# Patient Record
Sex: FEMALE | Race: WHITE | NOT HISPANIC OR LATINO | Employment: OTHER | URBAN - METROPOLITAN AREA
[De-identification: names, ages, dates, MRNs, and addresses within clinical notes are randomized per-mention and may not be internally consistent; named-entity substitution may affect disease eponyms.]

---

## 2020-09-17 ENCOUNTER — TELEPHONE (OUTPATIENT)
Dept: CARDIOLOGY | Facility: CLINIC | Age: 65
End: 2020-09-17

## 2020-09-17 DIAGNOSIS — I25.10 CORONARY ARTERY DISEASE, ANGINA PRESENCE UNSPECIFIED, UNSPECIFIED VESSEL OR LESION TYPE, UNSPECIFIED WHETHER NATIVE OR TRANSPLANTED HEART: Primary | ICD-10-CM

## 2020-09-17 DIAGNOSIS — E78.00 PURE HYPERCHOLESTEROLEMIA: ICD-10-CM

## 2020-09-17 DIAGNOSIS — I50.9 CONGESTIVE HEART FAILURE, UNSPECIFIED HF CHRONICITY, UNSPECIFIED HEART FAILURE TYPE: ICD-10-CM

## 2020-09-17 NOTE — TELEPHONE ENCOUNTER
----- Message from Yesy Brush sent at 9/17/2020 11:33 AM CDT -----  Regarding: lab order  Lab order     [phone]

## 2020-10-22 RX ORDER — FUROSEMIDE 40 MG/1
TABLET ORAL
Qty: 60 TABLET | Refills: 0 | Status: SHIPPED | OUTPATIENT
Start: 2020-10-22 | End: 2020-10-22 | Stop reason: SDUPTHER

## 2020-10-25 RX ORDER — FUROSEMIDE 40 MG/1
60 TABLET ORAL DAILY
Qty: 60 TABLET | Refills: 2 | Status: SHIPPED | OUTPATIENT
Start: 2020-10-25 | End: 2021-01-01 | Stop reason: SDUPTHER

## 2020-10-26 ENCOUNTER — OFFICE VISIT (OUTPATIENT)
Dept: CARDIOLOGY | Facility: CLINIC | Age: 65
End: 2020-10-26
Payer: COMMERCIAL

## 2020-10-26 VITALS
BODY MASS INDEX: 31.54 KG/M2 | HEIGHT: 63 IN | OXYGEN SATURATION: 97 % | SYSTOLIC BLOOD PRESSURE: 120 MMHG | DIASTOLIC BLOOD PRESSURE: 62 MMHG | HEART RATE: 73 BPM | WEIGHT: 178 LBS | TEMPERATURE: 97 F

## 2020-10-26 DIAGNOSIS — Z79.4 TYPE 2 DIABETES MELLITUS WITH OTHER CIRCULATORY COMPLICATION, WITH LONG-TERM CURRENT USE OF INSULIN: ICD-10-CM

## 2020-10-26 DIAGNOSIS — I50.20 HFREF (HEART FAILURE WITH REDUCED EJECTION FRACTION): ICD-10-CM

## 2020-10-26 DIAGNOSIS — I25.10 ARTERIOSCLEROTIC CARDIOVASCULAR DISEASE (ASCVD): ICD-10-CM

## 2020-10-26 DIAGNOSIS — I73.9 CLAUDICATION IN PERIPHERAL VASCULAR DISEASE: ICD-10-CM

## 2020-10-26 DIAGNOSIS — I25.10 CORONARY ARTERY DISEASE, ANGINA PRESENCE UNSPECIFIED, UNSPECIFIED VESSEL OR LESION TYPE, UNSPECIFIED WHETHER NATIVE OR TRANSPLANTED HEART: Primary | ICD-10-CM

## 2020-10-26 DIAGNOSIS — E11.59 TYPE 2 DIABETES MELLITUS WITH OTHER CIRCULATORY COMPLICATION, WITH LONG-TERM CURRENT USE OF INSULIN: ICD-10-CM

## 2020-10-26 DIAGNOSIS — I73.9 PVD (PERIPHERAL VASCULAR DISEASE): ICD-10-CM

## 2020-10-26 PROCEDURE — 93000 ELECTROCARDIOGRAM COMPLETE: CPT | Mod: S$GLB,,, | Performed by: SPECIALIST

## 2020-10-26 PROCEDURE — 1101F PT FALLS ASSESS-DOCD LE1/YR: CPT | Mod: CPTII,S$GLB,, | Performed by: SPECIALIST

## 2020-10-26 PROCEDURE — 1101F PR PT FALLS ASSESS DOC 0-1 FALLS W/OUT INJ PAST YR: ICD-10-PCS | Mod: CPTII,S$GLB,, | Performed by: SPECIALIST

## 2020-10-26 PROCEDURE — 3008F BODY MASS INDEX DOCD: CPT | Mod: CPTII,S$GLB,, | Performed by: SPECIALIST

## 2020-10-26 PROCEDURE — 93000 EKG 12-LEAD: ICD-10-PCS | Mod: S$GLB,,, | Performed by: SPECIALIST

## 2020-10-26 PROCEDURE — 3008F PR BODY MASS INDEX (BMI) DOCUMENTED: ICD-10-PCS | Mod: CPTII,S$GLB,, | Performed by: SPECIALIST

## 2020-10-26 PROCEDURE — 99204 PR OFFICE/OUTPT VISIT, NEW, LEVL IV, 45-59 MIN: ICD-10-PCS | Mod: S$GLB,,, | Performed by: SPECIALIST

## 2020-10-26 PROCEDURE — 99204 OFFICE O/P NEW MOD 45 MIN: CPT | Mod: S$GLB,,, | Performed by: SPECIALIST

## 2020-10-26 RX ORDER — GABAPENTIN 400 MG/1
400 CAPSULE ORAL 3 TIMES DAILY
Status: ON HOLD | COMMUNITY
Start: 2020-08-28 | End: 2021-01-01 | Stop reason: HOSPADM

## 2020-10-26 RX ORDER — ATORVASTATIN CALCIUM 40 MG/1
40 TABLET, FILM COATED ORAL NIGHTLY
COMMUNITY
Start: 2020-08-27 | End: 2021-01-01 | Stop reason: SDUPTHER

## 2020-10-26 RX ORDER — CLOPIDOGREL BISULFATE 75 MG/1
75 TABLET ORAL DAILY
COMMUNITY
Start: 2020-10-14 | End: 2020-12-18

## 2020-10-26 RX ORDER — CYCLOBENZAPRINE HCL 10 MG
10 TABLET ORAL 2 TIMES DAILY
COMMUNITY
Start: 2020-09-23 | End: 2021-01-01 | Stop reason: CLARIF

## 2020-10-26 RX ORDER — CITALOPRAM 40 MG/1
40 TABLET, FILM COATED ORAL DAILY
COMMUNITY
Start: 2020-10-20

## 2020-10-26 RX ORDER — INSULIN GLARGINE 100 [IU]/ML
INJECTION, SOLUTION SUBCUTANEOUS
Status: ON HOLD | COMMUNITY
Start: 2020-10-05 | End: 2021-01-01 | Stop reason: SDUPTHER

## 2020-10-26 RX ORDER — ISOSORBIDE DINITRATE 10 MG/1
10 TABLET ORAL 3 TIMES DAILY
COMMUNITY
End: 2020-11-02

## 2020-10-26 RX ORDER — CARVEDILOL 3.12 MG/1
TABLET ORAL
Status: ON HOLD | COMMUNITY
End: 2021-01-01 | Stop reason: HOSPADM

## 2020-10-26 NOTE — PROGRESS NOTES
Subjective:    Patient ID:  Claudia Powell is a 65 y.o. female who presents for   Coronary Artery Disease, Congestive Heart Failure, and Hyperlipidemia    HPI  cabg - 2016  Cath    rca ok, cx 50% block,  Jennifer to  Lad ok   svg to d1 ok  EF  40   aicd medtronic 2 years ago - bi v lead   Feels o k     No cp  + sob  No  etof  No  cigarrettes   Comes in today in feels well a she has not had a check up in over a year  She is on 20 U of insulin today  Problem 2.  She has claudication in the legs or right worse than left previous angiogram demonstrated occluded right SFA  She has lost weight is not smoking or drinking  Review of patient's allergies indicates:   Allergen Reactions    Cephalosporins     Penicillins      hives         Past Medical History:   Diagnosis Date    CHF (congestive heart failure)     Coronary artery disease     Diabetes mellitus     Hyperlipidemia     Hypertension     LBBB (left bundle branch block)     NSTEMI (non-ST elevated myocardial infarction)     PVD (peripheral vascular disease)      Past Surgical History:   Procedure Laterality Date    CARDIAC CATHETERIZATION       SECTION      CORONARY ARTERY BYPASS GRAFT  06/22/2016    x4    HYSTERECTOMY      INSERTION OF BIVENTRICULAR IMPLANTABLE CARDIOVERTER-DEFIBRILLATOR (ICD)       Social History     Tobacco Use    Smoking status: Former Smoker    Smokeless tobacco: Never Used   Substance Use Topics    Alcohol use: Not Currently    Drug use: Never        Review of Systems     Review of Systems   Constitution: Positive for malaise/fatigue and weight gain.   HENT: Negative.    Eyes: Negative.    Cardiovascular: Positive for dyspnea on exertion. Negative for chest pain, leg swelling, near-syncope, orthopnea, palpitations and syncope.   Respiratory: Positive for shortness of breath.    Endocrine:        Hbaic 6.5  Insulin 200 units    Skin: Negative.    Musculoskeletal: Positive for arthritis and joint pain.   Gastrointestinal:  Negative.  Negative for constipation, diarrhea and nausea.   Genitourinary: Negative.            Objective:        Vitals:    10/26/20 1631   BP: 120/62   Pulse: 73   Temp: 96.8 °F (36 °C)       No results found for: WBC, HGB, HCT, PLT, CHOL, TRIG, HDL, LDLDIRECT, ALT, AST, NA, K, CL, CREATININE, BUN, CO2, TSH, PSA, INR, GLUF, HGBA1C, MICROALBUR     ECHOCARDIOGRAM RESULTS  No results found for this or any previous visit.    CURRENT/PREVIOUS VISIT EKG  No results found for this or any previous visit.  No results found for this or any previous visit.  No results found for this or any previous visit.    PHYSICAL EXAM    Physical Exam blood pressure is 120/80  Head neck no carotid bruit  Lungs are clear  Cardiac 1/6 systolic murmur at the apex  Abdomen is ok  Femoral pulses are okay cannot palpate dorsalis pedis posterior tibials or pops field cool lower extremities    Medication List with Changes/Refills   Current Medications    ATORVASTATIN (LIPITOR) 40 MG TABLET    Take 40 mg by mouth every evening.    CARVEDILOL (COREG) 3.125 MG TABLET    Take by mouth.    CITALOPRAM (CELEXA) 40 MG TABLET    Take 40 mg by mouth once daily.    CLOPIDOGREL (PLAVIX) 75 MG TABLET    Take 75 mg by mouth once daily.    CYCLOBENZAPRINE (FLEXERIL) 10 MG TABLET    Take 10 mg by mouth 2 (two) times daily.    DIGOXIN (LANOXIN) 125 MCG TABLET    Take 1 tablet by mouth once daily    ENTRESTO 49-51 MG PER TABLET    Take 1 tablet by mouth twice daily    FUROSEMIDE (LASIX) 40 MG TABLET    Take 1.5 tablets (60 mg total) by mouth once daily.    GABAPENTIN (NEURONTIN) 400 MG CAPSULE    Take 400 mg by mouth 3 (three) times daily.    ISOSORBIDE DINITRATE (ISORDIL) 10 MG TABLET    Take 10 mg by mouth 3 (three) times daily.    LANTUS U-100 INSULIN 100 UNIT/ML INJECTION    INJECT 200 UNITS SUBCUTANEOUSLY ONCE DAILY    METOPROLOL TARTRATE (LOPRESSOR) 50 MG TABLET    Take 1 tablet by mouth twice daily    PENTOXIFYLLINE (TRENTAL) 400 MG TBSR    TAKE 1 TABLET  BY MOUTH THREE TIMES DAILY    RANOLAZINE (RANEXA) 500 MG TB12    Take 1 tablet by mouth twice daily           Assessment:       1. Coronary artery disease, angina presence unspecified, unspecified vessel or lesion type, unspecified whether native or transplanted heart     2.  Peripheral masses  Heart failure reduced ejection fraction with Medtronic Bi V pacing with AICD device     Plan:   Full lab work  Medtronic pacemaker clinic check  Echocardiogram  Return to clinic 6 -8 we week    Problem List Items Addressed This Visit     None      Visit Diagnoses     Coronary artery disease, angina presence unspecified, unspecified vessel or lesion type, unspecified whether native or transplanted heart    -  Primary    Relevant Orders    IN OFFICE EKG 12-LEAD (to [place])           No follow-ups on file.

## 2020-11-11 ENCOUNTER — HOSPITAL ENCOUNTER (OUTPATIENT)
Dept: CARDIOLOGY | Facility: CLINIC | Age: 65
Discharge: HOME | End: 2020-11-11
Attending: SPECIALIST
Payer: MEDICARE

## 2020-11-11 ENCOUNTER — LAB VISIT (OUTPATIENT)
Dept: LAB | Facility: HOSPITAL | Age: 65
End: 2020-11-11
Attending: SPECIALIST
Payer: MEDICARE

## 2020-11-11 VITALS — HEIGHT: 63 IN | WEIGHT: 177 LBS | BODY MASS INDEX: 31.36 KG/M2

## 2020-11-11 VITALS — HEIGHT: 63 IN | BODY MASS INDEX: 31.53 KG/M2 | WEIGHT: 177.94 LBS

## 2020-11-11 DIAGNOSIS — I44.7 LBBB (LEFT BUNDLE BRANCH BLOCK): Primary | ICD-10-CM

## 2020-11-11 DIAGNOSIS — I50.20 HFREF (HEART FAILURE WITH REDUCED EJECTION FRACTION): ICD-10-CM

## 2020-11-11 DIAGNOSIS — E78.00 PURE HYPERCHOLESTEROLEMIA: ICD-10-CM

## 2020-11-11 DIAGNOSIS — I25.10 CORONARY ARTERY DISEASE, ANGINA PRESENCE UNSPECIFIED, UNSPECIFIED VESSEL OR LESION TYPE, UNSPECIFIED WHETHER NATIVE OR TRANSPLANTED HEART: ICD-10-CM

## 2020-11-11 DIAGNOSIS — I25.10 ARTERIOSCLEROTIC CARDIOVASCULAR DISEASE (ASCVD): ICD-10-CM

## 2020-11-11 DIAGNOSIS — I73.9 CLAUDICATION IN PERIPHERAL VASCULAR DISEASE: ICD-10-CM

## 2020-11-11 DIAGNOSIS — Z79.4 TYPE 2 DIABETES MELLITUS WITH OTHER CIRCULATORY COMPLICATION, WITH LONG-TERM CURRENT USE OF INSULIN: ICD-10-CM

## 2020-11-11 DIAGNOSIS — E11.59 TYPE 2 DIABETES MELLITUS WITH OTHER CIRCULATORY COMPLICATION, WITH LONG-TERM CURRENT USE OF INSULIN: ICD-10-CM

## 2020-11-11 DIAGNOSIS — I50.9 CONGESTIVE HEART FAILURE, UNSPECIFIED HF CHRONICITY, UNSPECIFIED HEART FAILURE TYPE: ICD-10-CM

## 2020-11-11 LAB
ALBUMIN SERPL BCP-MCNC: 3.9 G/DL (ref 3.5–5.2)
ALP SERPL-CCNC: 94 U/L (ref 55–135)
ALT SERPL W/O P-5'-P-CCNC: 21 U/L (ref 10–44)
ANION GAP SERPL CALC-SCNC: 8 MMOL/L (ref 8–16)
AST SERPL-CCNC: 21 U/L (ref 10–40)
BASOPHILS # BLD AUTO: 0.07 K/UL (ref 0–0.2)
BASOPHILS NFR BLD: 0.6 % (ref 0–1.9)
BILIRUB SERPL-MCNC: 0.9 MG/DL (ref 0.1–1)
BNP SERPL-MCNC: 194 PG/ML (ref 0–99)
BUN SERPL-MCNC: 33 MG/DL (ref 8–23)
CALCIUM SERPL-MCNC: 9.4 MG/DL (ref 8.7–10.5)
CHLORIDE SERPL-SCNC: 104 MMOL/L (ref 95–110)
CHOLEST SERPL-MCNC: 142 MG/DL (ref 120–199)
CHOLEST/HDLC SERPL: 3.8 {RATIO} (ref 2–5)
CO2 SERPL-SCNC: 28 MMOL/L (ref 23–29)
CREAT SERPL-MCNC: 1.5 MG/DL (ref 0.5–1.4)
DIFFERENTIAL METHOD: ABNORMAL
EOSINOPHIL # BLD AUTO: 0.2 K/UL (ref 0–0.5)
EOSINOPHIL NFR BLD: 1.3 % (ref 0–8)
ERYTHROCYTE [DISTWIDTH] IN BLOOD BY AUTOMATED COUNT: 14.1 % (ref 11.5–14.5)
EST. GFR  (AFRICAN AMERICAN): 41.8 ML/MIN/1.73 M^2
EST. GFR  (NON AFRICAN AMERICAN): 36.3 ML/MIN/1.73 M^2
ESTIMATED AVG GLUCOSE: 131 MG/DL (ref 68–131)
GLUCOSE SERPL-MCNC: 101 MG/DL (ref 70–110)
HBA1C MFR BLD HPLC: 6.2 % (ref 4.5–6.2)
HCT VFR BLD AUTO: 39.5 % (ref 37–48.5)
HDLC SERPL-MCNC: 37 MG/DL (ref 40–75)
HDLC SERPL: 26.1 % (ref 20–50)
HGB BLD-MCNC: 13.2 G/DL (ref 12–16)
IMM GRANULOCYTES # BLD AUTO: 0.16 K/UL (ref 0–0.04)
IMM GRANULOCYTES NFR BLD AUTO: 1.3 % (ref 0–0.5)
LDLC SERPL CALC-MCNC: 75.6 MG/DL (ref 63–159)
LYMPHOCYTES # BLD AUTO: 1.7 K/UL (ref 1–4.8)
LYMPHOCYTES NFR BLD: 13.7 % (ref 18–48)
MCH RBC QN AUTO: 31.2 PG (ref 27–31)
MCHC RBC AUTO-ENTMCNC: 33.4 G/DL (ref 32–36)
MCV RBC AUTO: 93 FL (ref 82–98)
MONOCYTES # BLD AUTO: 0.8 K/UL (ref 0.3–1)
MONOCYTES NFR BLD: 6.3 % (ref 4–15)
NEUTROPHILS # BLD AUTO: 9.6 K/UL (ref 1.8–7.7)
NEUTROPHILS NFR BLD: 76.8 % (ref 38–73)
NONHDLC SERPL-MCNC: 105 MG/DL
NRBC BLD-RTO: 0 /100 WBC
PLATELET # BLD AUTO: 331 K/UL (ref 150–350)
PMV BLD AUTO: 9.6 FL (ref 9.2–12.9)
POTASSIUM SERPL-SCNC: 4.3 MMOL/L (ref 3.5–5.1)
PROT SERPL-MCNC: 7.7 G/DL (ref 6–8.4)
RBC # BLD AUTO: 4.23 M/UL (ref 4–5.4)
SODIUM SERPL-SCNC: 140 MMOL/L (ref 136–145)
TRIGL SERPL-MCNC: 147 MG/DL (ref 30–150)
WBC # BLD AUTO: 12.45 K/UL (ref 3.9–12.7)

## 2020-11-11 PROCEDURE — 93306 ECHO (CUPID ONLY): ICD-10-PCS | Mod: S$GLB,,, | Performed by: INTERNAL MEDICINE

## 2020-11-11 PROCEDURE — 85025 COMPLETE CBC W/AUTO DIFF WBC: CPT

## 2020-11-11 PROCEDURE — 83880 ASSAY OF NATRIURETIC PEPTIDE: CPT

## 2020-11-11 PROCEDURE — 80061 LIPID PANEL: CPT

## 2020-11-11 PROCEDURE — 93922 ANKLE BRACHIAL INDICES (ABI): ICD-10-PCS | Mod: S$GLB,,, | Performed by: SPECIALIST

## 2020-11-11 PROCEDURE — 83036 HEMOGLOBIN GLYCOSYLATED A1C: CPT

## 2020-11-11 PROCEDURE — 36415 COLL VENOUS BLD VENIPUNCTURE: CPT

## 2020-11-11 PROCEDURE — 80053 COMPREHEN METABOLIC PANEL: CPT

## 2020-11-11 PROCEDURE — 93306 TTE W/DOPPLER COMPLETE: CPT | Mod: S$GLB,,, | Performed by: INTERNAL MEDICINE

## 2020-11-11 PROCEDURE — 93922 UPR/L XTREMITY ART 2 LEVELS: CPT | Mod: S$GLB,,, | Performed by: SPECIALIST

## 2020-11-15 LAB
AORTIC ROOT ANNULUS: 2.2 CM
AORTIC VALVE CUSP SEPERATION: 1.6 CM
AV INDEX (PROSTH): 0.72
AV MEAN GRADIENT: 8 MMHG
AV PEAK GRADIENT: 13 MMHG
AV REGURGITATION PRESSURE HALF TIME: 692 MS
AV VALVE AREA: 2.48 CM2
AV VELOCITY RATIO: 0.74
BSA FOR ECHO PROCEDURE: 1.89 M2
CV ECHO LV RWT: 0.38 CM
DOP CALC AO PEAK VEL: 1.83 M/S
DOP CALC AO VTI: 40.4 CM
DOP CALC LVOT AREA: 3.5 CM2
DOP CALC LVOT DIAMETER: 2.1 CM
DOP CALC LVOT PEAK VEL: 1.36 M/S
DOP CALC LVOT STROKE VOLUME: 100.05 CM3
DOP CALCLVOT PEAK VEL VTI: 28.9 CM
E WAVE DECELERATION TIME: 185 MS
E/A RATIO: 1.41
E/E' RATIO: 13.23 M/S
ECHO LV POSTERIOR WALL: 0.96 CM (ref 0.6–1.1)
FRACTIONAL SHORTENING: 23 % (ref 28–44)
INTERVENTRICULAR SEPTUM: 1.14 CM (ref 0.6–1.1)
IVRT: 137 MS
LEFT ATRIUM SIZE: 4.7 CM
LEFT INTERNAL DIMENSION IN SYSTOLE: 3.9 CM (ref 2.1–4)
LEFT VENTRICLE MASS INDEX: 107 G/M2
LEFT VENTRICULAR INTERNAL DIMENSION IN DIASTOLE: 5.04 CM (ref 3.5–6)
LEFT VENTRICULAR MASS: 196.93 G
LV LATERAL E/E' RATIO: 10.75 M/S
LV SEPTAL E/E' RATIO: 17.2 M/S
MV PEAK A VEL: 0.61 M/S
MV PEAK E VEL: 0.86 M/S
MV STENOSIS PRESSURE HALF TIME: 88 MS
MV VALVE AREA P 1/2 METHOD: 2.5 CM2
PISA TR MAX VEL: 2.5 M/S
RA PRESSURE: 3 MMHG
RIGHT VENTRICULAR END-DIASTOLIC DIMENSION: 2.76 CM
TDI LATERAL: 0.08 M/S
TDI SEPTAL: 0.05 M/S
TDI: 0.07 M/S
TR MAX PG: 25 MMHG
TV REST PULMONARY ARTERY PRESSURE: 28 MMHG

## 2020-11-20 LAB
LEFT ABI: 0.47
LEFT ARM BP: 132 MMHG
LEFT CALF BP: 62 MMHG
LEFT DORSALIS PEDIS: 55 MMHG
LEFT POSTERIOR TIBIAL: 62 MMHG
LEFT UPPER LEG BP: 84 MMHG
RIGHT ABI: 0.48
RIGHT ARM BP: 128 MMHG
RIGHT CALF BP: 59 MMHG
RIGHT DORSALIS PEDIS: 58 MMHG
RIGHT POSTERIOR TIBIAL: 63 MMHG
RIGHT UPPER LEG BP: 61 MMHG

## 2021-01-01 ENCOUNTER — PATIENT MESSAGE (OUTPATIENT)
Dept: CARDIOLOGY | Facility: CLINIC | Age: 66
End: 2021-01-01

## 2021-01-01 ENCOUNTER — IMMUNIZATION (OUTPATIENT)
Dept: PRIMARY CARE CLINIC | Facility: CLINIC | Age: 66
End: 2021-01-01
Payer: MEDICARE

## 2021-01-01 ENCOUNTER — HOSPITAL ENCOUNTER (EMERGENCY)
Facility: HOSPITAL | Age: 66
Discharge: HOME | End: 2021-10-20
Attending: EMERGENCY MEDICINE
Payer: MEDICARE

## 2021-01-01 ENCOUNTER — HOSPITAL ENCOUNTER (OUTPATIENT)
Facility: HOSPITAL | Age: 66
Discharge: HOME | End: 2021-11-27
Attending: EMERGENCY MEDICINE | Admitting: INTERNAL MEDICINE
Payer: MEDICARE

## 2021-01-01 ENCOUNTER — HOSPITAL ENCOUNTER (INPATIENT)
Facility: HOSPITAL | Age: 66
LOS: 9 days | Discharge: HOME HEALTH | DRG: 308 | End: 2021-11-15
Attending: EMERGENCY MEDICINE | Admitting: INTERNAL MEDICINE
Payer: MEDICARE

## 2021-01-01 ENCOUNTER — TELEPHONE (OUTPATIENT)
Dept: CARDIOLOGY | Facility: CLINIC | Age: 66
End: 2021-01-01

## 2021-01-01 ENCOUNTER — LAB VISIT (OUTPATIENT)
Dept: LAB | Facility: HOSPITAL | Age: 66
End: 2021-01-01
Attending: INTERNAL MEDICINE
Payer: MEDICARE

## 2021-01-01 ENCOUNTER — OFFICE VISIT (OUTPATIENT)
Dept: CARDIOLOGY | Facility: CLINIC | Age: 66
End: 2021-01-01
Payer: MEDICARE

## 2021-01-01 ENCOUNTER — PATIENT MESSAGE (OUTPATIENT)
Dept: CARDIOLOGY | Facility: CLINIC | Age: 66
End: 2021-01-01
Payer: MEDICARE

## 2021-01-01 ENCOUNTER — TELEPHONE (OUTPATIENT)
Dept: CARDIOLOGY | Facility: CLINIC | Age: 66
End: 2021-01-01
Payer: MEDICARE

## 2021-01-01 ENCOUNTER — CLINICAL SUPPORT (OUTPATIENT)
Dept: CARDIOLOGY | Facility: HOSPITAL | Age: 66
DRG: 291 | End: 2021-01-01
Payer: MEDICARE

## 2021-01-01 ENCOUNTER — NURSE TRIAGE (OUTPATIENT)
Dept: ADMINISTRATIVE | Facility: CLINIC | Age: 66
End: 2021-01-01
Payer: MEDICARE

## 2021-01-01 ENCOUNTER — HOSPITAL ENCOUNTER (INPATIENT)
Facility: HOSPITAL | Age: 66
LOS: 2 days | Discharge: HOME | DRG: 291 | End: 2021-09-15
Attending: EMERGENCY MEDICINE | Admitting: FAMILY MEDICINE
Payer: MEDICARE

## 2021-01-01 ENCOUNTER — HOSPITAL ENCOUNTER (OUTPATIENT)
Facility: HOSPITAL | Age: 66
Discharge: HOME | End: 2021-11-23
Attending: EMERGENCY MEDICINE | Admitting: INTERNAL MEDICINE
Payer: MEDICARE

## 2021-01-01 ENCOUNTER — TELEPHONE (OUTPATIENT)
Dept: CARDIAC REHAB | Facility: HOSPITAL | Age: 66
End: 2021-01-01
Payer: MEDICARE

## 2021-01-01 ENCOUNTER — CLINICAL SUPPORT (OUTPATIENT)
Dept: CARDIOLOGY | Facility: HOSPITAL | Age: 66
DRG: 308 | End: 2021-01-01
Attending: HOSPITALIST
Payer: MEDICARE

## 2021-01-01 VITALS
BODY MASS INDEX: 33.3 KG/M2 | BODY MASS INDEX: 39.21 KG/M2 | HEART RATE: 60 BPM | HEIGHT: 63 IN | OXYGEN SATURATION: 95 % | DIASTOLIC BLOOD PRESSURE: 84 MMHG | TEMPERATURE: 97 F | WEIGHT: 188 LBS | HEART RATE: 68 BPM | TEMPERATURE: 99 F | RESPIRATION RATE: 18 BRPM | SYSTOLIC BLOOD PRESSURE: 161 MMHG | RESPIRATION RATE: 18 BRPM | OXYGEN SATURATION: 100 % | WEIGHT: 221.31 LBS | SYSTOLIC BLOOD PRESSURE: 177 MMHG | DIASTOLIC BLOOD PRESSURE: 70 MMHG

## 2021-01-01 VITALS
HEIGHT: 63 IN | TEMPERATURE: 99 F | DIASTOLIC BLOOD PRESSURE: 72 MMHG | HEART RATE: 88 BPM | BODY MASS INDEX: 33.4 KG/M2 | RESPIRATION RATE: 18 BRPM | WEIGHT: 188.5 LBS | OXYGEN SATURATION: 95 % | SYSTOLIC BLOOD PRESSURE: 154 MMHG

## 2021-01-01 VITALS
BODY MASS INDEX: 34.91 KG/M2 | SYSTOLIC BLOOD PRESSURE: 130 MMHG | HEART RATE: 71 BPM | OXYGEN SATURATION: 95 % | DIASTOLIC BLOOD PRESSURE: 60 MMHG | WEIGHT: 197 LBS | HEIGHT: 63 IN

## 2021-01-01 VITALS
RESPIRATION RATE: 18 BRPM | SYSTOLIC BLOOD PRESSURE: 134 MMHG | HEIGHT: 63 IN | WEIGHT: 192.25 LBS | TEMPERATURE: 98 F | OXYGEN SATURATION: 96 % | BODY MASS INDEX: 34.06 KG/M2 | DIASTOLIC BLOOD PRESSURE: 63 MMHG | HEART RATE: 74 BPM

## 2021-01-01 VITALS
TEMPERATURE: 98 F | WEIGHT: 191 LBS | OXYGEN SATURATION: 98 % | HEIGHT: 63 IN | HEART RATE: 69 BPM | BODY MASS INDEX: 33.84 KG/M2 | RESPIRATION RATE: 20 BRPM | DIASTOLIC BLOOD PRESSURE: 72 MMHG | SYSTOLIC BLOOD PRESSURE: 138 MMHG

## 2021-01-01 VITALS
OXYGEN SATURATION: 96 % | HEART RATE: 65 BPM | SYSTOLIC BLOOD PRESSURE: 178 MMHG | BODY MASS INDEX: 31.54 KG/M2 | HEIGHT: 63 IN | DIASTOLIC BLOOD PRESSURE: 70 MMHG | WEIGHT: 178 LBS

## 2021-01-01 DIAGNOSIS — I25.110 ATHEROSCLEROSIS OF NATIVE CORONARY ARTERY OF NATIVE HEART WITH UNSTABLE ANGINA PECTORIS: Chronic | ICD-10-CM

## 2021-01-01 DIAGNOSIS — I50.42 CHRONIC COMBINED SYSTOLIC AND DIASTOLIC HEART FAILURE: Chronic | ICD-10-CM

## 2021-01-01 DIAGNOSIS — R07.9 CHEST PAIN: ICD-10-CM

## 2021-01-01 DIAGNOSIS — I48.0 PAROXYSMAL ATRIAL FIBRILLATION: ICD-10-CM

## 2021-01-01 DIAGNOSIS — R94.31 ABNORMAL EKG: ICD-10-CM

## 2021-01-01 DIAGNOSIS — R06.02 SHORTNESS OF BREATH: ICD-10-CM

## 2021-01-01 DIAGNOSIS — R06.02 SOB (SHORTNESS OF BREATH): ICD-10-CM

## 2021-01-01 DIAGNOSIS — I50.9 ACUTE ON CHRONIC HEART FAILURE: ICD-10-CM

## 2021-01-01 DIAGNOSIS — J96.01 ACUTE HYPOXEMIC RESPIRATORY FAILURE: ICD-10-CM

## 2021-01-01 DIAGNOSIS — I50.23 ACUTE ON CHRONIC SYSTOLIC (CONGESTIVE) HEART FAILURE: ICD-10-CM

## 2021-01-01 DIAGNOSIS — I49.9 ARRHYTHMIA: ICD-10-CM

## 2021-01-01 DIAGNOSIS — E11.9 DIABETES MELLITUS WITHOUT COMPLICATION: Chronic | ICD-10-CM

## 2021-01-01 DIAGNOSIS — N17.9 AKI (ACUTE KIDNEY INJURY): ICD-10-CM

## 2021-01-01 DIAGNOSIS — I50.22 CHRONIC SYSTOLIC HEART FAILURE: Chronic | ICD-10-CM

## 2021-01-01 DIAGNOSIS — I73.9 PERIPHERAL VASCULAR DISEASE: Chronic | ICD-10-CM

## 2021-01-01 DIAGNOSIS — R57.9 SHOCK: ICD-10-CM

## 2021-01-01 DIAGNOSIS — I21.4 NSTEMI (NON-ST ELEVATED MYOCARDIAL INFARCTION): ICD-10-CM

## 2021-01-01 DIAGNOSIS — I50.43 ACUTE ON CHRONIC COMBINED SYSTOLIC AND DIASTOLIC HEART FAILURE: Primary | ICD-10-CM

## 2021-01-01 DIAGNOSIS — R52 PAIN: ICD-10-CM

## 2021-01-01 DIAGNOSIS — Z23 NEED FOR VACCINATION: Primary | ICD-10-CM

## 2021-01-01 DIAGNOSIS — I50.9 CHF (CONGESTIVE HEART FAILURE): ICD-10-CM

## 2021-01-01 DIAGNOSIS — Z95.810 AICD (AUTOMATIC CARDIOVERTER/DEFIBRILLATOR) PRESENT: Chronic | ICD-10-CM

## 2021-01-01 DIAGNOSIS — E87.70 HYPERVOLEMIA, UNSPECIFIED HYPERVOLEMIA TYPE: Primary | ICD-10-CM

## 2021-01-01 DIAGNOSIS — I50.20 HFREF (HEART FAILURE WITH REDUCED EJECTION FRACTION): Primary | ICD-10-CM

## 2021-01-01 DIAGNOSIS — M77.8 ENTHESOPATHY OF ELBOW: Primary | ICD-10-CM

## 2021-01-01 DIAGNOSIS — J96.01 ACUTE RESPIRATORY FAILURE WITH HYPOXIA: ICD-10-CM

## 2021-01-01 DIAGNOSIS — I25.10 CORONARY ARTERY DISEASE INVOLVING NATIVE CORONARY ARTERY OF NATIVE HEART WITHOUT ANGINA PECTORIS: ICD-10-CM

## 2021-01-01 DIAGNOSIS — I50.9 ACUTE ON CHRONIC CONGESTIVE HEART FAILURE: ICD-10-CM

## 2021-01-01 DIAGNOSIS — E87.5 HYPERKALEMIA: ICD-10-CM

## 2021-01-01 DIAGNOSIS — I10 PRIMARY HYPERTENSION: ICD-10-CM

## 2021-01-01 DIAGNOSIS — I48.91 ATRIAL FIBRILLATION WITH RVR: Primary | ICD-10-CM

## 2021-01-01 LAB
ALBUMIN SERPL BCP-MCNC: 2.9 G/DL (ref 3.5–5.2)
ALBUMIN SERPL BCP-MCNC: 3 G/DL (ref 3.5–5.2)
ALBUMIN SERPL BCP-MCNC: 3.1 G/DL (ref 3.5–5.2)
ALBUMIN SERPL BCP-MCNC: 3.2 G/DL (ref 3.5–5.2)
ALBUMIN SERPL BCP-MCNC: 3.2 G/DL (ref 3.5–5.2)
ALBUMIN SERPL BCP-MCNC: 3.3 G/DL (ref 3.5–5.2)
ALBUMIN SERPL BCP-MCNC: 3.4 G/DL (ref 3.5–5.2)
ALBUMIN SERPL BCP-MCNC: 3.5 G/DL (ref 3.5–5.2)
ALBUMIN SERPL BCP-MCNC: 3.6 G/DL (ref 3.5–5.2)
ALBUMIN SERPL BCP-MCNC: 3.7 G/DL (ref 3.5–5.2)
ALBUMIN SERPL BCP-MCNC: 3.8 G/DL (ref 3.5–5.2)
ALBUMIN SERPL BCP-MCNC: 3.8 G/DL (ref 3.5–5.2)
ALLENS TEST: ABNORMAL
ALP SERPL-CCNC: 102 U/L (ref 55–135)
ALP SERPL-CCNC: 105 U/L (ref 55–135)
ALP SERPL-CCNC: 106 U/L (ref 55–135)
ALP SERPL-CCNC: 111 U/L (ref 55–135)
ALP SERPL-CCNC: 112 U/L (ref 55–135)
ALP SERPL-CCNC: 113 U/L (ref 55–135)
ALP SERPL-CCNC: 119 U/L (ref 55–135)
ALP SERPL-CCNC: 123 U/L (ref 55–135)
ALP SERPL-CCNC: 153 U/L (ref 55–135)
ALP SERPL-CCNC: 84 U/L (ref 55–135)
ALP SERPL-CCNC: 91 U/L (ref 55–135)
ALP SERPL-CCNC: 92 U/L (ref 55–135)
ALP SERPL-CCNC: 94 U/L (ref 55–135)
ALP SERPL-CCNC: 99 U/L (ref 55–135)
ALT SERPL W/O P-5'-P-CCNC: 106 U/L (ref 10–44)
ALT SERPL W/O P-5'-P-CCNC: 1392 U/L (ref 10–44)
ALT SERPL W/O P-5'-P-CCNC: 1807 U/L (ref 10–44)
ALT SERPL W/O P-5'-P-CCNC: 2737 U/L (ref 10–44)
ALT SERPL W/O P-5'-P-CCNC: 3180 U/L (ref 10–44)
ALT SERPL W/O P-5'-P-CCNC: 364 U/L (ref 10–44)
ALT SERPL W/O P-5'-P-CCNC: 37 U/L (ref 10–44)
ALT SERPL W/O P-5'-P-CCNC: 466 U/L (ref 10–44)
ALT SERPL W/O P-5'-P-CCNC: 53 U/L (ref 10–44)
ALT SERPL W/O P-5'-P-CCNC: 57 U/L (ref 10–44)
ALT SERPL W/O P-5'-P-CCNC: 59 U/L (ref 10–44)
ALT SERPL W/O P-5'-P-CCNC: 657 U/L (ref 10–44)
ALT SERPL W/O P-5'-P-CCNC: 67 U/L (ref 10–44)
ALT SERPL W/O P-5'-P-CCNC: 81 U/L (ref 10–44)
ALT SERPL W/O P-5'-P-CCNC: 856 U/L (ref 10–44)
ALT SERPL W/O P-5'-P-CCNC: 920 U/L (ref 10–44)
AMYLASE SERPL-CCNC: 82 U/L (ref 20–110)
ANION GAP SERPL CALC-SCNC: 10 MMOL/L (ref 8–16)
ANION GAP SERPL CALC-SCNC: 11 MMOL/L (ref 8–16)
ANION GAP SERPL CALC-SCNC: 13 MMOL/L (ref 8–16)
ANION GAP SERPL CALC-SCNC: 14 MMOL/L (ref 8–16)
ANION GAP SERPL CALC-SCNC: 15 MMOL/L (ref 8–16)
ANION GAP SERPL CALC-SCNC: 16 MMOL/L (ref 8–16)
ANION GAP SERPL CALC-SCNC: 21 MMOL/L (ref 8–16)
ANION GAP SERPL CALC-SCNC: 7 MMOL/L (ref 8–16)
ANION GAP SERPL CALC-SCNC: 9 MMOL/L (ref 8–16)
AORTIC ROOT ANNULUS: 3.29 CM
AORTIC VALVE CUSP SEPERATION: 1.56 CM
APTT PPP: 24.2 SEC (ref 23.3–35.1)
AST SERPL-CCNC: 1339 U/L (ref 10–40)
AST SERPL-CCNC: 187 U/L (ref 10–40)
AST SERPL-CCNC: 23 U/L (ref 10–40)
AST SERPL-CCNC: 28 U/L (ref 10–40)
AST SERPL-CCNC: 29 U/L (ref 10–40)
AST SERPL-CCNC: 30 U/L (ref 10–40)
AST SERPL-CCNC: 33 U/L (ref 10–40)
AST SERPL-CCNC: 35 U/L (ref 10–40)
AST SERPL-CCNC: 364 U/L (ref 10–40)
AST SERPL-CCNC: 3742 U/L (ref 10–40)
AST SERPL-CCNC: 40 U/L (ref 10–40)
AST SERPL-CCNC: 53 U/L (ref 10–40)
AST SERPL-CCNC: 61 U/L (ref 10–40)
AST SERPL-CCNC: 645 U/L (ref 10–40)
AST SERPL-CCNC: 69 U/L (ref 10–40)
AST SERPL-CCNC: 82 U/L (ref 10–40)
AV INDEX (PROSTH): 0.67
AV MEAN GRADIENT: 3 MMHG
AV PEAK GRADIENT: 8 MMHG
AV VALVE AREA: 2.14 CM2
AV VELOCITY RATIO: 62.42
B-OH-BUTYR BLD STRIP-SCNC: 0.2 MMOL/L (ref 0–0.5)
B-OH-BUTYR BLD STRIP-SCNC: 0.2 MMOL/L (ref 0–0.5)
BACTERIA #/AREA URNS HPF: ABNORMAL /HPF
BACTERIA #/AREA URNS HPF: NEGATIVE /HPF
BACTERIA BLD CULT: NORMAL
BACTERIA BLD CULT: NORMAL
BACTERIA UR CULT: ABNORMAL
BASOPHILS # BLD AUTO: 0.02 K/UL (ref 0–0.2)
BASOPHILS # BLD AUTO: 0.03 K/UL (ref 0–0.2)
BASOPHILS # BLD AUTO: 0.04 K/UL (ref 0–0.2)
BASOPHILS # BLD AUTO: 0.05 K/UL (ref 0–0.2)
BASOPHILS # BLD AUTO: 0.06 K/UL (ref 0–0.2)
BASOPHILS # BLD AUTO: 0.08 K/UL (ref 0–0.2)
BASOPHILS # BLD AUTO: 0.13 K/UL (ref 0–0.2)
BASOPHILS # BLD AUTO: 0.15 K/UL (ref 0–0.2)
BASOPHILS NFR BLD: 0.1 % (ref 0–1.9)
BASOPHILS NFR BLD: 0.1 % (ref 0–1.9)
BASOPHILS NFR BLD: 0.2 % (ref 0–1.9)
BASOPHILS NFR BLD: 0.3 % (ref 0–1.9)
BASOPHILS NFR BLD: 0.4 % (ref 0–1.9)
BASOPHILS NFR BLD: 0.5 % (ref 0–1.9)
BASOPHILS NFR BLD: 0.6 % (ref 0–1.9)
BASOPHILS NFR BLD: 0.7 % (ref 0–1.9)
BASOPHILS NFR BLD: 0.7 % (ref 0–1.9)
BASOPHILS NFR BLD: 0.8 % (ref 0–1.9)
BILIRUB SERPL-MCNC: 1 MG/DL (ref 0.1–1)
BILIRUB SERPL-MCNC: 1.2 MG/DL (ref 0.1–1)
BILIRUB SERPL-MCNC: 1.3 MG/DL (ref 0.1–1)
BILIRUB SERPL-MCNC: 1.4 MG/DL (ref 0.1–1)
BILIRUB SERPL-MCNC: 1.5 MG/DL (ref 0.1–1)
BILIRUB SERPL-MCNC: 1.6 MG/DL (ref 0.1–1)
BILIRUB SERPL-MCNC: 1.7 MG/DL (ref 0.1–1)
BILIRUB SERPL-MCNC: 1.8 MG/DL (ref 0.1–1)
BILIRUB SERPL-MCNC: 1.9 MG/DL (ref 0.1–1)
BILIRUB SERPL-MCNC: 2 MG/DL (ref 0.1–1)
BILIRUB UR QL STRIP: NEGATIVE
BNP SERPL-MCNC: 1156 PG/ML (ref 0–99)
BNP SERPL-MCNC: 1471 PG/ML (ref 0–99)
BNP SERPL-MCNC: 2197 PG/ML (ref 0–99)
BNP SERPL-MCNC: 3639 PG/ML (ref 0–99)
BNP SERPL-MCNC: 4167 PG/ML (ref 0–99)
BNP SERPL-MCNC: 710 PG/ML (ref 0–99)
BNP SERPL-MCNC: 840 PG/ML (ref 0–99)
BNP SERPL-MCNC: 966 PG/ML (ref 0–99)
BNP SERPL-MCNC: >4500 PG/ML (ref 0–99)
BSA FOR ECHO PROCEDURE: 2.01 M2
BUN SERPL-MCNC: 104 MG/DL (ref 8–23)
BUN SERPL-MCNC: 105 MG/DL (ref 8–23)
BUN SERPL-MCNC: 110 MG/DL (ref 8–23)
BUN SERPL-MCNC: 24 MG/DL (ref 8–23)
BUN SERPL-MCNC: 29 MG/DL (ref 8–23)
BUN SERPL-MCNC: 33 MG/DL (ref 8–23)
BUN SERPL-MCNC: 37 MG/DL (ref 8–23)
BUN SERPL-MCNC: 37 MG/DL (ref 8–23)
BUN SERPL-MCNC: 45 MG/DL (ref 8–23)
BUN SERPL-MCNC: 47 MG/DL (ref 8–23)
BUN SERPL-MCNC: 48 MG/DL (ref 8–23)
BUN SERPL-MCNC: 56 MG/DL (ref 8–23)
BUN SERPL-MCNC: 59 MG/DL (ref 8–23)
BUN SERPL-MCNC: 59 MG/DL (ref 8–23)
BUN SERPL-MCNC: 64 MG/DL (ref 8–23)
BUN SERPL-MCNC: 68 MG/DL (ref 8–23)
BUN SERPL-MCNC: 70 MG/DL (ref 8–23)
BUN SERPL-MCNC: 72 MG/DL (ref 8–23)
BUN SERPL-MCNC: 79 MG/DL (ref 8–23)
BUN SERPL-MCNC: 80 MG/DL (ref 8–23)
BUN SERPL-MCNC: 81 MG/DL (ref 8–23)
CA-I BLDV-SCNC: 0.85 MMOL/L (ref 1.06–1.42)
CALCIUM SERPL-MCNC: 10.1 MG/DL (ref 8.7–10.5)
CALCIUM SERPL-MCNC: 6.6 MG/DL (ref 8.7–10.5)
CALCIUM SERPL-MCNC: 6.7 MG/DL (ref 8.7–10.5)
CALCIUM SERPL-MCNC: 7.1 MG/DL (ref 8.7–10.5)
CALCIUM SERPL-MCNC: 7.3 MG/DL (ref 8.7–10.5)
CALCIUM SERPL-MCNC: 7.9 MG/DL (ref 8.7–10.5)
CALCIUM SERPL-MCNC: 8.1 MG/DL (ref 8.7–10.5)
CALCIUM SERPL-MCNC: 8.2 MG/DL (ref 8.7–10.5)
CALCIUM SERPL-MCNC: 8.2 MG/DL (ref 8.7–10.5)
CALCIUM SERPL-MCNC: 8.3 MG/DL (ref 8.7–10.5)
CALCIUM SERPL-MCNC: 8.5 MG/DL (ref 8.7–10.5)
CALCIUM SERPL-MCNC: 8.6 MG/DL (ref 8.7–10.5)
CALCIUM SERPL-MCNC: 8.8 MG/DL (ref 8.7–10.5)
CALCIUM SERPL-MCNC: 8.8 MG/DL (ref 8.7–10.5)
CALCIUM SERPL-MCNC: 8.9 MG/DL (ref 8.7–10.5)
CALCIUM SERPL-MCNC: 8.9 MG/DL (ref 8.7–10.5)
CALCIUM SERPL-MCNC: 9 MG/DL (ref 8.7–10.5)
CALCIUM SERPL-MCNC: 9.2 MG/DL (ref 8.7–10.5)
CALCIUM SERPL-MCNC: 9.4 MG/DL (ref 8.7–10.5)
CALCIUM SERPL-MCNC: 9.5 MG/DL (ref 8.7–10.5)
CALCIUM SERPL-MCNC: 9.6 MG/DL (ref 8.7–10.5)
CHLORIDE SERPL-SCNC: 100 MMOL/L (ref 95–110)
CHLORIDE SERPL-SCNC: 101 MMOL/L (ref 95–110)
CHLORIDE SERPL-SCNC: 102 MMOL/L (ref 95–110)
CHLORIDE SERPL-SCNC: 103 MMOL/L (ref 95–110)
CHLORIDE SERPL-SCNC: 103 MMOL/L (ref 95–110)
CHLORIDE SERPL-SCNC: 93 MMOL/L (ref 95–110)
CHLORIDE SERPL-SCNC: 94 MMOL/L (ref 95–110)
CHLORIDE SERPL-SCNC: 96 MMOL/L (ref 95–110)
CHLORIDE SERPL-SCNC: 96 MMOL/L (ref 95–110)
CHLORIDE SERPL-SCNC: 97 MMOL/L (ref 95–110)
CHLORIDE SERPL-SCNC: 98 MMOL/L (ref 95–110)
CHLORIDE SERPL-SCNC: 99 MMOL/L (ref 95–110)
CLARITY UR: ABNORMAL
CLARITY UR: CLEAR
CO2 SERPL-SCNC: 14 MMOL/L (ref 23–29)
CO2 SERPL-SCNC: 17 MMOL/L (ref 23–29)
CO2 SERPL-SCNC: 17 MMOL/L (ref 23–29)
CO2 SERPL-SCNC: 19 MMOL/L (ref 23–29)
CO2 SERPL-SCNC: 19 MMOL/L (ref 23–29)
CO2 SERPL-SCNC: 21 MMOL/L (ref 23–29)
CO2 SERPL-SCNC: 23 MMOL/L (ref 23–29)
CO2 SERPL-SCNC: 24 MMOL/L (ref 23–29)
CO2 SERPL-SCNC: 25 MMOL/L (ref 23–29)
CO2 SERPL-SCNC: 26 MMOL/L (ref 23–29)
CO2 SERPL-SCNC: 27 MMOL/L (ref 23–29)
CO2 SERPL-SCNC: 29 MMOL/L (ref 23–29)
COLOR UR: COLORLESS
COLOR UR: COLORLESS
COLOR UR: YELLOW
COLOR UR: YELLOW
CORTIS SERPL-MCNC: 28.2 UG/DL
CREAT SERPL-MCNC: 1.4 MG/DL (ref 0.5–1.4)
CREAT SERPL-MCNC: 1.5 MG/DL (ref 0.5–1.4)
CREAT SERPL-MCNC: 1.6 MG/DL (ref 0.5–1.4)
CREAT SERPL-MCNC: 1.6 MG/DL (ref 0.5–1.4)
CREAT SERPL-MCNC: 1.7 MG/DL (ref 0.5–1.4)
CREAT SERPL-MCNC: 1.7 MG/DL (ref 0.5–1.4)
CREAT SERPL-MCNC: 1.9 MG/DL (ref 0.5–1.4)
CREAT SERPL-MCNC: 1.9 MG/DL (ref 0.5–1.4)
CREAT SERPL-MCNC: 2 MG/DL (ref 0.5–1.4)
CREAT SERPL-MCNC: 2.1 MG/DL (ref 0.5–1.4)
CREAT SERPL-MCNC: 2.1 MG/DL (ref 0.5–1.4)
CREAT SERPL-MCNC: 2.3 MG/DL (ref 0.5–1.4)
CREAT SERPL-MCNC: 2.9 MG/DL (ref 0.5–1.4)
CREAT SERPL-MCNC: 3.9 MG/DL (ref 0.5–1.4)
CREAT SERPL-MCNC: 4.3 MG/DL (ref 0.5–1.4)
CREAT SERPL-MCNC: 4.5 MG/DL (ref 0.5–1.4)
CREAT SERPL-MCNC: 4.9 MG/DL (ref 0.5–1.4)
CV ECHO LV RWT: 0.41 CM
CV PHARM DOSE: 0.4 MG
CV STRESS BASE HR: 70 BPM
DELSYS: ABNORMAL
DIASTOLIC BLOOD PRESSURE: 62 MMHG
DIFFERENTIAL METHOD: ABNORMAL
DIGOXIN SERPL-MCNC: 0.4 NG/ML (ref 0.8–2)
DIGOXIN SERPL-MCNC: 0.5 NG/ML (ref 0.8–2)
DIGOXIN SERPL-MCNC: 0.5 NG/ML (ref 0.8–2)
DIGOXIN SERPL-MCNC: 0.6 NG/ML (ref 0.8–2)
DIGOXIN SERPL-MCNC: 0.7 NG/ML (ref 0.8–2)
DIGOXIN SERPL-MCNC: 0.7 NG/ML (ref 0.8–2)
DIGOXIN SERPL-MCNC: 1.9 NG/ML (ref 0.8–2)
DIGOXIN SERPL-MCNC: 2.2 NG/ML (ref 0.8–2)
DOP CALC AO PEAK VEL: 1.4 M/S
DOP CALC AO VTI: 18.49 CM
DOP CALC LVOT AREA: 3.2 CM2
DOP CALC LVOT DIAMETER: 2.01 CM
DOP CALC LVOT PEAK VEL: 87.39 M/S
DOP CALC LVOT STROKE VOLUME: 39.55 CM3
DOP CALCLVOT PEAK VEL VTI: 12.47 CM
E WAVE DECELERATION TIME: 174.28 MSEC
E/A RATIO: 6.8
E/E' RATIO: 22.67 M/S
ECHO LV POSTERIOR WALL: 1.01 CM (ref 0.6–1.1)
EJECTION FRACTION: 33 %
EOSINOPHIL # BLD AUTO: 0 K/UL (ref 0–0.5)
EOSINOPHIL # BLD AUTO: 0.1 K/UL (ref 0–0.5)
EOSINOPHIL # BLD AUTO: 0.2 K/UL (ref 0–0.5)
EOSINOPHIL # BLD AUTO: 0.4 K/UL (ref 0–0.5)
EOSINOPHIL NFR BLD: 0 % (ref 0–8)
EOSINOPHIL NFR BLD: 0.1 % (ref 0–8)
EOSINOPHIL NFR BLD: 0.2 % (ref 0–8)
EOSINOPHIL NFR BLD: 0.4 % (ref 0–8)
EOSINOPHIL NFR BLD: 0.5 % (ref 0–8)
EOSINOPHIL NFR BLD: 0.8 % (ref 0–8)
EOSINOPHIL NFR BLD: 1 % (ref 0–8)
EOSINOPHIL NFR BLD: 1.4 % (ref 0–8)
EOSINOPHIL NFR BLD: 1.5 % (ref 0–8)
EOSINOPHIL NFR BLD: 1.6 % (ref 0–8)
EOSINOPHIL NFR BLD: 1.8 % (ref 0–8)
EOSINOPHIL NFR BLD: 1.8 % (ref 0–8)
EOSINOPHIL NFR BLD: 1.9 % (ref 0–8)
EOSINOPHIL NFR BLD: 1.9 % (ref 0–8)
EP: 7
ERYTHROCYTE [DISTWIDTH] IN BLOOD BY AUTOMATED COUNT: 14.3 % (ref 11.5–14.5)
ERYTHROCYTE [DISTWIDTH] IN BLOOD BY AUTOMATED COUNT: 14.4 % (ref 11.5–14.5)
ERYTHROCYTE [DISTWIDTH] IN BLOOD BY AUTOMATED COUNT: 14.5 % (ref 11.5–14.5)
ERYTHROCYTE [DISTWIDTH] IN BLOOD BY AUTOMATED COUNT: 14.5 % (ref 11.5–14.5)
ERYTHROCYTE [DISTWIDTH] IN BLOOD BY AUTOMATED COUNT: 14.6 % (ref 11.5–14.5)
ERYTHROCYTE [DISTWIDTH] IN BLOOD BY AUTOMATED COUNT: 15.9 % (ref 11.5–14.5)
ERYTHROCYTE [DISTWIDTH] IN BLOOD BY AUTOMATED COUNT: 16.1 % (ref 11.5–14.5)
ERYTHROCYTE [DISTWIDTH] IN BLOOD BY AUTOMATED COUNT: 16.2 % (ref 11.5–14.5)
ERYTHROCYTE [DISTWIDTH] IN BLOOD BY AUTOMATED COUNT: 16.4 % (ref 11.5–14.5)
ERYTHROCYTE [DISTWIDTH] IN BLOOD BY AUTOMATED COUNT: 16.4 % (ref 11.5–14.5)
ERYTHROCYTE [DISTWIDTH] IN BLOOD BY AUTOMATED COUNT: 16.9 % (ref 11.5–14.5)
ERYTHROCYTE [DISTWIDTH] IN BLOOD BY AUTOMATED COUNT: 16.9 % (ref 11.5–14.5)
ERYTHROCYTE [DISTWIDTH] IN BLOOD BY AUTOMATED COUNT: 17 % (ref 11.5–14.5)
ERYTHROCYTE [DISTWIDTH] IN BLOOD BY AUTOMATED COUNT: 17.3 % (ref 11.5–14.5)
ERYTHROCYTE [DISTWIDTH] IN BLOOD BY AUTOMATED COUNT: 17.3 % (ref 11.5–14.5)
ERYTHROCYTE [DISTWIDTH] IN BLOOD BY AUTOMATED COUNT: 17.7 % (ref 11.5–14.5)
ERYTHROCYTE [DISTWIDTH] IN BLOOD BY AUTOMATED COUNT: 17.8 % (ref 11.5–14.5)
ERYTHROCYTE [DISTWIDTH] IN BLOOD BY AUTOMATED COUNT: 18 % (ref 11.5–14.5)
ERYTHROCYTE [SEDIMENTATION RATE] IN BLOOD BY WESTERGREN METHOD: 18 MM/H
EST. GFR  (AFRICAN AMERICAN): 11 ML/MIN/1.73 M^2
EST. GFR  (AFRICAN AMERICAN): 11.6 ML/MIN/1.73 M^2
EST. GFR  (AFRICAN AMERICAN): 13.1 ML/MIN/1.73 M^2
EST. GFR  (AFRICAN AMERICAN): 18.7 ML/MIN/1.73 M^2
EST. GFR  (AFRICAN AMERICAN): 24.8 ML/MIN/1.73 M^2
EST. GFR  (AFRICAN AMERICAN): 27.7 ML/MIN/1.73 M^2
EST. GFR  (AFRICAN AMERICAN): 27.7 ML/MIN/1.73 M^2
EST. GFR  (AFRICAN AMERICAN): 29.3 ML/MIN/1.73 M^2
EST. GFR  (AFRICAN AMERICAN): 31.2 ML/MIN/1.73 M^2
EST. GFR  (AFRICAN AMERICAN): 31.2 ML/MIN/1.73 M^2
EST. GFR  (AFRICAN AMERICAN): 35.7 ML/MIN/1.73 M^2
EST. GFR  (AFRICAN AMERICAN): 35.7 ML/MIN/1.73 M^2
EST. GFR  (AFRICAN AMERICAN): 38.4 ML/MIN/1.73 M^2
EST. GFR  (AFRICAN AMERICAN): 38.4 ML/MIN/1.73 M^2
EST. GFR  (AFRICAN AMERICAN): 41.6 ML/MIN/1.73 M^2
EST. GFR  (AFRICAN AMERICAN): 45.2 ML/MIN/1.73 M^2
EST. GFR  (AFRICAN AMERICAN): 9.9 ML/MIN/1.73 M^2
EST. GFR  (NON AFRICAN AMERICAN): 10.1 ML/MIN/1.73 M^2
EST. GFR  (NON AFRICAN AMERICAN): 11.4 ML/MIN/1.73 M^2
EST. GFR  (NON AFRICAN AMERICAN): 16.2 ML/MIN/1.73 M^2
EST. GFR  (NON AFRICAN AMERICAN): 21.5 ML/MIN/1.73 M^2
EST. GFR  (NON AFRICAN AMERICAN): 24 ML/MIN/1.73 M^2
EST. GFR  (NON AFRICAN AMERICAN): 24 ML/MIN/1.73 M^2
EST. GFR  (NON AFRICAN AMERICAN): 25.5 ML/MIN/1.73 M^2
EST. GFR  (NON AFRICAN AMERICAN): 27.1 ML/MIN/1.73 M^2
EST. GFR  (NON AFRICAN AMERICAN): 27.1 ML/MIN/1.73 M^2
EST. GFR  (NON AFRICAN AMERICAN): 31 ML/MIN/1.73 M^2
EST. GFR  (NON AFRICAN AMERICAN): 31 ML/MIN/1.73 M^2
EST. GFR  (NON AFRICAN AMERICAN): 33.3 ML/MIN/1.73 M^2
EST. GFR  (NON AFRICAN AMERICAN): 33.3 ML/MIN/1.73 M^2
EST. GFR  (NON AFRICAN AMERICAN): 36 ML/MIN/1.73 M^2
EST. GFR  (NON AFRICAN AMERICAN): 39.2 ML/MIN/1.73 M^2
EST. GFR  (NON AFRICAN AMERICAN): 8.6 ML/MIN/1.73 M^2
EST. GFR  (NON AFRICAN AMERICAN): 9.5 ML/MIN/1.73 M^2
ESTIMATED AVG GLUCOSE: 137 MG/DL (ref 68–131)
FIO2: 45
FRACTIONAL SHORTENING: 17 % (ref 28–44)
GLUCOSE SERPL-MCNC: 113 MG/DL (ref 70–110)
GLUCOSE SERPL-MCNC: 125 MG/DL (ref 70–110)
GLUCOSE SERPL-MCNC: 135 MG/DL (ref 70–110)
GLUCOSE SERPL-MCNC: 142 MG/DL (ref 70–110)
GLUCOSE SERPL-MCNC: 147 MG/DL (ref 70–110)
GLUCOSE SERPL-MCNC: 149 MG/DL (ref 70–110)
GLUCOSE SERPL-MCNC: 153 MG/DL (ref 70–110)
GLUCOSE SERPL-MCNC: 155 MG/DL (ref 70–110)
GLUCOSE SERPL-MCNC: 160 MG/DL (ref 70–110)
GLUCOSE SERPL-MCNC: 162 MG/DL (ref 70–110)
GLUCOSE SERPL-MCNC: 165 MG/DL (ref 70–110)
GLUCOSE SERPL-MCNC: 167 MG/DL (ref 70–110)
GLUCOSE SERPL-MCNC: 169 MG/DL (ref 70–110)
GLUCOSE SERPL-MCNC: 179 MG/DL (ref 70–110)
GLUCOSE SERPL-MCNC: 179 MG/DL (ref 70–110)
GLUCOSE SERPL-MCNC: 181 MG/DL (ref 70–110)
GLUCOSE SERPL-MCNC: 182 MG/DL (ref 70–110)
GLUCOSE SERPL-MCNC: 191 MG/DL (ref 70–110)
GLUCOSE SERPL-MCNC: 192 MG/DL (ref 70–110)
GLUCOSE SERPL-MCNC: 193 MG/DL (ref 70–110)
GLUCOSE SERPL-MCNC: 195 MG/DL (ref 70–110)
GLUCOSE SERPL-MCNC: 197 MG/DL (ref 70–110)
GLUCOSE SERPL-MCNC: 198 MG/DL (ref 70–110)
GLUCOSE SERPL-MCNC: 199 MG/DL (ref 70–110)
GLUCOSE SERPL-MCNC: 201 MG/DL (ref 70–110)
GLUCOSE SERPL-MCNC: 203 MG/DL (ref 70–110)
GLUCOSE SERPL-MCNC: 204 MG/DL (ref 70–110)
GLUCOSE SERPL-MCNC: 209 MG/DL (ref 70–110)
GLUCOSE SERPL-MCNC: 210 MG/DL (ref 70–110)
GLUCOSE SERPL-MCNC: 212 MG/DL (ref 70–110)
GLUCOSE SERPL-MCNC: 213 MG/DL (ref 70–110)
GLUCOSE SERPL-MCNC: 213 MG/DL (ref 70–110)
GLUCOSE SERPL-MCNC: 224 MG/DL (ref 70–110)
GLUCOSE SERPL-MCNC: 225 MG/DL (ref 70–110)
GLUCOSE SERPL-MCNC: 227 MG/DL (ref 70–110)
GLUCOSE SERPL-MCNC: 228 MG/DL (ref 70–110)
GLUCOSE SERPL-MCNC: 230 MG/DL (ref 70–110)
GLUCOSE SERPL-MCNC: 231 MG/DL (ref 70–110)
GLUCOSE SERPL-MCNC: 234 MG/DL (ref 70–110)
GLUCOSE SERPL-MCNC: 247 MG/DL (ref 70–110)
GLUCOSE SERPL-MCNC: 249 MG/DL (ref 70–110)
GLUCOSE SERPL-MCNC: 252 MG/DL (ref 70–110)
GLUCOSE SERPL-MCNC: 253 MG/DL (ref 70–110)
GLUCOSE SERPL-MCNC: 254 MG/DL (ref 70–110)
GLUCOSE SERPL-MCNC: 255 MG/DL (ref 70–110)
GLUCOSE SERPL-MCNC: 257 MG/DL (ref 70–110)
GLUCOSE SERPL-MCNC: 257 MG/DL (ref 70–110)
GLUCOSE SERPL-MCNC: 258 MG/DL (ref 70–110)
GLUCOSE SERPL-MCNC: 261 MG/DL (ref 70–110)
GLUCOSE SERPL-MCNC: 264 MG/DL (ref 70–110)
GLUCOSE SERPL-MCNC: 265 MG/DL (ref 70–110)
GLUCOSE SERPL-MCNC: 266 MG/DL (ref 70–110)
GLUCOSE SERPL-MCNC: 267 MG/DL (ref 70–110)
GLUCOSE SERPL-MCNC: 267 MG/DL (ref 70–110)
GLUCOSE SERPL-MCNC: 268 MG/DL (ref 70–110)
GLUCOSE SERPL-MCNC: 272 MG/DL (ref 70–110)
GLUCOSE SERPL-MCNC: 277 MG/DL (ref 70–110)
GLUCOSE SERPL-MCNC: 281 MG/DL (ref 70–110)
GLUCOSE SERPL-MCNC: 286 MG/DL (ref 70–110)
GLUCOSE SERPL-MCNC: 293 MG/DL (ref 70–110)
GLUCOSE SERPL-MCNC: 309 MG/DL (ref 70–110)
GLUCOSE SERPL-MCNC: 309 MG/DL (ref 70–110)
GLUCOSE SERPL-MCNC: 312 MG/DL (ref 70–110)
GLUCOSE SERPL-MCNC: 314 MG/DL (ref 70–110)
GLUCOSE SERPL-MCNC: 315 MG/DL (ref 70–110)
GLUCOSE SERPL-MCNC: 329 MG/DL (ref 70–110)
GLUCOSE SERPL-MCNC: 331 MG/DL (ref 70–110)
GLUCOSE SERPL-MCNC: 332 MG/DL (ref 70–110)
GLUCOSE SERPL-MCNC: 340 MG/DL (ref 70–110)
GLUCOSE SERPL-MCNC: 347 MG/DL (ref 70–110)
GLUCOSE SERPL-MCNC: 363 MG/DL (ref 70–110)
GLUCOSE SERPL-MCNC: 384 MG/DL (ref 70–110)
GLUCOSE SERPL-MCNC: 427 MG/DL (ref 70–110)
GLUCOSE SERPL-MCNC: 449 MG/DL (ref 70–110)
GLUCOSE SERPL-MCNC: 96 MG/DL (ref 70–110)
GLUCOSE UR QL STRIP: ABNORMAL
GLUCOSE UR QL STRIP: NEGATIVE
HBA1C MFR BLD: 6.4 % (ref 4.5–6.2)
HCO3 UR-SCNC: 23.9 MMOL/L (ref 24–28)
HCT VFR BLD AUTO: 30.3 % (ref 37–48.5)
HCT VFR BLD AUTO: 31.1 % (ref 37–48.5)
HCT VFR BLD AUTO: 33.5 % (ref 37–48.5)
HCT VFR BLD AUTO: 34.3 % (ref 37–48.5)
HCT VFR BLD AUTO: 34.3 % (ref 37–48.5)
HCT VFR BLD AUTO: 34.4 % (ref 37–48.5)
HCT VFR BLD AUTO: 35.4 % (ref 37–48.5)
HCT VFR BLD AUTO: 35.6 % (ref 37–48.5)
HCT VFR BLD AUTO: 35.9 % (ref 37–48.5)
HCT VFR BLD AUTO: 36.1 % (ref 37–48.5)
HCT VFR BLD AUTO: 36.3 % (ref 37–48.5)
HCT VFR BLD AUTO: 36.6 % (ref 37–48.5)
HCT VFR BLD AUTO: 36.9 % (ref 37–48.5)
HCT VFR BLD AUTO: 36.9 % (ref 37–48.5)
HCT VFR BLD AUTO: 37.3 % (ref 37–48.5)
HCT VFR BLD AUTO: 37.8 % (ref 37–48.5)
HCT VFR BLD AUTO: 38.1 % (ref 37–48.5)
HCT VFR BLD AUTO: 38.4 % (ref 37–48.5)
HCT VFR BLD AUTO: 40 % (ref 37–48.5)
HCT VFR BLD AUTO: 43 % (ref 37–48.5)
HCT VFR BLD CALC: 37 %PCV (ref 36–54)
HGB BLD-MCNC: 10 G/DL (ref 12–16)
HGB BLD-MCNC: 10.2 G/DL (ref 12–16)
HGB BLD-MCNC: 10.6 G/DL (ref 12–16)
HGB BLD-MCNC: 10.8 G/DL (ref 12–16)
HGB BLD-MCNC: 10.8 G/DL (ref 12–16)
HGB BLD-MCNC: 10.9 G/DL (ref 12–16)
HGB BLD-MCNC: 11.1 G/DL (ref 12–16)
HGB BLD-MCNC: 11.2 G/DL (ref 12–16)
HGB BLD-MCNC: 11.4 G/DL (ref 12–16)
HGB BLD-MCNC: 11.7 G/DL (ref 12–16)
HGB BLD-MCNC: 11.8 G/DL (ref 12–16)
HGB BLD-MCNC: 11.9 G/DL (ref 12–16)
HGB BLD-MCNC: 12 G/DL (ref 12–16)
HGB BLD-MCNC: 12 G/DL (ref 12–16)
HGB BLD-MCNC: 12.4 G/DL (ref 12–16)
HGB BLD-MCNC: 12.6 G/DL (ref 12–16)
HGB BLD-MCNC: 12.8 G/DL (ref 12–16)
HGB BLD-MCNC: 12.9 G/DL (ref 12–16)
HGB BLD-MCNC: 13 G/DL (ref 12–16)
HGB BLD-MCNC: 13.9 G/DL (ref 12–16)
HGB UR QL STRIP: ABNORMAL
HGB UR QL STRIP: NEGATIVE
HYALINE CASTS #/AREA URNS LPF: 0 /LPF
HYALINE CASTS #/AREA URNS LPF: 1 /LPF
HYALINE CASTS #/AREA URNS LPF: 10 /LPF
HYALINE CASTS #/AREA URNS LPF: 2 /LPF
IMM GRANULOCYTES # BLD AUTO: 0.07 K/UL (ref 0–0.04)
IMM GRANULOCYTES # BLD AUTO: 0.07 K/UL (ref 0–0.04)
IMM GRANULOCYTES # BLD AUTO: 0.08 K/UL (ref 0–0.04)
IMM GRANULOCYTES # BLD AUTO: 0.08 K/UL (ref 0–0.04)
IMM GRANULOCYTES # BLD AUTO: 0.09 K/UL (ref 0–0.04)
IMM GRANULOCYTES # BLD AUTO: 0.1 K/UL (ref 0–0.04)
IMM GRANULOCYTES # BLD AUTO: 0.11 K/UL (ref 0–0.04)
IMM GRANULOCYTES # BLD AUTO: 0.17 K/UL (ref 0–0.04)
IMM GRANULOCYTES # BLD AUTO: 0.18 K/UL (ref 0–0.04)
IMM GRANULOCYTES # BLD AUTO: 0.22 K/UL (ref 0–0.04)
IMM GRANULOCYTES # BLD AUTO: 0.24 K/UL (ref 0–0.04)
IMM GRANULOCYTES # BLD AUTO: 0.26 K/UL (ref 0–0.04)
IMM GRANULOCYTES # BLD AUTO: 0.27 K/UL (ref 0–0.04)
IMM GRANULOCYTES # BLD AUTO: 0.4 K/UL (ref 0–0.04)
IMM GRANULOCYTES # BLD AUTO: 0.65 K/UL (ref 0–0.04)
IMM GRANULOCYTES # BLD AUTO: 0.69 K/UL (ref 0–0.04)
IMM GRANULOCYTES NFR BLD AUTO: 0.6 % (ref 0–0.5)
IMM GRANULOCYTES NFR BLD AUTO: 0.9 % (ref 0–0.5)
IMM GRANULOCYTES NFR BLD AUTO: 0.9 % (ref 0–0.5)
IMM GRANULOCYTES NFR BLD AUTO: 1 % (ref 0–0.5)
IMM GRANULOCYTES NFR BLD AUTO: 1.1 % (ref 0–0.5)
IMM GRANULOCYTES NFR BLD AUTO: 1.2 % (ref 0–0.5)
IMM GRANULOCYTES NFR BLD AUTO: 1.5 % (ref 0–0.5)
IMM GRANULOCYTES NFR BLD AUTO: 1.8 % (ref 0–0.5)
IMM GRANULOCYTES NFR BLD AUTO: 1.9 % (ref 0–0.5)
IMM GRANULOCYTES NFR BLD AUTO: 2.2 % (ref 0–0.5)
IMM GRANULOCYTES NFR BLD AUTO: 2.4 % (ref 0–0.5)
IMM GRANULOCYTES NFR BLD AUTO: 2.4 % (ref 0–0.5)
IMM GRANULOCYTES NFR BLD AUTO: 3.1 % (ref 0–0.5)
IMM GRANULOCYTES NFR BLD AUTO: 3.4 % (ref 0–0.5)
INR PPP: 1.1
INR PPP: 1.4
INR PPP: 1.4
INR PPP: 1.6
INTERVENTRICULAR SEPTUM: 1.01 CM (ref 0.6–1.1)
IP: 15
IVC DIAMETER: 2.3 CM
KETONES UR QL STRIP: NEGATIVE
LACTATE SERPL-SCNC: 3.2 MMOL/L (ref 0.5–1.9)
LACTATE SERPL-SCNC: 4.5 MMOL/L (ref 0.5–1.9)
LACTATE SERPL-SCNC: 5.4 MMOL/L (ref 0.5–1.9)
LACTATE SERPL-SCNC: 5.5 MMOL/L (ref 0.5–1.9)
LACTATE SERPL-SCNC: 9.6 MMOL/L (ref 0.5–1.9)
LEFT ATRIUM SIZE: 3.78 CM
LEFT INTERNAL DIMENSION IN SYSTOLE: 4.06 CM (ref 2.1–4)
LEFT VENTRICLE DIASTOLIC VOLUME INDEX: 91.25 ML/M2
LEFT VENTRICLE DIASTOLIC VOLUME: 179.76 ML
LEFT VENTRICLE MASS INDEX: 91 G/M2
LEFT VENTRICLE SYSTOLIC VOLUME INDEX: 60.8 ML/M2
LEFT VENTRICLE SYSTOLIC VOLUME: 119.68 ML
LEFT VENTRICULAR INTERNAL DIMENSION IN DIASTOLE: 4.9 CM (ref 3.5–6)
LEFT VENTRICULAR MASS: 178.42 G
LEUKOCYTE ESTERASE UR QL STRIP: ABNORMAL
LEUKOCYTE ESTERASE UR QL STRIP: NEGATIVE
LIPASE SERPL-CCNC: 70 U/L (ref 4–60)
LV LATERAL E/E' RATIO: 27.2 M/S
LV SEPTAL E/E' RATIO: 19.43 M/S
LYMPHOCYTES # BLD AUTO: 0.5 K/UL (ref 1–4.8)
LYMPHOCYTES # BLD AUTO: 0.7 K/UL (ref 1–4.8)
LYMPHOCYTES # BLD AUTO: 0.9 K/UL (ref 1–4.8)
LYMPHOCYTES # BLD AUTO: 0.9 K/UL (ref 1–4.8)
LYMPHOCYTES # BLD AUTO: 1 K/UL (ref 1–4.8)
LYMPHOCYTES # BLD AUTO: 1.1 K/UL (ref 1–4.8)
LYMPHOCYTES # BLD AUTO: 1.2 K/UL (ref 1–4.8)
LYMPHOCYTES # BLD AUTO: 1.2 K/UL (ref 1–4.8)
LYMPHOCYTES # BLD AUTO: 1.3 K/UL (ref 1–4.8)
LYMPHOCYTES # BLD AUTO: 1.4 K/UL (ref 1–4.8)
LYMPHOCYTES # BLD AUTO: 1.5 K/UL (ref 1–4.8)
LYMPHOCYTES # BLD AUTO: 1.8 K/UL (ref 1–4.8)
LYMPHOCYTES # BLD AUTO: 1.9 K/UL (ref 1–4.8)
LYMPHOCYTES # BLD AUTO: 6.1 K/UL (ref 1–4.8)
LYMPHOCYTES NFR BLD: 10.7 % (ref 18–48)
LYMPHOCYTES NFR BLD: 11.3 % (ref 18–48)
LYMPHOCYTES NFR BLD: 12.8 % (ref 18–48)
LYMPHOCYTES NFR BLD: 13 % (ref 18–48)
LYMPHOCYTES NFR BLD: 13.9 % (ref 18–48)
LYMPHOCYTES NFR BLD: 15.5 % (ref 18–48)
LYMPHOCYTES NFR BLD: 15.5 % (ref 18–48)
LYMPHOCYTES NFR BLD: 16.7 % (ref 18–48)
LYMPHOCYTES NFR BLD: 29.4 % (ref 18–48)
LYMPHOCYTES NFR BLD: 3.4 % (ref 18–48)
LYMPHOCYTES NFR BLD: 6.5 % (ref 18–48)
LYMPHOCYTES NFR BLD: 6.9 % (ref 18–48)
LYMPHOCYTES NFR BLD: 7 % (ref 18–48)
LYMPHOCYTES NFR BLD: 7.7 % (ref 18–48)
LYMPHOCYTES NFR BLD: 7.9 % (ref 18–48)
LYMPHOCYTES NFR BLD: 8.9 % (ref 18–48)
MAGNESIUM SERPL-MCNC: 1.8 MG/DL (ref 1.6–2.6)
MAGNESIUM SERPL-MCNC: 2 MG/DL (ref 1.6–2.6)
MAGNESIUM SERPL-MCNC: 2 MG/DL (ref 1.6–2.6)
MAGNESIUM SERPL-MCNC: 2.3 MG/DL (ref 1.6–2.6)
MAGNESIUM SERPL-MCNC: 2.3 MG/DL (ref 1.6–2.6)
MAGNESIUM SERPL-MCNC: 2.4 MG/DL (ref 1.6–2.6)
MAGNESIUM SERPL-MCNC: 2.5 MG/DL (ref 1.6–2.6)
MAGNESIUM SERPL-MCNC: 2.6 MG/DL (ref 1.6–2.6)
MCH RBC QN AUTO: 28.6 PG (ref 27–31)
MCH RBC QN AUTO: 28.9 PG (ref 27–31)
MCH RBC QN AUTO: 29.3 PG (ref 27–31)
MCH RBC QN AUTO: 29.9 PG (ref 27–31)
MCH RBC QN AUTO: 30.2 PG (ref 27–31)
MCH RBC QN AUTO: 30.6 PG (ref 27–31)
MCH RBC QN AUTO: 30.9 PG (ref 27–31)
MCH RBC QN AUTO: 31.2 PG (ref 27–31)
MCH RBC QN AUTO: 31.3 PG (ref 27–31)
MCH RBC QN AUTO: 31.4 PG (ref 27–31)
MCH RBC QN AUTO: 31.4 PG (ref 27–31)
MCH RBC QN AUTO: 31.5 PG (ref 27–31)
MCH RBC QN AUTO: 31.7 PG (ref 27–31)
MCH RBC QN AUTO: 31.8 PG (ref 27–31)
MCH RBC QN AUTO: 31.8 PG (ref 27–31)
MCH RBC QN AUTO: 31.9 PG (ref 27–31)
MCH RBC QN AUTO: 32.1 PG (ref 27–31)
MCH RBC QN AUTO: 32.1 PG (ref 27–31)
MCHC RBC AUTO-ENTMCNC: 30.9 G/DL (ref 32–36)
MCHC RBC AUTO-ENTMCNC: 31.2 G/DL (ref 32–36)
MCHC RBC AUTO-ENTMCNC: 31.4 G/DL (ref 32–36)
MCHC RBC AUTO-ENTMCNC: 31.5 G/DL (ref 32–36)
MCHC RBC AUTO-ENTMCNC: 31.5 G/DL (ref 32–36)
MCHC RBC AUTO-ENTMCNC: 31.6 G/DL (ref 32–36)
MCHC RBC AUTO-ENTMCNC: 31.7 G/DL (ref 32–36)
MCHC RBC AUTO-ENTMCNC: 31.7 G/DL (ref 32–36)
MCHC RBC AUTO-ENTMCNC: 31.8 G/DL (ref 32–36)
MCHC RBC AUTO-ENTMCNC: 32.2 G/DL (ref 32–36)
MCHC RBC AUTO-ENTMCNC: 32.3 G/DL (ref 32–36)
MCHC RBC AUTO-ENTMCNC: 32.3 G/DL (ref 32–36)
MCHC RBC AUTO-ENTMCNC: 32.5 G/DL (ref 32–36)
MCHC RBC AUTO-ENTMCNC: 32.5 G/DL (ref 32–36)
MCHC RBC AUTO-ENTMCNC: 32.8 G/DL (ref 32–36)
MCHC RBC AUTO-ENTMCNC: 33 G/DL (ref 32–36)
MCHC RBC AUTO-ENTMCNC: 33.9 G/DL (ref 32–36)
MCHC RBC AUTO-ENTMCNC: 34.3 G/DL (ref 32–36)
MCHC RBC AUTO-ENTMCNC: 34.3 G/DL (ref 32–36)
MCHC RBC AUTO-ENTMCNC: 34.4 G/DL (ref 32–36)
MCV RBC AUTO: 100 FL (ref 82–98)
MCV RBC AUTO: 91 FL (ref 82–98)
MCV RBC AUTO: 92 FL (ref 82–98)
MCV RBC AUTO: 95 FL (ref 82–98)
MCV RBC AUTO: 96 FL (ref 82–98)
MCV RBC AUTO: 97 FL (ref 82–98)
MCV RBC AUTO: 97 FL (ref 82–98)
MCV RBC AUTO: 98 FL (ref 82–98)
MCV RBC AUTO: 98 FL (ref 82–98)
MCV RBC AUTO: 99 FL (ref 82–98)
MICROSCOPIC COMMENT: ABNORMAL
MICROSCOPIC COMMENT: NORMAL
MODE: ABNORMAL
MONOCYTES # BLD AUTO: 0.3 K/UL (ref 0.3–1)
MONOCYTES # BLD AUTO: 0.6 K/UL (ref 0.3–1)
MONOCYTES # BLD AUTO: 0.7 K/UL (ref 0.3–1)
MONOCYTES # BLD AUTO: 0.8 K/UL (ref 0.3–1)
MONOCYTES # BLD AUTO: 0.9 K/UL (ref 0.3–1)
MONOCYTES # BLD AUTO: 0.9 K/UL (ref 0.3–1)
MONOCYTES # BLD AUTO: 1 K/UL (ref 0.3–1)
MONOCYTES # BLD AUTO: 1.1 K/UL (ref 0.3–1)
MONOCYTES # BLD AUTO: 1.1 K/UL (ref 0.3–1)
MONOCYTES # BLD AUTO: 1.2 K/UL (ref 0.3–1)
MONOCYTES # BLD AUTO: 1.2 K/UL (ref 0.3–1)
MONOCYTES # BLD AUTO: 1.3 K/UL (ref 0.3–1)
MONOCYTES # BLD AUTO: 1.4 K/UL (ref 0.3–1)
MONOCYTES # BLD AUTO: 1.6 K/UL (ref 0.3–1)
MONOCYTES NFR BLD: 1.8 % (ref 4–15)
MONOCYTES NFR BLD: 10.8 % (ref 4–15)
MONOCYTES NFR BLD: 11.6 % (ref 4–15)
MONOCYTES NFR BLD: 12.2 % (ref 4–15)
MONOCYTES NFR BLD: 13.6 % (ref 4–15)
MONOCYTES NFR BLD: 5.5 % (ref 4–15)
MONOCYTES NFR BLD: 5.8 % (ref 4–15)
MONOCYTES NFR BLD: 6.4 % (ref 4–15)
MONOCYTES NFR BLD: 7.2 % (ref 4–15)
MONOCYTES NFR BLD: 7.6 % (ref 4–15)
MONOCYTES NFR BLD: 8 % (ref 4–15)
MONOCYTES NFR BLD: 8.1 % (ref 4–15)
MONOCYTES NFR BLD: 8.3 % (ref 4–15)
MONOCYTES NFR BLD: 8.5 % (ref 4–15)
MONOCYTES NFR BLD: 8.6 % (ref 4–15)
MONOCYTES NFR BLD: 9.3 % (ref 4–15)
MV PEAK A VEL: 0.2 M/S
MV PEAK E VEL: 1.36 M/S
NEUTROPHILS # BLD AUTO: 10.7 K/UL (ref 1.8–7.7)
NEUTROPHILS # BLD AUTO: 12.1 K/UL (ref 1.8–7.7)
NEUTROPHILS # BLD AUTO: 12.1 K/UL (ref 1.8–7.7)
NEUTROPHILS # BLD AUTO: 12.6 K/UL (ref 1.8–7.7)
NEUTROPHILS # BLD AUTO: 13.1 K/UL (ref 1.8–7.7)
NEUTROPHILS # BLD AUTO: 13.4 K/UL (ref 1.8–7.7)
NEUTROPHILS # BLD AUTO: 16.5 K/UL (ref 1.8–7.7)
NEUTROPHILS # BLD AUTO: 6 K/UL (ref 1.8–7.7)
NEUTROPHILS # BLD AUTO: 6.2 K/UL (ref 1.8–7.7)
NEUTROPHILS # BLD AUTO: 6.6 K/UL (ref 1.8–7.7)
NEUTROPHILS # BLD AUTO: 7.2 K/UL (ref 1.8–7.7)
NEUTROPHILS # BLD AUTO: 7.9 K/UL (ref 1.8–7.7)
NEUTROPHILS # BLD AUTO: 8.9 K/UL (ref 1.8–7.7)
NEUTROPHILS # BLD AUTO: 9.2 K/UL (ref 1.8–7.7)
NEUTROPHILS # BLD AUTO: 9.4 K/UL (ref 1.8–7.7)
NEUTROPHILS # BLD AUTO: 9.6 K/UL (ref 1.8–7.7)
NEUTROPHILS NFR BLD: 58.6 % (ref 38–73)
NEUTROPHILS NFR BLD: 66.5 % (ref 38–73)
NEUTROPHILS NFR BLD: 69.4 % (ref 38–73)
NEUTROPHILS NFR BLD: 72.2 % (ref 38–73)
NEUTROPHILS NFR BLD: 72.2 % (ref 38–73)
NEUTROPHILS NFR BLD: 76.3 % (ref 38–73)
NEUTROPHILS NFR BLD: 76.9 % (ref 38–73)
NEUTROPHILS NFR BLD: 77.5 % (ref 38–73)
NEUTROPHILS NFR BLD: 79 % (ref 38–73)
NEUTROPHILS NFR BLD: 79.7 % (ref 38–73)
NEUTROPHILS NFR BLD: 80.9 % (ref 38–73)
NEUTROPHILS NFR BLD: 81.3 % (ref 38–73)
NEUTROPHILS NFR BLD: 82.4 % (ref 38–73)
NEUTROPHILS NFR BLD: 83.4 % (ref 38–73)
NEUTROPHILS NFR BLD: 83.4 % (ref 38–73)
NEUTROPHILS NFR BLD: 92.6 % (ref 38–73)
NITRITE UR QL STRIP: NEGATIVE
NITRITE UR QL STRIP: POSITIVE
NRBC BLD-RTO: 0 /100 WBC
NRBC BLD-RTO: 1 /100 WBC
NRBC BLD-RTO: 2 /100 WBC
OHS CV CPX 1 MINUTE RECOVERY HEART RATE: 87 BPM
OHS CV CPX 85 PERCENT MAX PREDICTED HEART RATE MALE: 126
OHS CV CPX MAX PREDICTED HEART RATE: 148
OHS CV CPX PATIENT IS FEMALE: 1
OHS CV CPX PATIENT IS MALE: 0
OHS CV CPX PEAK DIASTOLIC BLOOD PRESSURE: 62 MMHG
OHS CV CPX PEAK HEAR RATE: 87 BPM
OHS CV CPX PEAK RATE PRESSURE PRODUCT: NORMAL
OHS CV CPX PEAK SYSTOLIC BLOOD PRESSURE: 152 MMHG
OHS CV CPX PERCENT MAX PREDICTED HEART RATE ACHIEVED: 59
OHS CV CPX RATE PRESSURE PRODUCT PRESENTING: 9660
PCO2 BLDA: 38.6 MMHG (ref 35–45)
PH SMN: 7.4 [PH] (ref 7.35–7.45)
PH UR STRIP: 5 [PH] (ref 5–8)
PH UR STRIP: 5 [PH] (ref 5–8)
PH UR STRIP: 6 [PH] (ref 5–8)
PH UR STRIP: 6 [PH] (ref 5–8)
PHOSPHATE SERPL-MCNC: 2.9 MG/DL (ref 2.7–4.5)
PHOSPHATE SERPL-MCNC: 3.5 MG/DL (ref 2.7–4.5)
PHOSPHATE SERPL-MCNC: 3.7 MG/DL (ref 2.7–4.5)
PHOSPHATE SERPL-MCNC: 5.7 MG/DL (ref 2.7–4.5)
PHOSPHATE SERPL-MCNC: 6.6 MG/DL (ref 2.7–4.5)
PHOSPHATE SERPL-MCNC: 9.1 MG/DL (ref 2.7–4.5)
PISA TR MAX VEL: 2.68 M/S
PLATELET # BLD AUTO: 156 K/UL (ref 150–450)
PLATELET # BLD AUTO: 160 K/UL (ref 150–450)
PLATELET # BLD AUTO: 192 K/UL (ref 150–450)
PLATELET # BLD AUTO: 195 K/UL (ref 150–450)
PLATELET # BLD AUTO: 201 K/UL (ref 150–450)
PLATELET # BLD AUTO: 216 K/UL (ref 150–450)
PLATELET # BLD AUTO: 226 K/UL (ref 150–450)
PLATELET # BLD AUTO: 241 K/UL (ref 150–450)
PLATELET # BLD AUTO: 250 K/UL (ref 150–450)
PLATELET # BLD AUTO: 273 K/UL (ref 150–450)
PLATELET # BLD AUTO: 275 K/UL (ref 150–450)
PLATELET # BLD AUTO: 285 K/UL (ref 150–450)
PLATELET # BLD AUTO: 288 K/UL (ref 150–450)
PLATELET # BLD AUTO: 293 K/UL (ref 150–450)
PLATELET # BLD AUTO: 297 K/UL (ref 150–450)
PLATELET # BLD AUTO: 300 K/UL (ref 150–450)
PLATELET # BLD AUTO: 305 K/UL (ref 150–450)
PLATELET # BLD AUTO: 306 K/UL (ref 150–450)
PLATELET # BLD AUTO: 369 K/UL (ref 150–450)
PLATELET # BLD AUTO: 381 K/UL (ref 150–450)
PMV BLD AUTO: 10 FL (ref 9.2–12.9)
PMV BLD AUTO: 10.1 FL (ref 9.2–12.9)
PMV BLD AUTO: 10.2 FL (ref 9.2–12.9)
PMV BLD AUTO: 10.2 FL (ref 9.2–12.9)
PMV BLD AUTO: 10.3 FL (ref 9.2–12.9)
PMV BLD AUTO: 10.4 FL (ref 9.2–12.9)
PMV BLD AUTO: 10.4 FL (ref 9.2–12.9)
PMV BLD AUTO: 10.5 FL (ref 9.2–12.9)
PMV BLD AUTO: 9.2 FL (ref 9.2–12.9)
PMV BLD AUTO: 9.7 FL (ref 9.2–12.9)
PMV BLD AUTO: 9.7 FL (ref 9.2–12.9)
PMV BLD AUTO: 9.8 FL (ref 9.2–12.9)
PMV BLD AUTO: 9.9 FL (ref 9.2–12.9)
PO2 BLDA: 195 MMHG (ref 80–100)
POC BE: -1 MMOL/L
POC IONIZED CALCIUM: 1.19 MMOL/L (ref 1.06–1.42)
POC SATURATED O2: 100 % (ref 95–100)
POC TCO2: 25 MMOL/L (ref 23–27)
POTASSIUM BLD-SCNC: 4.4 MMOL/L (ref 3.5–5.1)
POTASSIUM SERPL-SCNC: 3.1 MMOL/L (ref 3.5–5.1)
POTASSIUM SERPL-SCNC: 3.6 MMOL/L (ref 3.5–5.1)
POTASSIUM SERPL-SCNC: 3.8 MMOL/L (ref 3.5–5.1)
POTASSIUM SERPL-SCNC: 3.9 MMOL/L (ref 3.5–5.1)
POTASSIUM SERPL-SCNC: 4 MMOL/L (ref 3.5–5.1)
POTASSIUM SERPL-SCNC: 4.1 MMOL/L (ref 3.5–5.1)
POTASSIUM SERPL-SCNC: 4.2 MMOL/L (ref 3.5–5.1)
POTASSIUM SERPL-SCNC: 4.5 MMOL/L (ref 3.5–5.1)
POTASSIUM SERPL-SCNC: 4.6 MMOL/L (ref 3.5–5.1)
POTASSIUM SERPL-SCNC: 4.8 MMOL/L (ref 3.5–5.1)
POTASSIUM SERPL-SCNC: 4.9 MMOL/L (ref 3.5–5.1)
POTASSIUM SERPL-SCNC: 5 MMOL/L (ref 3.5–5.1)
POTASSIUM SERPL-SCNC: 5.2 MMOL/L (ref 3.5–5.1)
POTASSIUM SERPL-SCNC: 5.6 MMOL/L (ref 3.5–5.1)
POTASSIUM SERPL-SCNC: 5.8 MMOL/L (ref 3.5–5.1)
PROCALCITONIN SERPL IA-MCNC: 0.42 NG/ML (ref 0–0.5)
PROCALCITONIN SERPL IA-MCNC: <0.05 NG/ML (ref 0–0.5)
PROCALCITONIN SERPL IA-MCNC: <0.05 NG/ML (ref 0–0.5)
PROT SERPL-MCNC: 5.9 G/DL (ref 6–8.4)
PROT SERPL-MCNC: 5.9 G/DL (ref 6–8.4)
PROT SERPL-MCNC: 6.1 G/DL (ref 6–8.4)
PROT SERPL-MCNC: 6.1 G/DL (ref 6–8.4)
PROT SERPL-MCNC: 6.3 G/DL (ref 6–8.4)
PROT SERPL-MCNC: 6.3 G/DL (ref 6–8.4)
PROT SERPL-MCNC: 6.4 G/DL (ref 6–8.4)
PROT SERPL-MCNC: 6.6 G/DL (ref 6–8.4)
PROT SERPL-MCNC: 6.7 G/DL (ref 6–8.4)
PROT SERPL-MCNC: 6.7 G/DL (ref 6–8.4)
PROT SERPL-MCNC: 6.9 G/DL (ref 6–8.4)
PROT SERPL-MCNC: 7.1 G/DL (ref 6–8.4)
PROT SERPL-MCNC: 7.3 G/DL (ref 6–8.4)
PROT SERPL-MCNC: 7.3 G/DL (ref 6–8.4)
PROT UR QL STRIP: ABNORMAL
PROT UR QL STRIP: NEGATIVE
PROTHROMBIN TIME: 13.2 SEC (ref 11.8–14.3)
PROTHROMBIN TIME: 15.8 SEC (ref 11.4–13.7)
PROTHROMBIN TIME: 15.9 SEC (ref 11.4–13.7)
PROTHROMBIN TIME: 17.7 SEC (ref 11.4–13.7)
PV PEAK VELOCITY: 74.56 CM/S
RA PRESSURE: 15 MMHG
RBC # BLD AUTO: 3.19 M/UL (ref 4–5.4)
RBC # BLD AUTO: 3.21 M/UL (ref 4–5.4)
RBC # BLD AUTO: 3.37 M/UL (ref 4–5.4)
RBC # BLD AUTO: 3.53 M/UL (ref 4–5.4)
RBC # BLD AUTO: 3.53 M/UL (ref 4–5.4)
RBC # BLD AUTO: 3.58 M/UL (ref 4–5.4)
RBC # BLD AUTO: 3.6 M/UL (ref 4–5.4)
RBC # BLD AUTO: 3.74 M/UL (ref 4–5.4)
RBC # BLD AUTO: 3.77 M/UL (ref 4–5.4)
RBC # BLD AUTO: 3.79 M/UL (ref 4–5.4)
RBC # BLD AUTO: 3.85 M/UL (ref 4–5.4)
RBC # BLD AUTO: 3.91 M/UL (ref 4–5.4)
RBC # BLD AUTO: 3.95 M/UL (ref 4–5.4)
RBC # BLD AUTO: 3.96 M/UL (ref 4–5.4)
RBC # BLD AUTO: 3.98 M/UL (ref 4–5.4)
RBC # BLD AUTO: 4.02 M/UL (ref 4–5.4)
RBC # BLD AUTO: 4.05 M/UL (ref 4–5.4)
RBC # BLD AUTO: 4.08 M/UL (ref 4–5.4)
RBC # BLD AUTO: 4.15 M/UL (ref 4–5.4)
RBC # BLD AUTO: 4.36 M/UL (ref 4–5.4)
RBC #/AREA URNS HPF: 1 /HPF (ref 0–4)
RBC #/AREA URNS HPF: 1 /HPF (ref 0–4)
RBC #/AREA URNS HPF: 3 /HPF (ref 0–4)
RBC #/AREA URNS HPF: 6 /HPF (ref 0–4)
RIGHT VENTRICULAR END-DIASTOLIC DIMENSION: 331 CM
SAMPLE: ABNORMAL
SARS-COV-2 RDRP RESP QL NAA+PROBE: NEGATIVE
SITE: ABNORMAL
SODIUM BLD-SCNC: 136 MMOL/L (ref 136–145)
SODIUM SERPL-SCNC: 128 MMOL/L (ref 136–145)
SODIUM SERPL-SCNC: 131 MMOL/L (ref 136–145)
SODIUM SERPL-SCNC: 131 MMOL/L (ref 136–145)
SODIUM SERPL-SCNC: 132 MMOL/L (ref 136–145)
SODIUM SERPL-SCNC: 132 MMOL/L (ref 136–145)
SODIUM SERPL-SCNC: 133 MMOL/L (ref 136–145)
SODIUM SERPL-SCNC: 134 MMOL/L (ref 136–145)
SODIUM SERPL-SCNC: 135 MMOL/L (ref 136–145)
SODIUM SERPL-SCNC: 136 MMOL/L (ref 136–145)
SODIUM SERPL-SCNC: 137 MMOL/L (ref 136–145)
SODIUM SERPL-SCNC: 137 MMOL/L (ref 136–145)
SODIUM SERPL-SCNC: 138 MMOL/L (ref 136–145)
SODIUM SERPL-SCNC: 139 MMOL/L (ref 136–145)
SP GR UR STRIP: 1 (ref 1–1.03)
SP GR UR STRIP: 1.01 (ref 1–1.03)
SP GR UR STRIP: 1.01 (ref 1–1.03)
SP GR UR STRIP: 1.03 (ref 1–1.03)
SPONT RATE: 26
SQUAMOUS #/AREA URNS HPF: 0 /HPF
SQUAMOUS #/AREA URNS HPF: 1 /HPF
SQUAMOUS #/AREA URNS HPF: 1 /HPF
SQUAMOUS #/AREA URNS HPF: 4 /HPF
SYSTOLIC BLOOD PRESSURE: 138 MMHG
T4 FREE SERPL-MCNC: 1.33 NG/DL (ref 0.71–1.51)
TDI LATERAL: 0.05 M/S
TDI SEPTAL: 0.07 M/S
TDI: 0.06 M/S
TR MAX PG: 29 MMHG
TROPONIN I SERPL DL<=0.01 NG/ML-MCNC: 0.08 NG/ML
TROPONIN I SERPL DL<=0.01 NG/ML-MCNC: 0.09 NG/ML
TROPONIN I SERPL DL<=0.01 NG/ML-MCNC: 0.09 NG/ML
TROPONIN I SERPL DL<=0.01 NG/ML-MCNC: 0.13 NG/ML
TROPONIN I SERPL DL<=0.01 NG/ML-MCNC: 0.14 NG/ML
TROPONIN I SERPL DL<=0.01 NG/ML-MCNC: 0.14 NG/ML
TROPONIN I SERPL DL<=0.01 NG/ML-MCNC: 0.28 NG/ML
TROPONIN I SERPL DL<=0.01 NG/ML-MCNC: 0.91 NG/ML
TROPONIN I SERPL DL<=0.01 NG/ML-MCNC: 1.07 NG/ML
TROPONIN I SERPL DL<=0.01 NG/ML-MCNC: 1.08 NG/ML
TROPONIN I SERPL DL<=0.01 NG/ML-MCNC: 1.1 NG/ML
TROPONIN I SERPL DL<=0.01 NG/ML-MCNC: 1.4 NG/ML
TROPONIN I SERPL DL<=0.01 NG/ML-MCNC: 1.56 NG/ML
TROPONIN I SERPL DL<=0.01 NG/ML-MCNC: 1.62 NG/ML
TSH SERPL DL<=0.005 MIU/L-ACNC: 1.3 UIU/ML (ref 0.34–5.6)
TV REST PULMONARY ARTERY PRESSURE: 44 MMHG
URATE SERPL-MCNC: 13.4 MG/DL (ref 2.4–5.7)
URN SPEC COLLECT METH UR: ABNORMAL
UROBILINOGEN UR STRIP-ACNC: NEGATIVE EU/DL
VALPROATE SERPL-MCNC: <10 UG/ML (ref 50–100)
WBC # BLD AUTO: 11.11 K/UL (ref 3.9–12.7)
WBC # BLD AUTO: 11.41 K/UL (ref 3.9–12.7)
WBC # BLD AUTO: 11.57 K/UL (ref 3.9–12.7)
WBC # BLD AUTO: 11.66 K/UL (ref 3.9–12.7)
WBC # BLD AUTO: 12.06 K/UL (ref 3.9–12.7)
WBC # BLD AUTO: 13.03 K/UL (ref 3.9–12.7)
WBC # BLD AUTO: 13.04 K/UL (ref 3.9–12.7)
WBC # BLD AUTO: 13.93 K/UL (ref 3.9–12.7)
WBC # BLD AUTO: 14.36 K/UL (ref 3.9–12.7)
WBC # BLD AUTO: 14.5 K/UL (ref 3.9–12.7)
WBC # BLD AUTO: 14.52 K/UL (ref 3.9–12.7)
WBC # BLD AUTO: 15.09 K/UL (ref 3.9–12.7)
WBC # BLD AUTO: 16.88 K/UL (ref 3.9–12.7)
WBC # BLD AUTO: 17.17 K/UL (ref 3.9–12.7)
WBC # BLD AUTO: 20.31 K/UL (ref 3.9–12.7)
WBC # BLD AUTO: 20.7 K/UL (ref 3.9–12.7)
WBC # BLD AUTO: 8.64 K/UL (ref 3.9–12.7)
WBC # BLD AUTO: 9.06 K/UL (ref 3.9–12.7)
WBC # BLD AUTO: 9.1 K/UL (ref 3.9–12.7)
WBC # BLD AUTO: 9.1 K/UL (ref 3.9–12.7)
WBC #/AREA URNS HPF: 0 /HPF (ref 0–5)
WBC #/AREA URNS HPF: 1 /HPF (ref 0–5)
WBC #/AREA URNS HPF: 11 /HPF (ref 0–5)
WBC #/AREA URNS HPF: 38 /HPF (ref 0–5)
YEAST URNS QL MICRO: ABNORMAL
YEAST URNS QL MICRO: NORMAL

## 2021-01-01 PROCEDURE — 85025 COMPLETE CBC W/AUTO DIFF WBC: CPT | Performed by: NURSE PRACTITIONER

## 2021-01-01 PROCEDURE — 94618 PULMONARY STRESS TESTING: CPT

## 2021-01-01 PROCEDURE — 25000003 PHARM REV CODE 250: Performed by: NURSE PRACTITIONER

## 2021-01-01 PROCEDURE — 63600175 PHARM REV CODE 636 W HCPCS: Performed by: FAMILY MEDICINE

## 2021-01-01 PROCEDURE — 20000000 HC ICU ROOM

## 2021-01-01 PROCEDURE — 94760 N-INVAS EAR/PLS OXIMETRY 1: CPT

## 2021-01-01 PROCEDURE — 91300 COVID-19, MRNA, LNP-S, PF, 30 MCG/0.3 ML DOSE VACCINE: ICD-10-PCS | Mod: S$GLB,,, | Performed by: FAMILY MEDICINE

## 2021-01-01 PROCEDURE — 3044F PR MOST RECENT HEMOGLOBIN A1C LEVEL <7.0%: ICD-10-PCS | Mod: CPTII,S$GLB,, | Performed by: INTERNAL MEDICINE

## 2021-01-01 PROCEDURE — 4010F ACE/ARB THERAPY RXD/TAKEN: CPT | Mod: CPTII,S$GLB,, | Performed by: NURSE PRACTITIONER

## 2021-01-01 PROCEDURE — 84484 ASSAY OF TROPONIN QUANT: CPT | Performed by: EMERGENCY MEDICINE

## 2021-01-01 PROCEDURE — 94660 CPAP INITIATION&MGMT: CPT

## 2021-01-01 PROCEDURE — 80053 COMPREHEN METABOLIC PANEL: CPT | Performed by: HOSPITALIST

## 2021-01-01 PROCEDURE — 82010 KETONE BODYS QUAN: CPT | Mod: 91 | Performed by: INTERNAL MEDICINE

## 2021-01-01 PROCEDURE — 99285 EMERGENCY DEPT VISIT HI MDM: CPT | Mod: 25

## 2021-01-01 PROCEDURE — 63600175 PHARM REV CODE 636 W HCPCS: Performed by: EMERGENCY MEDICINE

## 2021-01-01 PROCEDURE — 21400001 HC TELEMETRY ROOM

## 2021-01-01 PROCEDURE — 82803 BLOOD GASES ANY COMBINATION: CPT

## 2021-01-01 PROCEDURE — 99900035 HC TECH TIME PER 15 MIN (STAT)

## 2021-01-01 PROCEDURE — 84484 ASSAY OF TROPONIN QUANT: CPT | Mod: 91 | Performed by: NURSE PRACTITIONER

## 2021-01-01 PROCEDURE — 63600175 PHARM REV CODE 636 W HCPCS: Performed by: NURSE PRACTITIONER

## 2021-01-01 PROCEDURE — 27000221 HC OXYGEN, UP TO 24 HOURS

## 2021-01-01 PROCEDURE — 85025 COMPLETE CBC W/AUTO DIFF WBC: CPT | Performed by: EMERGENCY MEDICINE

## 2021-01-01 PROCEDURE — 25000003 PHARM REV CODE 250: Performed by: INTERNAL MEDICINE

## 2021-01-01 PROCEDURE — 83735 ASSAY OF MAGNESIUM: CPT | Performed by: HOSPITALIST

## 2021-01-01 PROCEDURE — 82330 ASSAY OF CALCIUM: CPT

## 2021-01-01 PROCEDURE — 36415 COLL VENOUS BLD VENIPUNCTURE: CPT | Performed by: NURSE PRACTITIONER

## 2021-01-01 PROCEDURE — 80162 ASSAY OF DIGOXIN TOTAL: CPT | Performed by: INTERNAL MEDICINE

## 2021-01-01 PROCEDURE — 36415 COLL VENOUS BLD VENIPUNCTURE: CPT | Performed by: EMERGENCY MEDICINE

## 2021-01-01 PROCEDURE — 93306 ECHO (CUPID ONLY): ICD-10-PCS | Mod: 26,,, | Performed by: INTERNAL MEDICINE

## 2021-01-01 PROCEDURE — 3078F DIAST BP <80 MM HG: CPT | Mod: CPTII,S$GLB,, | Performed by: INTERNAL MEDICINE

## 2021-01-01 PROCEDURE — 93010 EKG 12-LEAD: ICD-10-PCS | Mod: ,,, | Performed by: INTERNAL MEDICINE

## 2021-01-01 PROCEDURE — 96375 TX/PRO/DX INJ NEW DRUG ADDON: CPT

## 2021-01-01 PROCEDURE — 85610 PROTHROMBIN TIME: CPT | Performed by: EMERGENCY MEDICINE

## 2021-01-01 PROCEDURE — 96372 THER/PROPH/DIAG INJ SC/IM: CPT

## 2021-01-01 PROCEDURE — 99900031 HC PATIENT EDUCATION (STAT)

## 2021-01-01 PROCEDURE — 1159F MED LIST DOCD IN RCRD: CPT | Mod: CPTII,S$GLB,, | Performed by: INTERNAL MEDICINE

## 2021-01-01 PROCEDURE — 80048 BASIC METABOLIC PNL TOTAL CA: CPT | Performed by: NURSE PRACTITIONER

## 2021-01-01 PROCEDURE — 36415 COLL VENOUS BLD VENIPUNCTURE: CPT | Performed by: HOSPITALIST

## 2021-01-01 PROCEDURE — 97116 GAIT TRAINING THERAPY: CPT

## 2021-01-01 PROCEDURE — 91300 COVID-19, MRNA, LNP-S, PF, 30 MCG/0.3 ML DOSE VACCINE: CPT | Mod: S$GLB,,, | Performed by: FAMILY MEDICINE

## 2021-01-01 PROCEDURE — 94761 N-INVAS EAR/PLS OXIMETRY MLT: CPT

## 2021-01-01 PROCEDURE — 84145 PROCALCITONIN (PCT): CPT | Performed by: FAMILY MEDICINE

## 2021-01-01 PROCEDURE — 1160F RVW MEDS BY RX/DR IN RCRD: CPT | Mod: CPTII,S$GLB,, | Performed by: INTERNAL MEDICINE

## 2021-01-01 PROCEDURE — 63600175 PHARM REV CODE 636 W HCPCS: Performed by: INTERNAL MEDICINE

## 2021-01-01 PROCEDURE — 80162 ASSAY OF DIGOXIN TOTAL: CPT | Performed by: HOSPITALIST

## 2021-01-01 PROCEDURE — 99233 PR SUBSEQUENT HOSPITAL CARE,LEVL III: ICD-10-PCS | Mod: ,,, | Performed by: INTERNAL MEDICINE

## 2021-01-01 PROCEDURE — 87040 BLOOD CULTURE FOR BACTERIA: CPT | Mod: 59 | Performed by: EMERGENCY MEDICINE

## 2021-01-01 PROCEDURE — 3075F PR MOST RECENT SYSTOLIC BLOOD PRESS GE 130-139MM HG: ICD-10-PCS | Mod: CPTII,S$GLB,, | Performed by: INTERNAL MEDICINE

## 2021-01-01 PROCEDURE — G0378 HOSPITAL OBSERVATION PER HR: HCPCS

## 2021-01-01 PROCEDURE — 94799 UNLISTED PULMONARY SVC/PX: CPT

## 2021-01-01 PROCEDURE — 25000003 PHARM REV CODE 250: Performed by: HOSPITALIST

## 2021-01-01 PROCEDURE — 82962 GLUCOSE BLOOD TEST: CPT

## 2021-01-01 PROCEDURE — 96376 TX/PRO/DX INJ SAME DRUG ADON: CPT

## 2021-01-01 PROCEDURE — 87086 URINE CULTURE/COLONY COUNT: CPT

## 2021-01-01 PROCEDURE — 97161 PT EVAL LOW COMPLEX 20 MIN: CPT

## 2021-01-01 PROCEDURE — 1159F PR MEDICATION LIST DOCUMENTED IN MEDICAL RECORD: ICD-10-PCS | Mod: CPTII,S$GLB,, | Performed by: INTERNAL MEDICINE

## 2021-01-01 PROCEDURE — 83605 ASSAY OF LACTIC ACID: CPT | Performed by: INTERNAL MEDICINE

## 2021-01-01 PROCEDURE — 83880 ASSAY OF NATRIURETIC PEPTIDE: CPT | Performed by: INTERNAL MEDICINE

## 2021-01-01 PROCEDURE — 21000000 HC CCU ICU ROOM CHARGE

## 2021-01-01 PROCEDURE — 83605 ASSAY OF LACTIC ACID: CPT | Performed by: EMERGENCY MEDICINE

## 2021-01-01 PROCEDURE — 94640 AIRWAY INHALATION TREATMENT: CPT

## 2021-01-01 PROCEDURE — 93018 CV STRESS TEST I&R ONLY: CPT | Mod: ,,, | Performed by: INTERNAL MEDICINE

## 2021-01-01 PROCEDURE — 80053 COMPREHEN METABOLIC PANEL: CPT | Performed by: NURSE PRACTITIONER

## 2021-01-01 PROCEDURE — 99214 OFFICE O/P EST MOD 30 MIN: CPT | Mod: S$GLB,,, | Performed by: INTERNAL MEDICINE

## 2021-01-01 PROCEDURE — 81001 URINALYSIS AUTO W/SCOPE: CPT | Performed by: EMERGENCY MEDICINE

## 2021-01-01 PROCEDURE — 96365 THER/PROPH/DIAG IV INF INIT: CPT

## 2021-01-01 PROCEDURE — 84100 ASSAY OF PHOSPHORUS: CPT | Performed by: HOSPITALIST

## 2021-01-01 PROCEDURE — 3075F SYST BP GE 130 - 139MM HG: CPT | Mod: CPTII,S$GLB,, | Performed by: INTERNAL MEDICINE

## 2021-01-01 PROCEDURE — 96372 THER/PROPH/DIAG INJ SC/IM: CPT | Mod: 59

## 2021-01-01 PROCEDURE — 99283 EMERGENCY DEPT VISIT LOW MDM: CPT

## 2021-01-01 PROCEDURE — 80053 COMPREHEN METABOLIC PANEL: CPT | Performed by: EMERGENCY MEDICINE

## 2021-01-01 PROCEDURE — 83880 ASSAY OF NATRIURETIC PEPTIDE: CPT | Performed by: EMERGENCY MEDICINE

## 2021-01-01 PROCEDURE — 82010 KETONE BODYS QUAN: CPT | Performed by: EMERGENCY MEDICINE

## 2021-01-01 PROCEDURE — 84484 ASSAY OF TROPONIN QUANT: CPT | Performed by: NURSE PRACTITIONER

## 2021-01-01 PROCEDURE — 99233 SBSQ HOSP IP/OBS HIGH 50: CPT | Mod: ,,, | Performed by: INTERNAL MEDICINE

## 2021-01-01 PROCEDURE — C9399 UNCLASSIFIED DRUGS OR BIOLOG: HCPCS | Performed by: NURSE PRACTITIONER

## 2021-01-01 PROCEDURE — 4010F ACE/ARB THERAPY RXD/TAKEN: CPT | Mod: CPTII,S$GLB,, | Performed by: INTERNAL MEDICINE

## 2021-01-01 PROCEDURE — 93010 ELECTROCARDIOGRAM REPORT: CPT | Mod: ,,, | Performed by: INTERNAL MEDICINE

## 2021-01-01 PROCEDURE — 93016 NUCLEAR STRESS TEST (CUPID ONLY): ICD-10-PCS | Mod: ,,, | Performed by: INTERNAL MEDICINE

## 2021-01-01 PROCEDURE — 80048 BASIC METABOLIC PNL TOTAL CA: CPT | Performed by: INTERNAL MEDICINE

## 2021-01-01 PROCEDURE — 84295 ASSAY OF SERUM SODIUM: CPT

## 2021-01-01 PROCEDURE — 93018 NUCLEAR STRESS TEST (CUPID ONLY): ICD-10-PCS | Mod: ,,, | Performed by: INTERNAL MEDICINE

## 2021-01-01 PROCEDURE — 81001 URINALYSIS AUTO W/SCOPE: CPT | Performed by: INTERNAL MEDICINE

## 2021-01-01 PROCEDURE — 93010 ELECTROCARDIOGRAM REPORT: CPT | Mod: ,,, | Performed by: SPECIALIST

## 2021-01-01 PROCEDURE — 3288F PR FALLS RISK ASSESSMENT DOCUMENTED: ICD-10-PCS | Mod: CPTII,S$GLB,, | Performed by: INTERNAL MEDICINE

## 2021-01-01 PROCEDURE — 93017 CV STRESS TEST TRACING ONLY: CPT

## 2021-01-01 PROCEDURE — 3078F PR MOST RECENT DIASTOLIC BLOOD PRESSURE < 80 MM HG: ICD-10-PCS | Mod: CPTII,S$GLB,, | Performed by: INTERNAL MEDICINE

## 2021-01-01 PROCEDURE — 83036 HEMOGLOBIN GLYCOSYLATED A1C: CPT | Performed by: NURSE PRACTITIONER

## 2021-01-01 PROCEDURE — 83735 ASSAY OF MAGNESIUM: CPT | Performed by: NURSE PRACTITIONER

## 2021-01-01 PROCEDURE — 1101F PR PT FALLS ASSESS DOC 0-1 FALLS W/OUT INJ PAST YR: ICD-10-PCS | Mod: CPTII,S$GLB,, | Performed by: INTERNAL MEDICINE

## 2021-01-01 PROCEDURE — 93016 CV STRESS TEST SUPVJ ONLY: CPT | Mod: ,,, | Performed by: INTERNAL MEDICINE

## 2021-01-01 PROCEDURE — 99223 PR INITIAL HOSPITAL CARE,LEVL III: ICD-10-PCS | Mod: ,,, | Performed by: INTERNAL MEDICINE

## 2021-01-01 PROCEDURE — 83880 ASSAY OF NATRIURETIC PEPTIDE: CPT | Performed by: HOSPITALIST

## 2021-01-01 PROCEDURE — 83735 ASSAY OF MAGNESIUM: CPT | Performed by: INTERNAL MEDICINE

## 2021-01-01 PROCEDURE — 80164 ASSAY DIPROPYLACETIC ACD TOT: CPT | Performed by: NURSE PRACTITIONER

## 2021-01-01 PROCEDURE — 4010F PR ACE/ARB THEARPY RXD/TAKEN: ICD-10-PCS | Mod: CPTII,S$GLB,, | Performed by: NURSE PRACTITIONER

## 2021-01-01 PROCEDURE — 82150 ASSAY OF AMYLASE: CPT | Performed by: INTERNAL MEDICINE

## 2021-01-01 PROCEDURE — 93010 EKG 12-LEAD: ICD-10-PCS | Mod: ,,, | Performed by: SPECIALIST

## 2021-01-01 PROCEDURE — 99223 1ST HOSP IP/OBS HIGH 75: CPT | Mod: ,,, | Performed by: INTERNAL MEDICINE

## 2021-01-01 PROCEDURE — 99232 PR SUBSEQUENT HOSPITAL CARE,LEVL II: ICD-10-PCS | Mod: ,,, | Performed by: INTERNAL MEDICINE

## 2021-01-01 PROCEDURE — 84484 ASSAY OF TROPONIN QUANT: CPT | Performed by: HOSPITALIST

## 2021-01-01 PROCEDURE — 99214 OFFICE O/P EST MOD 30 MIN: CPT | Mod: S$GLB,,, | Performed by: NURSE PRACTITIONER

## 2021-01-01 PROCEDURE — 87077 CULTURE AEROBIC IDENTIFY: CPT

## 2021-01-01 PROCEDURE — 25000003 PHARM REV CODE 250

## 2021-01-01 PROCEDURE — 84100 ASSAY OF PHOSPHORUS: CPT | Performed by: NURSE PRACTITIONER

## 2021-01-01 PROCEDURE — 99233 SBSQ HOSP IP/OBS HIGH 50: CPT | Mod: 25,,, | Performed by: INTERNAL MEDICINE

## 2021-01-01 PROCEDURE — U0002 COVID-19 LAB TEST NON-CDC: HCPCS | Performed by: EMERGENCY MEDICINE

## 2021-01-01 PROCEDURE — 99291 CRITICAL CARE FIRST HOUR: CPT

## 2021-01-01 PROCEDURE — 93005 ELECTROCARDIOGRAM TRACING: CPT | Performed by: INTERNAL MEDICINE

## 2021-01-01 PROCEDURE — 85610 PROTHROMBIN TIME: CPT | Performed by: HOSPITALIST

## 2021-01-01 PROCEDURE — 85014 HEMATOCRIT: CPT

## 2021-01-01 PROCEDURE — 36415 COLL VENOUS BLD VENIPUNCTURE: CPT | Performed by: FAMILY MEDICINE

## 2021-01-01 PROCEDURE — 87186 SC STD MICRODIL/AGAR DIL: CPT

## 2021-01-01 PROCEDURE — 82962 GLUCOSE BLOOD TEST: CPT | Mod: 91

## 2021-01-01 PROCEDURE — 0001A COVID-19, MRNA, LNP-S, PF, 30 MCG/0.3 ML DOSE VACCINE: ICD-10-PCS | Mod: S$GLB,,, | Performed by: FAMILY MEDICINE

## 2021-01-01 PROCEDURE — 25000242 PHARM REV CODE 250 ALT 637 W/ HCPCS: Performed by: INTERNAL MEDICINE

## 2021-01-01 PROCEDURE — 84145 PROCALCITONIN (PCT): CPT | Performed by: EMERGENCY MEDICINE

## 2021-01-01 PROCEDURE — 25000242 PHARM REV CODE 250 ALT 637 W/ HCPCS: Performed by: EMERGENCY MEDICINE

## 2021-01-01 PROCEDURE — 3008F BODY MASS INDEX DOCD: CPT | Mod: CPTII,S$GLB,, | Performed by: INTERNAL MEDICINE

## 2021-01-01 PROCEDURE — 82330 ASSAY OF CALCIUM: CPT | Performed by: INTERNAL MEDICINE

## 2021-01-01 PROCEDURE — 97530 THERAPEUTIC ACTIVITIES: CPT

## 2021-01-01 PROCEDURE — 84145 PROCALCITONIN (PCT): CPT | Performed by: HOSPITALIST

## 2021-01-01 PROCEDURE — 99284 EMERGENCY DEPT VISIT MOD MDM: CPT | Mod: 25

## 2021-01-01 PROCEDURE — 99233 PR SUBSEQUENT HOSPITAL CARE,LEVL III: ICD-10-PCS | Mod: 25,,, | Performed by: INTERNAL MEDICINE

## 2021-01-01 PROCEDURE — 36600 WITHDRAWAL OF ARTERIAL BLOOD: CPT

## 2021-01-01 PROCEDURE — 1160F PR REVIEW ALL MEDS BY PRESCRIBER/CLIN PHARMACIST DOCUMENTED: ICD-10-PCS | Mod: CPTII,S$GLB,, | Performed by: INTERNAL MEDICINE

## 2021-01-01 PROCEDURE — 96366 THER/PROPH/DIAG IV INF ADDON: CPT

## 2021-01-01 PROCEDURE — 84439 ASSAY OF FREE THYROXINE: CPT | Performed by: INTERNAL MEDICINE

## 2021-01-01 PROCEDURE — 25000003 PHARM REV CODE 250: Performed by: FAMILY MEDICINE

## 2021-01-01 PROCEDURE — 85027 COMPLETE CBC AUTOMATED: CPT | Performed by: INTERNAL MEDICINE

## 2021-01-01 PROCEDURE — 83880 ASSAY OF NATRIURETIC PEPTIDE: CPT | Performed by: NURSE PRACTITIONER

## 2021-01-01 PROCEDURE — 63600175 PHARM REV CODE 636 W HCPCS

## 2021-01-01 PROCEDURE — 1126F PR PAIN SEVERITY QUANTIFIED, NO PAIN PRESENT: ICD-10-PCS | Mod: CPTII,S$GLB,, | Performed by: INTERNAL MEDICINE

## 2021-01-01 PROCEDURE — 80162 ASSAY OF DIGOXIN TOTAL: CPT | Performed by: NURSE PRACTITIONER

## 2021-01-01 PROCEDURE — 82533 TOTAL CORTISOL: CPT | Performed by: INTERNAL MEDICINE

## 2021-01-01 PROCEDURE — 83605 ASSAY OF LACTIC ACID: CPT | Mod: 91 | Performed by: FAMILY MEDICINE

## 2021-01-01 PROCEDURE — 25000003 PHARM REV CODE 250: Performed by: EMERGENCY MEDICINE

## 2021-01-01 PROCEDURE — 1126F AMNT PAIN NOTED NONE PRSNT: CPT | Mod: CPTII,S$GLB,, | Performed by: INTERNAL MEDICINE

## 2021-01-01 PROCEDURE — 83735 ASSAY OF MAGNESIUM: CPT | Performed by: EMERGENCY MEDICINE

## 2021-01-01 PROCEDURE — 25000242 PHARM REV CODE 250 ALT 637 W/ HCPCS: Performed by: NURSE PRACTITIONER

## 2021-01-01 PROCEDURE — 3008F PR BODY MASS INDEX (BMI) DOCUMENTED: ICD-10-PCS | Mod: CPTII,S$GLB,, | Performed by: INTERNAL MEDICINE

## 2021-01-01 PROCEDURE — 84132 ASSAY OF SERUM POTASSIUM: CPT

## 2021-01-01 PROCEDURE — 93306 TTE W/DOPPLER COMPLETE: CPT

## 2021-01-01 PROCEDURE — 80162 ASSAY OF DIGOXIN TOTAL: CPT | Performed by: EMERGENCY MEDICINE

## 2021-01-01 PROCEDURE — 1101F PT FALLS ASSESS-DOCD LE1/YR: CPT | Mod: CPTII,S$GLB,, | Performed by: INTERNAL MEDICINE

## 2021-01-01 PROCEDURE — 85025 COMPLETE CBC W/AUTO DIFF WBC: CPT | Mod: 91 | Performed by: NURSE PRACTITIONER

## 2021-01-01 PROCEDURE — 96374 THER/PROPH/DIAG INJ IV PUSH: CPT

## 2021-01-01 PROCEDURE — 36415 COLL VENOUS BLD VENIPUNCTURE: CPT | Performed by: INTERNAL MEDICINE

## 2021-01-01 PROCEDURE — 99214 PR OFFICE/OUTPT VISIT, EST, LEVL IV, 30-39 MIN: ICD-10-PCS | Mod: S$GLB,,, | Performed by: NURSE PRACTITIONER

## 2021-01-01 PROCEDURE — 25000242 PHARM REV CODE 250 ALT 637 W/ HCPCS: Performed by: HOSPITALIST

## 2021-01-01 PROCEDURE — 3044F HG A1C LEVEL LT 7.0%: CPT | Mod: CPTII,S$GLB,, | Performed by: INTERNAL MEDICINE

## 2021-01-01 PROCEDURE — 85730 THROMBOPLASTIN TIME PARTIAL: CPT | Performed by: EMERGENCY MEDICINE

## 2021-01-01 PROCEDURE — 63600175 PHARM REV CODE 636 W HCPCS: Performed by: HOSPITALIST

## 2021-01-01 PROCEDURE — 81001 URINALYSIS AUTO W/SCOPE: CPT

## 2021-01-01 PROCEDURE — 84550 ASSAY OF BLOOD/URIC ACID: CPT | Performed by: NURSE PRACTITIONER

## 2021-01-01 PROCEDURE — 84132 ASSAY OF SERUM POTASSIUM: CPT | Mod: 59 | Performed by: FAMILY MEDICINE

## 2021-01-01 PROCEDURE — 99214 PR OFFICE/OUTPT VISIT, EST, LEVL IV, 30-39 MIN: ICD-10-PCS | Mod: S$GLB,,, | Performed by: INTERNAL MEDICINE

## 2021-01-01 PROCEDURE — 93306 TTE W/DOPPLER COMPLETE: CPT | Mod: 26,,, | Performed by: INTERNAL MEDICINE

## 2021-01-01 PROCEDURE — 0002A COVID-19, MRNA, LNP-S, PF, 30 MCG/0.3 ML DOSE VACCINE: CPT | Mod: CV19,S$GLB,, | Performed by: FAMILY MEDICINE

## 2021-01-01 PROCEDURE — 0002A COVID-19, MRNA, LNP-S, PF, 30 MCG/0.3 ML DOSE VACCINE: ICD-10-PCS | Mod: CV19,S$GLB,, | Performed by: FAMILY MEDICINE

## 2021-01-01 PROCEDURE — 84443 ASSAY THYROID STIM HORMONE: CPT | Performed by: INTERNAL MEDICINE

## 2021-01-01 PROCEDURE — 4010F PR ACE/ARB THEARPY RXD/TAKEN: ICD-10-PCS | Mod: CPTII,S$GLB,, | Performed by: INTERNAL MEDICINE

## 2021-01-01 PROCEDURE — 94640 AIRWAY INHALATION TREATMENT: CPT | Mod: 76

## 2021-01-01 PROCEDURE — 99232 SBSQ HOSP IP/OBS MODERATE 35: CPT | Mod: ,,, | Performed by: INTERNAL MEDICINE

## 2021-01-01 PROCEDURE — 83605 ASSAY OF LACTIC ACID: CPT | Mod: 91 | Performed by: INTERNAL MEDICINE

## 2021-01-01 PROCEDURE — 3288F FALL RISK ASSESSMENT DOCD: CPT | Mod: CPTII,S$GLB,, | Performed by: INTERNAL MEDICINE

## 2021-01-01 PROCEDURE — 93005 ELECTROCARDIOGRAM TRACING: CPT | Performed by: SPECIALIST

## 2021-01-01 PROCEDURE — 0001A COVID-19, MRNA, LNP-S, PF, 30 MCG/0.3 ML DOSE VACCINE: CPT | Mod: S$GLB,,, | Performed by: FAMILY MEDICINE

## 2021-01-01 PROCEDURE — 83690 ASSAY OF LIPASE: CPT | Performed by: INTERNAL MEDICINE

## 2021-01-01 PROCEDURE — 80053 COMPREHEN METABOLIC PANEL: CPT | Mod: 91 | Performed by: INTERNAL MEDICINE

## 2021-01-01 PROCEDURE — 81001 URINALYSIS AUTO W/SCOPE: CPT | Performed by: NURSE PRACTITIONER

## 2021-01-01 RX ORDER — INSULIN ASPART 100 [IU]/ML
0-5 INJECTION, SOLUTION INTRAVENOUS; SUBCUTANEOUS
Status: DISCONTINUED | OUTPATIENT
Start: 2021-01-01 | End: 2021-01-01 | Stop reason: HOSPADM

## 2021-01-01 RX ORDER — FAMOTIDINE 20 MG/1
20 TABLET, FILM COATED ORAL DAILY
Status: DISCONTINUED | OUTPATIENT
Start: 2021-01-01 | End: 2021-01-01 | Stop reason: HOSPADM

## 2021-01-01 RX ORDER — ENOXAPARIN SODIUM 100 MG/ML
1 INJECTION SUBCUTANEOUS
Status: DISCONTINUED | OUTPATIENT
Start: 2021-01-01 | End: 2021-01-01

## 2021-01-01 RX ORDER — POTASSIUM CHLORIDE 20 MEQ/1
40 TABLET, EXTENDED RELEASE ORAL
Status: DISCONTINUED | OUTPATIENT
Start: 2021-01-01 | End: 2021-01-01 | Stop reason: HOSPADM

## 2021-01-01 RX ORDER — ACETAMINOPHEN 325 MG/1
650 TABLET ORAL EVERY 4 HOURS PRN
Status: DISCONTINUED | OUTPATIENT
Start: 2021-01-01 | End: 2021-01-01 | Stop reason: HOSPADM

## 2021-01-01 RX ORDER — ISOSORBIDE DINITRATE 10 MG/1
10 TABLET ORAL 3 TIMES DAILY
Status: DISCONTINUED | OUTPATIENT
Start: 2021-01-01 | End: 2021-01-01 | Stop reason: HOSPADM

## 2021-01-01 RX ORDER — MAGNESIUM SULFATE 1 G/100ML
1 INJECTION INTRAVENOUS
Status: DISCONTINUED | OUTPATIENT
Start: 2021-01-01 | End: 2021-01-01 | Stop reason: HOSPADM

## 2021-01-01 RX ORDER — IBUPROFEN 200 MG
16 TABLET ORAL
Status: DISCONTINUED | OUTPATIENT
Start: 2021-01-01 | End: 2021-01-01 | Stop reason: HOSPADM

## 2021-01-01 RX ORDER — IBUPROFEN 200 MG
24 TABLET ORAL
Status: DISCONTINUED | OUTPATIENT
Start: 2021-01-01 | End: 2021-01-01 | Stop reason: HOSPADM

## 2021-01-01 RX ORDER — REGADENOSON 0.08 MG/ML
0.4 INJECTION, SOLUTION INTRAVENOUS ONCE
Status: COMPLETED | OUTPATIENT
Start: 2021-01-01 | End: 2021-01-01

## 2021-01-01 RX ORDER — FUROSEMIDE 10 MG/ML
40 INJECTION INTRAMUSCULAR; INTRAVENOUS 2 TIMES DAILY
Status: DISCONTINUED | OUTPATIENT
Start: 2021-01-01 | End: 2021-01-01

## 2021-01-01 RX ORDER — PREDNISONE 20 MG/1
40 TABLET ORAL DAILY
Status: DISCONTINUED | OUTPATIENT
Start: 2021-01-01 | End: 2021-01-01

## 2021-01-01 RX ORDER — ATORVASTATIN CALCIUM 20 MG/1
40 TABLET, FILM COATED ORAL NIGHTLY
Status: DISCONTINUED | OUTPATIENT
Start: 2021-01-01 | End: 2021-01-01 | Stop reason: HOSPADM

## 2021-01-01 RX ORDER — DIGOXIN 125 MCG
0.12 TABLET ORAL DAILY
Status: DISCONTINUED | OUTPATIENT
Start: 2021-01-01 | End: 2021-01-01

## 2021-01-01 RX ORDER — METOPROLOL TARTRATE 25 MG/1
25 TABLET, FILM COATED ORAL 2 TIMES DAILY
Status: DISCONTINUED | OUTPATIENT
Start: 2021-01-01 | End: 2021-01-01

## 2021-01-01 RX ORDER — SPIRONOLACTONE 25 MG/1
25 TABLET ORAL DAILY
Start: 2021-01-01

## 2021-01-01 RX ORDER — DIGOXIN 125 MCG
0.12 TABLET ORAL DAILY
Qty: 30 TABLET | Refills: 0 | Status: SHIPPED | OUTPATIENT
Start: 2021-01-01 | End: 2021-01-01 | Stop reason: SDUPTHER

## 2021-01-01 RX ORDER — ONDANSETRON 2 MG/ML
4 INJECTION INTRAMUSCULAR; INTRAVENOUS EVERY 8 HOURS PRN
Status: DISCONTINUED | OUTPATIENT
Start: 2021-01-01 | End: 2021-01-01 | Stop reason: HOSPADM

## 2021-01-01 RX ORDER — IPRATROPIUM BROMIDE AND ALBUTEROL SULFATE 2.5; .5 MG/3ML; MG/3ML
3 SOLUTION RESPIRATORY (INHALATION)
Status: DISCONTINUED | OUTPATIENT
Start: 2021-01-01 | End: 2021-01-01

## 2021-01-01 RX ORDER — SODIUM CHLORIDE 9 MG/ML
INJECTION, SOLUTION INTRAVENOUS CONTINUOUS
Status: DISCONTINUED | OUTPATIENT
Start: 2021-01-01 | End: 2021-01-01

## 2021-01-01 RX ORDER — POTASSIUM CHLORIDE 20 MEQ/1
20 TABLET, EXTENDED RELEASE ORAL
Status: DISCONTINUED | OUTPATIENT
Start: 2021-01-01 | End: 2021-01-01 | Stop reason: HOSPADM

## 2021-01-01 RX ORDER — TALC
6 POWDER (GRAM) TOPICAL NIGHTLY PRN
Status: DISCONTINUED | OUTPATIENT
Start: 2021-01-01 | End: 2021-01-01 | Stop reason: HOSPADM

## 2021-01-01 RX ORDER — POTASSIUM CHLORIDE 7.45 MG/ML
20 INJECTION INTRAVENOUS
Status: DISCONTINUED | OUTPATIENT
Start: 2021-01-01 | End: 2021-01-01 | Stop reason: HOSPADM

## 2021-01-01 RX ORDER — BISACODYL 10 MG
10 SUPPOSITORY, RECTAL RECTAL DAILY PRN
Status: DISCONTINUED | OUTPATIENT
Start: 2021-01-01 | End: 2021-01-01 | Stop reason: HOSPADM

## 2021-01-01 RX ORDER — AMLODIPINE BESYLATE 5 MG/1
5 TABLET ORAL 2 TIMES DAILY
Qty: 180 TABLET | Refills: 3 | Status: SHIPPED | OUTPATIENT
Start: 2021-01-01 | End: 2022-12-06

## 2021-01-01 RX ORDER — CHLORHEXIDINE GLUCONATE ORAL RINSE 1.2 MG/ML
15 SOLUTION DENTAL 2 TIMES DAILY
Status: DISCONTINUED | OUTPATIENT
Start: 2021-01-01 | End: 2021-01-01

## 2021-01-01 RX ORDER — FUROSEMIDE 10 MG/ML
60 INJECTION INTRAMUSCULAR; INTRAVENOUS ONCE
Status: COMPLETED | OUTPATIENT
Start: 2021-01-01 | End: 2021-01-01

## 2021-01-01 RX ORDER — RANOLAZINE 500 MG/1
500 TABLET, EXTENDED RELEASE ORAL 2 TIMES DAILY
Qty: 60 TABLET | Refills: 0 | Status: SHIPPED | OUTPATIENT
Start: 2021-01-01 | End: 2021-01-01 | Stop reason: SDUPTHER

## 2021-01-01 RX ORDER — NITROFURANTOIN 25; 75 MG/1; MG/1
100 CAPSULE ORAL EVERY 12 HOURS
Qty: 10 CAPSULE | Refills: 0 | Status: SHIPPED | OUTPATIENT
Start: 2021-01-01 | End: 2021-01-01

## 2021-01-01 RX ORDER — FUROSEMIDE 10 MG/ML
20 INJECTION INTRAMUSCULAR; INTRAVENOUS ONCE
Status: COMPLETED | OUTPATIENT
Start: 2021-01-01 | End: 2021-01-01

## 2021-01-01 RX ORDER — POTASSIUM CHLORIDE 7.45 MG/ML
40 INJECTION INTRAVENOUS
Status: DISCONTINUED | OUTPATIENT
Start: 2021-01-01 | End: 2021-01-01 | Stop reason: HOSPADM

## 2021-01-01 RX ORDER — ATORVASTATIN CALCIUM 40 MG/1
40 TABLET, FILM COATED ORAL NIGHTLY
Status: DISCONTINUED | OUTPATIENT
Start: 2021-01-01 | End: 2021-01-01 | Stop reason: HOSPADM

## 2021-01-01 RX ORDER — ISOSORBIDE DINITRATE 10 MG/1
10 TABLET ORAL 3 TIMES DAILY
Qty: 270 TABLET | Refills: 0 | Status: SHIPPED | OUTPATIENT
Start: 2021-01-01

## 2021-01-01 RX ORDER — CLOPIDOGREL BISULFATE 75 MG/1
75 TABLET ORAL DAILY
Status: DISCONTINUED | OUTPATIENT
Start: 2021-01-01 | End: 2021-01-01

## 2021-01-01 RX ORDER — METOPROLOL TARTRATE 50 MG/1
50 TABLET ORAL 2 TIMES DAILY
Status: DISCONTINUED | OUTPATIENT
Start: 2021-01-01 | End: 2021-01-01 | Stop reason: HOSPADM

## 2021-01-01 RX ORDER — GLUCAGON 1 MG
1 KIT INJECTION
Status: DISCONTINUED | OUTPATIENT
Start: 2021-01-01 | End: 2021-01-01 | Stop reason: HOSPADM

## 2021-01-01 RX ORDER — SACUBITRIL AND VALSARTAN 49; 51 MG/1; MG/1
1 TABLET, FILM COATED ORAL 2 TIMES DAILY
Qty: 60 TABLET | Refills: 2 | Status: ON HOLD | OUTPATIENT
Start: 2021-01-01 | End: 2021-01-01 | Stop reason: HOSPADM

## 2021-01-01 RX ORDER — PENTOXIFYLLINE 400 MG/1
400 TABLET, EXTENDED RELEASE ORAL 3 TIMES DAILY
Status: DISCONTINUED | OUTPATIENT
Start: 2021-01-01 | End: 2021-01-01 | Stop reason: HOSPADM

## 2021-01-01 RX ORDER — FUROSEMIDE 10 MG/ML
INJECTION INTRAMUSCULAR; INTRAVENOUS
Status: COMPLETED
Start: 2021-01-01 | End: 2021-01-01

## 2021-01-01 RX ORDER — SACUBITRIL AND VALSARTAN 49; 51 MG/1; MG/1
1 TABLET, FILM COATED ORAL 2 TIMES DAILY
Qty: 60 TABLET | Refills: 2 | Status: ON HOLD | OUTPATIENT
Start: 2021-01-01 | End: 2021-01-01 | Stop reason: SDUPTHER

## 2021-01-01 RX ORDER — METOLAZONE 5 MG/1
5 TABLET ORAL EVERY OTHER DAY
COMMUNITY
Start: 2021-01-01

## 2021-01-01 RX ORDER — METOPROLOL TARTRATE 1 MG/ML
2.5 INJECTION, SOLUTION INTRAVENOUS ONCE
Status: COMPLETED | OUTPATIENT
Start: 2021-01-01 | End: 2021-01-01

## 2021-01-01 RX ORDER — NAPROXEN 500 MG/1
500 TABLET ORAL 2 TIMES DAILY WITH MEALS
Qty: 10 TABLET | Refills: 0 | Status: SHIPPED | OUTPATIENT
Start: 2021-01-01 | End: 2021-01-01

## 2021-01-01 RX ORDER — NITROFURANTOIN 25; 75 MG/1; MG/1
100 CAPSULE ORAL 2 TIMES DAILY
Status: ON HOLD | COMMUNITY
End: 2021-01-01 | Stop reason: HOSPADM

## 2021-01-01 RX ORDER — MAGNESIUM SULFATE HEPTAHYDRATE 40 MG/ML
4 INJECTION, SOLUTION INTRAVENOUS
Status: DISCONTINUED | OUTPATIENT
Start: 2021-01-01 | End: 2021-01-01 | Stop reason: HOSPADM

## 2021-01-01 RX ORDER — PENTOXIFYLLINE 400 MG/1
400 TABLET, EXTENDED RELEASE ORAL 2 TIMES DAILY
Status: DISCONTINUED | OUTPATIENT
Start: 2021-01-01 | End: 2021-01-01

## 2021-01-01 RX ORDER — AMIODARONE HYDROCHLORIDE 200 MG/1
200 TABLET ORAL DAILY
Qty: 90 TABLET | Refills: 3 | Status: SHIPPED | OUTPATIENT
Start: 2021-01-01 | End: 2022-12-06

## 2021-01-01 RX ORDER — RANOLAZINE 500 MG/1
500 TABLET, EXTENDED RELEASE ORAL 2 TIMES DAILY
Status: DISCONTINUED | OUTPATIENT
Start: 2021-01-01 | End: 2021-01-01 | Stop reason: HOSPADM

## 2021-01-01 RX ORDER — LISINOPRIL 5 MG/1
10 TABLET ORAL DAILY
Status: DISCONTINUED | OUTPATIENT
Start: 2021-01-01 | End: 2021-01-01 | Stop reason: HOSPADM

## 2021-01-01 RX ORDER — ISOSORBIDE DINITRATE 10 MG/1
10 TABLET ORAL ONCE
Status: COMPLETED | OUTPATIENT
Start: 2021-01-01 | End: 2021-01-01

## 2021-01-01 RX ORDER — ASPIRIN 325 MG
325 TABLET ORAL
Status: COMPLETED | OUTPATIENT
Start: 2021-01-01 | End: 2021-01-01

## 2021-01-01 RX ORDER — ENOXAPARIN SODIUM 100 MG/ML
1 INJECTION SUBCUTANEOUS
Status: DISCONTINUED | OUTPATIENT
Start: 2021-01-01 | End: 2021-01-01 | Stop reason: DRUGHIGH

## 2021-01-01 RX ORDER — LEVOFLOXACIN 500 MG/1
500 TABLET, FILM COATED ORAL ONCE
Status: COMPLETED | OUTPATIENT
Start: 2021-01-01 | End: 2021-01-01

## 2021-01-01 RX ORDER — CITALOPRAM 20 MG/1
40 TABLET, FILM COATED ORAL DAILY
Status: DISCONTINUED | OUTPATIENT
Start: 2021-01-01 | End: 2021-01-01

## 2021-01-01 RX ORDER — ALBUTEROL SULFATE 90 UG/1
1 AEROSOL, METERED RESPIRATORY (INHALATION) EVERY 4 HOURS PRN
Status: DISCONTINUED | OUTPATIENT
Start: 2021-01-01 | End: 2021-01-01

## 2021-01-01 RX ORDER — MUPIROCIN 20 MG/G
OINTMENT TOPICAL 2 TIMES DAILY
Status: DISCONTINUED | OUTPATIENT
Start: 2021-01-01 | End: 2021-01-01

## 2021-01-01 RX ORDER — MORPHINE SULFATE 2 MG/ML
2 INJECTION, SOLUTION INTRAMUSCULAR; INTRAVENOUS EVERY 4 HOURS PRN
Status: DISCONTINUED | OUTPATIENT
Start: 2021-01-01 | End: 2021-01-01 | Stop reason: HOSPADM

## 2021-01-01 RX ORDER — ENOXAPARIN SODIUM 100 MG/ML
1 INJECTION SUBCUTANEOUS
Status: COMPLETED | OUTPATIENT
Start: 2021-01-01 | End: 2021-01-01

## 2021-01-01 RX ORDER — PENTOXIFYLLINE 400 MG/1
400 TABLET, EXTENDED RELEASE ORAL 3 TIMES DAILY
Qty: 270 TABLET | Refills: 3 | Status: ON HOLD | OUTPATIENT
Start: 2021-01-01 | End: 2022-01-01 | Stop reason: CLARIF

## 2021-01-01 RX ORDER — IPRATROPIUM BROMIDE 0.5 MG/2.5ML
0.5 SOLUTION RESPIRATORY (INHALATION)
Status: DISCONTINUED | OUTPATIENT
Start: 2021-01-01 | End: 2021-01-01 | Stop reason: HOSPADM

## 2021-01-01 RX ORDER — ALPRAZOLAM 0.5 MG/1
0.5 TABLET ORAL 2 TIMES DAILY PRN
Qty: 14 TABLET | Refills: 0 | Status: ON HOLD | OUTPATIENT
Start: 2021-01-01 | End: 2022-01-01 | Stop reason: CLARIF

## 2021-01-01 RX ORDER — NOREPINEPHRINE BITARTRATE/D5W 4MG/250ML
0-3 PLASTIC BAG, INJECTION (ML) INTRAVENOUS CONTINUOUS
Status: DISCONTINUED | OUTPATIENT
Start: 2021-01-01 | End: 2021-01-01

## 2021-01-01 RX ORDER — SODIUM CHLORIDE 0.9 % (FLUSH) 0.9 %
10 SYRINGE (ML) INJECTION
Status: DISCONTINUED | OUTPATIENT
Start: 2021-01-01 | End: 2021-01-01 | Stop reason: HOSPADM

## 2021-01-01 RX ORDER — SACUBITRIL AND VALSARTAN 49; 51 MG/1; MG/1
1 TABLET, FILM COATED ORAL 2 TIMES DAILY
Qty: 60 TABLET | Refills: 2 | OUTPATIENT
Start: 2021-01-01 | End: 2021-01-01

## 2021-01-01 RX ORDER — FUROSEMIDE 10 MG/ML
40 INJECTION INTRAMUSCULAR; INTRAVENOUS
Status: COMPLETED | OUTPATIENT
Start: 2021-01-01 | End: 2021-01-01

## 2021-01-01 RX ORDER — DIGOXIN 125 MCG
0.12 TABLET ORAL DAILY
Status: DISCONTINUED | OUTPATIENT
Start: 2021-01-01 | End: 2021-01-01 | Stop reason: HOSPADM

## 2021-01-01 RX ORDER — FUROSEMIDE 10 MG/ML
80 INJECTION INTRAMUSCULAR; INTRAVENOUS 2 TIMES DAILY
Status: DISCONTINUED | OUTPATIENT
Start: 2021-01-01 | End: 2021-01-01

## 2021-01-01 RX ORDER — IPRATROPIUM BROMIDE AND ALBUTEROL SULFATE 2.5; .5 MG/3ML; MG/3ML
3 SOLUTION RESPIRATORY (INHALATION) EVERY 6 HOURS
Status: DISCONTINUED | OUTPATIENT
Start: 2021-01-01 | End: 2021-01-01

## 2021-01-01 RX ORDER — POTASSIUM CHLORIDE 750 MG/1
10 TABLET, EXTENDED RELEASE ORAL DAILY
Qty: 30 TABLET | Refills: 3 | Status: ON HOLD | OUTPATIENT
Start: 2021-01-01 | End: 2021-01-01 | Stop reason: HOSPADM

## 2021-01-01 RX ORDER — CITALOPRAM 20 MG/1
20 TABLET, FILM COATED ORAL DAILY
Status: DISCONTINUED | OUTPATIENT
Start: 2021-01-01 | End: 2021-01-01 | Stop reason: HOSPADM

## 2021-01-01 RX ORDER — ENOXAPARIN SODIUM 100 MG/ML
40 INJECTION SUBCUTANEOUS EVERY 24 HOURS
Status: DISCONTINUED | OUTPATIENT
Start: 2021-01-01 | End: 2021-01-01 | Stop reason: HOSPADM

## 2021-01-01 RX ORDER — ALBUTEROL SULFATE 0.83 MG/ML
2.5 SOLUTION RESPIRATORY (INHALATION) EVERY 4 HOURS PRN
Status: DISCONTINUED | OUTPATIENT
Start: 2021-01-01 | End: 2021-01-01 | Stop reason: HOSPADM

## 2021-01-01 RX ORDER — METOPROLOL TARTRATE 25 MG/1
25 TABLET, FILM COATED ORAL 2 TIMES DAILY
Qty: 60 TABLET | Refills: 0 | Status: SHIPPED | OUTPATIENT
Start: 2021-01-01 | End: 2021-01-01

## 2021-01-01 RX ORDER — ENOXAPARIN SODIUM 100 MG/ML
0.75 INJECTION SUBCUTANEOUS
Status: DISCONTINUED | OUTPATIENT
Start: 2021-01-01 | End: 2021-01-01

## 2021-01-01 RX ORDER — ATORVASTATIN CALCIUM 40 MG/1
40 TABLET, FILM COATED ORAL NIGHTLY
Status: DISCONTINUED | OUTPATIENT
Start: 2021-01-01 | End: 2021-01-01

## 2021-01-01 RX ORDER — MULTIVITAMIN
1 TABLET ORAL DAILY
COMMUNITY

## 2021-01-01 RX ORDER — MAGNESIUM SULFATE HEPTAHYDRATE 40 MG/ML
2 INJECTION, SOLUTION INTRAVENOUS
Status: DISCONTINUED | OUTPATIENT
Start: 2021-01-01 | End: 2021-01-01 | Stop reason: HOSPADM

## 2021-01-01 RX ORDER — ISOSORBIDE DINITRATE 10 MG/1
10 TABLET ORAL 3 TIMES DAILY
Status: DISCONTINUED | OUTPATIENT
Start: 2021-01-01 | End: 2021-01-01

## 2021-01-01 RX ORDER — NITROGLYCERIN 20 MG/100ML
5 INJECTION INTRAVENOUS CONTINUOUS
Status: DISCONTINUED | OUTPATIENT
Start: 2021-01-01 | End: 2021-01-01

## 2021-01-01 RX ORDER — DIGOXIN 0.25 MG/ML
250 INJECTION INTRAMUSCULAR; INTRAVENOUS
Status: COMPLETED | OUTPATIENT
Start: 2021-01-01 | End: 2021-01-01

## 2021-01-01 RX ORDER — PHENTERMINE HYDROCHLORIDE 37.5 MG/1
CAPSULE ORAL
COMMUNITY
Start: 2021-01-01 | End: 2022-01-01

## 2021-01-01 RX ORDER — CLOPIDOGREL BISULFATE 75 MG/1
75 TABLET ORAL DAILY
Status: DISCONTINUED | OUTPATIENT
Start: 2021-01-01 | End: 2021-01-01 | Stop reason: HOSPADM

## 2021-01-01 RX ORDER — INSULIN GLARGINE 100 [IU]/ML
100 INJECTION, SOLUTION SUBCUTANEOUS DAILY
Status: ON HOLD
Start: 2021-01-01 | End: 2021-01-01 | Stop reason: SDUPTHER

## 2021-01-01 RX ORDER — PREDNISONE 20 MG/1
40 TABLET ORAL DAILY
Status: DISCONTINUED | OUTPATIENT
Start: 2021-01-01 | End: 2021-01-01 | Stop reason: HOSPADM

## 2021-01-01 RX ORDER — ALBUTEROL SULFATE 90 UG/1
1 AEROSOL, METERED RESPIRATORY (INHALATION) EVERY 4 HOURS PRN
COMMUNITY
Start: 2021-01-01

## 2021-01-01 RX ORDER — METHYLPREDNISOLONE SOD SUCC 125 MG
VIAL (EA) INJECTION
Status: COMPLETED
Start: 2021-01-01 | End: 2021-01-01

## 2021-01-01 RX ORDER — POLYETHYLENE GLYCOL 3350 17 G/17G
17 POWDER, FOR SOLUTION ORAL DAILY
Status: DISCONTINUED | OUTPATIENT
Start: 2021-01-01 | End: 2021-01-01 | Stop reason: HOSPADM

## 2021-01-01 RX ORDER — AMOXICILLIN 250 MG
1 CAPSULE ORAL 2 TIMES DAILY PRN
Status: DISCONTINUED | OUTPATIENT
Start: 2021-01-01 | End: 2021-01-01 | Stop reason: HOSPADM

## 2021-01-01 RX ORDER — POTASSIUM CHLORIDE 750 MG/1
10 CAPSULE, EXTENDED RELEASE ORAL DAILY
Status: DISCONTINUED | OUTPATIENT
Start: 2021-01-01 | End: 2021-01-01

## 2021-01-01 RX ORDER — CITALOPRAM 20 MG/1
40 TABLET, FILM COATED ORAL DAILY
Status: DISCONTINUED | OUTPATIENT
Start: 2021-01-01 | End: 2021-01-01 | Stop reason: HOSPADM

## 2021-01-01 RX ORDER — PROCHLORPERAZINE EDISYLATE 5 MG/ML
5 INJECTION INTRAMUSCULAR; INTRAVENOUS EVERY 6 HOURS PRN
Status: DISCONTINUED | OUTPATIENT
Start: 2021-01-01 | End: 2021-01-01 | Stop reason: HOSPADM

## 2021-01-01 RX ORDER — FUROSEMIDE 40 MG/1
60 TABLET ORAL DAILY
Qty: 45 TABLET | Refills: 0 | Status: SHIPPED | OUTPATIENT
Start: 2021-01-01 | End: 2021-01-01

## 2021-01-01 RX ORDER — LANOLIN ALCOHOL/MO/W.PET/CERES
800 CREAM (GRAM) TOPICAL
Status: DISCONTINUED | OUTPATIENT
Start: 2021-01-01 | End: 2021-01-01 | Stop reason: HOSPADM

## 2021-01-01 RX ORDER — LEVALBUTEROL 1.25 MG/.5ML
1.25 SOLUTION, CONCENTRATE RESPIRATORY (INHALATION) EVERY 6 HOURS PRN
Status: DISCONTINUED | OUTPATIENT
Start: 2021-01-01 | End: 2021-01-01 | Stop reason: HOSPADM

## 2021-01-01 RX ORDER — FUROSEMIDE 10 MG/ML
80 INJECTION INTRAMUSCULAR; INTRAVENOUS
Status: COMPLETED | OUTPATIENT
Start: 2021-01-01 | End: 2021-01-01

## 2021-01-01 RX ORDER — RANOLAZINE 500 MG/1
500 TABLET, EXTENDED RELEASE ORAL 2 TIMES DAILY
Qty: 60 TABLET | Refills: 2 | Status: SHIPPED | OUTPATIENT
Start: 2021-01-01 | End: 2021-01-01 | Stop reason: SDUPTHER

## 2021-01-01 RX ORDER — PROMETHAZINE HYDROCHLORIDE 25 MG/1
25 TABLET ORAL EVERY 6 HOURS PRN
Status: DISCONTINUED | OUTPATIENT
Start: 2021-01-01 | End: 2021-01-01 | Stop reason: HOSPADM

## 2021-01-01 RX ORDER — ATORVASTATIN CALCIUM 40 MG/1
40 TABLET, FILM COATED ORAL NIGHTLY
Qty: 90 TABLET | Refills: 2 | Status: SHIPPED | OUTPATIENT
Start: 2021-01-01

## 2021-01-01 RX ORDER — PREDNISONE 20 MG/1
40 TABLET ORAL DAILY
Qty: 8 TABLET | Refills: 0 | OUTPATIENT
Start: 2021-01-01 | End: 2021-01-01

## 2021-01-01 RX ORDER — CLOPIDOGREL BISULFATE 75 MG/1
75 TABLET ORAL DAILY
Qty: 90 TABLET | Refills: 3 | Status: ON HOLD | OUTPATIENT
Start: 2021-01-01 | End: 2022-01-01 | Stop reason: SDUPTHER

## 2021-01-01 RX ORDER — IPRATROPIUM BROMIDE AND ALBUTEROL SULFATE 2.5; .5 MG/3ML; MG/3ML
3 SOLUTION RESPIRATORY (INHALATION) 2 TIMES DAILY
Status: DISCONTINUED | OUTPATIENT
Start: 2021-01-01 | End: 2021-01-01 | Stop reason: HOSPADM

## 2021-01-01 RX ORDER — LEVALBUTEROL 1.25 MG/.5ML
1.25 SOLUTION, CONCENTRATE RESPIRATORY (INHALATION) 4 TIMES DAILY
Status: DISCONTINUED | OUTPATIENT
Start: 2021-01-01 | End: 2021-01-01

## 2021-01-01 RX ORDER — METOPROLOL TARTRATE 50 MG/1
50 TABLET ORAL 2 TIMES DAILY
Status: DISCONTINUED | OUTPATIENT
Start: 2021-01-01 | End: 2021-01-01

## 2021-01-01 RX ORDER — METHYLPREDNISOLONE SOD SUCC 125 MG
125 VIAL (EA) INJECTION
Status: COMPLETED | OUTPATIENT
Start: 2021-01-01 | End: 2021-01-01

## 2021-01-01 RX ORDER — AMIODARONE HYDROCHLORIDE 200 MG/1
200 TABLET ORAL DAILY
Status: DISCONTINUED | OUTPATIENT
Start: 2021-01-01 | End: 2021-01-01 | Stop reason: HOSPADM

## 2021-01-01 RX ORDER — FUROSEMIDE 40 MG/1
40 TABLET ORAL DAILY
Status: DISCONTINUED | OUTPATIENT
Start: 2021-01-01 | End: 2021-01-01 | Stop reason: HOSPADM

## 2021-01-01 RX ORDER — IPRATROPIUM BROMIDE 0.5 MG/2.5ML
0.5 SOLUTION RESPIRATORY (INHALATION) EVERY 8 HOURS
Status: DISCONTINUED | OUTPATIENT
Start: 2021-01-01 | End: 2021-01-01

## 2021-01-01 RX ORDER — AMLODIPINE BESYLATE 5 MG/1
5 TABLET ORAL DAILY
Qty: 30 TABLET | Refills: 1 | Status: SHIPPED | OUTPATIENT
Start: 2021-01-01 | End: 2021-01-01 | Stop reason: SDUPTHER

## 2021-01-01 RX ORDER — FUROSEMIDE 40 MG/1
TABLET ORAL
Qty: 60 TABLET | Refills: 2 | Status: ON HOLD | OUTPATIENT
Start: 2021-01-01 | End: 2022-01-01

## 2021-01-01 RX ORDER — LEVALBUTEROL 1.25 MG/.5ML
1.25 SOLUTION, CONCENTRATE RESPIRATORY (INHALATION)
Status: DISCONTINUED | OUTPATIENT
Start: 2021-01-01 | End: 2021-01-01 | Stop reason: HOSPADM

## 2021-01-01 RX ORDER — GABAPENTIN 400 MG/1
400 CAPSULE ORAL 3 TIMES DAILY
COMMUNITY

## 2021-01-01 RX ORDER — SACUBITRIL AND VALSARTAN 49; 51 MG/1; MG/1
1 TABLET, FILM COATED ORAL 2 TIMES DAILY
Qty: 60 TABLET | Refills: 2 | Status: SHIPPED | OUTPATIENT
Start: 2021-01-01 | End: 2021-01-01 | Stop reason: SDUPTHER

## 2021-01-01 RX ORDER — NITROGLYCERIN 20 MG/100ML
INJECTION INTRAVENOUS
Status: COMPLETED
Start: 2021-01-01 | End: 2021-01-01

## 2021-01-01 RX ORDER — OXYCODONE AND ACETAMINOPHEN 10; 325 MG/1; MG/1
1 TABLET ORAL EVERY 8 HOURS PRN
COMMUNITY
Start: 2021-01-01 | End: 2022-01-01

## 2021-01-01 RX ORDER — INSULIN ASPART 100 [IU]/ML
0-5 INJECTION, SOLUTION INTRAVENOUS; SUBCUTANEOUS
Status: DISCONTINUED | OUTPATIENT
Start: 2021-01-01 | End: 2021-01-01

## 2021-01-01 RX ORDER — FUROSEMIDE 20 MG/1
60 TABLET ORAL DAILY
Status: DISCONTINUED | OUTPATIENT
Start: 2021-01-01 | End: 2021-01-01

## 2021-01-01 RX ORDER — METOPROLOL TARTRATE 50 MG/1
50 TABLET ORAL 2 TIMES DAILY
Qty: 180 TABLET | Refills: 2 | Status: ON HOLD | OUTPATIENT
Start: 2021-01-01 | End: 2022-01-01 | Stop reason: HOSPADM

## 2021-01-01 RX ORDER — IPRATROPIUM BROMIDE AND ALBUTEROL SULFATE 2.5; .5 MG/3ML; MG/3ML
3 SOLUTION RESPIRATORY (INHALATION)
Status: COMPLETED | OUTPATIENT
Start: 2021-01-01 | End: 2021-01-01

## 2021-01-01 RX ORDER — SPIRONOLACTONE 25 MG/1
25 TABLET ORAL DAILY
Status: ON HOLD | COMMUNITY
End: 2021-01-01 | Stop reason: SDUPTHER

## 2021-01-01 RX ORDER — MORPHINE SULFATE 4 MG/ML
4 INJECTION, SOLUTION INTRAMUSCULAR; INTRAVENOUS EVERY 4 HOURS PRN
Status: DISCONTINUED | OUTPATIENT
Start: 2021-01-01 | End: 2021-01-01 | Stop reason: HOSPADM

## 2021-01-01 RX ORDER — ALPRAZOLAM 0.5 MG/1
0.5 TABLET ORAL 3 TIMES DAILY PRN
Status: DISCONTINUED | OUTPATIENT
Start: 2021-01-01 | End: 2021-01-01 | Stop reason: HOSPADM

## 2021-01-01 RX ORDER — FUROSEMIDE 10 MG/ML
40 INJECTION INTRAMUSCULAR; INTRAVENOUS
Status: DISCONTINUED | OUTPATIENT
Start: 2021-01-01 | End: 2021-01-01

## 2021-01-01 RX ORDER — RANOLAZINE 500 MG/1
500 TABLET, EXTENDED RELEASE ORAL 2 TIMES DAILY
Qty: 60 TABLET | Refills: 2 | Status: ON HOLD | OUTPATIENT
Start: 2021-01-01 | End: 2021-01-01 | Stop reason: SDUPTHER

## 2021-01-01 RX ORDER — RANOLAZINE 500 MG/1
500 TABLET, EXTENDED RELEASE ORAL 2 TIMES DAILY
Qty: 60 TABLET | Refills: 2 | OUTPATIENT
Start: 2021-01-01 | End: 2021-01-01

## 2021-01-01 RX ORDER — AMIODARONE HYDROCHLORIDE 200 MG/1
200 TABLET ORAL DAILY
Qty: 21 TABLET | Refills: 0 | Status: SHIPPED | OUTPATIENT
Start: 2021-01-01 | End: 2021-01-01 | Stop reason: SDUPTHER

## 2021-01-01 RX ORDER — SPIRONOLACTONE 25 MG/1
25 TABLET ORAL DAILY
Qty: 30 TABLET | Refills: 11 | Status: SHIPPED | OUTPATIENT
Start: 2021-01-01 | End: 2021-01-01 | Stop reason: SINTOL

## 2021-01-01 RX ORDER — DIGOXIN 125 MCG
0.12 TABLET ORAL DAILY
Qty: 30 TABLET | Refills: 2 | Status: SHIPPED | OUTPATIENT
Start: 2021-01-01 | End: 2021-01-01 | Stop reason: SDUPTHER

## 2021-01-01 RX ORDER — RANOLAZINE 500 MG/1
500 TABLET, EXTENDED RELEASE ORAL 2 TIMES DAILY
Status: DISCONTINUED | OUTPATIENT
Start: 2021-01-01 | End: 2021-01-01

## 2021-01-01 RX ORDER — NITROGLYCERIN 0.4 MG/1
0.4 TABLET SUBLINGUAL EVERY 5 MIN PRN
Status: DISCONTINUED | OUTPATIENT
Start: 2021-01-01 | End: 2021-01-01 | Stop reason: HOSPADM

## 2021-01-01 RX ORDER — NITROFURANTOIN 25; 75 MG/1; MG/1
100 CAPSULE ORAL EVERY 12 HOURS
Status: DISCONTINUED | OUTPATIENT
Start: 2021-01-01 | End: 2021-01-01 | Stop reason: HOSPADM

## 2021-01-01 RX ORDER — NITROGLYCERIN 20 MG/100ML
0-400 INJECTION INTRAVENOUS CONTINUOUS
Status: DISCONTINUED | OUTPATIENT
Start: 2021-01-01 | End: 2021-01-01

## 2021-01-01 RX ORDER — ONDANSETRON HYDROCHLORIDE 8 MG/1
8 TABLET, FILM COATED ORAL EVERY 6 HOURS PRN
Status: DISCONTINUED | OUTPATIENT
Start: 2021-01-01 | End: 2021-01-01 | Stop reason: HOSPADM

## 2021-01-01 RX ORDER — MEROPENEM AND SODIUM CHLORIDE 1 G/50ML
1 INJECTION, SOLUTION INTRAVENOUS
Status: DISCONTINUED | OUTPATIENT
Start: 2021-01-01 | End: 2021-01-01

## 2021-01-01 RX ORDER — FUROSEMIDE 40 MG/1
60 TABLET ORAL DAILY
Qty: 60 TABLET | Refills: 2 | Status: SHIPPED | OUTPATIENT
Start: 2021-01-01 | End: 2021-01-01 | Stop reason: SDUPTHER

## 2021-01-01 RX ORDER — BENZONATATE 100 MG/1
100 CAPSULE ORAL 3 TIMES DAILY PRN
Status: DISCONTINUED | OUTPATIENT
Start: 2021-01-01 | End: 2021-01-01 | Stop reason: HOSPADM

## 2021-01-01 RX ORDER — INSULIN GLARGINE 100 [IU]/ML
50 INJECTION, SOLUTION SUBCUTANEOUS DAILY
Status: ON HOLD
Start: 2021-01-01 | End: 2022-01-01 | Stop reason: SDUPTHER

## 2021-01-01 RX ORDER — METOPROLOL TARTRATE 25 MG/1
25 TABLET, FILM COATED ORAL 2 TIMES DAILY
Status: DISCONTINUED | OUTPATIENT
Start: 2021-01-01 | End: 2021-01-01 | Stop reason: HOSPADM

## 2021-01-01 RX ORDER — GABAPENTIN 300 MG/1
300 CAPSULE ORAL 2 TIMES DAILY
Status: DISCONTINUED | OUTPATIENT
Start: 2021-01-01 | End: 2021-01-01

## 2021-01-01 RX ORDER — NAPROXEN SODIUM 220 MG/1
81 TABLET, FILM COATED ORAL DAILY
Status: DISCONTINUED | OUTPATIENT
Start: 2021-01-01 | End: 2021-01-01 | Stop reason: HOSPADM

## 2021-01-01 RX ORDER — FUROSEMIDE 40 MG/1
60 TABLET ORAL DAILY
Qty: 60 TABLET | Refills: 2 | Status: ON HOLD | OUTPATIENT
Start: 2021-01-01 | End: 2021-01-01 | Stop reason: HOSPADM

## 2021-01-01 RX ORDER — CARVEDILOL 3.12 MG/1
3.12 TABLET ORAL 2 TIMES DAILY
Status: DISCONTINUED | OUTPATIENT
Start: 2021-01-01 | End: 2021-01-01

## 2021-01-01 RX ORDER — ESZOPICLONE 3 MG/1
3 TABLET, FILM COATED ORAL NIGHTLY
COMMUNITY
End: 2022-01-01

## 2021-01-01 RX ORDER — INSULIN ASPART 100 [IU]/ML
0-15 INJECTION, SOLUTION INTRAVENOUS; SUBCUTANEOUS
Status: DISCONTINUED | OUTPATIENT
Start: 2021-01-01 | End: 2021-01-01 | Stop reason: HOSPADM

## 2021-01-01 RX ORDER — NAPROXEN SODIUM 220 MG/1
81 TABLET, FILM COATED ORAL DAILY
Status: DISCONTINUED | OUTPATIENT
Start: 2021-01-01 | End: 2021-01-01

## 2021-01-01 RX ORDER — ONDANSETRON 2 MG/ML
4 INJECTION INTRAMUSCULAR; INTRAVENOUS EVERY 6 HOURS PRN
Status: DISCONTINUED | OUTPATIENT
Start: 2021-01-01 | End: 2021-01-01 | Stop reason: HOSPADM

## 2021-01-01 RX ORDER — LEVOFLOXACIN 5 MG/ML
750 INJECTION, SOLUTION INTRAVENOUS
Status: DISCONTINUED | OUTPATIENT
Start: 2021-01-01 | End: 2021-01-01

## 2021-01-01 RX ORDER — LEVOFLOXACIN 250 MG/1
250 TABLET ORAL DAILY
Status: DISCONTINUED | OUTPATIENT
Start: 2021-01-01 | End: 2021-01-01

## 2021-01-01 RX ORDER — AMIODARONE HYDROCHLORIDE 150 MG/3ML
150 INJECTION, SOLUTION INTRAVENOUS
Status: COMPLETED | OUTPATIENT
Start: 2021-01-01 | End: 2021-01-01

## 2021-01-01 RX ORDER — FUROSEMIDE 10 MG/ML
80 INJECTION INTRAMUSCULAR; INTRAVENOUS 2 TIMES DAILY
Status: DISCONTINUED | OUTPATIENT
Start: 2021-01-01 | End: 2021-01-01 | Stop reason: HOSPADM

## 2021-01-01 RX ORDER — FUROSEMIDE 10 MG/ML
40 INJECTION INTRAMUSCULAR; INTRAVENOUS
Status: DISCONTINUED | OUTPATIENT
Start: 2021-01-01 | End: 2021-01-01 | Stop reason: HOSPADM

## 2021-01-01 RX ORDER — ISOSORBIDE DINITRATE 10 MG/1
10 TABLET ORAL 3 TIMES DAILY
Qty: 270 TABLET | Refills: 0 | Status: SHIPPED | OUTPATIENT
Start: 2021-01-01 | End: 2021-01-01 | Stop reason: SDUPTHER

## 2021-01-01 RX ORDER — FUROSEMIDE 10 MG/ML
60 INJECTION INTRAMUSCULAR; INTRAVENOUS
Status: DISCONTINUED | OUTPATIENT
Start: 2021-01-01 | End: 2021-01-01

## 2021-01-01 RX ADMIN — ISOSORBIDE DINITRATE 10 MG: 10 TABLET ORAL at 02:11

## 2021-01-01 RX ADMIN — LEVALBUTEROL HYDROCHLORIDE 1.25 MG: 1.25 SOLUTION, CONCENTRATE RESPIRATORY (INHALATION) at 01:09

## 2021-01-01 RX ADMIN — DIGOXIN 0.12 MG: 125 TABLET ORAL at 08:11

## 2021-01-01 RX ADMIN — FUROSEMIDE 40 MG: 10 INJECTION, SOLUTION INTRAVENOUS at 10:11

## 2021-01-01 RX ADMIN — IBUPROFEN 600 MG: 400 TABLET, FILM COATED ORAL at 02:10

## 2021-01-01 RX ADMIN — SODIUM CHLORIDE 250 ML: 0.9 INJECTION, SOLUTION INTRAVENOUS at 01:11

## 2021-01-01 RX ADMIN — ISOSORBIDE DINITRATE 10 MG: 10 TABLET ORAL at 09:11

## 2021-01-01 RX ADMIN — IPRATROPIUM BROMIDE 0.5 MG: 0.5 SOLUTION RESPIRATORY (INHALATION) at 01:09

## 2021-01-01 RX ADMIN — FUROSEMIDE 40 MG: 10 INJECTION, SOLUTION INTRAMUSCULAR; INTRAVENOUS at 05:11

## 2021-01-01 RX ADMIN — INSULIN ASPART 2 UNITS: 100 INJECTION, SOLUTION INTRAVENOUS; SUBCUTANEOUS at 05:11

## 2021-01-01 RX ADMIN — SODIUM CHLORIDE: 0.9 INJECTION, SOLUTION INTRAVENOUS at 02:11

## 2021-01-01 RX ADMIN — METOPROLOL TARTRATE 25 MG: 25 TABLET, FILM COATED ORAL at 11:09

## 2021-01-01 RX ADMIN — FUROSEMIDE 80 MG: 10 INJECTION, SOLUTION INTRAVENOUS at 02:09

## 2021-01-01 RX ADMIN — ASPIRIN 81 MG 81 MG: 81 TABLET ORAL at 09:11

## 2021-01-01 RX ADMIN — INSULIN ASPART 4 UNITS: 100 INJECTION, SOLUTION INTRAVENOUS; SUBCUTANEOUS at 08:09

## 2021-01-01 RX ADMIN — ONDANSETRON 4 MG: 2 INJECTION INTRAMUSCULAR; INTRAVENOUS at 12:11

## 2021-01-01 RX ADMIN — INSULIN ASPART 1 UNITS: 100 INJECTION, SOLUTION INTRAVENOUS; SUBCUTANEOUS at 09:11

## 2021-01-01 RX ADMIN — ASPIRIN 81 MG 81 MG: 81 TABLET ORAL at 08:11

## 2021-01-01 RX ADMIN — ALPRAZOLAM 0.5 MG: 0.5 TABLET ORAL at 09:11

## 2021-01-01 RX ADMIN — PENTOXIFYLLINE 400 MG: 400 TABLET, EXTENDED RELEASE ORAL at 09:11

## 2021-01-01 RX ADMIN — CLOPIDOGREL BISULFATE 75 MG: 75 TABLET, FILM COATED ORAL at 08:11

## 2021-01-01 RX ADMIN — SODIUM ZIRCONIUM CYCLOSILICATE 10 G: 10 POWDER, FOR SUSPENSION ORAL at 04:11

## 2021-01-01 RX ADMIN — ENOXAPARIN SODIUM 100 MG: 100 INJECTION SUBCUTANEOUS at 05:11

## 2021-01-01 RX ADMIN — PENTOXIFYLLINE 400 MG: 400 TABLET, EXTENDED RELEASE ORAL at 08:09

## 2021-01-01 RX ADMIN — NITROGLYCERIN 20 MCG/MIN: 20 INJECTION INTRAVENOUS at 02:09

## 2021-01-01 RX ADMIN — ATORVASTATIN CALCIUM 40 MG: 40 TABLET, FILM COATED ORAL at 09:11

## 2021-01-01 RX ADMIN — CALCIUM GLUCONATE 1 G: 98 INJECTION, SOLUTION INTRAVENOUS at 04:11

## 2021-01-01 RX ADMIN — INSULIN ASPART 3 UNITS: 100 INJECTION, SOLUTION INTRAVENOUS; SUBCUTANEOUS at 08:11

## 2021-01-01 RX ADMIN — ALPRAZOLAM 0.5 MG: 0.5 TABLET ORAL at 05:11

## 2021-01-01 RX ADMIN — PENTOXIFYLLINE 400 MG: 400 TABLET, EXTENDED RELEASE ORAL at 04:09

## 2021-01-01 RX ADMIN — ISOSORBIDE DINITRATE 10 MG: 10 TABLET ORAL at 08:09

## 2021-01-01 RX ADMIN — PENTOXIFYLLINE 400 MG: 400 TABLET, EXTENDED RELEASE ORAL at 08:11

## 2021-01-01 RX ADMIN — AMIODARONE HYDROCHLORIDE 150 MG: 50 INJECTION, SOLUTION INTRAVENOUS at 11:11

## 2021-01-01 RX ADMIN — ALPRAZOLAM 0.5 MG: 0.5 TABLET ORAL at 10:11

## 2021-01-01 RX ADMIN — CITALOPRAM HYDROBROMIDE 40 MG: 20 TABLET, FILM COATED ORAL at 09:11

## 2021-01-01 RX ADMIN — METOPROLOL TARTRATE 50 MG: 50 TABLET, FILM COATED ORAL at 09:11

## 2021-01-01 RX ADMIN — INSULIN DETEMIR 100 UNITS: 100 INJECTION, SOLUTION SUBCUTANEOUS at 08:09

## 2021-01-01 RX ADMIN — FUROSEMIDE 40 MG: 40 TABLET ORAL at 09:11

## 2021-01-01 RX ADMIN — FUROSEMIDE 80 MG: 10 INJECTION INTRAMUSCULAR; INTRAVENOUS at 02:09

## 2021-01-01 RX ADMIN — ISOSORBIDE DINITRATE 10 MG: 10 TABLET ORAL at 03:11

## 2021-01-01 RX ADMIN — AMIODARONE HYDROCHLORIDE 1 MG/MIN: 1.8 INJECTION, SOLUTION INTRAVENOUS at 03:11

## 2021-01-01 RX ADMIN — ISOSORBIDE DINITRATE 10 MG: 10 TABLET ORAL at 10:11

## 2021-01-01 RX ADMIN — DIGOXIN 0.12 MG: 125 TABLET ORAL at 09:11

## 2021-01-01 RX ADMIN — THERA TABS 1 TABLET: TAB at 08:11

## 2021-01-01 RX ADMIN — ALPRAZOLAM 0.5 MG: 0.5 TABLET ORAL at 04:11

## 2021-01-01 RX ADMIN — SACUBITRIL AND VALSARTAN 2 TABLET: 49; 51 TABLET, FILM COATED ORAL at 08:09

## 2021-01-01 RX ADMIN — MUPIROCIN: 20 OINTMENT TOPICAL at 08:11

## 2021-01-01 RX ADMIN — GABAPENTIN 400 MG: 300 CAPSULE ORAL at 08:09

## 2021-01-01 RX ADMIN — INSULIN DETEMIR 25 UNITS: 100 INJECTION, SOLUTION SUBCUTANEOUS at 09:11

## 2021-01-01 RX ADMIN — SACUBITRIL AND VALSARTAN 1 TABLET: 49; 51 TABLET, FILM COATED ORAL at 08:09

## 2021-01-01 RX ADMIN — LEVALBUTEROL HYDROCHLORIDE 1.25 MG: 1.25 SOLUTION, CONCENTRATE RESPIRATORY (INHALATION) at 01:11

## 2021-01-01 RX ADMIN — ONDANSETRON HYDROCHLORIDE 8 MG: 8 TABLET, FILM COATED ORAL at 09:11

## 2021-01-01 RX ADMIN — INSULIN ASPART 2 UNITS: 100 INJECTION, SOLUTION INTRAVENOUS; SUBCUTANEOUS at 11:11

## 2021-01-01 RX ADMIN — METOPROLOL TARTRATE 50 MG: 50 TABLET, FILM COATED ORAL at 08:11

## 2021-01-01 RX ADMIN — INSULIN ASPART 2 UNITS: 100 INJECTION, SOLUTION INTRAVENOUS; SUBCUTANEOUS at 12:11

## 2021-01-01 RX ADMIN — FUROSEMIDE 20 MG: 10 INJECTION INTRAMUSCULAR; INTRAVENOUS at 09:11

## 2021-01-01 RX ADMIN — MUPIROCIN: 20 OINTMENT TOPICAL at 09:11

## 2021-01-01 RX ADMIN — HUMAN INSULIN 9.52 UNITS: 100 INJECTION, SOLUTION SUBCUTANEOUS at 05:11

## 2021-01-01 RX ADMIN — INSULIN ASPART 12 UNITS: 100 INJECTION, SOLUTION INTRAVENOUS; SUBCUTANEOUS at 12:09

## 2021-01-01 RX ADMIN — INSULIN ASPART 2 UNITS: 100 INJECTION, SOLUTION INTRAVENOUS; SUBCUTANEOUS at 06:11

## 2021-01-01 RX ADMIN — METOPROLOL TARTRATE 25 MG: 25 TABLET, FILM COATED ORAL at 08:11

## 2021-01-01 RX ADMIN — ACETAMINOPHEN 650 MG: 325 TABLET, FILM COATED ORAL at 03:11

## 2021-01-01 RX ADMIN — ISOSORBIDE DINITRATE 10 MG: 10 TABLET ORAL at 08:11

## 2021-01-01 RX ADMIN — ASPIRIN 325 MG ORAL TABLET 325 MG: 325 PILL ORAL at 03:09

## 2021-01-01 RX ADMIN — METOPROLOL TARTRATE 25 MG: 25 TABLET, FILM COATED ORAL at 08:09

## 2021-01-01 RX ADMIN — Medication 125 MG: at 02:09

## 2021-01-01 RX ADMIN — ENOXAPARIN SODIUM 100 MG: 100 INJECTION SUBCUTANEOUS at 04:11

## 2021-01-01 RX ADMIN — RANOLAZINE 500 MG: 500 TABLET, EXTENDED RELEASE ORAL at 08:09

## 2021-01-01 RX ADMIN — REGADENOSON 0.4 MG: 0.08 INJECTION, SOLUTION INTRAVENOUS at 09:09

## 2021-01-01 RX ADMIN — CITALOPRAM HYDROBROMIDE 40 MG: 20 TABLET ORAL at 11:09

## 2021-01-01 RX ADMIN — DIGOXIN 0.12 MG: 125 TABLET ORAL at 08:09

## 2021-01-01 RX ADMIN — FAMOTIDINE 20 MG: 20 TABLET ORAL at 09:11

## 2021-01-01 RX ADMIN — NITROFURANTOIN MONOHYDRATE/MACROCRYSTALLINE 100 MG: 25; 75 CAPSULE ORAL at 02:11

## 2021-01-01 RX ADMIN — GABAPENTIN 400 MG: 300 CAPSULE ORAL at 08:11

## 2021-01-01 RX ADMIN — RANOLAZINE 500 MG: 500 TABLET, EXTENDED RELEASE ORAL at 09:11

## 2021-01-01 RX ADMIN — FAMOTIDINE 20 MG: 20 TABLET ORAL at 08:11

## 2021-01-01 RX ADMIN — NOREPINEPHRINE BITARTRATE 0.05 MCG/KG/MIN: 4 INJECTION, SOLUTION INTRAVENOUS at 02:11

## 2021-01-01 RX ADMIN — IPRATROPIUM BROMIDE 0.5 MG: 0.5 SOLUTION RESPIRATORY (INHALATION) at 08:09

## 2021-01-01 RX ADMIN — AMIODARONE HYDROCHLORIDE 200 MG: 200 TABLET ORAL at 08:11

## 2021-01-01 RX ADMIN — GABAPENTIN 300 MG: 300 CAPSULE ORAL at 09:11

## 2021-01-01 RX ADMIN — AMIODARONE HYDROCHLORIDE 0.5 MG/MIN: 1.8 INJECTION, SOLUTION INTRAVENOUS at 12:11

## 2021-01-01 RX ADMIN — LEVOFLOXACIN 500 MG: 500 TABLET, FILM COATED ORAL at 05:11

## 2021-01-01 RX ADMIN — PENTOXIFYLLINE 400 MG: 400 TABLET, EXTENDED RELEASE ORAL at 02:11

## 2021-01-01 RX ADMIN — HUMAN INSULIN 10 UNITS: 100 INJECTION, SOLUTION SUBCUTANEOUS at 04:09

## 2021-01-01 RX ADMIN — METHYLPREDNISOLONE SODIUM SUCCINATE 125 MG: 125 INJECTION, POWDER, FOR SOLUTION INTRAMUSCULAR; INTRAVENOUS at 02:09

## 2021-01-01 RX ADMIN — LISINOPRIL 10 MG: 5 TABLET ORAL at 08:11

## 2021-01-01 RX ADMIN — APIXABAN 2.5 MG: 2.5 TABLET, FILM COATED ORAL at 09:11

## 2021-01-01 RX ADMIN — ENOXAPARIN SODIUM 90 MG: 100 INJECTION SUBCUTANEOUS at 11:11

## 2021-01-01 RX ADMIN — FUROSEMIDE 60 MG: 10 INJECTION, SOLUTION INTRAVENOUS at 12:11

## 2021-01-01 RX ADMIN — INSULIN ASPART 9 UNITS: 100 INJECTION, SOLUTION INTRAVENOUS; SUBCUTANEOUS at 08:09

## 2021-01-01 RX ADMIN — CITALOPRAM HYDROBROMIDE 20 MG: 20 TABLET, FILM COATED ORAL at 08:11

## 2021-01-01 RX ADMIN — SODIUM ZIRCONIUM CYCLOSILICATE 10 G: 5 POWDER, FOR SUSPENSION ORAL at 10:11

## 2021-01-01 RX ADMIN — RANOLAZINE 500 MG: 500 TABLET, EXTENDED RELEASE ORAL at 08:11

## 2021-01-01 RX ADMIN — LEVALBUTEROL HYDROCHLORIDE 1.25 MG: 1.25 SOLUTION, CONCENTRATE RESPIRATORY (INHALATION) at 08:09

## 2021-01-01 RX ADMIN — SODIUM CHLORIDE: 0.9 INJECTION, SOLUTION INTRAVENOUS at 03:11

## 2021-01-01 RX ADMIN — ALPRAZOLAM 0.5 MG: 0.5 TABLET ORAL at 03:11

## 2021-01-01 RX ADMIN — ISOSORBIDE DINITRATE 10 MG: 10 TABLET ORAL at 04:09

## 2021-01-01 RX ADMIN — INSULIN ASPART 3 UNITS: 100 INJECTION, SOLUTION INTRAVENOUS; SUBCUTANEOUS at 04:11

## 2021-01-01 RX ADMIN — LEVALBUTEROL HYDROCHLORIDE 1.25 MG: 1.25 SOLUTION, CONCENTRATE RESPIRATORY (INHALATION) at 07:11

## 2021-01-01 RX ADMIN — CITALOPRAM HYDROBROMIDE 40 MG: 20 TABLET, FILM COATED ORAL at 08:11

## 2021-01-01 RX ADMIN — CITALOPRAM HYDROBROMIDE 40 MG: 20 TABLET ORAL at 08:09

## 2021-01-01 RX ADMIN — INSULIN ASPART 4 UNITS: 100 INJECTION, SOLUTION INTRAVENOUS; SUBCUTANEOUS at 11:11

## 2021-01-01 RX ADMIN — FUROSEMIDE 80 MG: 10 INJECTION, SOLUTION INTRAMUSCULAR; INTRAVENOUS at 12:09

## 2021-01-01 RX ADMIN — CHLORHEXIDINE GLUCONATE 15 ML: 1.2 RINSE ORAL at 08:11

## 2021-01-01 RX ADMIN — SODIUM ZIRCONIUM CYCLOSILICATE 10 G: 5 POWDER, FOR SUSPENSION ORAL at 09:11

## 2021-01-01 RX ADMIN — FUROSEMIDE 80 MG: 10 INJECTION, SOLUTION INTRAVENOUS at 03:11

## 2021-01-01 RX ADMIN — METOPROLOL TARTRATE 50 MG: 50 TABLET, FILM COATED ORAL at 10:11

## 2021-01-01 RX ADMIN — INSULIN DETEMIR 50 UNITS: 100 INJECTION, SOLUTION SUBCUTANEOUS at 08:11

## 2021-01-01 RX ADMIN — INSULIN ASPART 1 UNITS: 100 INJECTION, SOLUTION INTRAVENOUS; SUBCUTANEOUS at 08:11

## 2021-01-01 RX ADMIN — INSULIN ASPART 6 UNITS: 100 INJECTION, SOLUTION INTRAVENOUS; SUBCUTANEOUS at 05:09

## 2021-01-01 RX ADMIN — CHLORHEXIDINE GLUCONATE 15 ML: 1.2 RINSE ORAL at 09:11

## 2021-01-01 RX ADMIN — INSULIN ASPART 3 UNITS: 100 INJECTION, SOLUTION INTRAVENOUS; SUBCUTANEOUS at 05:11

## 2021-01-01 RX ADMIN — CHLORHEXIDINE GLUCONATE 15 ML: 1.2 RINSE ORAL at 08:09

## 2021-01-01 RX ADMIN — FUROSEMIDE 80 MG: 10 INJECTION, SOLUTION INTRAMUSCULAR; INTRAVENOUS at 04:09

## 2021-01-01 RX ADMIN — CLOPIDOGREL BISULFATE 75 MG: 75 TABLET, FILM COATED ORAL at 09:11

## 2021-01-01 RX ADMIN — SODIUM ZIRCONIUM CYCLOSILICATE 10 G: 10 POWDER, FOR SUSPENSION ORAL at 08:11

## 2021-01-01 RX ADMIN — ENOXAPARIN SODIUM 90 MG: 100 INJECTION SUBCUTANEOUS at 05:11

## 2021-01-01 RX ADMIN — INSULIN DETEMIR 100 UNITS: 100 INJECTION, SOLUTION SUBCUTANEOUS at 12:09

## 2021-01-01 RX ADMIN — FUROSEMIDE 80 MG: 10 INJECTION, SOLUTION INTRAMUSCULAR; INTRAVENOUS at 08:09

## 2021-01-01 RX ADMIN — SACUBITRIL AND VALSARTAN 2 TABLET: 49; 51 TABLET, FILM COATED ORAL at 11:09

## 2021-01-01 RX ADMIN — MORPHINE SULFATE 2 MG: 4 INJECTION, SOLUTION INTRAMUSCULAR; INTRAVENOUS at 03:09

## 2021-01-01 RX ADMIN — DIGOXIN 0.12 MG: 125 TABLET ORAL at 11:09

## 2021-01-01 RX ADMIN — SODIUM BICARBONATE: 84 INJECTION, SOLUTION INTRAVENOUS at 04:11

## 2021-01-01 RX ADMIN — POLYETHYLENE GLYCOL 3350 17 G: 17 POWDER, FOR SOLUTION ORAL at 08:11

## 2021-01-01 RX ADMIN — CLOPIDOGREL BISULFATE 75 MG: 75 TABLET, FILM COATED ORAL at 08:09

## 2021-01-01 RX ADMIN — FUROSEMIDE 80 MG: 10 INJECTION, SOLUTION INTRAMUSCULAR; INTRAVENOUS at 11:09

## 2021-01-01 RX ADMIN — METOPROLOL TARTRATE 25 MG: 25 TABLET, FILM COATED ORAL at 12:09

## 2021-01-01 RX ADMIN — IPRATROPIUM BROMIDE AND ALBUTEROL SULFATE 3 ML: .5; 3 SOLUTION RESPIRATORY (INHALATION) at 07:11

## 2021-01-01 RX ADMIN — INSULIN ASPART 2 UNITS: 100 INJECTION, SOLUTION INTRAVENOUS; SUBCUTANEOUS at 09:11

## 2021-01-01 RX ADMIN — ALPRAZOLAM 0.5 MG: 0.5 TABLET ORAL at 01:11

## 2021-01-01 RX ADMIN — ISOSORBIDE DINITRATE 10 MG: 10 TABLET ORAL at 11:09

## 2021-01-01 RX ADMIN — INSULIN ASPART 3 UNITS: 100 INJECTION, SOLUTION INTRAVENOUS; SUBCUTANEOUS at 11:11

## 2021-01-01 RX ADMIN — FUROSEMIDE 40 MG: 10 INJECTION, SOLUTION INTRAMUSCULAR; INTRAVENOUS at 09:11

## 2021-01-01 RX ADMIN — METOPROLOL TARTRATE 25 MG: 25 TABLET, FILM COATED ORAL at 11:11

## 2021-01-01 RX ADMIN — ENOXAPARIN SODIUM 90 MG: 100 INJECTION SUBCUTANEOUS at 12:11

## 2021-01-01 RX ADMIN — AMIODARONE HYDROCHLORIDE 0.5 MG/MIN: 1.8 INJECTION, SOLUTION INTRAVENOUS at 11:11

## 2021-01-01 RX ADMIN — SODIUM CHLORIDE 500 ML: 0.9 INJECTION, SOLUTION INTRAVENOUS at 02:11

## 2021-01-01 RX ADMIN — CLOPIDOGREL BISULFATE 75 MG: 75 TABLET, FILM COATED ORAL at 11:09

## 2021-01-01 RX ADMIN — INSULIN ASPART 12 UNITS: 100 INJECTION, SOLUTION INTRAVENOUS; SUBCUTANEOUS at 05:09

## 2021-01-01 RX ADMIN — ENOXAPARIN SODIUM 40 MG: 40 INJECTION SUBCUTANEOUS at 04:09

## 2021-01-01 RX ADMIN — PROCHLORPERAZINE EDISYLATE 5 MG: 5 INJECTION INTRAMUSCULAR; INTRAVENOUS at 04:11

## 2021-01-01 RX ADMIN — RANOLAZINE 500 MG: 500 TABLET, EXTENDED RELEASE ORAL at 11:09

## 2021-01-01 RX ADMIN — CALCIUM GLUCONATE 2 G: 98 INJECTION, SOLUTION INTRAVENOUS at 08:11

## 2021-01-01 RX ADMIN — INSULIN ASPART 12 UNITS: 100 INJECTION, SOLUTION INTRAVENOUS; SUBCUTANEOUS at 08:09

## 2021-01-01 RX ADMIN — INSULIN ASPART 2 UNITS: 100 INJECTION, SOLUTION INTRAVENOUS; SUBCUTANEOUS at 02:11

## 2021-01-01 RX ADMIN — PREDNISONE 40 MG: 20 TABLET ORAL at 08:11

## 2021-01-01 RX ADMIN — PENTOXIFYLLINE 400 MG: 400 TABLET, EXTENDED RELEASE ORAL at 11:09

## 2021-01-01 RX ADMIN — AMIODARONE HYDROCHLORIDE 1 MG/MIN: 1.8 INJECTION, SOLUTION INTRAVENOUS at 11:11

## 2021-01-01 RX ADMIN — MUPIROCIN: 20 OINTMENT TOPICAL at 08:09

## 2021-01-01 RX ADMIN — INSULIN ASPART 3 UNITS: 100 INJECTION, SOLUTION INTRAVENOUS; SUBCUTANEOUS at 09:11

## 2021-01-01 RX ADMIN — APIXABAN 2.5 MG: 2.5 TABLET, FILM COATED ORAL at 08:11

## 2021-01-01 RX ADMIN — PREDNISONE 40 MG: 20 TABLET ORAL at 09:11

## 2021-01-01 RX ADMIN — INSULIN DETEMIR 50 UNITS: 100 INJECTION, SOLUTION SUBCUTANEOUS at 10:11

## 2021-01-01 RX ADMIN — IPRATROPIUM BROMIDE AND ALBUTEROL SULFATE 3 ML: .5; 3 SOLUTION RESPIRATORY (INHALATION) at 12:11

## 2021-01-01 RX ADMIN — ONDANSETRON 4 MG: 2 INJECTION INTRAMUSCULAR; INTRAVENOUS at 04:11

## 2021-01-01 RX ADMIN — ASPIRIN 325 MG ORAL TABLET 325 MG: 325 PILL ORAL at 05:11

## 2021-01-01 RX ADMIN — INSULIN ASPART 4 UNITS: 100 INJECTION, SOLUTION INTRAVENOUS; SUBCUTANEOUS at 05:11

## 2021-01-01 RX ADMIN — ONDANSETRON HYDROCHLORIDE 8 MG: 8 TABLET, FILM COATED ORAL at 04:11

## 2021-01-01 RX ADMIN — LEVOFLOXACIN 750 MG: 750 INJECTION, SOLUTION INTRAVENOUS at 03:09

## 2021-01-01 RX ADMIN — CITALOPRAM HYDROBROMIDE 40 MG: 20 TABLET ORAL at 09:11

## 2021-01-01 RX ADMIN — AMIODARONE HYDROCHLORIDE 200 MG: 200 TABLET ORAL at 09:11

## 2021-01-01 RX ADMIN — ATORVASTATIN CALCIUM 40 MG: 40 TABLET, FILM COATED ORAL at 08:09

## 2021-01-01 RX ADMIN — ENOXAPARIN SODIUM 90 MG: 100 INJECTION SUBCUTANEOUS at 04:11

## 2021-01-01 RX ADMIN — ISOSORBIDE DINITRATE 10 MG: 10 TABLET ORAL at 04:11

## 2021-01-01 RX ADMIN — LEVOFLOXACIN 250 MG: 250 TABLET, FILM COATED ORAL at 05:11

## 2021-01-01 RX ADMIN — DIGOXIN 250 MCG: 0.25 INJECTION INTRAMUSCULAR; INTRAVENOUS at 12:11

## 2021-01-01 RX ADMIN — PENTOXIFYLLINE 400 MG: 400 TABLET, EXTENDED RELEASE ORAL at 03:11

## 2021-01-01 RX ADMIN — MEROPENEM AND SODIUM CHLORIDE 1 G: 1 INJECTION, SOLUTION INTRAVENOUS at 04:09

## 2021-01-01 RX ADMIN — CARVEDILOL 3.12 MG: 3.12 TABLET, FILM COATED ORAL at 08:09

## 2021-01-01 RX ADMIN — METOPROLOL TARTRATE 2.5 MG: 5 INJECTION, SOLUTION INTRAVENOUS at 06:11

## 2021-01-01 RX ADMIN — IPRATROPIUM BROMIDE AND ALBUTEROL SULFATE 3 ML: .5; 3 SOLUTION RESPIRATORY (INHALATION) at 02:09

## 2021-01-01 RX ADMIN — DEXTROSE MONOHYDRATE 25 G: 500 INJECTION PARENTERAL at 04:11

## 2021-01-01 RX ADMIN — SODIUM CHLORIDE: 0.9 INJECTION, SOLUTION INTRAVENOUS at 09:11

## 2021-01-01 RX ADMIN — INSULIN ASPART 3 UNITS: 100 INJECTION, SOLUTION INTRAVENOUS; SUBCUTANEOUS at 01:11

## 2021-01-01 RX ADMIN — POTASSIUM CHLORIDE 40 MEQ: 1500 TABLET, EXTENDED RELEASE ORAL at 08:11

## 2021-01-01 RX ADMIN — ENOXAPARIN SODIUM 80 MG: 80 INJECTION SUBCUTANEOUS at 02:11

## 2021-01-14 ENCOUNTER — OFFICE VISIT (OUTPATIENT)
Dept: CARDIOLOGY | Facility: CLINIC | Age: 66
End: 2021-01-14
Payer: MEDICARE

## 2021-01-14 VITALS
WEIGHT: 182 LBS | DIASTOLIC BLOOD PRESSURE: 70 MMHG | HEART RATE: 60 BPM | OXYGEN SATURATION: 97 % | HEIGHT: 63 IN | SYSTOLIC BLOOD PRESSURE: 140 MMHG | BODY MASS INDEX: 32.25 KG/M2

## 2021-01-14 DIAGNOSIS — I25.110 ATHEROSCLEROSIS OF NATIVE CORONARY ARTERY OF NATIVE HEART WITH UNSTABLE ANGINA PECTORIS: Chronic | ICD-10-CM

## 2021-01-14 DIAGNOSIS — I73.9 PERIPHERAL VASCULAR DISEASE: Chronic | ICD-10-CM

## 2021-01-14 DIAGNOSIS — E11.9 DIABETES MELLITUS WITHOUT COMPLICATION: Chronic | ICD-10-CM

## 2021-01-14 DIAGNOSIS — I50.22 CHRONIC SYSTOLIC HEART FAILURE: Chronic | ICD-10-CM

## 2021-01-14 PROCEDURE — 1101F PR PT FALLS ASSESS DOC 0-1 FALLS W/OUT INJ PAST YR: ICD-10-PCS | Mod: CPTII,S$GLB,, | Performed by: INTERNAL MEDICINE

## 2021-01-14 PROCEDURE — 1101F PT FALLS ASSESS-DOCD LE1/YR: CPT | Mod: CPTII,S$GLB,, | Performed by: INTERNAL MEDICINE

## 2021-01-14 PROCEDURE — 3044F PR MOST RECENT HEMOGLOBIN A1C LEVEL <7.0%: ICD-10-PCS | Mod: CPTII,S$GLB,, | Performed by: INTERNAL MEDICINE

## 2021-01-14 PROCEDURE — 3008F BODY MASS INDEX DOCD: CPT | Mod: CPTII,S$GLB,, | Performed by: INTERNAL MEDICINE

## 2021-01-14 PROCEDURE — 3288F FALL RISK ASSESSMENT DOCD: CPT | Mod: CPTII,S$GLB,, | Performed by: INTERNAL MEDICINE

## 2021-01-14 PROCEDURE — 99214 PR OFFICE/OUTPT VISIT, EST, LEVL IV, 30-39 MIN: ICD-10-PCS | Mod: S$GLB,,, | Performed by: INTERNAL MEDICINE

## 2021-01-14 PROCEDURE — 99214 OFFICE O/P EST MOD 30 MIN: CPT | Mod: S$GLB,,, | Performed by: INTERNAL MEDICINE

## 2021-01-14 PROCEDURE — 3288F PR FALLS RISK ASSESSMENT DOCUMENTED: ICD-10-PCS | Mod: CPTII,S$GLB,, | Performed by: INTERNAL MEDICINE

## 2021-01-14 PROCEDURE — 3044F HG A1C LEVEL LT 7.0%: CPT | Mod: CPTII,S$GLB,, | Performed by: INTERNAL MEDICINE

## 2021-01-14 PROCEDURE — 3008F PR BODY MASS INDEX (BMI) DOCUMENTED: ICD-10-PCS | Mod: CPTII,S$GLB,, | Performed by: INTERNAL MEDICINE

## 2021-01-28 PROBLEM — I50.22 CHRONIC SYSTOLIC HEART FAILURE: Chronic | Status: ACTIVE | Noted: 2021-01-01

## 2021-01-28 PROBLEM — I25.110 ATHEROSCLEROSIS OF NATIVE CORONARY ARTERY OF NATIVE HEART WITH UNSTABLE ANGINA PECTORIS: Chronic | Status: ACTIVE | Noted: 2021-01-01

## 2021-01-28 PROBLEM — I73.9 PERIPHERAL VASCULAR DISEASE: Chronic | Status: ACTIVE | Noted: 2021-01-01

## 2021-01-28 PROBLEM — E11.9 DIABETES MELLITUS WITHOUT COMPLICATION: Chronic | Status: ACTIVE | Noted: 2021-01-01

## 2021-09-13 PROBLEM — E66.9 OBESITY: Status: ACTIVE | Noted: 2021-01-01

## 2021-09-13 PROBLEM — R06.02 SOB (SHORTNESS OF BREATH): Status: ACTIVE | Noted: 2021-01-01

## 2021-09-13 PROBLEM — J96.01 ACUTE RESPIRATORY FAILURE WITH HYPOXIA: Status: ACTIVE | Noted: 2021-01-01

## 2021-09-13 PROBLEM — I50.43 ACUTE ON CHRONIC COMBINED SYSTOLIC AND DIASTOLIC HEART FAILURE: Status: ACTIVE | Noted: 2021-01-01

## 2021-09-13 PROBLEM — E87.20 LACTIC ACID ACIDOSIS: Status: ACTIVE | Noted: 2021-01-01

## 2021-09-14 PROBLEM — R07.9 CHEST PAIN: Status: ACTIVE | Noted: 2021-01-01

## 2021-09-15 PROBLEM — E87.20 LACTIC ACID ACIDOSIS: Status: RESOLVED | Noted: 2021-01-01 | Resolved: 2021-01-01

## 2021-09-15 PROBLEM — R07.9 CHEST PAIN: Status: RESOLVED | Noted: 2021-01-01 | Resolved: 2021-01-01

## 2021-09-15 PROBLEM — I50.43 ACUTE ON CHRONIC COMBINED SYSTOLIC AND DIASTOLIC HEART FAILURE: Status: RESOLVED | Noted: 2021-01-01 | Resolved: 2021-01-01

## 2021-09-15 PROBLEM — J96.01 ACUTE RESPIRATORY FAILURE WITH HYPOXIA: Status: RESOLVED | Noted: 2021-01-01 | Resolved: 2021-01-01

## 2021-11-06 PROBLEM — I48.91 ATRIAL FIBRILLATION WITH RVR: Status: ACTIVE | Noted: 2021-01-01

## 2021-11-06 PROBLEM — N17.9 AKI (ACUTE KIDNEY INJURY): Status: ACTIVE | Noted: 2021-01-01

## 2021-11-07 PROBLEM — E87.5 HYPERKALEMIA: Status: ACTIVE | Noted: 2021-01-01

## 2021-11-07 PROBLEM — R57.9 SHOCK: Status: ACTIVE | Noted: 2021-01-01

## 2021-11-09 PROBLEM — K76.0 FATTY LIVER: Status: ACTIVE | Noted: 2021-01-01

## 2021-11-11 PROBLEM — J81.0 ACUTE PULMONARY EDEMA: Status: ACTIVE | Noted: 2021-01-01

## 2021-11-11 PROBLEM — M10.9 GOUT: Status: ACTIVE | Noted: 2021-01-01

## 2021-11-14 PROBLEM — J96.01 ACUTE HYPOXEMIC RESPIRATORY FAILURE: Status: ACTIVE | Noted: 2021-01-01

## 2021-11-14 PROBLEM — N39.0 UTI (URINARY TRACT INFECTION): Status: ACTIVE | Noted: 2021-01-01

## 2021-11-14 PROBLEM — I50.23 ACUTE ON CHRONIC SYSTOLIC (CONGESTIVE) HEART FAILURE: Status: ACTIVE | Noted: 2021-01-01

## 2021-11-15 PROBLEM — I48.91 ATRIAL FIBRILLATION WITH RVR: Status: RESOLVED | Noted: 2021-01-01 | Resolved: 2021-01-01

## 2021-11-15 PROBLEM — J81.0 ACUTE PULMONARY EDEMA: Status: RESOLVED | Noted: 2021-01-01 | Resolved: 2021-01-01

## 2021-11-15 PROBLEM — J96.01 ACUTE HYPOXEMIC RESPIRATORY FAILURE: Status: RESOLVED | Noted: 2021-01-01 | Resolved: 2021-01-01

## 2021-11-15 PROBLEM — E87.5 HYPERKALEMIA: Status: RESOLVED | Noted: 2021-01-01 | Resolved: 2021-01-01

## 2021-11-15 PROBLEM — R57.9 SHOCK: Status: RESOLVED | Noted: 2021-01-01 | Resolved: 2021-01-01

## 2021-11-15 PROBLEM — N17.9 AKI (ACUTE KIDNEY INJURY): Status: RESOLVED | Noted: 2021-01-01 | Resolved: 2021-01-01

## 2021-11-15 PROBLEM — I50.23 ACUTE ON CHRONIC SYSTOLIC (CONGESTIVE) HEART FAILURE: Status: RESOLVED | Noted: 2021-01-01 | Resolved: 2021-01-01

## 2021-11-15 PROBLEM — M10.9 GOUT: Status: RESOLVED | Noted: 2021-01-01 | Resolved: 2021-01-01

## 2021-11-22 PROBLEM — I50.42 CHRONIC COMBINED SYSTOLIC AND DIASTOLIC HEART FAILURE: Status: ACTIVE | Noted: 2021-01-01

## 2021-11-22 PROBLEM — Z95.1 S/P CABG (CORONARY ARTERY BYPASS GRAFT): Chronic | Status: ACTIVE | Noted: 2021-01-01

## 2021-11-22 PROBLEM — Z95.810 AICD (AUTOMATIC CARDIOVERTER/DEFIBRILLATOR) PRESENT: Chronic | Status: ACTIVE | Noted: 2021-01-01

## 2021-11-22 PROBLEM — J44.9 COPD (CHRONIC OBSTRUCTIVE PULMONARY DISEASE): Chronic | Status: ACTIVE | Noted: 2021-01-01

## 2021-11-22 PROBLEM — I50.43 ACUTE ON CHRONIC COMBINED SYSTOLIC AND DIASTOLIC HEART FAILURE: Status: ACTIVE | Noted: 2021-01-01

## 2021-11-22 PROBLEM — I50.42 CHRONIC COMBINED SYSTOLIC AND DIASTOLIC HEART FAILURE: Chronic | Status: ACTIVE | Noted: 2021-01-01

## 2021-11-27 PROBLEM — I50.9 ACUTE ON CHRONIC CONGESTIVE HEART FAILURE: Status: ACTIVE | Noted: 2021-01-01

## 2021-12-06 PROBLEM — I48.0 PAROXYSMAL ATRIAL FIBRILLATION: Status: ACTIVE | Noted: 2021-01-01

## 2021-12-06 PROBLEM — I25.10 CORONARY ARTERY DISEASE INVOLVING NATIVE CORONARY ARTERY OF NATIVE HEART WITHOUT ANGINA PECTORIS: Status: ACTIVE | Noted: 2021-01-01

## 2021-12-06 PROBLEM — I10 PRIMARY HYPERTENSION: Status: ACTIVE | Noted: 2021-01-01

## 2022-01-01 ENCOUNTER — CLINICAL SUPPORT (OUTPATIENT)
Dept: CARDIOLOGY | Facility: HOSPITAL | Age: 67
DRG: 871 | End: 2022-01-01
Attending: INTERNAL MEDICINE
Payer: MEDICARE

## 2022-01-01 ENCOUNTER — TELEPHONE (OUTPATIENT)
Dept: CARDIOLOGY | Facility: CLINIC | Age: 67
End: 2022-01-01
Payer: MEDICARE

## 2022-01-01 ENCOUNTER — HOSPITAL ENCOUNTER (INPATIENT)
Facility: HOSPITAL | Age: 67
LOS: 8 days | Discharge: ANOTHER HOSPITAL | DRG: 871 | End: 2022-01-15
Attending: EMERGENCY MEDICINE | Admitting: INTERNAL MEDICINE
Payer: MEDICARE

## 2022-01-01 ENCOUNTER — HOSPITAL ENCOUNTER (EMERGENCY)
Facility: HOSPITAL | Age: 67
End: 2022-01-23
Attending: EMERGENCY MEDICINE
Payer: MEDICARE

## 2022-01-01 ENCOUNTER — ANESTHESIA EVENT (OUTPATIENT)
Dept: CARDIOLOGY | Facility: HOSPITAL | Age: 67
DRG: 871 | End: 2022-01-01
Payer: MEDICARE

## 2022-01-01 ENCOUNTER — ANESTHESIA EVENT (OUTPATIENT)
Dept: MEDSURG UNIT | Facility: HOSPITAL | Age: 67
DRG: 260 | End: 2022-01-01
Payer: MEDICARE

## 2022-01-01 ENCOUNTER — ANESTHESIA (OUTPATIENT)
Dept: CARDIOLOGY | Facility: HOSPITAL | Age: 67
DRG: 871 | End: 2022-01-01
Payer: MEDICARE

## 2022-01-01 ENCOUNTER — HOSPITAL ENCOUNTER (INPATIENT)
Facility: HOSPITAL | Age: 67
LOS: 6 days | Discharge: HOME HEALTH | DRG: 260 | End: 2022-01-21
Attending: HOSPITALIST | Admitting: HOSPITALIST
Payer: MEDICARE

## 2022-01-01 ENCOUNTER — ANESTHESIA (OUTPATIENT)
Dept: MEDSURG UNIT | Facility: HOSPITAL | Age: 67
DRG: 260 | End: 2022-01-01
Payer: MEDICARE

## 2022-01-01 VITALS
TEMPERATURE: 99 F | SYSTOLIC BLOOD PRESSURE: 111 MMHG | OXYGEN SATURATION: 96 % | HEART RATE: 83 BPM | WEIGHT: 183 LBS | DIASTOLIC BLOOD PRESSURE: 53 MMHG | HEIGHT: 63 IN | RESPIRATION RATE: 18 BRPM | BODY MASS INDEX: 32.43 KG/M2

## 2022-01-01 VITALS
WEIGHT: 181.88 LBS | SYSTOLIC BLOOD PRESSURE: 135 MMHG | OXYGEN SATURATION: 94 % | BODY MASS INDEX: 32.23 KG/M2 | HEART RATE: 61 BPM | RESPIRATION RATE: 18 BRPM | TEMPERATURE: 98 F | HEIGHT: 63 IN | DIASTOLIC BLOOD PRESSURE: 58 MMHG

## 2022-01-01 VITALS — WEIGHT: 181.88 LBS | BODY MASS INDEX: 32.23 KG/M2 | HEIGHT: 63 IN

## 2022-01-01 VITALS — WEIGHT: 183 LBS | BODY MASS INDEX: 32.42 KG/M2

## 2022-01-01 DIAGNOSIS — T82.7XXS INFECTION INVOLVING IMPLANTABLE CARDIOVERTER-DEFIBRILLATOR (ICD), SEQUELA: Primary | ICD-10-CM

## 2022-01-01 DIAGNOSIS — I10 PRIMARY HYPERTENSION: ICD-10-CM

## 2022-01-01 DIAGNOSIS — E87.20 LACTIC ACIDOSIS: ICD-10-CM

## 2022-01-01 DIAGNOSIS — E87.1 HYPONATREMIA: ICD-10-CM

## 2022-01-01 DIAGNOSIS — Z88.0 PENICILLIN ALLERGY: ICD-10-CM

## 2022-01-01 DIAGNOSIS — Z95.810 AICD (AUTOMATIC CARDIOVERTER/DEFIBRILLATOR) PRESENT: ICD-10-CM

## 2022-01-01 DIAGNOSIS — I27.20 PULMONARY HYPERTENSION: ICD-10-CM

## 2022-01-01 DIAGNOSIS — I49.9 CARDIAC RHYTHM DISORDER OR DISTURBANCE OR CHANGE: ICD-10-CM

## 2022-01-01 DIAGNOSIS — E11.65 TYPE 2 DIABETES MELLITUS WITH HYPERGLYCEMIA, WITHOUT LONG-TERM CURRENT USE OF INSULIN: ICD-10-CM

## 2022-01-01 DIAGNOSIS — I46.9 CARDIOPULMONARY ARREST: Primary | ICD-10-CM

## 2022-01-01 DIAGNOSIS — I48.0 PAROXYSMAL ATRIAL FIBRILLATION: ICD-10-CM

## 2022-01-01 DIAGNOSIS — U07.1 COVID-19: ICD-10-CM

## 2022-01-01 DIAGNOSIS — I48.91 ATRIAL FIBRILLATION AND FLUTTER: ICD-10-CM

## 2022-01-01 DIAGNOSIS — A41.81: Primary | ICD-10-CM

## 2022-01-01 DIAGNOSIS — R06.00 DYSPNEA: ICD-10-CM

## 2022-01-01 DIAGNOSIS — R78.81 BACTEREMIA: ICD-10-CM

## 2022-01-01 DIAGNOSIS — E87.6 HYPOKALEMIA: ICD-10-CM

## 2022-01-01 DIAGNOSIS — I21.4 NON-ST ELEVATION MYOCARDIAL INFARCTION (NSTEMI): ICD-10-CM

## 2022-01-01 DIAGNOSIS — I48.92 ATRIAL FIBRILLATION AND FLUTTER: ICD-10-CM

## 2022-01-01 DIAGNOSIS — J18.9 ATYPICAL PNEUMONIA: ICD-10-CM

## 2022-01-01 DIAGNOSIS — R00.1 BRADYCARDIA: ICD-10-CM

## 2022-01-01 DIAGNOSIS — B95.2 BACTEREMIA DUE TO ENTEROCOCCUS: ICD-10-CM

## 2022-01-01 DIAGNOSIS — T82.7XXS PACEMAKER INFECTION, SEQUELA: ICD-10-CM

## 2022-01-01 DIAGNOSIS — R07.9 CHEST PAIN: ICD-10-CM

## 2022-01-01 DIAGNOSIS — N18.31 STAGE 3A CHRONIC KIDNEY DISEASE: ICD-10-CM

## 2022-01-01 DIAGNOSIS — R78.81 BACTEREMIA DUE TO ENTEROCOCCUS: Primary | ICD-10-CM

## 2022-01-01 DIAGNOSIS — I49.9 ARRHYTHMIA: ICD-10-CM

## 2022-01-01 DIAGNOSIS — R09.02 HYPOXIA: ICD-10-CM

## 2022-01-01 DIAGNOSIS — R94.31 PROLONGED QT INTERVAL: ICD-10-CM

## 2022-01-01 DIAGNOSIS — B95.2 BACTEREMIA DUE TO ENTEROCOCCUS: Primary | ICD-10-CM

## 2022-01-01 DIAGNOSIS — I50.42 CHRONIC COMBINED SYSTOLIC AND DIASTOLIC HEART FAILURE: Chronic | ICD-10-CM

## 2022-01-01 DIAGNOSIS — R94.31 QT PROLONGATION: ICD-10-CM

## 2022-01-01 DIAGNOSIS — A41.9 SEVERE SEPSIS: ICD-10-CM

## 2022-01-01 DIAGNOSIS — R78.81 BACTEREMIA DUE TO ENTEROCOCCUS: ICD-10-CM

## 2022-01-01 DIAGNOSIS — T82.7XXA INFECTION INVOLVING IMPLANTABLE CARDIOVERTER-DEFIBRILLATOR (ICD), INITIAL ENCOUNTER: ICD-10-CM

## 2022-01-01 DIAGNOSIS — R65.20 SEVERE SEPSIS: ICD-10-CM

## 2022-01-01 LAB
ABO + RH BLD: NORMAL
ALBUMIN SERPL BCP-MCNC: 2.7 G/DL (ref 3.5–5.2)
ALBUMIN SERPL BCP-MCNC: 2.8 G/DL (ref 3.5–5.2)
ALBUMIN SERPL BCP-MCNC: 3 G/DL (ref 3.5–5.2)
ALBUMIN SERPL BCP-MCNC: 3.1 G/DL (ref 3.5–5.2)
ALBUMIN SERPL BCP-MCNC: 3.2 G/DL (ref 3.5–5.2)
ALBUMIN SERPL BCP-MCNC: 3.2 G/DL (ref 3.5–5.2)
ALBUMIN SERPL BCP-MCNC: 3.3 G/DL (ref 3.5–5.2)
ALBUMIN SERPL BCP-MCNC: 3.4 G/DL (ref 3.5–5.2)
ALLENS TEST: ABNORMAL
ALP SERPL-CCNC: 101 U/L (ref 55–135)
ALP SERPL-CCNC: 113 U/L (ref 55–135)
ALP SERPL-CCNC: 81 U/L (ref 55–135)
ALP SERPL-CCNC: 87 U/L (ref 55–135)
ALP SERPL-CCNC: 88 U/L (ref 55–135)
ALP SERPL-CCNC: 89 U/L (ref 55–135)
ALP SERPL-CCNC: 95 U/L (ref 55–135)
ALP SERPL-CCNC: 96 U/L (ref 55–135)
ALP SERPL-CCNC: 97 U/L (ref 55–135)
ALT SERPL W/O P-5'-P-CCNC: 17 U/L (ref 10–44)
ALT SERPL W/O P-5'-P-CCNC: 18 U/L (ref 10–44)
ALT SERPL W/O P-5'-P-CCNC: 20 U/L (ref 10–44)
ALT SERPL W/O P-5'-P-CCNC: 22 U/L (ref 10–44)
ALT SERPL W/O P-5'-P-CCNC: 22 U/L (ref 10–44)
ALT SERPL W/O P-5'-P-CCNC: 23 U/L (ref 10–44)
ALT SERPL W/O P-5'-P-CCNC: 24 U/L (ref 10–44)
ANION GAP SERPL CALC-SCNC: 10 MMOL/L (ref 8–16)
ANION GAP SERPL CALC-SCNC: 11 MMOL/L (ref 8–16)
ANION GAP SERPL CALC-SCNC: 12 MMOL/L (ref 8–16)
ANION GAP SERPL CALC-SCNC: 12 MMOL/L (ref 8–16)
ANION GAP SERPL CALC-SCNC: 13 MMOL/L (ref 8–16)
ANION GAP SERPL CALC-SCNC: 14 MMOL/L (ref 8–16)
ANION GAP SERPL CALC-SCNC: 14 MMOL/L (ref 8–16)
ANION GAP SERPL CALC-SCNC: 9 MMOL/L (ref 8–16)
ANISOCYTOSIS BLD QL SMEAR: SLIGHT
AST SERPL-CCNC: 17 U/L (ref 10–40)
AST SERPL-CCNC: 19 U/L (ref 10–40)
AST SERPL-CCNC: 21 U/L (ref 10–40)
AST SERPL-CCNC: 21 U/L (ref 10–40)
AST SERPL-CCNC: 22 U/L (ref 10–40)
AST SERPL-CCNC: 22 U/L (ref 10–40)
AST SERPL-CCNC: 24 U/L (ref 10–40)
AST SERPL-CCNC: 24 U/L (ref 10–40)
AST SERPL-CCNC: 29 U/L (ref 10–40)
BACTERIA #/AREA URNS HPF: NEGATIVE /HPF
BACTERIA BLD CULT: ABNORMAL
BACTERIA BLD CULT: NORMAL
BACTERIA BLD CULT: NORMAL
BACTERIA SPEC AEROBE CULT: ABNORMAL
BACTERIA SPEC AEROBE CULT: ABNORMAL
BACTERIA SPEC AEROBE CULT: NO GROWTH
BASOPHILS # BLD AUTO: 0.03 K/UL (ref 0–0.2)
BASOPHILS # BLD AUTO: 0.03 K/UL (ref 0–0.2)
BASOPHILS # BLD AUTO: 0.04 K/UL (ref 0–0.2)
BASOPHILS # BLD AUTO: 0.04 K/UL (ref 0–0.2)
BASOPHILS # BLD AUTO: 0.08 K/UL (ref 0–0.2)
BASOPHILS # BLD AUTO: 0.08 K/UL (ref 0–0.2)
BASOPHILS # BLD AUTO: 0.1 K/UL (ref 0–0.2)
BASOPHILS # BLD AUTO: 0.11 K/UL (ref 0–0.2)
BASOPHILS # BLD AUTO: 0.12 K/UL (ref 0–0.2)
BASOPHILS # BLD AUTO: 0.12 K/UL (ref 0–0.2)
BASOPHILS # BLD AUTO: 0.16 K/UL (ref 0–0.2)
BASOPHILS NFR BLD: 0 % (ref 0–1.9)
BASOPHILS NFR BLD: 0.2 % (ref 0–1.9)
BASOPHILS NFR BLD: 0.3 % (ref 0–1.9)
BASOPHILS NFR BLD: 0.4 % (ref 0–1.9)
BASOPHILS NFR BLD: 0.5 % (ref 0–1.9)
BASOPHILS NFR BLD: 0.7 % (ref 0–1.9)
BASOPHILS NFR BLD: 1 % (ref 0–1.9)
BASOPHILS NFR BLD: 1.1 % (ref 0–1.9)
BASOPHILS NFR BLD: 1.2 % (ref 0–1.9)
BASOPHILS NFR BLD: 1.3 % (ref 0–1.9)
BASOPHILS NFR BLD: 1.3 % (ref 0–1.9)
BASOPHILS NFR BLD: 1.6 % (ref 0–1.9)
BILIRUB SERPL-MCNC: 0.5 MG/DL (ref 0.1–1)
BILIRUB SERPL-MCNC: 1 MG/DL (ref 0.1–1)
BILIRUB SERPL-MCNC: 1.1 MG/DL (ref 0.1–1)
BILIRUB SERPL-MCNC: 1.2 MG/DL (ref 0.1–1)
BILIRUB SERPL-MCNC: 1.4 MG/DL (ref 0.1–1)
BILIRUB UR QL STRIP: NEGATIVE
BLD GP AB SCN CELLS X3 SERPL QL: NORMAL
BLD PROD TYP BPU: NORMAL
BLOOD UNIT EXPIRATION DATE: NORMAL
BLOOD UNIT TYPE CODE: 9500
BLOOD UNIT TYPE: NORMAL
BNP SERPL-MCNC: 804 PG/ML (ref 0–99)
BSA FOR ECHO PROCEDURE: 1.9 M2
BSA FOR ECHO PROCEDURE: 1.91 M2
BSA FOR ECHO PROCEDURE: 1.91 M2
BUN SERPL-MCNC: 24 MG/DL (ref 8–23)
BUN SERPL-MCNC: 24 MG/DL (ref 8–23)
BUN SERPL-MCNC: 28 MG/DL (ref 8–23)
BUN SERPL-MCNC: 29 MG/DL (ref 8–23)
BUN SERPL-MCNC: 29 MG/DL (ref 8–23)
BUN SERPL-MCNC: 30 MG/DL (ref 8–23)
BUN SERPL-MCNC: 31 MG/DL (ref 8–23)
BUN SERPL-MCNC: 32 MG/DL (ref 8–23)
BUN SERPL-MCNC: 34 MG/DL (ref 8–23)
BUN SERPL-MCNC: 35 MG/DL (ref 8–23)
BUN SERPL-MCNC: 36 MG/DL (ref 8–23)
BUN SERPL-MCNC: 37 MG/DL (ref 8–23)
BUN SERPL-MCNC: 41 MG/DL (ref 8–23)
CALCIUM SERPL-MCNC: 10 MG/DL (ref 8.7–10.5)
CALCIUM SERPL-MCNC: 10.3 MG/DL (ref 8.7–10.5)
CALCIUM SERPL-MCNC: 8.4 MG/DL (ref 8.7–10.5)
CALCIUM SERPL-MCNC: 8.5 MG/DL (ref 8.7–10.5)
CALCIUM SERPL-MCNC: 8.6 MG/DL (ref 8.7–10.5)
CALCIUM SERPL-MCNC: 8.6 MG/DL (ref 8.7–10.5)
CALCIUM SERPL-MCNC: 8.9 MG/DL (ref 8.7–10.5)
CALCIUM SERPL-MCNC: 9 MG/DL (ref 8.7–10.5)
CALCIUM SERPL-MCNC: 9.3 MG/DL (ref 8.7–10.5)
CALCIUM SERPL-MCNC: 9.3 MG/DL (ref 8.7–10.5)
CALCIUM SERPL-MCNC: 9.5 MG/DL (ref 8.7–10.5)
CALCIUM SERPL-MCNC: 9.6 MG/DL (ref 8.7–10.5)
CALCIUM SERPL-MCNC: 9.7 MG/DL (ref 8.7–10.5)
CEA SERPL-MCNC: 3.4 NG/ML (ref 0–5)
CHLORIDE SERPL-SCNC: 100 MMOL/L (ref 95–110)
CHLORIDE SERPL-SCNC: 101 MMOL/L (ref 95–110)
CHLORIDE SERPL-SCNC: 102 MMOL/L (ref 95–110)
CHLORIDE SERPL-SCNC: 87 MMOL/L (ref 95–110)
CHLORIDE SERPL-SCNC: 88 MMOL/L (ref 95–110)
CHLORIDE SERPL-SCNC: 90 MMOL/L (ref 95–110)
CHLORIDE SERPL-SCNC: 91 MMOL/L (ref 95–110)
CHLORIDE SERPL-SCNC: 91 MMOL/L (ref 95–110)
CHLORIDE SERPL-SCNC: 92 MMOL/L (ref 95–110)
CHLORIDE SERPL-SCNC: 92 MMOL/L (ref 95–110)
CHLORIDE SERPL-SCNC: 95 MMOL/L (ref 95–110)
CHLORIDE SERPL-SCNC: 97 MMOL/L (ref 95–110)
CHLORIDE SERPL-SCNC: 99 MMOL/L (ref 95–110)
CK SERPL-CCNC: 189 U/L (ref 20–180)
CLARITY UR: CLEAR
CO2 SERPL-SCNC: 20 MMOL/L (ref 23–29)
CO2 SERPL-SCNC: 23 MMOL/L (ref 23–29)
CO2 SERPL-SCNC: 26 MMOL/L (ref 23–29)
CO2 SERPL-SCNC: 27 MMOL/L (ref 23–29)
CO2 SERPL-SCNC: 27 MMOL/L (ref 23–29)
CO2 SERPL-SCNC: 28 MMOL/L (ref 23–29)
CO2 SERPL-SCNC: 29 MMOL/L (ref 23–29)
CO2 SERPL-SCNC: 30 MMOL/L (ref 23–29)
CO2 SERPL-SCNC: 30 MMOL/L (ref 23–29)
CO2 SERPL-SCNC: 31 MMOL/L (ref 23–29)
CO2 SERPL-SCNC: 32 MMOL/L (ref 23–29)
CODING SYSTEM: NORMAL
COLOR UR: YELLOW
CREAT SERPL-MCNC: 1.1 MG/DL (ref 0.5–1.4)
CREAT SERPL-MCNC: 1.2 MG/DL (ref 0.5–1.4)
CREAT SERPL-MCNC: 1.3 MG/DL (ref 0.5–1.4)
CREAT SERPL-MCNC: 1.3 MG/DL (ref 0.5–1.4)
CREAT SERPL-MCNC: 1.4 MG/DL (ref 0.5–1.4)
CREAT SERPL-MCNC: 1.5 MG/DL (ref 0.5–1.4)
CREAT SERPL-MCNC: 1.6 MG/DL (ref 0.5–1.4)
CREAT SERPL-MCNC: 1.7 MG/DL (ref 0.5–1.4)
CRP SERPL-MCNC: 0.84 MG/DL
CRP SERPL-MCNC: 19.53 MG/DL
CRP SERPL-MCNC: 8.8 MG/L (ref 0–8.2)
D DIMER PPP IA.FEU-MCNC: 0.38 UG/ML FEU
DELSYS: ABNORMAL
DIFFERENTIAL METHOD: ABNORMAL
DIGOXIN SERPL-MCNC: 0.8 NG/ML (ref 0.8–2)
DIGOXIN SERPL-MCNC: 0.9 NG/ML (ref 0.8–2)
DISPENSE STATUS: NORMAL
EJECTION FRACTION: 35 %
EJECTION FRACTION: 40 %
EJECTION FRACTION: 42 %
EOSINOPHIL # BLD AUTO: 0 K/UL (ref 0–0.5)
EOSINOPHIL # BLD AUTO: 0.1 K/UL (ref 0–0.5)
EOSINOPHIL # BLD AUTO: 0.2 K/UL (ref 0–0.5)
EOSINOPHIL # BLD AUTO: 0.3 K/UL (ref 0–0.5)
EOSINOPHIL # BLD AUTO: 0.3 K/UL (ref 0–0.5)
EOSINOPHIL NFR BLD: 0.1 % (ref 0–8)
EOSINOPHIL NFR BLD: 0.5 % (ref 0–8)
EOSINOPHIL NFR BLD: 1.7 % (ref 0–8)
EOSINOPHIL NFR BLD: 2 % (ref 0–8)
EOSINOPHIL NFR BLD: 2.1 % (ref 0–8)
EOSINOPHIL NFR BLD: 2.3 % (ref 0–8)
EOSINOPHIL NFR BLD: 2.3 % (ref 0–8)
EOSINOPHIL NFR BLD: 2.4 % (ref 0–8)
EOSINOPHIL NFR BLD: 2.5 % (ref 0–8)
EOSINOPHIL NFR BLD: 2.8 % (ref 0–8)
EOSINOPHIL NFR BLD: 2.9 % (ref 0–8)
EOSINOPHIL NFR BLD: 3.2 % (ref 0–8)
ERYTHROCYTE [DISTWIDTH] IN BLOOD BY AUTOMATED COUNT: 16.2 % (ref 11.5–14.5)
ERYTHROCYTE [DISTWIDTH] IN BLOOD BY AUTOMATED COUNT: 16.3 % (ref 11.5–14.5)
ERYTHROCYTE [DISTWIDTH] IN BLOOD BY AUTOMATED COUNT: 16.3 % (ref 11.5–14.5)
ERYTHROCYTE [DISTWIDTH] IN BLOOD BY AUTOMATED COUNT: 16.4 % (ref 11.5–14.5)
ERYTHROCYTE [DISTWIDTH] IN BLOOD BY AUTOMATED COUNT: 16.5 % (ref 11.5–14.5)
ERYTHROCYTE [DISTWIDTH] IN BLOOD BY AUTOMATED COUNT: 16.6 % (ref 11.5–14.5)
ERYTHROCYTE [DISTWIDTH] IN BLOOD BY AUTOMATED COUNT: 16.7 % (ref 11.5–14.5)
ERYTHROCYTE [DISTWIDTH] IN BLOOD BY AUTOMATED COUNT: 16.7 % (ref 11.5–14.5)
ERYTHROCYTE [DISTWIDTH] IN BLOOD BY AUTOMATED COUNT: 16.8 % (ref 11.5–14.5)
ERYTHROCYTE [DISTWIDTH] IN BLOOD BY AUTOMATED COUNT: 16.8 % (ref 11.5–14.5)
ERYTHROCYTE [DISTWIDTH] IN BLOOD BY AUTOMATED COUNT: 16.9 % (ref 11.5–14.5)
ERYTHROCYTE [DISTWIDTH] IN BLOOD BY AUTOMATED COUNT: 17 % (ref 11.5–14.5)
EST. GFR  (AFRICAN AMERICAN): 35.7 ML/MIN/1.73 M^2
EST. GFR  (AFRICAN AMERICAN): 38.4 ML/MIN/1.73 M^2
EST. GFR  (AFRICAN AMERICAN): 41.6 ML/MIN/1.73 M^2
EST. GFR  (AFRICAN AMERICAN): 45.2 ML/MIN/1.73 M^2
EST. GFR  (AFRICAN AMERICAN): 49.4 ML/MIN/1.73 M^2
EST. GFR  (AFRICAN AMERICAN): 49.4 ML/MIN/1.73 M^2
EST. GFR  (AFRICAN AMERICAN): 54.4 ML/MIN/1.73 M^2
EST. GFR  (AFRICAN AMERICAN): >60 ML/MIN/1.73 M^2
EST. GFR  (NON AFRICAN AMERICAN): 31 ML/MIN/1.73 M^2
EST. GFR  (NON AFRICAN AMERICAN): 33.3 ML/MIN/1.73 M^2
EST. GFR  (NON AFRICAN AMERICAN): 36 ML/MIN/1.73 M^2
EST. GFR  (NON AFRICAN AMERICAN): 39.2 ML/MIN/1.73 M^2
EST. GFR  (NON AFRICAN AMERICAN): 42.9 ML/MIN/1.73 M^2
EST. GFR  (NON AFRICAN AMERICAN): 42.9 ML/MIN/1.73 M^2
EST. GFR  (NON AFRICAN AMERICAN): 47.2 ML/MIN/1.73 M^2
EST. GFR  (NON AFRICAN AMERICAN): 52.4 ML/MIN/1.73 M^2
ESTIMATED AVG GLUCOSE: 263 MG/DL (ref 68–131)
FERRITIN SERPL-MCNC: 76 NG/ML (ref 20–300)
FIO2: 0.3
FLOW: 2.5
FRACTIONAL SHORTENING: 17 % (ref 28–44)
GLUCOSE SERPL-MCNC: 144 MG/DL (ref 70–110)
GLUCOSE SERPL-MCNC: 151 MG/DL (ref 70–110)
GLUCOSE SERPL-MCNC: 158 MG/DL (ref 70–110)
GLUCOSE SERPL-MCNC: 165 MG/DL (ref 70–110)
GLUCOSE SERPL-MCNC: 172 MG/DL (ref 70–110)
GLUCOSE SERPL-MCNC: 181 MG/DL (ref 70–110)
GLUCOSE SERPL-MCNC: 192 MG/DL (ref 70–110)
GLUCOSE SERPL-MCNC: 198 MG/DL (ref 70–110)
GLUCOSE SERPL-MCNC: 202 MG/DL (ref 70–110)
GLUCOSE SERPL-MCNC: 206 MG/DL (ref 70–110)
GLUCOSE SERPL-MCNC: 207 MG/DL (ref 70–110)
GLUCOSE SERPL-MCNC: 215 MG/DL (ref 70–110)
GLUCOSE SERPL-MCNC: 220 MG/DL (ref 70–110)
GLUCOSE SERPL-MCNC: 221 MG/DL (ref 70–110)
GLUCOSE SERPL-MCNC: 234 MG/DL (ref 70–110)
GLUCOSE SERPL-MCNC: 243 MG/DL (ref 70–110)
GLUCOSE SERPL-MCNC: 245 MG/DL (ref 70–110)
GLUCOSE SERPL-MCNC: 254 MG/DL (ref 70–110)
GLUCOSE SERPL-MCNC: 259 MG/DL (ref 70–110)
GLUCOSE SERPL-MCNC: 261 MG/DL (ref 70–110)
GLUCOSE SERPL-MCNC: 273 MG/DL (ref 70–110)
GLUCOSE SERPL-MCNC: 275 MG/DL (ref 70–110)
GLUCOSE SERPL-MCNC: 277 MG/DL (ref 70–110)
GLUCOSE SERPL-MCNC: 286 MG/DL (ref 70–110)
GLUCOSE SERPL-MCNC: 288 MG/DL (ref 70–110)
GLUCOSE SERPL-MCNC: 292 MG/DL (ref 70–110)
GLUCOSE SERPL-MCNC: 299 MG/DL (ref 70–110)
GLUCOSE SERPL-MCNC: 312 MG/DL (ref 70–110)
GLUCOSE SERPL-MCNC: 319 MG/DL (ref 70–110)
GLUCOSE SERPL-MCNC: 326 MG/DL (ref 70–110)
GLUCOSE SERPL-MCNC: 329 MG/DL (ref 70–110)
GLUCOSE SERPL-MCNC: 339 MG/DL (ref 70–110)
GLUCOSE SERPL-MCNC: 355 MG/DL (ref 70–110)
GLUCOSE SERPL-MCNC: 355 MG/DL (ref 70–110)
GLUCOSE SERPL-MCNC: 358 MG/DL (ref 70–110)
GLUCOSE SERPL-MCNC: 368 MG/DL (ref 70–110)
GLUCOSE SERPL-MCNC: 381 MG/DL (ref 70–110)
GLUCOSE SERPL-MCNC: 384 MG/DL (ref 70–110)
GLUCOSE SERPL-MCNC: 391 MG/DL (ref 70–110)
GLUCOSE SERPL-MCNC: 396 MG/DL (ref 70–110)
GLUCOSE SERPL-MCNC: 408 MG/DL (ref 70–110)
GLUCOSE SERPL-MCNC: 415 MG/DL (ref 70–110)
GLUCOSE SERPL-MCNC: 419 MG/DL (ref 70–110)
GLUCOSE SERPL-MCNC: 441 MG/DL (ref 70–110)
GLUCOSE SERPL-MCNC: 482 MG/DL (ref 70–110)
GLUCOSE SERPL-MCNC: 506 MG/DL (ref 70–110)
GLUCOSE SERPL-MCNC: 558 MG/DL (ref 70–110)
GLUCOSE SERPL-MCNC: 560 MG/DL (ref 70–110)
GLUCOSE SERPL-MCNC: 572 MG/DL (ref 70–110)
GLUCOSE UR QL STRIP: ABNORMAL
GRAM STN SPEC: ABNORMAL
HBA1C MFR BLD: 10.8 % (ref 4.5–6.2)
HCO3 UR-SCNC: 35.3 MMOL/L (ref 24–28)
HCT VFR BLD AUTO: 30.6 % (ref 37–48.5)
HCT VFR BLD AUTO: 30.7 % (ref 37–48.5)
HCT VFR BLD AUTO: 30.9 % (ref 37–48.5)
HCT VFR BLD AUTO: 31.7 % (ref 37–48.5)
HCT VFR BLD AUTO: 31.9 % (ref 37–48.5)
HCT VFR BLD AUTO: 32.5 % (ref 37–48.5)
HCT VFR BLD AUTO: 32.6 % (ref 37–48.5)
HCT VFR BLD AUTO: 33.7 % (ref 37–48.5)
HCT VFR BLD AUTO: 33.8 % (ref 37–48.5)
HCT VFR BLD AUTO: 33.9 % (ref 37–48.5)
HCT VFR BLD AUTO: 34.9 % (ref 37–48.5)
HCT VFR BLD AUTO: 36.7 % (ref 37–48.5)
HGB BLD-MCNC: 10.2 G/DL (ref 12–16)
HGB BLD-MCNC: 10.4 G/DL (ref 12–16)
HGB BLD-MCNC: 10.5 G/DL (ref 12–16)
HGB BLD-MCNC: 10.5 G/DL (ref 12–16)
HGB BLD-MCNC: 10.6 G/DL (ref 12–16)
HGB BLD-MCNC: 10.6 G/DL (ref 12–16)
HGB BLD-MCNC: 10.9 G/DL (ref 12–16)
HGB BLD-MCNC: 11 G/DL (ref 12–16)
HGB BLD-MCNC: 11.2 G/DL (ref 12–16)
HGB BLD-MCNC: 11.2 G/DL (ref 12–16)
HGB BLD-MCNC: 12.4 G/DL (ref 12–16)
HGB BLD-MCNC: 9.9 G/DL (ref 12–16)
HGB UR QL STRIP: NEGATIVE
HYALINE CASTS #/AREA URNS LPF: 2 /LPF
IMM GRANULOCYTES # BLD AUTO: 0.06 K/UL (ref 0–0.04)
IMM GRANULOCYTES # BLD AUTO: 0.11 K/UL (ref 0–0.04)
IMM GRANULOCYTES # BLD AUTO: 0.11 K/UL (ref 0–0.04)
IMM GRANULOCYTES # BLD AUTO: 0.13 K/UL (ref 0–0.04)
IMM GRANULOCYTES # BLD AUTO: 0.16 K/UL (ref 0–0.04)
IMM GRANULOCYTES # BLD AUTO: 0.22 K/UL (ref 0–0.04)
IMM GRANULOCYTES # BLD AUTO: 0.26 K/UL (ref 0–0.04)
IMM GRANULOCYTES # BLD AUTO: 0.37 K/UL (ref 0–0.04)
IMM GRANULOCYTES # BLD AUTO: 0.38 K/UL (ref 0–0.04)
IMM GRANULOCYTES # BLD AUTO: 0.41 K/UL (ref 0–0.04)
IMM GRANULOCYTES # BLD AUTO: 0.43 K/UL (ref 0–0.04)
IMM GRANULOCYTES # BLD AUTO: ABNORMAL K/UL (ref 0–0.04)
IMM GRANULOCYTES NFR BLD AUTO: 0.5 % (ref 0–0.5)
IMM GRANULOCYTES NFR BLD AUTO: 0.7 % (ref 0–0.5)
IMM GRANULOCYTES NFR BLD AUTO: 1.1 % (ref 0–0.5)
IMM GRANULOCYTES NFR BLD AUTO: 1.5 % (ref 0–0.5)
IMM GRANULOCYTES NFR BLD AUTO: 1.5 % (ref 0–0.5)
IMM GRANULOCYTES NFR BLD AUTO: 2.7 % (ref 0–0.5)
IMM GRANULOCYTES NFR BLD AUTO: 3 % (ref 0–0.5)
IMM GRANULOCYTES NFR BLD AUTO: 3.4 % (ref 0–0.5)
IMM GRANULOCYTES NFR BLD AUTO: 4.2 % (ref 0–0.5)
IMM GRANULOCYTES NFR BLD AUTO: 4.5 % (ref 0–0.5)
IMM GRANULOCYTES NFR BLD AUTO: 4.9 % (ref 0–0.5)
IMM GRANULOCYTES NFR BLD AUTO: ABNORMAL % (ref 0–0.5)
INR PPP: 1 (ref 0.8–1.2)
INR PPP: 1.4
KETONES UR QL STRIP: NEGATIVE
LACTATE SERPL-SCNC: 1.4 MMOL/L (ref 0.5–1.9)
LACTATE SERPL-SCNC: 2.3 MMOL/L (ref 0.5–1.9)
LDH SERPL L TO P-CCNC: 263 U/L (ref 110–260)
LEFT INTERNAL DIMENSION IN SYSTOLE: 3.81 CM (ref 2.1–4)
LEFT VENTRICLE DIASTOLIC VOLUME INDEX: 59.8 ML/M2
LEFT VENTRICLE DIASTOLIC VOLUME: 111.22 ML
LEFT VENTRICLE SYSTOLIC VOLUME INDEX: 33.5 ML/M2
LEFT VENTRICLE SYSTOLIC VOLUME: 62.4 ML
LEFT VENTRICULAR INTERNAL DIMENSION IN DIASTOLE: 4.57 CM (ref 3.5–6)
LEUKOCYTE ESTERASE UR QL STRIP: NEGATIVE
LYMPHOCYTES # BLD AUTO: 0.5 K/UL (ref 1–4.8)
LYMPHOCYTES # BLD AUTO: 1.2 K/UL (ref 1–4.8)
LYMPHOCYTES # BLD AUTO: 1.2 K/UL (ref 1–4.8)
LYMPHOCYTES # BLD AUTO: 1.7 K/UL (ref 1–4.8)
LYMPHOCYTES # BLD AUTO: 1.8 K/UL (ref 1–4.8)
LYMPHOCYTES # BLD AUTO: 1.8 K/UL (ref 1–4.8)
LYMPHOCYTES # BLD AUTO: 2 K/UL (ref 1–4.8)
LYMPHOCYTES # BLD AUTO: 2.1 K/UL (ref 1–4.8)
LYMPHOCYTES # BLD AUTO: 2.3 K/UL (ref 1–4.8)
LYMPHOCYTES # BLD AUTO: 2.3 K/UL (ref 1–4.8)
LYMPHOCYTES # BLD AUTO: 2.6 K/UL (ref 1–4.8)
LYMPHOCYTES NFR BLD: 10.4 % (ref 18–48)
LYMPHOCYTES NFR BLD: 11 % (ref 18–48)
LYMPHOCYTES NFR BLD: 11.8 % (ref 18–48)
LYMPHOCYTES NFR BLD: 19.7 % (ref 18–48)
LYMPHOCYTES NFR BLD: 20.4 % (ref 18–48)
LYMPHOCYTES NFR BLD: 21.1 % (ref 18–48)
LYMPHOCYTES NFR BLD: 22.4 % (ref 18–48)
LYMPHOCYTES NFR BLD: 23.1 % (ref 18–48)
LYMPHOCYTES NFR BLD: 23.6 % (ref 18–48)
LYMPHOCYTES NFR BLD: 23.7 % (ref 18–48)
LYMPHOCYTES NFR BLD: 23.8 % (ref 18–48)
LYMPHOCYTES NFR BLD: 3.1 % (ref 18–48)
MAGNESIUM SERPL-MCNC: 1.7 MG/DL (ref 1.6–2.6)
MAGNESIUM SERPL-MCNC: 1.8 MG/DL (ref 1.6–2.6)
MAGNESIUM SERPL-MCNC: 1.8 MG/DL (ref 1.6–2.6)
MAGNESIUM SERPL-MCNC: 1.9 MG/DL (ref 1.6–2.6)
MAGNESIUM SERPL-MCNC: 1.9 MG/DL (ref 1.6–2.6)
MAGNESIUM SERPL-MCNC: 2.4 MG/DL (ref 1.6–2.6)
MCH RBC QN AUTO: 28.1 PG (ref 27–31)
MCH RBC QN AUTO: 28.4 PG (ref 27–31)
MCH RBC QN AUTO: 28.4 PG (ref 27–31)
MCH RBC QN AUTO: 28.6 PG (ref 27–31)
MCH RBC QN AUTO: 28.6 PG (ref 27–31)
MCH RBC QN AUTO: 28.7 PG (ref 27–31)
MCH RBC QN AUTO: 28.7 PG (ref 27–31)
MCH RBC QN AUTO: 28.8 PG (ref 27–31)
MCH RBC QN AUTO: 28.9 PG (ref 27–31)
MCH RBC QN AUTO: 28.9 PG (ref 27–31)
MCH RBC QN AUTO: 29 PG (ref 27–31)
MCH RBC QN AUTO: 29.2 PG (ref 27–31)
MCHC RBC AUTO-ENTMCNC: 32.1 G/DL (ref 32–36)
MCHC RBC AUTO-ENTMCNC: 32.3 G/DL (ref 32–36)
MCHC RBC AUTO-ENTMCNC: 32.3 G/DL (ref 32–36)
MCHC RBC AUTO-ENTMCNC: 32.4 G/DL (ref 32–36)
MCHC RBC AUTO-ENTMCNC: 32.4 G/DL (ref 32–36)
MCHC RBC AUTO-ENTMCNC: 32.5 G/DL (ref 32–36)
MCHC RBC AUTO-ENTMCNC: 32.8 G/DL (ref 32–36)
MCHC RBC AUTO-ENTMCNC: 33 G/DL (ref 32–36)
MCHC RBC AUTO-ENTMCNC: 33.1 G/DL (ref 32–36)
MCHC RBC AUTO-ENTMCNC: 33.2 G/DL (ref 32–36)
MCHC RBC AUTO-ENTMCNC: 33.8 G/DL (ref 32–36)
MCHC RBC AUTO-ENTMCNC: 34.2 G/DL (ref 32–36)
MCV RBC AUTO: 85 FL (ref 82–98)
MCV RBC AUTO: 85 FL (ref 82–98)
MCV RBC AUTO: 86 FL (ref 82–98)
MCV RBC AUTO: 86 FL (ref 82–98)
MCV RBC AUTO: 87 FL (ref 82–98)
MCV RBC AUTO: 88 FL (ref 82–98)
MCV RBC AUTO: 90 FL (ref 82–98)
MCV RBC AUTO: 90 FL (ref 82–98)
METAMYELOCYTES NFR BLD MANUAL: 3 %
MICROSCOPIC COMMENT: ABNORMAL
MODE: ABNORMAL
MONOCYTES # BLD AUTO: 0.8 K/UL (ref 0.3–1)
MONOCYTES # BLD AUTO: 0.8 K/UL (ref 0.3–1)
MONOCYTES # BLD AUTO: 0.9 K/UL (ref 0.3–1)
MONOCYTES # BLD AUTO: 1 K/UL (ref 0.3–1)
MONOCYTES # BLD AUTO: 1.1 K/UL (ref 0.3–1)
MONOCYTES # BLD AUTO: 1.2 K/UL (ref 0.3–1)
MONOCYTES NFR BLD: 10.1 % (ref 4–15)
MONOCYTES NFR BLD: 10.1 % (ref 4–15)
MONOCYTES NFR BLD: 10.6 % (ref 4–15)
MONOCYTES NFR BLD: 12.7 % (ref 4–15)
MONOCYTES NFR BLD: 13.3 % (ref 4–15)
MONOCYTES NFR BLD: 5 % (ref 4–15)
MONOCYTES NFR BLD: 5 % (ref 4–15)
MONOCYTES NFR BLD: 7.8 % (ref 4–15)
MONOCYTES NFR BLD: 9 % (ref 4–15)
MONOCYTES NFR BLD: 9.1 % (ref 4–15)
MONOCYTES NFR BLD: 9.6 % (ref 4–15)
MONOCYTES NFR BLD: 9.9 % (ref 4–15)
MYELOCYTES NFR BLD MANUAL: 3 %
NEUTROPHILS # BLD AUTO: 14.1 K/UL (ref 1.8–7.7)
NEUTROPHILS # BLD AUTO: 4.2 K/UL (ref 1.8–7.7)
NEUTROPHILS # BLD AUTO: 4.6 K/UL (ref 1.8–7.7)
NEUTROPHILS # BLD AUTO: 4.9 K/UL (ref 1.8–7.7)
NEUTROPHILS # BLD AUTO: 5.7 K/UL (ref 1.8–7.7)
NEUTROPHILS # BLD AUTO: 5.8 K/UL (ref 1.8–7.7)
NEUTROPHILS # BLD AUTO: 6.4 K/UL (ref 1.8–7.7)
NEUTROPHILS # BLD AUTO: 6.6 K/UL (ref 1.8–7.7)
NEUTROPHILS # BLD AUTO: 7.2 K/UL (ref 1.8–7.7)
NEUTROPHILS # BLD AUTO: 7.3 K/UL (ref 1.8–7.7)
NEUTROPHILS # BLD AUTO: 9.1 K/UL (ref 1.8–7.7)
NEUTROPHILS NFR BLD: 57.2 % (ref 38–73)
NEUTROPHILS NFR BLD: 57.8 % (ref 38–73)
NEUTROPHILS NFR BLD: 59 % (ref 38–73)
NEUTROPHILS NFR BLD: 60.6 % (ref 38–73)
NEUTROPHILS NFR BLD: 60.7 % (ref 38–73)
NEUTROPHILS NFR BLD: 61.6 % (ref 38–73)
NEUTROPHILS NFR BLD: 62.1 % (ref 38–73)
NEUTROPHILS NFR BLD: 66.8 % (ref 38–73)
NEUTROPHILS NFR BLD: 72 % (ref 38–73)
NEUTROPHILS NFR BLD: 74.4 % (ref 38–73)
NEUTROPHILS NFR BLD: 78.2 % (ref 38–73)
NEUTROPHILS NFR BLD: 90.9 % (ref 38–73)
NEUTS BAND NFR BLD MANUAL: 4 %
NITRITE UR QL STRIP: NEGATIVE
NRBC BLD-RTO: 0 /100 WBC
PCO2 BLDA: 45 MMHG (ref 35–45)
PH SMN: 7.5 [PH] (ref 7.35–7.45)
PH UR STRIP: 6 [PH] (ref 5–8)
PHOSPHATE SERPL-MCNC: 3.1 MG/DL (ref 2.7–4.5)
PLATELET # BLD AUTO: 195 K/UL (ref 150–450)
PLATELET # BLD AUTO: 201 K/UL (ref 150–450)
PLATELET # BLD AUTO: 219 K/UL (ref 150–450)
PLATELET # BLD AUTO: 225 K/UL (ref 150–450)
PLATELET # BLD AUTO: 236 K/UL (ref 150–450)
PLATELET # BLD AUTO: 252 K/UL (ref 150–450)
PLATELET # BLD AUTO: 294 K/UL (ref 150–450)
PLATELET # BLD AUTO: 300 K/UL (ref 150–450)
PLATELET # BLD AUTO: 315 K/UL (ref 150–450)
PLATELET # BLD AUTO: 317 K/UL (ref 150–450)
PLATELET # BLD AUTO: 319 K/UL (ref 150–450)
PLATELET # BLD AUTO: 366 K/UL (ref 150–450)
PMV BLD AUTO: 10 FL (ref 9.2–12.9)
PMV BLD AUTO: 10.1 FL (ref 9.2–12.9)
PMV BLD AUTO: 10.1 FL (ref 9.2–12.9)
PMV BLD AUTO: 9.2 FL (ref 9.2–12.9)
PMV BLD AUTO: 9.3 FL (ref 9.2–12.9)
PMV BLD AUTO: 9.4 FL (ref 9.2–12.9)
PMV BLD AUTO: 9.5 FL (ref 9.2–12.9)
PMV BLD AUTO: 9.7 FL (ref 9.2–12.9)
PMV BLD AUTO: 9.8 FL (ref 9.2–12.9)
PMV BLD AUTO: 9.9 FL (ref 9.2–12.9)
PO2 BLDA: 40 MMHG (ref 40–60)
POC BE: 12 MMOL/L
POC SATURATED O2: 79 % (ref 95–100)
POC TCO2: 37 MMOL/L (ref 24–29)
POCT GLUCOSE: 163 MG/DL (ref 70–110)
POCT GLUCOSE: 179 MG/DL (ref 70–110)
POCT GLUCOSE: 187 MG/DL (ref 70–110)
POCT GLUCOSE: 197 MG/DL (ref 70–110)
POCT GLUCOSE: 200 MG/DL (ref 70–110)
POCT GLUCOSE: 201 MG/DL (ref 70–110)
POCT GLUCOSE: 216 MG/DL (ref 70–110)
POCT GLUCOSE: 222 MG/DL (ref 70–110)
POCT GLUCOSE: 225 MG/DL (ref 70–110)
POCT GLUCOSE: 227 MG/DL (ref 70–110)
POCT GLUCOSE: 241 MG/DL (ref 70–110)
POCT GLUCOSE: 241 MG/DL (ref 70–110)
POCT GLUCOSE: 242 MG/DL (ref 70–110)
POCT GLUCOSE: 251 MG/DL (ref 70–110)
POCT GLUCOSE: 256 MG/DL (ref 70–110)
POCT GLUCOSE: 266 MG/DL (ref 70–110)
POCT GLUCOSE: 274 MG/DL (ref 70–110)
POCT GLUCOSE: 279 MG/DL (ref 70–110)
POCT GLUCOSE: 279 MG/DL (ref 70–110)
POCT GLUCOSE: 283 MG/DL (ref 70–110)
POCT GLUCOSE: 295 MG/DL (ref 70–110)
POCT GLUCOSE: 298 MG/DL (ref 70–110)
POCT GLUCOSE: 315 MG/DL (ref 70–110)
POCT GLUCOSE: 326 MG/DL (ref 70–110)
POTASSIUM SERPL-SCNC: 3.1 MMOL/L (ref 3.5–5.1)
POTASSIUM SERPL-SCNC: 3.4 MMOL/L (ref 3.5–5.1)
POTASSIUM SERPL-SCNC: 3.4 MMOL/L (ref 3.5–5.1)
POTASSIUM SERPL-SCNC: 3.5 MMOL/L (ref 3.5–5.1)
POTASSIUM SERPL-SCNC: 3.6 MMOL/L (ref 3.5–5.1)
POTASSIUM SERPL-SCNC: 3.7 MMOL/L (ref 3.5–5.1)
POTASSIUM SERPL-SCNC: 3.9 MMOL/L (ref 3.5–5.1)
POTASSIUM SERPL-SCNC: 4 MMOL/L (ref 3.5–5.1)
POTASSIUM SERPL-SCNC: 4.1 MMOL/L (ref 3.5–5.1)
POTASSIUM SERPL-SCNC: 4.2 MMOL/L (ref 3.5–5.1)
POTASSIUM SERPL-SCNC: 4.2 MMOL/L (ref 3.5–5.1)
PROCALCITONIN SERPL IA-MCNC: 0.4 NG/ML (ref 0–0.5)
PROT SERPL-MCNC: 6.1 G/DL (ref 6–8.4)
PROT SERPL-MCNC: 6.1 G/DL (ref 6–8.4)
PROT SERPL-MCNC: 6.2 G/DL (ref 6–8.4)
PROT SERPL-MCNC: 6.5 G/DL (ref 6–8.4)
PROT SERPL-MCNC: 6.5 G/DL (ref 6–8.4)
PROT SERPL-MCNC: 6.6 G/DL (ref 6–8.4)
PROT SERPL-MCNC: 6.8 G/DL (ref 6–8.4)
PROT SERPL-MCNC: 7.1 G/DL (ref 6–8.4)
PROT SERPL-MCNC: 7.2 G/DL (ref 6–8.4)
PROT UR QL STRIP: ABNORMAL
PROTHROMBIN TIME: 10.5 SEC (ref 9–12.5)
PROTHROMBIN TIME: 16.2 SEC (ref 11.4–13.7)
RA PRESSURE: 3 MMHG
RBC # BLD AUTO: 3.42 M/UL (ref 4–5.4)
RBC # BLD AUTO: 3.52 M/UL (ref 4–5.4)
RBC # BLD AUTO: 3.6 M/UL (ref 4–5.4)
RBC # BLD AUTO: 3.61 M/UL (ref 4–5.4)
RBC # BLD AUTO: 3.69 M/UL (ref 4–5.4)
RBC # BLD AUTO: 3.69 M/UL (ref 4–5.4)
RBC # BLD AUTO: 3.74 M/UL (ref 4–5.4)
RBC # BLD AUTO: 3.84 M/UL (ref 4–5.4)
RBC # BLD AUTO: 3.87 M/UL (ref 4–5.4)
RBC # BLD AUTO: 3.88 M/UL (ref 4–5.4)
RBC # BLD AUTO: 3.91 M/UL (ref 4–5.4)
RBC # BLD AUTO: 4.34 M/UL (ref 4–5.4)
RBC #/AREA URNS HPF: 4 /HPF (ref 0–4)
SAMPLE: ABNORMAL
SARS-COV-2 RDRP RESP QL NAA+PROBE: NEGATIVE
SARS-COV-2 RDRP RESP QL NAA+PROBE: NEGATIVE
SINUS: 2.7 CM
SITE: ABNORMAL
SODIUM SERPL-SCNC: 129 MMOL/L (ref 136–145)
SODIUM SERPL-SCNC: 130 MMOL/L (ref 136–145)
SODIUM SERPL-SCNC: 131 MMOL/L (ref 136–145)
SODIUM SERPL-SCNC: 132 MMOL/L (ref 136–145)
SODIUM SERPL-SCNC: 132 MMOL/L (ref 136–145)
SODIUM SERPL-SCNC: 133 MMOL/L (ref 136–145)
SODIUM SERPL-SCNC: 134 MMOL/L (ref 136–145)
SODIUM SERPL-SCNC: 134 MMOL/L (ref 136–145)
SODIUM SERPL-SCNC: 135 MMOL/L (ref 136–145)
SODIUM SERPL-SCNC: 136 MMOL/L (ref 136–145)
SODIUM SERPL-SCNC: 137 MMOL/L (ref 136–145)
SP GR UR STRIP: 1.01 (ref 1–1.03)
SP02: 97
SQUAMOUS #/AREA URNS HPF: 5 /HPF
STJ: 2.4 CM
TRANS ERYTHROCYTES VOL PATIENT: NORMAL ML
TROPONIN I SERPL DL<=0.01 NG/ML-MCNC: 0.27 NG/ML
TROPONIN I SERPL DL<=0.01 NG/ML-MCNC: 0.33 NG/ML
TROPONIN I SERPL DL<=0.01 NG/ML-MCNC: 0.58 NG/ML
URN SPEC COLLECT METH UR: ABNORMAL
UROBILINOGEN UR STRIP-ACNC: NEGATIVE EU/DL
VANCOMYCIN SERPL-MCNC: 12.1 UG/ML
VANCOMYCIN SERPL-MCNC: 14.5 UG/ML
VANCOMYCIN SERPL-MCNC: 15.8 UG/ML
VANCOMYCIN SERPL-MCNC: 5.8 UG/ML
VANCOMYCIN TROUGH SERPL-MCNC: 14.1 UG/ML
VANCOMYCIN TROUGH SERPL-MCNC: 23.1 UG/ML
VANCOMYCIN TROUGH SERPL-MCNC: 25.3 UG/ML
VANCOMYCIN TROUGH SERPL-MCNC: 9.2 UG/ML (ref 10–22)
WBC # BLD AUTO: 10.32 K/UL (ref 3.9–12.7)
WBC # BLD AUTO: 10.83 K/UL (ref 3.9–12.7)
WBC # BLD AUTO: 10.87 K/UL (ref 3.9–12.7)
WBC # BLD AUTO: 11.6 K/UL (ref 3.9–12.7)
WBC # BLD AUTO: 15.54 K/UL (ref 3.9–12.7)
WBC # BLD AUTO: 7.36 K/UL (ref 3.9–12.7)
WBC # BLD AUTO: 7.81 K/UL (ref 3.9–12.7)
WBC # BLD AUTO: 8.42 K/UL (ref 3.9–12.7)
WBC # BLD AUTO: 9.21 K/UL (ref 3.9–12.7)
WBC # BLD AUTO: 9.27 K/UL (ref 3.9–12.7)
WBC # BLD AUTO: 9.53 K/UL (ref 3.9–12.7)
WBC # BLD AUTO: 9.78 K/UL (ref 3.9–12.7)
WBC #/AREA URNS HPF: 1 /HPF (ref 0–5)
YEAST URNS QL MICRO: ABNORMAL

## 2022-01-01 PROCEDURE — 36573 INSJ PICC RS&I 5 YR+: CPT

## 2022-01-01 PROCEDURE — 93010 EKG 12-LEAD: ICD-10-PCS | Mod: ,,, | Performed by: INTERNAL MEDICINE

## 2022-01-01 PROCEDURE — 25000003 PHARM REV CODE 250: Performed by: PHYSICIAN ASSISTANT

## 2022-01-01 PROCEDURE — 93005 ELECTROCARDIOGRAM TRACING: CPT

## 2022-01-01 PROCEDURE — 93010 ELECTROCARDIOGRAM REPORT: CPT | Mod: ,,, | Performed by: INTERNAL MEDICINE

## 2022-01-01 PROCEDURE — 99223 PR INITIAL HOSPITAL CARE,LEVL III: ICD-10-PCS | Mod: ,,, | Performed by: INTERNAL MEDICINE

## 2022-01-01 PROCEDURE — 27000221 HC OXYGEN, UP TO 24 HOURS

## 2022-01-01 PROCEDURE — 99900031 HC PATIENT EDUCATION (STAT)

## 2022-01-01 PROCEDURE — 12000002 HC ACUTE/MED SURGE SEMI-PRIVATE ROOM

## 2022-01-01 PROCEDURE — 94761 N-INVAS EAR/PLS OXIMETRY MLT: CPT

## 2022-01-01 PROCEDURE — 83735 ASSAY OF MAGNESIUM: CPT | Performed by: PHYSICIAN ASSISTANT

## 2022-01-01 PROCEDURE — 99900035 HC TECH TIME PER 15 MIN (STAT)

## 2022-01-01 PROCEDURE — 37000008 HC ANESTHESIA 1ST 15 MINUTES: Performed by: INTERNAL MEDICINE

## 2022-01-01 PROCEDURE — C9399 UNCLASSIFIED DRUGS OR BIOLOG: HCPCS | Performed by: PHYSICIAN ASSISTANT

## 2022-01-01 PROCEDURE — 99232 PR SUBSEQUENT HOSPITAL CARE,LEVL II: ICD-10-PCS | Mod: ,,, | Performed by: INTERNAL MEDICINE

## 2022-01-01 PROCEDURE — 99233 PR SUBSEQUENT HOSPITAL CARE,LEVL III: ICD-10-PCS | Mod: GC,,, | Performed by: STUDENT IN AN ORGANIZED HEALTH CARE EDUCATION/TRAINING PROGRAM

## 2022-01-01 PROCEDURE — C2629 INTRO/SHEATH, LASER: HCPCS | Performed by: STUDENT IN AN ORGANIZED HEALTH CARE EDUCATION/TRAINING PROGRAM

## 2022-01-01 PROCEDURE — 85025 COMPLETE CBC W/AUTO DIFF WBC: CPT | Performed by: INTERNAL MEDICINE

## 2022-01-01 PROCEDURE — 86140 C-REACTIVE PROTEIN: CPT | Performed by: PHYSICIAN ASSISTANT

## 2022-01-01 PROCEDURE — 80053 COMPREHEN METABOLIC PANEL: CPT | Performed by: INTERNAL MEDICINE

## 2022-01-01 PROCEDURE — 25000003 PHARM REV CODE 250: Performed by: INTERNAL MEDICINE

## 2022-01-01 PROCEDURE — 99223 1ST HOSP IP/OBS HIGH 75: CPT | Mod: ,,, | Performed by: PHYSICIAN ASSISTANT

## 2022-01-01 PROCEDURE — D9220A PRA ANESTHESIA: ICD-10-PCS | Mod: ANES,,, | Performed by: ANESTHESIOLOGY

## 2022-01-01 PROCEDURE — 93005 ELECTROCARDIOGRAM TRACING: CPT | Performed by: INTERNAL MEDICINE

## 2022-01-01 PROCEDURE — 20600001 HC STEP DOWN PRIVATE ROOM

## 2022-01-01 PROCEDURE — 63600175 PHARM REV CODE 636 W HCPCS: Performed by: INTERNAL MEDICINE

## 2022-01-01 PROCEDURE — 93010 EKG 12-LEAD: ICD-10-PCS | Mod: 59,,, | Performed by: INTERNAL MEDICINE

## 2022-01-01 PROCEDURE — A4216 STERILE WATER/SALINE, 10 ML: HCPCS | Performed by: INTERNAL MEDICINE

## 2022-01-01 PROCEDURE — 93325 TRANSESOPHAGEAL ECHO (TEE) (CUPID ONLY): ICD-10-PCS | Mod: 26,,, | Performed by: INTERNAL MEDICINE

## 2022-01-01 PROCEDURE — 93321 TRANSESOPHAGEAL ECHO (TEE) (CUPID ONLY): ICD-10-PCS | Mod: 26,,, | Performed by: INTERNAL MEDICINE

## 2022-01-01 PROCEDURE — 80202 ASSAY OF VANCOMYCIN: CPT | Performed by: HOSPITALIST

## 2022-01-01 PROCEDURE — 80202 ASSAY OF VANCOMYCIN: CPT | Performed by: INTERNAL MEDICINE

## 2022-01-01 PROCEDURE — 99223 1ST HOSP IP/OBS HIGH 75: CPT | Mod: ,,, | Performed by: INTERNAL MEDICINE

## 2022-01-01 PROCEDURE — 36415 COLL VENOUS BLD VENIPUNCTURE: CPT | Performed by: INTERNAL MEDICINE

## 2022-01-01 PROCEDURE — 36620 PR INSERT CATH,ART,PERCUT,SHORTTERM: ICD-10-PCS | Mod: 59,,, | Performed by: ANESTHESIOLOGY

## 2022-01-01 PROCEDURE — 84145 PROCALCITONIN (PCT): CPT | Performed by: EMERGENCY MEDICINE

## 2022-01-01 PROCEDURE — 36415 COLL VENOUS BLD VENIPUNCTURE: CPT | Performed by: HOSPITALIST

## 2022-01-01 PROCEDURE — 33241 REMOVE PULSE GENERATOR: CPT | Performed by: STUDENT IN AN ORGANIZED HEALTH CARE EDUCATION/TRAINING PROGRAM

## 2022-01-01 PROCEDURE — 37000008 HC ANESTHESIA 1ST 15 MINUTES: Performed by: STUDENT IN AN ORGANIZED HEALTH CARE EDUCATION/TRAINING PROGRAM

## 2022-01-01 PROCEDURE — 99233 PR SUBSEQUENT HOSPITAL CARE,LEVL III: ICD-10-PCS | Mod: ,,, | Performed by: INTERNAL MEDICINE

## 2022-01-01 PROCEDURE — 63600175 PHARM REV CODE 636 W HCPCS: Performed by: HOSPITALIST

## 2022-01-01 PROCEDURE — 83615 LACTATE (LD) (LDH) ENZYME: CPT | Performed by: EMERGENCY MEDICINE

## 2022-01-01 PROCEDURE — 86140 C-REACTIVE PROTEIN: CPT | Performed by: EMERGENCY MEDICINE

## 2022-01-01 PROCEDURE — 99232 PR SUBSEQUENT HOSPITAL CARE,LEVL II: ICD-10-PCS | Mod: ,,, | Performed by: STUDENT IN AN ORGANIZED HEALTH CARE EDUCATION/TRAINING PROGRAM

## 2022-01-01 PROCEDURE — 82962 GLUCOSE BLOOD TEST: CPT

## 2022-01-01 PROCEDURE — C2628 CATHETER, OCCLUSION: HCPCS | Performed by: STUDENT IN AN ORGANIZED HEALTH CARE EDUCATION/TRAINING PROGRAM

## 2022-01-01 PROCEDURE — 85025 COMPLETE CBC W/AUTO DIFF WBC: CPT | Performed by: EMERGENCY MEDICINE

## 2022-01-01 PROCEDURE — 93312 TRANSESOPHAGEAL ECHO (TEE) (CUPID ONLY): ICD-10-PCS | Mod: 26,,, | Performed by: INTERNAL MEDICINE

## 2022-01-01 PROCEDURE — 25000003 PHARM REV CODE 250: Performed by: HOSPITALIST

## 2022-01-01 PROCEDURE — 99223 1ST HOSP IP/OBS HIGH 75: CPT | Mod: GC,,, | Performed by: STUDENT IN AN ORGANIZED HEALTH CARE EDUCATION/TRAINING PROGRAM

## 2022-01-01 PROCEDURE — 87070 CULTURE OTHR SPECIMN AEROBIC: CPT | Mod: 59 | Performed by: STUDENT IN AN ORGANIZED HEALTH CARE EDUCATION/TRAINING PROGRAM

## 2022-01-01 PROCEDURE — 99232 SBSQ HOSP IP/OBS MODERATE 35: CPT | Mod: ,,, | Performed by: STUDENT IN AN ORGANIZED HEALTH CARE EDUCATION/TRAINING PROGRAM

## 2022-01-01 PROCEDURE — 63600175 PHARM REV CODE 636 W HCPCS: Performed by: NURSE ANESTHETIST, CERTIFIED REGISTERED

## 2022-01-01 PROCEDURE — D9220A PRA ANESTHESIA: Mod: ANES,,, | Performed by: ANESTHESIOLOGY

## 2022-01-01 PROCEDURE — 43752 NASAL/OROGASTRIC W/TUBE PLMT: CPT

## 2022-01-01 PROCEDURE — 82947 ASSAY GLUCOSE BLOOD QUANT: CPT | Performed by: INTERNAL MEDICINE

## 2022-01-01 PROCEDURE — 99232 SBSQ HOSP IP/OBS MODERATE 35: CPT | Mod: ,,, | Performed by: INTERNAL MEDICINE

## 2022-01-01 PROCEDURE — 63600175 PHARM REV CODE 636 W HCPCS: Performed by: STUDENT IN AN ORGANIZED HEALTH CARE EDUCATION/TRAINING PROGRAM

## 2022-01-01 PROCEDURE — 99232 SBSQ HOSP IP/OBS MODERATE 35: CPT | Mod: ,,, | Performed by: HOSPITALIST

## 2022-01-01 PROCEDURE — 83605 ASSAY OF LACTIC ACID: CPT | Mod: 91 | Performed by: EMERGENCY MEDICINE

## 2022-01-01 PROCEDURE — 25000003 PHARM REV CODE 250

## 2022-01-01 PROCEDURE — U0002 COVID-19 LAB TEST NON-CDC: HCPCS | Performed by: EMERGENCY MEDICINE

## 2022-01-01 PROCEDURE — D9220A PRA ANESTHESIA: ICD-10-PCS | Mod: CRNA,,, | Performed by: NURSE ANESTHETIST, CERTIFIED REGISTERED

## 2022-01-01 PROCEDURE — 80162 ASSAY OF DIGOXIN TOTAL: CPT | Performed by: EMERGENCY MEDICINE

## 2022-01-01 PROCEDURE — 83036 HEMOGLOBIN GLYCOSYLATED A1C: CPT | Performed by: STUDENT IN AN ORGANIZED HEALTH CARE EDUCATION/TRAINING PROGRAM

## 2022-01-01 PROCEDURE — 80053 COMPREHEN METABOLIC PANEL: CPT | Performed by: EMERGENCY MEDICINE

## 2022-01-01 PROCEDURE — 37000009 HC ANESTHESIA EA ADD 15 MINS: Performed by: INTERNAL MEDICINE

## 2022-01-01 PROCEDURE — 99233 SBSQ HOSP IP/OBS HIGH 50: CPT | Mod: GC,,, | Performed by: INTERNAL MEDICINE

## 2022-01-01 PROCEDURE — 85027 COMPLETE CBC AUTOMATED: CPT | Performed by: INTERNAL MEDICINE

## 2022-01-01 PROCEDURE — 80048 BASIC METABOLIC PNL TOTAL CA: CPT | Performed by: INTERNAL MEDICINE

## 2022-01-01 PROCEDURE — 86920 COMPATIBILITY TEST SPIN: CPT | Performed by: INTERNAL MEDICINE

## 2022-01-01 PROCEDURE — 1111F PR DISCHARGE MEDS RECONCILED W/ CURRENT OUTPATIENT MED LIST: ICD-10-PCS | Mod: 95,CPTII,, | Performed by: INTERNAL MEDICINE

## 2022-01-01 PROCEDURE — 94799 UNLISTED PULMONARY SVC/PX: CPT

## 2022-01-01 PROCEDURE — 99233 PR SUBSEQUENT HOSPITAL CARE,LEVL III: ICD-10-PCS | Mod: 95,,, | Performed by: INTERNAL MEDICINE

## 2022-01-01 PROCEDURE — 25000003 PHARM REV CODE 250: Performed by: NURSE ANESTHETIST, CERTIFIED REGISTERED

## 2022-01-01 PROCEDURE — 99232 PR SUBSEQUENT HOSPITAL CARE,LEVL II: ICD-10-PCS | Mod: ,,, | Performed by: HOSPITALIST

## 2022-01-01 PROCEDURE — 85610 PROTHROMBIN TIME: CPT | Performed by: EMERGENCY MEDICINE

## 2022-01-01 PROCEDURE — 33244 PR RMV PACER/ELECTRD,TRANSVEN EXTRCT: ICD-10-PCS | Mod: 22,,, | Performed by: STUDENT IN AN ORGANIZED HEALTH CARE EDUCATION/TRAINING PROGRAM

## 2022-01-01 PROCEDURE — 27201423 OPTIME MED/SURG SUP & DEVICES STERILE SUPPLY: Performed by: STUDENT IN AN ORGANIZED HEALTH CARE EDUCATION/TRAINING PROGRAM

## 2022-01-01 PROCEDURE — D9220A PRA ANESTHESIA: Mod: CRNA,,, | Performed by: NURSE ANESTHETIST, CERTIFIED REGISTERED

## 2022-01-01 PROCEDURE — 93010 ELECTROCARDIOGRAM REPORT: CPT | Mod: 59,,, | Performed by: INTERNAL MEDICINE

## 2022-01-01 PROCEDURE — 99239 HOSP IP/OBS DSCHRG MGMT >30: CPT | Mod: 95,,, | Performed by: INTERNAL MEDICINE

## 2022-01-01 PROCEDURE — 87075 CULTR BACTERIA EXCEPT BLOOD: CPT | Mod: 59 | Performed by: STUDENT IN AN ORGANIZED HEALTH CARE EDUCATION/TRAINING PROGRAM

## 2022-01-01 PROCEDURE — 87070 CULTURE OTHR SPECIMN AEROBIC: CPT | Performed by: INTERNAL MEDICINE

## 2022-01-01 PROCEDURE — 82803 BLOOD GASES ANY COMBINATION: CPT

## 2022-01-01 PROCEDURE — C1894 INTRO/SHEATH, NON-LASER: HCPCS | Performed by: STUDENT IN AN ORGANIZED HEALTH CARE EDUCATION/TRAINING PROGRAM

## 2022-01-01 PROCEDURE — 85610 PROTHROMBIN TIME: CPT | Performed by: PHYSICIAN ASSISTANT

## 2022-01-01 PROCEDURE — 84484 ASSAY OF TROPONIN QUANT: CPT | Performed by: EMERGENCY MEDICINE

## 2022-01-01 PROCEDURE — 80053 COMPREHEN METABOLIC PANEL: CPT | Performed by: PHYSICIAN ASSISTANT

## 2022-01-01 PROCEDURE — A4216 STERILE WATER/SALINE, 10 ML: HCPCS | Performed by: HOSPITALIST

## 2022-01-01 PROCEDURE — 85025 COMPLETE CBC W/AUTO DIFF WBC: CPT | Performed by: PHYSICIAN ASSISTANT

## 2022-01-01 PROCEDURE — 99233 PR SUBSEQUENT HOSPITAL CARE,LEVL III: ICD-10-PCS | Mod: GC,,, | Performed by: INTERNAL MEDICINE

## 2022-01-01 PROCEDURE — C1893 INTRO/SHEATH, FIXED,NON-PEEL: HCPCS | Performed by: STUDENT IN AN ORGANIZED HEALTH CARE EDUCATION/TRAINING PROGRAM

## 2022-01-01 PROCEDURE — 99233 SBSQ HOSP IP/OBS HIGH 50: CPT | Mod: GC,,, | Performed by: STUDENT IN AN ORGANIZED HEALTH CARE EDUCATION/TRAINING PROGRAM

## 2022-01-01 PROCEDURE — C9399 UNCLASSIFIED DRUGS OR BIOLOG: HCPCS | Performed by: HOSPITALIST

## 2022-01-01 PROCEDURE — 80048 BASIC METABOLIC PNL TOTAL CA: CPT | Performed by: STUDENT IN AN ORGANIZED HEALTH CARE EDUCATION/TRAINING PROGRAM

## 2022-01-01 PROCEDURE — 99239 PR HOSPITAL DISCHARGE DAY,>30 MIN: ICD-10-PCS | Mod: 95,,, | Performed by: INTERNAL MEDICINE

## 2022-01-01 PROCEDURE — 86901 BLOOD TYPING SEROLOGIC RH(D): CPT | Performed by: INTERNAL MEDICINE

## 2022-01-01 PROCEDURE — U0002 COVID-19 LAB TEST NON-CDC: HCPCS | Performed by: STUDENT IN AN ORGANIZED HEALTH CARE EDUCATION/TRAINING PROGRAM

## 2022-01-01 PROCEDURE — 93312 ECHO TRANSESOPHAGEAL: CPT | Mod: 26,,, | Performed by: INTERNAL MEDICINE

## 2022-01-01 PROCEDURE — 93308 TTE F-UP OR LMTD: CPT

## 2022-01-01 PROCEDURE — 87077 CULTURE AEROBIC IDENTIFY: CPT | Performed by: EMERGENCY MEDICINE

## 2022-01-01 PROCEDURE — 96365 THER/PROPH/DIAG IV INF INIT: CPT

## 2022-01-01 PROCEDURE — 27201037 HC PRESSURE MONITORING SET UP

## 2022-01-01 PROCEDURE — 84484 ASSAY OF TROPONIN QUANT: CPT | Performed by: INTERNAL MEDICINE

## 2022-01-01 PROCEDURE — 93321 DOPPLER ECHO F-UP/LMTD STD: CPT | Mod: 26,,, | Performed by: INTERNAL MEDICINE

## 2022-01-01 PROCEDURE — 87186 SC STD MICRODIL/AGAR DIL: CPT | Performed by: EMERGENCY MEDICINE

## 2022-01-01 PROCEDURE — 99233 SBSQ HOSP IP/OBS HIGH 50: CPT | Mod: ,,, | Performed by: INTERNAL MEDICINE

## 2022-01-01 PROCEDURE — 87040 BLOOD CULTURE FOR BACTERIA: CPT | Mod: 59 | Performed by: INTERNAL MEDICINE

## 2022-01-01 PROCEDURE — 36415 COLL VENOUS BLD VENIPUNCTURE: CPT | Performed by: STUDENT IN AN ORGANIZED HEALTH CARE EDUCATION/TRAINING PROGRAM

## 2022-01-01 PROCEDURE — 93308 TTE F-UP OR LMTD: CPT | Mod: 26,,, | Performed by: INTERNAL MEDICINE

## 2022-01-01 PROCEDURE — 93325 DOPPLER ECHO COLOR FLOW MAPG: CPT | Mod: 26,,, | Performed by: INTERNAL MEDICINE

## 2022-01-01 PROCEDURE — 25000003 PHARM REV CODE 250: Performed by: STUDENT IN AN ORGANIZED HEALTH CARE EDUCATION/TRAINING PROGRAM

## 2022-01-01 PROCEDURE — 25000003 PHARM REV CODE 250: Performed by: EMERGENCY MEDICINE

## 2022-01-01 PROCEDURE — 93321 DOPPLER ECHO F-UP/LMTD STD: CPT

## 2022-01-01 PROCEDURE — 87040 BLOOD CULTURE FOR BACTERIA: CPT | Mod: 59 | Performed by: EMERGENCY MEDICINE

## 2022-01-01 PROCEDURE — 84484 ASSAY OF TROPONIN QUANT: CPT | Mod: 91 | Performed by: INTERNAL MEDICINE

## 2022-01-01 PROCEDURE — 82728 ASSAY OF FERRITIN: CPT | Performed by: EMERGENCY MEDICINE

## 2022-01-01 PROCEDURE — 80202 ASSAY OF VANCOMYCIN: CPT | Performed by: PHYSICIAN ASSISTANT

## 2022-01-01 PROCEDURE — 85007 BL SMEAR W/DIFF WBC COUNT: CPT | Performed by: INTERNAL MEDICINE

## 2022-01-01 PROCEDURE — C9399 UNCLASSIFIED DRUGS OR BIOLOG: HCPCS | Performed by: INTERNAL MEDICINE

## 2022-01-01 PROCEDURE — 99223 PR INITIAL HOSPITAL CARE,LEVL III: ICD-10-PCS | Mod: ,,, | Performed by: PHYSICIAN ASSISTANT

## 2022-01-01 PROCEDURE — 83735 ASSAY OF MAGNESIUM: CPT | Performed by: INTERNAL MEDICINE

## 2022-01-01 PROCEDURE — 87040 BLOOD CULTURE FOR BACTERIA: CPT | Performed by: INTERNAL MEDICINE

## 2022-01-01 PROCEDURE — 87205 SMEAR GRAM STAIN: CPT | Performed by: INTERNAL MEDICINE

## 2022-01-01 PROCEDURE — 80069 RENAL FUNCTION PANEL: CPT | Performed by: INTERNAL MEDICINE

## 2022-01-01 PROCEDURE — 82550 ASSAY OF CK (CPK): CPT | Performed by: EMERGENCY MEDICINE

## 2022-01-01 PROCEDURE — 33244 REMOVE ELCTRD TRANSVENOUSLY: CPT | Mod: 22,,, | Performed by: STUDENT IN AN ORGANIZED HEALTH CARE EDUCATION/TRAINING PROGRAM

## 2022-01-01 PROCEDURE — 63600175 PHARM REV CODE 636 W HCPCS: Performed by: ANESTHESIOLOGY

## 2022-01-01 PROCEDURE — 63600175 PHARM REV CODE 636 W HCPCS: Performed by: PHYSICIAN ASSISTANT

## 2022-01-01 PROCEDURE — C1751 CATH, INF, PER/CENT/MIDLINE: HCPCS

## 2022-01-01 PROCEDURE — 76937 US GUIDE VASCULAR ACCESS: CPT

## 2022-01-01 PROCEDURE — 82962 GLUCOSE BLOOD TEST: CPT | Performed by: STUDENT IN AN ORGANIZED HEALTH CARE EDUCATION/TRAINING PROGRAM

## 2022-01-01 PROCEDURE — 80162 ASSAY OF DIGOXIN TOTAL: CPT | Performed by: INTERNAL MEDICINE

## 2022-01-01 PROCEDURE — 99223 PR INITIAL HOSPITAL CARE,LEVL III: ICD-10-PCS | Mod: GC,,, | Performed by: STUDENT IN AN ORGANIZED HEALTH CARE EDUCATION/TRAINING PROGRAM

## 2022-01-01 PROCEDURE — 63600175 PHARM REV CODE 636 W HCPCS: Performed by: EMERGENCY MEDICINE

## 2022-01-01 PROCEDURE — 93010 ELECTROCARDIOGRAM REPORT: CPT | Mod: 59,76,, | Performed by: INTERNAL MEDICINE

## 2022-01-01 PROCEDURE — 25000242 PHARM REV CODE 250 ALT 637 W/ HCPCS: Performed by: EMERGENCY MEDICINE

## 2022-01-01 PROCEDURE — 99284 EMERGENCY DEPT VISIT MOD MDM: CPT

## 2022-01-01 PROCEDURE — 93010 EKG 12-LEAD: ICD-10-PCS | Mod: 59,76,, | Performed by: INTERNAL MEDICINE

## 2022-01-01 PROCEDURE — 99233 SBSQ HOSP IP/OBS HIGH 50: CPT | Mod: 95,,, | Performed by: INTERNAL MEDICINE

## 2022-01-01 PROCEDURE — 83880 ASSAY OF NATRIURETIC PEPTIDE: CPT | Performed by: EMERGENCY MEDICINE

## 2022-01-01 PROCEDURE — 93308 ECHO (CUPID ONLY): ICD-10-PCS | Mod: 26,,, | Performed by: INTERNAL MEDICINE

## 2022-01-01 PROCEDURE — 82378 CARCINOEMBRYONIC ANTIGEN: CPT | Performed by: INTERNAL MEDICINE

## 2022-01-01 PROCEDURE — 81001 URINALYSIS AUTO W/SCOPE: CPT | Performed by: INTERNAL MEDICINE

## 2022-01-01 PROCEDURE — 86140 C-REACTIVE PROTEIN: CPT | Performed by: HOSPITALIST

## 2022-01-01 PROCEDURE — 33241 PR RMV PULSE GENERATOR,SNGL/DUAL: ICD-10-PCS | Mod: ,,, | Performed by: STUDENT IN AN ORGANIZED HEALTH CARE EDUCATION/TRAINING PROGRAM

## 2022-01-01 PROCEDURE — 85379 FIBRIN DEGRADATION QUANT: CPT | Performed by: EMERGENCY MEDICINE

## 2022-01-01 PROCEDURE — 33244 REMOVE ELCTRD TRANSVENOUSLY: CPT | Performed by: STUDENT IN AN ORGANIZED HEALTH CARE EDUCATION/TRAINING PROGRAM

## 2022-01-01 PROCEDURE — 36415 COLL VENOUS BLD VENIPUNCTURE: CPT | Performed by: EMERGENCY MEDICINE

## 2022-01-01 PROCEDURE — 37000009 HC ANESTHESIA EA ADD 15 MINS: Performed by: STUDENT IN AN ORGANIZED HEALTH CARE EDUCATION/TRAINING PROGRAM

## 2022-01-01 PROCEDURE — 99291 CRITICAL CARE FIRST HOUR: CPT

## 2022-01-01 PROCEDURE — 83735 ASSAY OF MAGNESIUM: CPT | Performed by: EMERGENCY MEDICINE

## 2022-01-01 PROCEDURE — 36620 INSERTION CATHETER ARTERY: CPT | Mod: 59,,, | Performed by: ANESTHESIOLOGY

## 2022-01-01 PROCEDURE — 87147 CULTURE TYPE IMMUNOLOGIC: CPT | Performed by: INTERNAL MEDICINE

## 2022-01-01 PROCEDURE — 1111F DSCHRG MED/CURRENT MED MERGE: CPT | Mod: 95,CPTII,, | Performed by: INTERNAL MEDICINE

## 2022-01-01 PROCEDURE — 33241 REMOVE PULSE GENERATOR: CPT | Mod: ,,, | Performed by: STUDENT IN AN ORGANIZED HEALTH CARE EDUCATION/TRAINING PROGRAM

## 2022-01-01 RX ORDER — NAPROXEN 250 MG/1
250 TABLET ORAL EVERY 8 HOURS PRN
Status: DISPENSED | OUTPATIENT
Start: 2022-01-01 | End: 2022-01-01

## 2022-01-01 RX ORDER — FUROSEMIDE 40 MG/1
40 TABLET ORAL DAILY
Status: DISCONTINUED | OUTPATIENT
Start: 2022-01-01 | End: 2022-01-01 | Stop reason: HOSPADM

## 2022-01-01 RX ORDER — ROCURONIUM BROMIDE 10 MG/ML
INJECTION, SOLUTION INTRAVENOUS
Status: DISCONTINUED | OUTPATIENT
Start: 2022-01-01 | End: 2022-01-01

## 2022-01-01 RX ORDER — RANOLAZINE 500 MG/1
500 TABLET, EXTENDED RELEASE ORAL 2 TIMES DAILY
Status: DISCONTINUED | OUTPATIENT
Start: 2022-01-01 | End: 2022-01-01

## 2022-01-01 RX ORDER — VANCOMYCIN HYDROCHLORIDE 1 G/20ML
INJECTION, POWDER, LYOPHILIZED, FOR SOLUTION INTRAVENOUS
Status: DISCONTINUED | OUTPATIENT
Start: 2022-01-01 | End: 2022-01-01 | Stop reason: HOSPADM

## 2022-01-01 RX ORDER — SODIUM CHLORIDE 9 MG/ML
INJECTION, SOLUTION INTRAVENOUS CONTINUOUS PRN
Status: DISCONTINUED | OUTPATIENT
Start: 2022-01-01 | End: 2022-01-01

## 2022-01-01 RX ORDER — ONDANSETRON 8 MG/1
8 TABLET, ORALLY DISINTEGRATING ORAL EVERY 8 HOURS PRN
Status: DISCONTINUED | OUTPATIENT
Start: 2022-01-01 | End: 2022-01-01

## 2022-01-01 RX ORDER — POTASSIUM CHLORIDE 20 MEQ/1
20 TABLET, EXTENDED RELEASE ORAL
Status: DISCONTINUED | OUTPATIENT
Start: 2022-01-01 | End: 2022-01-01 | Stop reason: HOSPADM

## 2022-01-01 RX ORDER — POTASSIUM CHLORIDE 7.45 MG/ML
20 INJECTION INTRAVENOUS
Status: DISCONTINUED | OUTPATIENT
Start: 2022-01-01 | End: 2022-01-01 | Stop reason: HOSPADM

## 2022-01-01 RX ORDER — HYDROCODONE BITARTRATE AND ACETAMINOPHEN 5; 325 MG/1; MG/1
1 TABLET ORAL EVERY 4 HOURS PRN
Status: DISCONTINUED | OUTPATIENT
Start: 2022-01-01 | End: 2022-01-01 | Stop reason: HOSPADM

## 2022-01-01 RX ORDER — FUROSEMIDE 40 MG/1
40 TABLET ORAL DAILY
Status: DISCONTINUED | OUTPATIENT
Start: 2022-01-01 | End: 2022-01-01

## 2022-01-01 RX ORDER — SPIRONOLACTONE 25 MG/1
25 TABLET ORAL DAILY
Status: DISCONTINUED | OUTPATIENT
Start: 2022-01-01 | End: 2022-01-01 | Stop reason: HOSPADM

## 2022-01-01 RX ORDER — SODIUM CHLORIDE 0.9 % (FLUSH) 0.9 %
10 SYRINGE (ML) INJECTION EVERY 6 HOURS
Status: DISCONTINUED | OUTPATIENT
Start: 2022-01-01 | End: 2022-01-01 | Stop reason: HOSPADM

## 2022-01-01 RX ORDER — AMLODIPINE BESYLATE 5 MG/1
5 TABLET ORAL 2 TIMES DAILY
Status: DISCONTINUED | OUTPATIENT
Start: 2022-01-01 | End: 2022-01-01 | Stop reason: HOSPADM

## 2022-01-01 RX ORDER — PROMETHAZINE HYDROCHLORIDE 25 MG/1
25 TABLET ORAL EVERY 6 HOURS PRN
Status: DISCONTINUED | OUTPATIENT
Start: 2022-01-01 | End: 2022-01-01

## 2022-01-01 RX ORDER — ACETAMINOPHEN 325 MG/1
650 TABLET ORAL EVERY 4 HOURS PRN
Status: DISCONTINUED | OUTPATIENT
Start: 2022-01-01 | End: 2022-01-01 | Stop reason: HOSPADM

## 2022-01-01 RX ORDER — DOXYCYCLINE 100 MG/1
100 CAPSULE ORAL EVERY 12 HOURS
Status: ON HOLD
Start: 2022-01-01 | End: 2022-01-01 | Stop reason: CLARIF

## 2022-01-01 RX ORDER — SODIUM CHLORIDE 0.9 % (FLUSH) 0.9 %
10 SYRINGE (ML) INJECTION
Status: DISCONTINUED | OUTPATIENT
Start: 2022-01-01 | End: 2022-01-01

## 2022-01-01 RX ORDER — IPRATROPIUM BROMIDE AND ALBUTEROL SULFATE 2.5; .5 MG/3ML; MG/3ML
3 SOLUTION RESPIRATORY (INHALATION) EVERY 4 HOURS PRN
Status: DISCONTINUED | OUTPATIENT
Start: 2022-01-01 | End: 2022-01-01 | Stop reason: HOSPADM

## 2022-01-01 RX ORDER — VANCOMYCIN HCL IN 5 % DEXTROSE 1G/250ML
1000 PLASTIC BAG, INJECTION (ML) INTRAVENOUS ONCE
Status: DISCONTINUED | OUTPATIENT
Start: 2022-01-01 | End: 2022-01-01

## 2022-01-01 RX ORDER — SODIUM BICARBONATE 1 MEQ/ML
SYRINGE (ML) INTRAVENOUS CODE/TRAUMA/SEDATION MEDICATION
Status: COMPLETED | OUTPATIENT
Start: 2022-01-01 | End: 2022-01-01

## 2022-01-01 RX ORDER — EPINEPHRINE 1 MG/ML
INJECTION, SOLUTION INTRACARDIAC; INTRAMUSCULAR; INTRAVENOUS; SUBCUTANEOUS
Status: DISCONTINUED | OUTPATIENT
Start: 2022-01-01 | End: 2022-01-01

## 2022-01-01 RX ORDER — BUPIVACAINE HYDROCHLORIDE 2.5 MG/ML
INJECTION, SOLUTION EPIDURAL; INFILTRATION; INTRACAUDAL
Status: DISCONTINUED | OUTPATIENT
Start: 2022-01-01 | End: 2022-01-01 | Stop reason: HOSPADM

## 2022-01-01 RX ORDER — METOLAZONE 2.5 MG/1
5 TABLET ORAL EVERY OTHER DAY
Status: DISCONTINUED | OUTPATIENT
Start: 2022-01-01 | End: 2022-01-01

## 2022-01-01 RX ORDER — TALC
6 POWDER (GRAM) TOPICAL NIGHTLY PRN
Status: DISCONTINUED | OUTPATIENT
Start: 2022-01-01 | End: 2022-01-01 | Stop reason: HOSPADM

## 2022-01-01 RX ORDER — OXYCODONE HYDROCHLORIDE 10 MG/1
10 TABLET ORAL EVERY 6 HOURS PRN
Status: DISCONTINUED | OUTPATIENT
Start: 2022-01-01 | End: 2022-01-01 | Stop reason: HOSPADM

## 2022-01-01 RX ORDER — POTASSIUM CHLORIDE 20 MEQ/1
40 TABLET, EXTENDED RELEASE ORAL ONCE
Status: COMPLETED | OUTPATIENT
Start: 2022-01-01 | End: 2022-01-01

## 2022-01-01 RX ORDER — CITALOPRAM 20 MG/1
40 TABLET, FILM COATED ORAL DAILY
Status: DISCONTINUED | OUTPATIENT
Start: 2022-01-01 | End: 2022-01-01 | Stop reason: HOSPADM

## 2022-01-01 RX ORDER — ALBUTEROL SULFATE 90 UG/1
2 AEROSOL, METERED RESPIRATORY (INHALATION) ONCE
Status: COMPLETED | OUTPATIENT
Start: 2022-01-01 | End: 2022-01-01

## 2022-01-01 RX ORDER — PENTOXIFYLLINE 400 MG/1
400 TABLET, EXTENDED RELEASE ORAL 3 TIMES DAILY
Status: DISCONTINUED | OUTPATIENT
Start: 2022-01-01 | End: 2022-01-01 | Stop reason: HOSPADM

## 2022-01-01 RX ORDER — DOXYCYCLINE 100 MG/1
100 CAPSULE ORAL EVERY 12 HOURS
Status: DISCONTINUED | OUTPATIENT
Start: 2022-01-01 | End: 2022-01-01

## 2022-01-01 RX ORDER — INSULIN ASPART 100 [IU]/ML
0-5 INJECTION, SOLUTION INTRAVENOUS; SUBCUTANEOUS
Status: DISCONTINUED | OUTPATIENT
Start: 2022-01-01 | End: 2022-01-01 | Stop reason: HOSPADM

## 2022-01-01 RX ORDER — VANCOMYCIN HCL IN 5 % DEXTROSE 1G/250ML
1000 PLASTIC BAG, INJECTION (ML) INTRAVENOUS ONCE
Status: COMPLETED | OUTPATIENT
Start: 2022-01-01 | End: 2022-01-01

## 2022-01-01 RX ORDER — EPHEDRINE SULFATE 50 MG/ML
INJECTION, SOLUTION INTRAVENOUS
Status: DISCONTINUED | OUTPATIENT
Start: 2022-01-01 | End: 2022-01-01

## 2022-01-01 RX ORDER — LIDOCAINE HYDROCHLORIDE 20 MG/ML
INJECTION, SOLUTION INFILTRATION; PERINEURAL
Status: DISCONTINUED | OUTPATIENT
Start: 2022-01-01 | End: 2022-01-01 | Stop reason: HOSPADM

## 2022-01-01 RX ORDER — RANOLAZINE 500 MG/1
500 TABLET, EXTENDED RELEASE ORAL 2 TIMES DAILY
Status: DISCONTINUED | OUTPATIENT
Start: 2022-01-01 | End: 2022-01-01 | Stop reason: HOSPADM

## 2022-01-01 RX ORDER — MUPIROCIN 20 MG/G
OINTMENT TOPICAL 2 TIMES DAILY
Status: DISCONTINUED | OUTPATIENT
Start: 2022-01-01 | End: 2022-01-01 | Stop reason: HOSPADM

## 2022-01-01 RX ORDER — METOPROLOL TARTRATE 50 MG/1
50 TABLET ORAL 2 TIMES DAILY
Status: DISCONTINUED | OUTPATIENT
Start: 2022-01-01 | End: 2022-01-01 | Stop reason: HOSPADM

## 2022-01-01 RX ORDER — MAGNESIUM SULFATE 1 G/100ML
1 INJECTION INTRAVENOUS
Status: DISCONTINUED | OUTPATIENT
Start: 2022-01-01 | End: 2022-01-01 | Stop reason: HOSPADM

## 2022-01-01 RX ORDER — PENTOXIFYLLINE 400 MG/1
400 TABLET, EXTENDED RELEASE ORAL 3 TIMES DAILY
Qty: 90 TABLET | Refills: 1 | Status: SHIPPED | OUTPATIENT
Start: 2022-01-01 | End: 2023-01-20

## 2022-01-01 RX ORDER — SODIUM CHLORIDE 9 MG/ML
INJECTION, SOLUTION INTRAVENOUS CONTINUOUS
Status: DISCONTINUED | OUTPATIENT
Start: 2022-01-01 | End: 2022-01-01

## 2022-01-01 RX ORDER — MAGNESIUM SULFATE HEPTAHYDRATE 40 MG/ML
4 INJECTION, SOLUTION INTRAVENOUS
Status: DISCONTINUED | OUTPATIENT
Start: 2022-01-01 | End: 2022-01-01 | Stop reason: HOSPADM

## 2022-01-01 RX ORDER — SODIUM CHLORIDE 0.9 G/100ML
IRRIGANT IRRIGATION
Status: DISCONTINUED | OUTPATIENT
Start: 2022-01-01 | End: 2022-01-01 | Stop reason: HOSPADM

## 2022-01-01 RX ORDER — ATORVASTATIN CALCIUM 40 MG/1
40 TABLET, FILM COATED ORAL NIGHTLY
Status: DISCONTINUED | OUTPATIENT
Start: 2022-01-01 | End: 2022-01-01 | Stop reason: HOSPADM

## 2022-01-01 RX ORDER — SPIRONOLACTONE 25 MG/1
25 TABLET ORAL DAILY
Status: DISCONTINUED | OUTPATIENT
Start: 2022-01-01 | End: 2022-01-01

## 2022-01-01 RX ORDER — ONDANSETRON 2 MG/ML
INJECTION INTRAMUSCULAR; INTRAVENOUS
Status: DISCONTINUED | OUTPATIENT
Start: 2022-01-01 | End: 2022-01-01

## 2022-01-01 RX ORDER — INSULIN GLARGINE 100 [IU]/ML
100 INJECTION, SOLUTION SUBCUTANEOUS DAILY
Start: 2022-01-01

## 2022-01-01 RX ORDER — METOLAZONE 5 MG/1
5 TABLET ORAL EVERY OTHER DAY
Status: DISCONTINUED | OUTPATIENT
Start: 2022-01-01 | End: 2022-01-01

## 2022-01-01 RX ORDER — POTASSIUM CHLORIDE 20 MEQ/1
40 TABLET, EXTENDED RELEASE ORAL
Status: DISCONTINUED | OUTPATIENT
Start: 2022-01-01 | End: 2022-01-01 | Stop reason: HOSPADM

## 2022-01-01 RX ORDER — SODIUM CHLORIDE 0.9 % (FLUSH) 0.9 %
10 SYRINGE (ML) INJECTION
Status: DISCONTINUED | OUTPATIENT
Start: 2022-01-01 | End: 2022-01-01 | Stop reason: HOSPADM

## 2022-01-01 RX ORDER — METOLAZONE 2.5 MG/1
5 TABLET ORAL EVERY OTHER DAY
Status: DISCONTINUED | OUTPATIENT
Start: 2022-01-01 | End: 2022-01-01 | Stop reason: HOSPADM

## 2022-01-01 RX ORDER — MEROPENEM AND SODIUM CHLORIDE 500 MG/50ML
500 INJECTION, SOLUTION INTRAVENOUS
Status: DISCONTINUED | OUTPATIENT
Start: 2022-01-01 | End: 2022-01-01

## 2022-01-01 RX ORDER — CLOPIDOGREL BISULFATE 75 MG/1
75 TABLET ORAL DAILY
Qty: 90 TABLET | Refills: 3
Start: 2022-01-01

## 2022-01-01 RX ORDER — IBUPROFEN 200 MG
16 TABLET ORAL
Status: DISCONTINUED | OUTPATIENT
Start: 2022-01-01 | End: 2022-01-01 | Stop reason: HOSPADM

## 2022-01-01 RX ORDER — POLYETHYLENE GLYCOL 3350 17 G/17G
17 POWDER, FOR SOLUTION ORAL DAILY PRN
Status: DISCONTINUED | OUTPATIENT
Start: 2022-01-01 | End: 2022-01-01 | Stop reason: HOSPADM

## 2022-01-01 RX ORDER — POTASSIUM CHLORIDE 7.45 MG/ML
10 INJECTION INTRAVENOUS ONCE
Status: COMPLETED | OUTPATIENT
Start: 2022-01-01 | End: 2022-01-01

## 2022-01-01 RX ORDER — MEROPENEM AND SODIUM CHLORIDE 1 G/50ML
1 INJECTION, SOLUTION INTRAVENOUS
Status: DISCONTINUED | OUTPATIENT
Start: 2022-01-01 | End: 2022-01-01

## 2022-01-01 RX ORDER — GLUCAGON 1 MG
1 KIT INJECTION
Status: DISCONTINUED | OUTPATIENT
Start: 2022-01-01 | End: 2022-01-01 | Stop reason: HOSPADM

## 2022-01-01 RX ORDER — FENTANYL CITRATE 50 UG/ML
25 INJECTION, SOLUTION INTRAMUSCULAR; INTRAVENOUS EVERY 5 MIN PRN
Status: DISCONTINUED | OUTPATIENT
Start: 2022-01-01 | End: 2022-01-01

## 2022-01-01 RX ORDER — OXYCODONE HYDROCHLORIDE 5 MG/1
5 TABLET ORAL EVERY 6 HOURS PRN
Status: DISCONTINUED | OUTPATIENT
Start: 2022-01-01 | End: 2022-01-01

## 2022-01-01 RX ORDER — METOPROLOL TARTRATE 50 MG/1
50 TABLET ORAL 2 TIMES DAILY
Status: DISCONTINUED | OUTPATIENT
Start: 2022-01-01 | End: 2022-01-01

## 2022-01-01 RX ORDER — CLOPIDOGREL BISULFATE 75 MG/1
75 TABLET ORAL DAILY
Qty: 90 TABLET | Refills: 3 | Status: ON HOLD | OUTPATIENT
Start: 2022-01-01 | End: 2022-01-01 | Stop reason: SDUPTHER

## 2022-01-01 RX ORDER — IBUPROFEN 200 MG
24 TABLET ORAL
Status: DISCONTINUED | OUTPATIENT
Start: 2022-01-01 | End: 2022-01-01 | Stop reason: HOSPADM

## 2022-01-01 RX ORDER — GABAPENTIN 300 MG/1
300 CAPSULE ORAL 3 TIMES DAILY
Status: DISCONTINUED | OUTPATIENT
Start: 2022-01-01 | End: 2022-01-01 | Stop reason: HOSPADM

## 2022-01-01 RX ORDER — EPINEPHRINE 0.1 MG/ML
INJECTION INTRAVENOUS CODE/TRAUMA/SEDATION MEDICATION
Status: COMPLETED | OUTPATIENT
Start: 2022-01-01 | End: 2022-01-01

## 2022-01-01 RX ORDER — LANOLIN ALCOHOL/MO/W.PET/CERES
800 CREAM (GRAM) TOPICAL
Status: DISCONTINUED | OUTPATIENT
Start: 2022-01-01 | End: 2022-01-01 | Stop reason: HOSPADM

## 2022-01-01 RX ORDER — SODIUM CHLORIDE, SODIUM LACTATE, POTASSIUM CHLORIDE, CALCIUM CHLORIDE 600; 310; 30; 20 MG/100ML; MG/100ML; MG/100ML; MG/100ML
1000 INJECTION, SOLUTION INTRAVENOUS
Status: COMPLETED | OUTPATIENT
Start: 2022-01-01 | End: 2022-01-01

## 2022-01-01 RX ORDER — AMIODARONE HYDROCHLORIDE 200 MG/1
200 TABLET ORAL DAILY
Status: DISCONTINUED | OUTPATIENT
Start: 2022-01-01 | End: 2022-01-01 | Stop reason: HOSPADM

## 2022-01-01 RX ORDER — ACETAMINOPHEN 325 MG/1
650 TABLET ORAL EVERY 8 HOURS PRN
Status: DISCONTINUED | OUTPATIENT
Start: 2022-01-01 | End: 2022-01-01 | Stop reason: HOSPADM

## 2022-01-01 RX ORDER — PROPOFOL 10 MG/ML
VIAL (ML) INTRAVENOUS
Status: DISCONTINUED | OUTPATIENT
Start: 2022-01-01 | End: 2022-01-01

## 2022-01-01 RX ORDER — ATORVASTATIN CALCIUM 20 MG/1
40 TABLET, FILM COATED ORAL NIGHTLY
Status: DISCONTINUED | OUTPATIENT
Start: 2022-01-01 | End: 2022-01-01 | Stop reason: HOSPADM

## 2022-01-01 RX ORDER — ENOXAPARIN SODIUM 100 MG/ML
40 INJECTION SUBCUTANEOUS EVERY 24 HOURS
Status: DISCONTINUED | OUTPATIENT
Start: 2022-01-01 | End: 2022-01-01

## 2022-01-01 RX ORDER — DIGOXIN 125 MCG
0.12 TABLET ORAL DAILY
Status: DISCONTINUED | OUTPATIENT
Start: 2022-01-01 | End: 2022-01-01 | Stop reason: HOSPADM

## 2022-01-01 RX ORDER — HYDROCODONE BITARTRATE AND ACETAMINOPHEN 500; 5 MG/1; MG/1
TABLET ORAL
Status: DISCONTINUED | OUTPATIENT
Start: 2022-01-01 | End: 2022-01-01 | Stop reason: HOSPADM

## 2022-01-01 RX ORDER — ONDANSETRON 2 MG/ML
4 INJECTION INTRAMUSCULAR; INTRAVENOUS EVERY 8 HOURS PRN
Status: DISCONTINUED | OUTPATIENT
Start: 2022-01-01 | End: 2022-01-01 | Stop reason: HOSPADM

## 2022-01-01 RX ORDER — ENOXAPARIN SODIUM 100 MG/ML
40 INJECTION SUBCUTANEOUS
Status: DISCONTINUED | OUTPATIENT
Start: 2022-01-01 | End: 2022-01-01 | Stop reason: HOSPADM

## 2022-01-01 RX ORDER — FENTANYL CITRATE 50 UG/ML
INJECTION, SOLUTION INTRAMUSCULAR; INTRAVENOUS
Status: DISCONTINUED | OUTPATIENT
Start: 2022-01-01 | End: 2022-01-01

## 2022-01-01 RX ORDER — OXYCODONE AND ACETAMINOPHEN 10; 325 MG/1; MG/1
1 TABLET ORAL EVERY 6 HOURS PRN
Qty: 20 TABLET | Refills: 0 | Status: SHIPPED | OUTPATIENT
Start: 2022-01-01 | End: 2022-01-01 | Stop reason: SDUPTHER

## 2022-01-01 RX ORDER — ISOSORBIDE DINITRATE 10 MG/1
10 TABLET ORAL 3 TIMES DAILY
Status: DISCONTINUED | OUTPATIENT
Start: 2022-01-01 | End: 2022-01-01 | Stop reason: HOSPADM

## 2022-01-01 RX ORDER — CLOPIDOGREL BISULFATE 75 MG/1
75 TABLET ORAL DAILY
Status: DISCONTINUED | OUTPATIENT
Start: 2022-01-01 | End: 2022-01-01

## 2022-01-01 RX ORDER — FUROSEMIDE 40 MG/1
40 TABLET ORAL DAILY
Qty: 60 TABLET | Refills: 2
Start: 2022-01-01 | End: 2022-01-01

## 2022-01-01 RX ORDER — INSULIN ASPART 100 [IU]/ML
15 INJECTION, SOLUTION INTRAVENOUS; SUBCUTANEOUS
Status: DISCONTINUED | OUTPATIENT
Start: 2022-01-01 | End: 2022-01-01 | Stop reason: HOSPADM

## 2022-01-01 RX ORDER — PENTOXIFYLLINE 400 MG/1
400 TABLET, EXTENDED RELEASE ORAL 3 TIMES DAILY
Qty: 90 TABLET
Start: 2022-01-01 | End: 2022-01-01

## 2022-01-01 RX ORDER — OXYCODONE AND ACETAMINOPHEN 10; 325 MG/1; MG/1
1 TABLET ORAL EVERY 6 HOURS PRN
Qty: 20 TABLET | Refills: 0 | Status: SHIPPED | OUTPATIENT
Start: 2022-01-01

## 2022-01-01 RX ORDER — MAGNESIUM SULFATE HEPTAHYDRATE 40 MG/ML
2 INJECTION, SOLUTION INTRAVENOUS
Status: DISCONTINUED | OUTPATIENT
Start: 2022-01-01 | End: 2022-01-01 | Stop reason: HOSPADM

## 2022-01-01 RX ORDER — BISACODYL 10 MG
10 SUPPOSITORY, RECTAL RECTAL DAILY PRN
Status: DISCONTINUED | OUTPATIENT
Start: 2022-01-01 | End: 2022-01-01 | Stop reason: HOSPADM

## 2022-01-01 RX ORDER — CITALOPRAM 20 MG/1
40 TABLET, FILM COATED ORAL DAILY
Status: DISCONTINUED | OUTPATIENT
Start: 2022-01-01 | End: 2022-01-01

## 2022-01-01 RX ORDER — LIDOCAINE HCL/PF 100 MG/5ML
SYRINGE (ML) INTRAVENOUS
Status: DISCONTINUED | OUTPATIENT
Start: 2022-01-01 | End: 2022-01-01

## 2022-01-01 RX ORDER — POTASSIUM CHLORIDE 7.45 MG/ML
40 INJECTION INTRAVENOUS
Status: DISCONTINUED | OUTPATIENT
Start: 2022-01-01 | End: 2022-01-01 | Stop reason: HOSPADM

## 2022-01-01 RX ORDER — INSULIN ASPART 100 [IU]/ML
10 INJECTION, SOLUTION INTRAVENOUS; SUBCUTANEOUS
Status: DISCONTINUED | OUTPATIENT
Start: 2022-01-01 | End: 2022-01-01

## 2022-01-01 RX ORDER — FAMOTIDINE 10 MG/ML
INJECTION INTRAVENOUS
Status: DISCONTINUED | OUTPATIENT
Start: 2022-01-01 | End: 2022-01-01

## 2022-01-01 RX ORDER — ONDANSETRON 2 MG/ML
4 INJECTION INTRAMUSCULAR; INTRAVENOUS DAILY PRN
Status: DISCONTINUED | OUTPATIENT
Start: 2022-01-01 | End: 2022-01-01

## 2022-01-01 RX ORDER — NAPROXEN 250 MG/1
250 TABLET ORAL
Status: DISCONTINUED | OUTPATIENT
Start: 2022-01-01 | End: 2022-01-01

## 2022-01-01 RX ORDER — SODIUM CHLORIDE 9 MG/ML
INJECTION, SOLUTION INTRAVENOUS CONTINUOUS
Status: DISCONTINUED | OUTPATIENT
Start: 2022-01-01 | End: 2022-01-01 | Stop reason: HOSPADM

## 2022-01-01 RX ORDER — MIDAZOLAM HYDROCHLORIDE 1 MG/ML
INJECTION INTRAMUSCULAR; INTRAVENOUS
Status: DISCONTINUED | OUTPATIENT
Start: 2022-01-01 | End: 2022-01-01

## 2022-01-01 RX ADMIN — ATORVASTATIN CALCIUM 40 MG: 40 TABLET, FILM COATED ORAL at 08:01

## 2022-01-01 RX ADMIN — GABAPENTIN 300 MG: 300 CAPSULE ORAL at 04:01

## 2022-01-01 RX ADMIN — GABAPENTIN 300 MG: 300 CAPSULE ORAL at 03:01

## 2022-01-01 RX ADMIN — MULTIPLE VITAMINS W/ MINERALS TAB 1 TABLET: TAB at 08:01

## 2022-01-01 RX ADMIN — POTASSIUM CHLORIDE 40 MEQ: 20 TABLET, EXTENDED RELEASE ORAL at 12:01

## 2022-01-01 RX ADMIN — AMLODIPINE BESYLATE 5 MG: 5 TABLET ORAL at 08:01

## 2022-01-01 RX ADMIN — HYDROCODONE BITARTRATE AND ACETAMINOPHEN 1 TABLET: 5; 325 TABLET ORAL at 09:01

## 2022-01-01 RX ADMIN — HUMAN INSULIN 2 UNITS: 100 INJECTION, SOLUTION SUBCUTANEOUS at 08:01

## 2022-01-01 RX ADMIN — PENTOXIFYLLINE 400 MG: 400 TABLET, FILM COATED, EXTENDED RELEASE ORAL at 08:01

## 2022-01-01 RX ADMIN — HUMAN INSULIN 6 UNITS: 100 INJECTION, SOLUTION SUBCUTANEOUS at 05:01

## 2022-01-01 RX ADMIN — DIGOXIN 0.12 MG: 125 TABLET ORAL at 09:01

## 2022-01-01 RX ADMIN — MIDAZOLAM HYDROCHLORIDE 2 MG: 1 INJECTION, SOLUTION INTRAMUSCULAR; INTRAVENOUS at 10:01

## 2022-01-01 RX ADMIN — PENTOXIFYLLINE 400 MG: 400 TABLET, FILM COATED, EXTENDED RELEASE ORAL at 03:01

## 2022-01-01 RX ADMIN — EPINEPHRINE 0.1 MG: 0.1 INJECTION, SOLUTION ENDOTRACHEAL; INTRACARDIAC; INTRAVENOUS at 07:01

## 2022-01-01 RX ADMIN — HUMAN INSULIN 12 UNITS: 100 INJECTION, SOLUTION SUBCUTANEOUS at 09:01

## 2022-01-01 RX ADMIN — MEROPENEM AND SODIUM CHLORIDE 1 G: 1 INJECTION, SOLUTION INTRAVENOUS at 12:01

## 2022-01-01 RX ADMIN — VANCOMYCIN HYDROCHLORIDE 1250 MG: 1.25 INJECTION, POWDER, LYOPHILIZED, FOR SOLUTION INTRAVENOUS at 11:01

## 2022-01-01 RX ADMIN — CITALOPRAM HYDROBROMIDE 40 MG: 20 TABLET ORAL at 10:01

## 2022-01-01 RX ADMIN — HUMAN INSULIN 8 UNITS: 100 INJECTION, SOLUTION SUBCUTANEOUS at 05:01

## 2022-01-01 RX ADMIN — HUMAN INSULIN 10 UNITS: 100 INJECTION, SOLUTION SUBCUTANEOUS at 08:01

## 2022-01-01 RX ADMIN — ALBUTEROL SULFATE 2 PUFF: 90 AEROSOL, METERED RESPIRATORY (INHALATION) at 11:01

## 2022-01-01 RX ADMIN — SODIUM CHLORIDE, SODIUM LACTATE, POTASSIUM CHLORIDE, AND CALCIUM CHLORIDE 1000 ML: .6; .31; .03; .02 INJECTION, SOLUTION INTRAVENOUS at 12:01

## 2022-01-01 RX ADMIN — ISOSORBIDE DINITRATE 10 MG: 10 TABLET ORAL at 10:01

## 2022-01-01 RX ADMIN — VANCOMYCIN HYDROCHLORIDE 1500 MG: 1.5 INJECTION, POWDER, LYOPHILIZED, FOR SOLUTION INTRAVENOUS at 10:01

## 2022-01-01 RX ADMIN — HUMAN INSULIN 12 UNITS: 100 INJECTION, SOLUTION SUBCUTANEOUS at 05:01

## 2022-01-01 RX ADMIN — PENTOXIFYLLINE 400 MG: 400 TABLET, EXTENDED RELEASE ORAL at 10:01

## 2022-01-01 RX ADMIN — MUPIROCIN: 20 OINTMENT TOPICAL at 11:01

## 2022-01-01 RX ADMIN — RANOLAZINE 500 MG: 500 TABLET, EXTENDED RELEASE ORAL at 08:01

## 2022-01-01 RX ADMIN — SODIUM CHLORIDE: 0.9 INJECTION, SOLUTION INTRAVENOUS at 11:01

## 2022-01-01 RX ADMIN — INSULIN DETEMIR 60 UNITS: 100 INJECTION, SOLUTION SUBCUTANEOUS at 10:01

## 2022-01-01 RX ADMIN — HUMAN INSULIN 6 UNITS: 100 INJECTION, SOLUTION SUBCUTANEOUS at 09:01

## 2022-01-01 RX ADMIN — THERA TABS 1 TABLET: TAB at 08:01

## 2022-01-01 RX ADMIN — FAMOTIDINE 20 MG: 10 INJECTION, SOLUTION INTRAVENOUS at 01:01

## 2022-01-01 RX ADMIN — FUROSEMIDE 40 MG: 40 TABLET ORAL at 08:01

## 2022-01-01 RX ADMIN — INSULIN DETEMIR 60 UNITS: 100 INJECTION, SOLUTION SUBCUTANEOUS at 08:01

## 2022-01-01 RX ADMIN — VANCOMYCIN HYDROCHLORIDE 1500 MG: 1.5 INJECTION, POWDER, LYOPHILIZED, FOR SOLUTION INTRAVENOUS at 05:01

## 2022-01-01 RX ADMIN — HUMAN INSULIN 10 UNITS: 100 INJECTION, SOLUTION SUBCUTANEOUS at 12:01

## 2022-01-01 RX ADMIN — INSULIN ASPART 15 UNITS: 100 INJECTION, SOLUTION INTRAVENOUS; SUBCUTANEOUS at 05:01

## 2022-01-01 RX ADMIN — FUROSEMIDE 40 MG: 40 TABLET ORAL at 09:01

## 2022-01-01 RX ADMIN — GABAPENTIN 300 MG: 300 CAPSULE ORAL at 02:01

## 2022-01-01 RX ADMIN — AMIODARONE HYDROCHLORIDE 200 MG: 200 TABLET ORAL at 08:01

## 2022-01-01 RX ADMIN — EPINEPHRINE 10 MCG: 1 INJECTION, SOLUTION INTRAMUSCULAR; SUBCUTANEOUS at 11:01

## 2022-01-01 RX ADMIN — ATORVASTATIN CALCIUM 40 MG: 20 TABLET, FILM COATED ORAL at 08:01

## 2022-01-01 RX ADMIN — SPIRONOLACTONE 25 MG: 25 TABLET ORAL at 09:01

## 2022-01-01 RX ADMIN — DIGOXIN 0.12 MG: 125 TABLET ORAL at 08:01

## 2022-01-01 RX ADMIN — ACETAMINOPHEN 650 MG: 325 TABLET, FILM COATED ORAL at 10:01

## 2022-01-01 RX ADMIN — THERA TABS 1 TABLET: TAB at 01:01

## 2022-01-01 RX ADMIN — INSULIN ASPART 10 UNITS: 100 INJECTION, SOLUTION INTRAVENOUS; SUBCUTANEOUS at 03:01

## 2022-01-01 RX ADMIN — ATORVASTATIN CALCIUM 40 MG: 20 TABLET, FILM COATED ORAL at 09:01

## 2022-01-01 RX ADMIN — METOPROLOL TARTRATE 50 MG: 50 TABLET, FILM COATED ORAL at 08:01

## 2022-01-01 RX ADMIN — ONDANSETRON 4 MG: 2 INJECTION INTRAMUSCULAR; INTRAVENOUS at 05:01

## 2022-01-01 RX ADMIN — CLOPIDOGREL 75 MG: 75 TABLET, FILM COATED ORAL at 08:01

## 2022-01-01 RX ADMIN — PENTOXIFYLLINE 400 MG: 400 TABLET, EXTENDED RELEASE ORAL at 03:01

## 2022-01-01 RX ADMIN — METOPROLOL TARTRATE 50 MG: 50 TABLET, FILM COATED ORAL at 01:01

## 2022-01-01 RX ADMIN — DOXYCYCLINE HYCLATE 100 MG: 100 CAPSULE ORAL at 01:01

## 2022-01-01 RX ADMIN — ACETAMINOPHEN 650 MG: 325 TABLET ORAL at 06:01

## 2022-01-01 RX ADMIN — AMIODARONE HYDROCHLORIDE 200 MG: 200 TABLET ORAL at 09:01

## 2022-01-01 RX ADMIN — INSULIN ASPART 3 UNITS: 100 INJECTION, SOLUTION INTRAVENOUS; SUBCUTANEOUS at 03:01

## 2022-01-01 RX ADMIN — ACETAMINOPHEN 650 MG: 325 TABLET, FILM COATED ORAL at 11:01

## 2022-01-01 RX ADMIN — INSULIN DETEMIR 25 UNITS: 100 INJECTION, SOLUTION SUBCUTANEOUS at 09:01

## 2022-01-01 RX ADMIN — ISOSORBIDE DINITRATE 10 MG: 10 TABLET ORAL at 02:01

## 2022-01-01 RX ADMIN — Medication 10 ML: at 11:01

## 2022-01-01 RX ADMIN — INSULIN ASPART 1 UNITS: 100 INJECTION, SOLUTION INTRAVENOUS; SUBCUTANEOUS at 01:01

## 2022-01-01 RX ADMIN — PENTOXIFYLLINE 400 MG: 400 TABLET, EXTENDED RELEASE ORAL at 09:01

## 2022-01-01 RX ADMIN — HUMAN INSULIN 8 UNITS: 100 INJECTION, SOLUTION SUBCUTANEOUS at 08:01

## 2022-01-01 RX ADMIN — MEROPENEM AND SODIUM CHLORIDE 1 G: 1 INJECTION, SOLUTION INTRAVENOUS at 06:01

## 2022-01-01 RX ADMIN — SPIRONOLACTONE 25 MG: 25 TABLET ORAL at 11:01

## 2022-01-01 RX ADMIN — HUMAN INSULIN 12 UNITS: 100 INJECTION, SOLUTION SUBCUTANEOUS at 08:01

## 2022-01-01 RX ADMIN — RANOLAZINE 500 MG: 500 TABLET, EXTENDED RELEASE ORAL at 09:01

## 2022-01-01 RX ADMIN — ISOSORBIDE DINITRATE 10 MG: 10 TABLET ORAL at 09:01

## 2022-01-01 RX ADMIN — APIXABAN 2.5 MG: 2.5 TABLET, FILM COATED ORAL at 11:01

## 2022-01-01 RX ADMIN — METOPROLOL TARTRATE 50 MG: 50 TABLET, FILM COATED ORAL at 09:01

## 2022-01-01 RX ADMIN — METOPROLOL TARTRATE 50 MG: 50 TABLET, FILM COATED ORAL at 10:01

## 2022-01-01 RX ADMIN — DOXYCYCLINE HYCLATE 100 MG: 100 CAPSULE ORAL at 08:01

## 2022-01-01 RX ADMIN — RANOLAZINE 500 MG: 500 TABLET, EXTENDED RELEASE ORAL at 11:01

## 2022-01-01 RX ADMIN — GABAPENTIN 100 MG: 300 CAPSULE ORAL at 08:01

## 2022-01-01 RX ADMIN — POTASSIUM CHLORIDE 40 MEQ: 20 TABLET, EXTENDED RELEASE ORAL at 01:01

## 2022-01-01 RX ADMIN — ISOSORBIDE DINITRATE 10 MG: 10 TABLET ORAL at 08:01

## 2022-01-01 RX ADMIN — INSULIN ASPART 10 UNITS: 100 INJECTION, SOLUTION INTRAVENOUS; SUBCUTANEOUS at 07:01

## 2022-01-01 RX ADMIN — PENTOXIFYLLINE 400 MG: 400 TABLET, EXTENDED RELEASE ORAL at 08:01

## 2022-01-01 RX ADMIN — NAPROXEN 250 MG: 250 TABLET ORAL at 10:01

## 2022-01-01 RX ADMIN — ROCURONIUM BROMIDE 10 MG: 10 INJECTION, SOLUTION INTRAVENOUS at 01:01

## 2022-01-01 RX ADMIN — ROCURONIUM BROMIDE 50 MG: 10 INJECTION, SOLUTION INTRAVENOUS at 10:01

## 2022-01-01 RX ADMIN — MEROPENEM AND SODIUM CHLORIDE 1 G: 1 INJECTION, SOLUTION INTRAVENOUS at 04:01

## 2022-01-01 RX ADMIN — GABAPENTIN 300 MG: 300 CAPSULE ORAL at 10:01

## 2022-01-01 RX ADMIN — INSULIN ASPART 2 UNITS: 100 INJECTION, SOLUTION INTRAVENOUS; SUBCUTANEOUS at 12:01

## 2022-01-01 RX ADMIN — CLOPIDOGREL BISULFATE 75 MG: 75 TABLET, FILM COATED ORAL at 01:01

## 2022-01-01 RX ADMIN — PROPOFOL 50 MG: 10 INJECTION, EMULSION INTRAVENOUS at 11:01

## 2022-01-01 RX ADMIN — Medication 10 ML: at 06:01

## 2022-01-01 RX ADMIN — ACETAMINOPHEN 650 MG: 325 TABLET, FILM COATED ORAL at 05:01

## 2022-01-01 RX ADMIN — INSULIN DETEMIR 40 UNITS: 100 INJECTION, SOLUTION SUBCUTANEOUS at 09:01

## 2022-01-01 RX ADMIN — GABAPENTIN 300 MG: 300 CAPSULE ORAL at 08:01

## 2022-01-01 RX ADMIN — APIXABAN 2.5 MG: 2.5 TABLET, FILM COATED ORAL at 10:01

## 2022-01-01 RX ADMIN — CITALOPRAM HYDROBROMIDE 40 MG: 20 TABLET ORAL at 11:01

## 2022-01-01 RX ADMIN — INSULIN ASPART 3 UNITS: 100 INJECTION, SOLUTION INTRAVENOUS; SUBCUTANEOUS at 11:01

## 2022-01-01 RX ADMIN — HUMAN INSULIN 4 UNITS: 100 INJECTION, SOLUTION SUBCUTANEOUS at 09:01

## 2022-01-01 RX ADMIN — INSULIN DETEMIR 60 UNITS: 100 INJECTION, SOLUTION SUBCUTANEOUS at 11:01

## 2022-01-01 RX ADMIN — PROPOFOL 30 MG: 10 INJECTION, EMULSION INTRAVENOUS at 11:01

## 2022-01-01 RX ADMIN — AMLODIPINE BESYLATE 5 MG: 5 TABLET ORAL at 11:01

## 2022-01-01 RX ADMIN — ISOSORBIDE DINITRATE 10 MG: 10 TABLET ORAL at 04:01

## 2022-01-01 RX ADMIN — PENTOXIFYLLINE 400 MG: 400 TABLET, EXTENDED RELEASE ORAL at 02:01

## 2022-01-01 RX ADMIN — MUPIROCIN: 20 OINTMENT TOPICAL at 08:01

## 2022-01-01 RX ADMIN — THERA TABS 1 TABLET: TAB at 10:01

## 2022-01-01 RX ADMIN — HUMAN INSULIN 6 UNITS: 100 INJECTION, SOLUTION SUBCUTANEOUS at 11:01

## 2022-01-01 RX ADMIN — ACETAMINOPHEN 650 MG: 325 TABLET ORAL at 05:01

## 2022-01-01 RX ADMIN — ACETAMINOPHEN 650 MG: 325 TABLET, FILM COATED ORAL at 02:01

## 2022-01-01 RX ADMIN — HUMAN INSULIN 6 UNITS: 100 INJECTION, SOLUTION SUBCUTANEOUS at 06:01

## 2022-01-01 RX ADMIN — EPINEPHRINE 0.02 MCG/KG/MIN: 1 INJECTION INTRAMUSCULAR; INTRAVENOUS; SUBCUTANEOUS at 11:01

## 2022-01-01 RX ADMIN — SPIRONOLACTONE 25 MG: 25 TABLET ORAL at 01:01

## 2022-01-01 RX ADMIN — GABAPENTIN 300 MG: 300 CAPSULE ORAL at 09:01

## 2022-01-01 RX ADMIN — DIGOXIN 0.12 MG: 125 TABLET ORAL at 10:01

## 2022-01-01 RX ADMIN — INSULIN ASPART 15 UNITS: 100 INJECTION, SOLUTION INTRAVENOUS; SUBCUTANEOUS at 06:01

## 2022-01-01 RX ADMIN — PENTOXIFYLLINE 400 MG: 400 TABLET, EXTENDED RELEASE ORAL at 04:01

## 2022-01-01 RX ADMIN — DIGOXIN 0.12 MG: 125 TABLET ORAL at 11:01

## 2022-01-01 RX ADMIN — APIXABAN 2.5 MG: 2.5 TABLET, FILM COATED ORAL at 01:01

## 2022-01-01 RX ADMIN — GABAPENTIN 300 MG: 300 CAPSULE ORAL at 01:01

## 2022-01-01 RX ADMIN — INSULIN DETEMIR 25 UNITS: 100 INJECTION, SOLUTION SUBCUTANEOUS at 08:01

## 2022-01-01 RX ADMIN — GABAPENTIN 300 MG: 300 CAPSULE ORAL at 11:01

## 2022-01-01 RX ADMIN — SPIRONOLACTONE 25 MG: 25 TABLET ORAL at 08:01

## 2022-01-01 RX ADMIN — METOPROLOL TARTRATE 50 MG: 50 TABLET, FILM COATED ORAL at 11:01

## 2022-01-01 RX ADMIN — INSULIN DETEMIR 60 UNITS: 100 INJECTION, SOLUTION SUBCUTANEOUS at 09:01

## 2022-01-01 RX ADMIN — MUPIROCIN: 20 OINTMENT TOPICAL at 09:01

## 2022-01-01 RX ADMIN — ISOSORBIDE DINITRATE 10 MG: 10 TABLET ORAL at 03:01

## 2022-01-01 RX ADMIN — APIXABAN 2.5 MG: 2.5 TABLET, FILM COATED ORAL at 08:01

## 2022-01-01 RX ADMIN — OXYCODONE HYDROCHLORIDE 10 MG: 10 TABLET ORAL at 05:01

## 2022-01-01 RX ADMIN — INSULIN ASPART 1 UNITS: 100 INJECTION, SOLUTION INTRAVENOUS; SUBCUTANEOUS at 09:01

## 2022-01-01 RX ADMIN — INSULIN ASPART 15 UNITS: 100 INJECTION, SOLUTION INTRAVENOUS; SUBCUTANEOUS at 08:01

## 2022-01-01 RX ADMIN — INSULIN ASPART 15 UNITS: 100 INJECTION, SOLUTION INTRAVENOUS; SUBCUTANEOUS at 12:01

## 2022-01-01 RX ADMIN — CLOPIDOGREL BISULFATE 75 MG: 75 TABLET, FILM COATED ORAL at 08:01

## 2022-01-01 RX ADMIN — ENOXAPARIN SODIUM 40 MG: 40 INJECTION SUBCUTANEOUS at 05:01

## 2022-01-01 RX ADMIN — PENTOXIFYLLINE 400 MG: 400 TABLET, FILM COATED, EXTENDED RELEASE ORAL at 09:01

## 2022-01-01 RX ADMIN — VANCOMYCIN HYDROCHLORIDE 1500 MG: 1.5 INJECTION, POWDER, LYOPHILIZED, FOR SOLUTION INTRAVENOUS at 02:01

## 2022-01-01 RX ADMIN — AMLODIPINE BESYLATE 5 MG: 5 TABLET ORAL at 09:01

## 2022-01-01 RX ADMIN — METOLAZONE 5 MG: 2.5 TABLET ORAL at 08:01

## 2022-01-01 RX ADMIN — SODIUM CHLORIDE: 0.9 INJECTION, SOLUTION INTRAVENOUS at 10:01

## 2022-01-01 RX ADMIN — LIDOCAINE HYDROCHLORIDE 100 MG: 20 INJECTION, SOLUTION INTRAVENOUS at 10:01

## 2022-01-01 RX ADMIN — ONDANSETRON 4 MG: 2 INJECTION INTRAMUSCULAR; INTRAVENOUS at 01:01

## 2022-01-01 RX ADMIN — DOXYCYCLINE HYCLATE 100 MG: 100 CAPSULE ORAL at 09:01

## 2022-01-01 RX ADMIN — CITALOPRAM HYDROBROMIDE 40 MG: 20 TABLET ORAL at 08:01

## 2022-01-01 RX ADMIN — HUMAN INSULIN 6 UNITS: 100 INJECTION, SOLUTION SUBCUTANEOUS at 08:01

## 2022-01-01 RX ADMIN — INSULIN ASPART 15 UNITS: 100 INJECTION, SOLUTION INTRAVENOUS; SUBCUTANEOUS at 01:01

## 2022-01-01 RX ADMIN — DIGOXIN 0.12 MG: 125 TABLET ORAL at 01:01

## 2022-01-01 RX ADMIN — ROCURONIUM BROMIDE 20 MG: 10 INJECTION, SOLUTION INTRAVENOUS at 12:01

## 2022-01-01 RX ADMIN — INSULIN ASPART 2 UNITS: 100 INJECTION, SOLUTION INTRAVENOUS; SUBCUTANEOUS at 11:01

## 2022-01-01 RX ADMIN — DOXYCYCLINE HYCLATE 100 MG: 100 CAPSULE ORAL at 11:01

## 2022-01-01 RX ADMIN — INSULIN DETEMIR 60 UNITS: 100 INJECTION, SOLUTION SUBCUTANEOUS at 01:01

## 2022-01-01 RX ADMIN — INSULIN ASPART 1 UNITS: 100 INJECTION, SOLUTION INTRAVENOUS; SUBCUTANEOUS at 08:01

## 2022-01-01 RX ADMIN — ATORVASTATIN CALCIUM 40 MG: 40 TABLET, FILM COATED ORAL at 09:01

## 2022-01-01 RX ADMIN — HUMAN INSULIN 8 UNITS: 100 INJECTION, SOLUTION SUBCUTANEOUS at 12:01

## 2022-01-01 RX ADMIN — CITALOPRAM HYDROBROMIDE 40 MG: 20 TABLET ORAL at 09:01

## 2022-01-01 RX ADMIN — SODIUM CHLORIDE: 0.9 INJECTION, SOLUTION INTRAVENOUS at 04:01

## 2022-01-01 RX ADMIN — Medication 10 ML: at 05:01

## 2022-01-01 RX ADMIN — INSULIN ASPART 15 UNITS: 100 INJECTION, SOLUTION INTRAVENOUS; SUBCUTANEOUS at 11:01

## 2022-01-01 RX ADMIN — ISOSORBIDE DINITRATE 10 MG: 10 TABLET ORAL at 01:01

## 2022-01-01 RX ADMIN — AMIODARONE HYDROCHLORIDE 200 MG: 200 TABLET ORAL at 10:01

## 2022-01-01 RX ADMIN — ACETAMINOPHEN 650 MG: 325 TABLET ORAL at 08:01

## 2022-01-01 RX ADMIN — INSULIN ASPART 4 UNITS: 100 INJECTION, SOLUTION INTRAVENOUS; SUBCUTANEOUS at 05:01

## 2022-01-01 RX ADMIN — CLOPIDOGREL BISULFATE 75 MG: 75 TABLET, FILM COATED ORAL at 11:01

## 2022-01-01 RX ADMIN — INSULIN ASPART 15 UNITS: 100 INJECTION, SOLUTION INTRAVENOUS; SUBCUTANEOUS at 04:01

## 2022-01-01 RX ADMIN — INSULIN ASPART 2 UNITS: 100 INJECTION, SOLUTION INTRAVENOUS; SUBCUTANEOUS at 08:01

## 2022-01-01 RX ADMIN — FUROSEMIDE 40 MG: 40 TABLET ORAL at 11:01

## 2022-01-01 RX ADMIN — PENTOXIFYLLINE 400 MG: 400 TABLET, FILM COATED, EXTENDED RELEASE ORAL at 04:01

## 2022-01-01 RX ADMIN — INSULIN ASPART 2 UNITS: 100 INJECTION, SOLUTION INTRAVENOUS; SUBCUTANEOUS at 05:01

## 2022-01-01 RX ADMIN — HUMAN INSULIN 4 UNITS: 100 INJECTION, SOLUTION SUBCUTANEOUS at 10:01

## 2022-01-01 RX ADMIN — RANOLAZINE 500 MG: 500 TABLET, EXTENDED RELEASE ORAL at 10:01

## 2022-01-01 RX ADMIN — INSULIN DETEMIR 40 UNITS: 100 INJECTION, SOLUTION SUBCUTANEOUS at 08:01

## 2022-01-01 RX ADMIN — PENTOXIFYLLINE 400 MG: 400 TABLET, EXTENDED RELEASE ORAL at 11:01

## 2022-01-01 RX ADMIN — ACETAMINOPHEN 650 MG: 325 TABLET ORAL at 09:01

## 2022-01-01 RX ADMIN — INSULIN ASPART 10 UNITS: 100 INJECTION, SOLUTION INTRAVENOUS; SUBCUTANEOUS at 11:01

## 2022-01-01 RX ADMIN — THERA TABS 1 TABLET: TAB at 09:01

## 2022-01-01 RX ADMIN — HUMAN INSULIN 4 UNITS: 100 INJECTION, SOLUTION SUBCUTANEOUS at 05:01

## 2022-01-01 RX ADMIN — THERA TABS 1 TABLET: TAB at 11:01

## 2022-01-01 RX ADMIN — INSULIN ASPART 15 UNITS: 100 INJECTION, SOLUTION INTRAVENOUS; SUBCUTANEOUS at 07:01

## 2022-01-01 RX ADMIN — VANCOMYCIN HYDROCHLORIDE 500 MG: 500 INJECTION, POWDER, LYOPHILIZED, FOR SOLUTION INTRAVENOUS at 03:01

## 2022-01-01 RX ADMIN — AMLODIPINE BESYLATE 5 MG: 5 TABLET ORAL at 10:01

## 2022-01-01 RX ADMIN — INSULIN DETEMIR 25 UNITS: 100 INJECTION, SOLUTION SUBCUTANEOUS at 03:01

## 2022-01-01 RX ADMIN — DOXYCYCLINE HYCLATE 100 MG: 100 CAPSULE ORAL at 10:01

## 2022-01-01 RX ADMIN — MULTIPLE VITAMINS W/ MINERALS TAB 1 TABLET: TAB at 09:01

## 2022-01-01 RX ADMIN — ACETAMINOPHEN 650 MG: 325 TABLET ORAL at 01:01

## 2022-01-01 RX ADMIN — AMIODARONE HYDROCHLORIDE 200 MG: 200 TABLET ORAL at 01:01

## 2022-01-01 RX ADMIN — ISOSORBIDE DINITRATE 10 MG: 10 TABLET ORAL at 11:01

## 2022-01-01 RX ADMIN — VANCOMYCIN HYDROCHLORIDE 1750 MG: 500 INJECTION, POWDER, LYOPHILIZED, FOR SOLUTION INTRAVENOUS at 12:01

## 2022-01-01 RX ADMIN — HUMAN INSULIN 10 UNITS: 100 INJECTION, SOLUTION SUBCUTANEOUS at 01:01

## 2022-01-01 RX ADMIN — PROPOFOL 70 MG: 10 INJECTION, EMULSION INTRAVENOUS at 11:01

## 2022-01-01 RX ADMIN — INSULIN ASPART 2 UNITS: 100 INJECTION, SOLUTION INTRAVENOUS; SUBCUTANEOUS at 07:01

## 2022-01-01 RX ADMIN — PENTOXIFYLLINE 400 MG: 400 TABLET, FILM COATED, EXTENDED RELEASE ORAL at 02:01

## 2022-01-01 RX ADMIN — SUGAMMADEX 200 MG: 100 INJECTION, SOLUTION INTRAVENOUS at 02:01

## 2022-01-01 RX ADMIN — OXYCODONE HYDROCHLORIDE 10 MG: 10 TABLET ORAL at 02:01

## 2022-01-01 RX ADMIN — AMLODIPINE BESYLATE 5 MG: 5 TABLET ORAL at 01:01

## 2022-01-01 RX ADMIN — CITALOPRAM HYDROBROMIDE 40 MG: 20 TABLET ORAL at 01:01

## 2022-01-01 RX ADMIN — INSULIN ASPART 3 UNITS: 100 INJECTION, SOLUTION INTRAVENOUS; SUBCUTANEOUS at 07:01

## 2022-01-01 RX ADMIN — OXYCODONE HYDROCHLORIDE 10 MG: 10 TABLET ORAL at 04:01

## 2022-01-01 RX ADMIN — APIXABAN 2.5 MG: 2.5 TABLET, FILM COATED ORAL at 09:01

## 2022-01-01 RX ADMIN — VANCOMYCIN HYDROCHLORIDE 1000 MG: 1 INJECTION, POWDER, FOR SOLUTION INTRAVENOUS at 10:01

## 2022-01-01 RX ADMIN — EPINEPHRINE 5 MCG: 1 INJECTION, SOLUTION INTRAMUSCULAR; SUBCUTANEOUS at 10:01

## 2022-01-01 RX ADMIN — PENTOXIFYLLINE 400 MG: 400 TABLET, EXTENDED RELEASE ORAL at 01:01

## 2022-01-01 RX ADMIN — EPHEDRINE SULFATE 10 MG: 50 INJECTION INTRAMUSCULAR; INTRAVENOUS; SUBCUTANEOUS at 10:01

## 2022-01-01 RX ADMIN — CLOPIDOGREL BISULFATE 75 MG: 75 TABLET, FILM COATED ORAL at 10:01

## 2022-01-01 RX ADMIN — OXYCODONE 5 MG: 5 TABLET ORAL at 11:01

## 2022-01-01 RX ADMIN — FUROSEMIDE 40 MG: 40 TABLET ORAL at 10:01

## 2022-01-01 RX ADMIN — HUMAN INSULIN 4 UNITS: 100 INJECTION, SOLUTION SUBCUTANEOUS at 06:01

## 2022-01-01 RX ADMIN — INSULIN ASPART 10 UNITS: 100 INJECTION, SOLUTION INTRAVENOUS; SUBCUTANEOUS at 08:01

## 2022-01-01 RX ADMIN — GABAPENTIN 400 MG: 300 CAPSULE ORAL at 11:01

## 2022-01-01 RX ADMIN — VANCOMYCIN HYDROCHLORIDE 1000 MG: 1 INJECTION, POWDER, LYOPHILIZED, FOR SOLUTION INTRAVENOUS at 09:01

## 2022-01-01 RX ADMIN — RANOLAZINE 500 MG: 500 TABLET, EXTENDED RELEASE ORAL at 01:01

## 2022-01-01 RX ADMIN — AMIODARONE HYDROCHLORIDE 200 MG: 200 TABLET ORAL at 11:01

## 2022-01-01 RX ADMIN — FUROSEMIDE 40 MG: 40 TABLET ORAL at 01:01

## 2022-01-01 RX ADMIN — ROCURONIUM BROMIDE 20 MG: 10 INJECTION, SOLUTION INTRAVENOUS at 11:01

## 2022-01-01 RX ADMIN — INSULIN ASPART 3 UNITS: 100 INJECTION, SOLUTION INTRAVENOUS; SUBCUTANEOUS at 04:01

## 2022-01-01 RX ADMIN — SODIUM BICARBONATE 50 MEQ: 84 INJECTION, SOLUTION INTRAVENOUS at 07:01

## 2022-01-01 RX ADMIN — INSULIN ASPART 3 UNITS: 100 INJECTION, SOLUTION INTRAVENOUS; SUBCUTANEOUS at 06:01

## 2022-01-01 RX ADMIN — INSULIN ASPART 4 UNITS: 100 INJECTION, SOLUTION INTRAVENOUS; SUBCUTANEOUS at 03:01

## 2022-01-01 RX ADMIN — ATORVASTATIN CALCIUM 40 MG: 40 TABLET, FILM COATED ORAL at 11:01

## 2022-01-01 RX ADMIN — SODIUM CHLORIDE: 0.9 INJECTION, SOLUTION INTRAVENOUS at 06:01

## 2022-01-01 RX ADMIN — METOLAZONE 5 MG: 2.5 TABLET ORAL at 01:01

## 2022-01-01 RX ADMIN — OXYCODONE 5 MG: 5 TABLET ORAL at 05:01

## 2022-01-01 RX ADMIN — INSULIN DETEMIR 10 UNITS: 100 INJECTION, SOLUTION SUBCUTANEOUS at 08:01

## 2022-01-01 RX ADMIN — HUMAN INSULIN 12 UNITS: 100 INJECTION, SOLUTION SUBCUTANEOUS at 12:01

## 2022-01-01 RX ADMIN — ACETAMINOPHEN 650 MG: 325 TABLET ORAL at 04:01

## 2022-01-01 RX ADMIN — FENTANYL CITRATE 100 MCG: 50 INJECTION, SOLUTION INTRAMUSCULAR; INTRAVENOUS at 10:01

## 2022-01-01 RX ADMIN — PROPOFOL 50 MG: 10 INJECTION, EMULSION INTRAVENOUS at 10:01

## 2022-01-01 RX ADMIN — VANCOMYCIN HYDROCHLORIDE 1500 MG: 1.5 INJECTION, POWDER, LYOPHILIZED, FOR SOLUTION INTRAVENOUS at 03:01

## 2022-01-07 PROBLEM — J18.9 ATYPICAL PNEUMONIA: Status: ACTIVE | Noted: 2022-01-01

## 2022-01-07 NOTE — HPI
66 year old female with history of CAD, CHF, DM2, HTN, and  has been vaccinated against COVID presented to ED complaining of 2-3 days of worsening SOB, cough with yellow sputum. She reported a temperature of 105 F yesterday to ED MD. No anginal like chest discomfort.No orthopnea, PND or pedal edema. She was found to be hypoxic by EMS. They started her on 15 lpm, which has since been decreased to 3 lpm     In ED labs reviewed and noted below: leukocytosis with normal H/H; hyponatremia, hypokalemia (treated in ED) with stage 3b renal dysfunction; cardiac b iomarkers are minimally elevated; elevated lactate (patient was hypoxic by EMS) with normal procalcitonin. CXR: atypical pneumonia changes. EKG reviewed: Biventricular pacemaker detected with no acute segments. Discussed with ED MD: admit for atypical pneumonia; trend cardiac biomarkers; cultured; 130 ml/hr bolus of LR; iv antibiotics

## 2022-01-07 NOTE — ED PROVIDER NOTES
Encounter Date: 1/7/2022       History     Chief Complaint   Patient presents with    Shortness of Breath     87% room air per EMS     66-year-old female with history of coronary artery disease status post CABG x4 vessels, NSTEMI and past, with a biventricular pacemaker and defibrillator, history of left bundle-branch block, peripheral vascular disease, hypertension, hyperlipidemia, diabetes, COPD, reports she does not use home oxygen and that she has pulse use CPAP at night but does not due to panic attacks, and history of AFib on Eliquis 2.5 mg twice daily presents to the ER by EMS for evaluation of shortness of breath.  Patient is a poor historian but reports she has been short of breath and having cough for at least several days on arrival EMS reports her sats were 87% at rest while laying in bed with a respiratory rate of 34.  She reports she had fever to 105.3.  She reports this was yesterday and that she took Tylenol once in the afternoon.  She reports yesterday and this morning she  slept all day and did not get out of bed.  She reports nasal congestion, rhinorrhea, headache and body aches.  Nausea but no vomiting or diarrhea.  No abdominal pain.  She denies any chest pain but reports she feels short of breath at this time.  She has been vaccinated and received her booster several weeks ago.  She also reports she is having intermittent left foot weakness when she is attempting to walk this has been coming and going since yesterday reports that when she would get to the bathroom sometime she was feel like the left foot was weak other times the foot was normal.  At this time she reports left great toe numbness which is chronic.  No weakness in the upper extremities no weakness in the lower extremities at this time.  No facial droop no expressive aphasia no speech changes no dizziness lightheadedness or vision changes  EMS placed her on 15 L non-rebreather and sats improved here she was decreased to 2.5 L  nasal cannula and sats are 94-96%        Review of patient's allergies indicates:   Allergen Reactions    Cephalosporins     Penicillins      hives       Past Medical History:   Diagnosis Date    CHF (congestive heart failure)     Coronary artery disease     Diabetes mellitus     Hyperlipidemia     Hypertension     LBBB (left bundle branch block)     NSTEMI (non-ST elevated myocardial infarction)     PVD (peripheral vascular disease)      Past Surgical History:   Procedure Laterality Date    CARDIAC CATHETERIZATION       SECTION      CORONARY ARTERY BYPASS GRAFT  06/22/2016    x4    HYSTERECTOMY      INSERTION OF BIVENTRICULAR IMPLANTABLE CARDIOVERTER-DEFIBRILLATOR (ICD)       Family History   Problem Relation Age of Onset    Heart attack Mother     Heart attack Father      Social History     Tobacco Use    Smoking status: Former Smoker    Smokeless tobacco: Never Used   Substance Use Topics    Alcohol use: Not Currently    Drug use: Never     Review of Systems   Constitutional: Positive for activity change, appetite change, chills, diaphoresis, fatigue and fever.   HENT: Positive for congestion and rhinorrhea. Negative for postnasal drip and sore throat.    Eyes: Negative for visual disturbance.   Respiratory: Positive for cough, shortness of breath and wheezing. Negative for chest tightness and stridor.    Cardiovascular: Negative for chest pain, palpitations and leg swelling.   Gastrointestinal: Positive for nausea. Negative for abdominal distention, abdominal pain, blood in stool, constipation, diarrhea and vomiting.   Genitourinary: Negative for dysuria, flank pain, frequency, hematuria and urgency.   Musculoskeletal: Positive for myalgias. Negative for arthralgias, back pain, neck pain and neck stiffness.   Skin: Negative for rash.   Neurological: Positive for weakness, numbness and headaches. Negative for dizziness, tremors, syncope, facial asymmetry, speech difficulty and  light-headedness.   Hematological: Does not bruise/bleed easily.   Psychiatric/Behavioral: Positive for sleep disturbance. Negative for agitation, behavioral problems and confusion. The patient is not nervous/anxious.    All other systems reviewed and are negative.      Physical Exam     Initial Vitals [01/07/22 1043]   BP Pulse Resp Temp SpO2   (!) 154/70 71 (!) 22 98.3 °F (36.8 °C) (!) 87 %      MAP       --         Physical Exam    Nursing note and vitals reviewed.  Constitutional: She appears well-developed and well-nourished. She is not diaphoretic. No distress.   HENT:   Head: Normocephalic and atraumatic.   Right Ear: External ear normal.   Left Ear: External ear normal.   Nose: Nose normal.   Mouth/Throat: Oropharynx is clear and moist.   Rosacea of cheeks    Dry mucous membranes   Eyes: Conjunctivae and EOM are normal. Pupils are equal, round, and reactive to light.   Neck: Neck supple. No tracheal deviation present.   Normal range of motion.  Cardiovascular: Normal rate, regular rhythm, normal heart sounds and intact distal pulses. Exam reveals no gallop and no friction rub.    No murmur heard.  Pulmonary/Chest: Breath sounds normal. No stridor. No respiratory distress. She has no wheezes. She has no rhonchi. She has no rales. She exhibits no tenderness.   Sats 94-96% on 2.5 L nasal cannula respirations 20 decreased breath sounds throughout with coarse breath sounds and lower lobes.   Abdominal: Abdomen is soft. Bowel sounds are normal. She exhibits no distension and no mass. There is no abdominal tenderness. There is no rebound and no guarding.   Musculoskeletal:         General: No tenderness or edema. Normal range of motion.      Cervical back: Normal range of motion and neck supple.      Comments: No calf tenderness     Neurological: She is alert and oriented to person, place, and time. She has normal strength. No cranial nerve deficit or sensory deficit.   Pt is neurovascularly intact with GCS: 15,   CN 2-12 grossly intact. No facial asymmetry. Normal speech without slurring.  5/5 strength in bilateral lower extremities including hip flexion,  dorsal and plantar flexion, EHL and FLH, and bilateral upper extremities including biceps, triceps, wrist flexion and extension, and RUM nerves.  Normal sensation to light touch in all 4 extremities. No pronator drift.  2+ DTR's. Normal Gait.      Skin: Skin is warm and dry. Capillary refill takes less than 2 seconds. Rash noted. No erythema. No pallor.   Psychiatric: She has a normal mood and affect. Her behavior is normal. Judgment and thought content normal.         ED Course   Procedures  Labs Reviewed   CBC W/ AUTO DIFFERENTIAL - Abnormal; Notable for the following components:       Result Value    WBC 15.54 (*)     Hematocrit 36.7 (*)     RDW 16.5 (*)     Immature Granulocytes 0.7 (*)     Gran # (ANC) 14.1 (*)     Immature Grans (Abs) 0.11 (*)     Lymph # 0.5 (*)     Gran % 90.9 (*)     Lymph % 3.1 (*)     All other components within normal limits   COMPREHENSIVE METABOLIC PANEL - Abnormal; Notable for the following components:    Sodium 129 (*)     Potassium 3.1 (*)     Chloride 88 (*)     Glucose 358 (*)     BUN 34 (*)     Calcium 8.6 (*)     Albumin 3.4 (*)     Total Bilirubin 1.4 (*)     eGFR if  45.2 (*)     eGFR if non  39.2 (*)     All other components within normal limits   C-REACTIVE PROTEIN - Abnormal; Notable for the following components:    CRP 19.53 (*)     All other components within normal limits   LACTATE DEHYDROGENASE - Abnormal; Notable for the following components:     (*)     All other components within normal limits   CK - Abnormal; Notable for the following components:     (*)     All other components within normal limits   LACTIC ACID, PLASMA - Abnormal; Notable for the following components:    Lactate (Lactic Acid) 2.3 (*)     All other components within normal limits    Narrative:        LA  critical result(s) called and verbal readback obtained from   Elena Bradshaw, nurse by HS3 01/07/2022 11:51   TROPONIN I - Abnormal; Notable for the following components:    Troponin I 0.272 (*)     All other components within normal limits   PROTIME-INR - Abnormal; Notable for the following components:    PT 16.2 (*)     All other components within normal limits   ISTAT PROCEDURE - Abnormal; Notable for the following components:    POC PH 7.503 (*)     POC HCO3 35.3 (*)     POC SATURATED O2 79 (*)     POC TCO2 37 (*)     All other components within normal limits   CULTURE, BLOOD   CULTURE, BLOOD   FERRITIN   SARS-COV-2 RNA AMPLIFICATION, QUAL   PROCALCITONIN   D DIMER, QUANTITATIVE   DIGOXIN LEVEL   MAGNESIUM   B-TYPE NATRIURETIC PEPTIDE   LACTIC ACID, PLASMA     EKG Readings: (Independently Interpreted)   Previous EKG Date: 11/26/21. Rhythm: Paced Rhythm. Heart Rate: 66.     ECG Results          EKG 12-lead (In process)  Result time 01/07/22 12:09:31    In process by Interface, Lab In [facility] (01/07/22 12:09:31)                 Narrative:    Test Reason : U07.1,    Vent. Rate : 066 BPM     Atrial Rate : 066 BPM     P-R Int : 144 ms          QRS Dur : 164 ms      QT Int : 620 ms       P-R-T Axes : 000 106 211 degrees     QTc Int : 649 ms    Atrial-sensed ventricular-paced rhythm  Biventricular pacemaker detected  Abnormal ECG  When compared with ECG of 26-NOV-2021 20:33,  Premature ventricular complexes are no longer Present  Vent. rate has decreased BY   6 BPM    Referred By: [email or web address]   SELF           Confirmed By:                             Imaging Results          X-Ray Chest AP Portable (Final result)  Result time 01/07/22 11:18:54    Final result by Vaughn Clemons MD (01/07/22 11:18:54)                 Narrative:    HISTORY: COVID-19  dyspnea.    FINDINGS: Portable chest radiograph at 1105 hours compared to 11/26/2021 shows triple lead left subclavian CCD with distal lead tips in the proximal right atrium,  right ventricle, and coronary sinus, unchanged in position. There are median sternotomy wires and mediastinal surgical clips, with the cardiac silhouette and pulmonary vasculature stable and within normal limits. There are aortic vascular calcifications.    The lungs are normally expanded, with no consolidation, large pleural effusion, or evidence of pulmonary edema. There are scattered faint groundglass opacities in both lungs, nonspecific. There is a remote healed left clavicular fracture, with no acute fractures.    IMPRESSION: Scattered faint groundglass opacities in both lungs, suggesting multifocal viral or atypical pneumonia, given clinical history.    Electronically signed by:  Vaughn Clemons MD  1/7/2022 11:18 AM CST Workstation: [id number]                            X-Rays:   Independently Interpreted Readings:   Chest X-Ray: Bilateral interstitial infiltrates concerning for CHF versus viral pneumonia.     Medications   meropenem-0.9% sodium chloride 1 g/50 mL IVPB (1 g Intravenous New Bag 1/7/22 1239)   albuterol inhaler 2 puff (2 puffs Inhalation Given 1/7/22 1130)   lactated ringers infusion (1,000 mLs Intravenous New Bag 1/7/22 1239)   potassium chloride SA CR tablet 40 mEq (40 mEq Oral Given 1/7/22 1239)     Medical Decision Making:   Independently Interpreted Test(s):   I have ordered and independently interpreted X-rays - see prior notes.  I have ordered and independently interpreted EKG Reading(s) - see prior notes  Clinical Tests:   Lab Tests: Ordered and Reviewed       <> Summary of Lab: VBG on 2.5 L nasal cannula pH 7.5 pCO2 45 PO2 40 bicarb 35.3  White count 15.54  Radiological Study: Ordered and Reviewed  Medical Tests: Ordered and Reviewed  ED Management:  66-year-old female with history of coronary artery disease status post CABG x4 vessels, NSTEMI and past, with a biventricular pacemaker and defibrillator, history of left bundle-branch block, peripheral vascular disease, hypertension,  hyperlipidemia, diabetes, COPD, reports she does not use home oxygen and that she has pulse use CPAP at night but does not due to panic attacks, and history of AFib on Eliquis 2.5 mg twice daily presents to the ER by EMS for evaluation of shortness of breath.  Patient is a poor historian but reports she has been short of breath and having cough for at least several days on arrival EMS reports her sats were 87% at rest while laying in bed with a respiratory rate of 34.  She reports she had fever to 105.3.  She reports this was yesterday and that she took Tylenol once in the afternoon.  She reports yesterday and this morning she  slept all day and did not get out of bed.  She reports nasal congestion, rhinorrhea, headache and body aches.  Nausea but no vomiting or diarrhea.  No abdominal pain.  She denies any chest pain but reports she feels short of breath at this time.  She has been vaccinated and received her booster several weeks ago.  She also reports she is having intermittent left foot weakness when she is attempting to walk this has been coming and going since yesterday reports that when she would get to the bathroom sometime she was feel like the left foot was weak other times the foot was normal.  At this time she reports left great toe numbness which is chronic.  No weakness in the upper extremities no weakness in the lower extremities at this time.  No facial droop no expressive aphasia no speech changes no dizziness lightheadedness or vision changes  EMS placed her on 15 L non-rebreather and sats improved here she was decreased to 2.5 L nasal cannula and sats are 94-96%  On physical exam sats are 94-96 on her 2.5 L respirations 20 she has coarse breath sounds throughout, blood pressure 136/51 pulse 70.  Afebrile 98.3.  Chest x-ray was performed reveals bilateral interstitial infiltrates concerning for CHF for possible viral pneumonia.  COVID test is pending.  EKG showed a atrially sensed ventricularly  paced rhythm.  CBC reveals a white count of 15.51 no anemia normal platelets, VBG was performed due to patient's glucose being 344 with EMS and pH was 7.5 patient does not appear to be in DKA just hyperglycemic  The rest of her labs are pending  She is getting albuterol inhaler treatment at this time  Nina Barone M.D.  11:34 AM 1/7/2022      COVID returned negative but chest x-ray is still concerning for COVID infection she will start remain on airborne precautions.  White count returned at 15.5 for ANC of 14.1 lactate of 2.3.  Patient also has a sodium 129 that is uncorrected with a glucose of 358 potassium of 3.1 which was orally replaced chloride 88 BUN 34 calcium 8.6 albumin 3.4 bili of 1.4 GFR of she has elevated inflammatory markers, procalcitonin was 0.4, troponin was 0.272.  She had no chest pain and symptoms did not sound cardiac.  D-dimer was negative.  Digoxin level 0.9  Hospital Medicine is admitting her to the hospital due to her hypoxia with concern for atypical viral pneumonia still possibly COVID infection lactic acidosis with a white count of 15.5 and electrolyte abnormalities.  As well as rule out ACS  Other:   I have discussed this case with another health care provider.       <> Summary of the Discussion: Dr Curtis admitted the pt             Attending Attestation:         Attending Critical Care:   Critical Care Times:   Direct Patient Care (initial evaluation, reassessments, and time considering the case)................................................................10 minutes.   Additional History from reviewing old medical records or taking additional history from the family, EMS, PCP, etc.......................5 minutes.   Ordering, Reviewing, and Interpreting Diagnostic Studies...............................................................................................................5 minutes.    Documentation..................................................................................................................................................................................10 minutes.   Consultation with other Physicians. .................................................................................................................................................5 minutes.   Consultation with the patient's family directly relating to the patient's condition, care, and DNR status (when patient unable)......5 minutes.   ==============================================================  · Total Critical Care Time - exclusive of procedural time: 40 minutes.  ==============================================================  Critical care was necessary to treat or prevent imminent or life-threatening deterioration of the following conditions: sepsis (hypoxia ).   Critical care was time spent personally by me on the following activities: obtaining history from patient or relative, examination of patient, review of old charts, ordering lab, x-rays, and/or EKG, development of treatment plan with patient or relative, ordering and performing treatments and interventions, evaluation of patient's response to treatment, discussion with consultants, interpretation of cardiac measurements and re-evaluation of patient's conition.   Critical Care Condition: life-threatening                    Clinical Impression:   Final diagnoses:  [U07.1] COVID-19  [J18.9] Atypical pneumonia  [A41.9, R65.20] Severe sepsis (Primary)  [E87.2] Lactic acidosis  [E87.1] Hyponatremia  [E87.6] Hypokalemia  [R09.02] Hypoxia  [E11.65] Type 2 diabetes mellitus with hyperglycemia, without long-term current use of insulin          ED Disposition Condition    Admit               Nina Barone MD  01/07/22 5263

## 2022-01-07 NOTE — SUBJECTIVE & OBJECTIVE
Past Medical History:   Diagnosis Date    CHF (congestive heart failure)     Coronary artery disease     Diabetes mellitus     Hyperlipidemia     Hypertension     LBBB (left bundle branch block)     NSTEMI (non-ST elevated myocardial infarction)     PVD (peripheral vascular disease)        Past Surgical History:   Procedure Laterality Date    CARDIAC CATHETERIZATION       SECTION      CORONARY ARTERY BYPASS GRAFT  06/22/2016    x4    HYSTERECTOMY      INSERTION OF BIVENTRICULAR IMPLANTABLE CARDIOVERTER-DEFIBRILLATOR (ICD)         Review of patient's allergies indicates:   Allergen Reactions    Cephalosporins     Penicillins      hives         No current facility-administered medications on file prior to encounter.     Current Outpatient Medications on File Prior to Encounter   Medication Sig    albuterol (PROVENTIL/VENTOLIN HFA) 90 mcg/actuation inhaler Inhale 1 puff into the lungs every 4 (four) hours as needed.    amiodarone (PACERONE) 200 MG Tab Take 1 tablet (200 mg total) by mouth once daily.    amLODIPine (NORVASC) 5 MG tablet Take 1 tablet (5 mg total) by mouth 2 (two) times daily.    apixaban (ELIQUIS) 2.5 mg Tab Take 1 tablet (2.5 mg total) by mouth 2 (two) times daily.    atorvastatin (LIPITOR) 40 MG tablet Take 1 tablet (40 mg total) by mouth every evening.    citalopram (CELEXA) 40 MG tablet Take 40 mg by mouth once daily.    clopidogreL (PLAVIX) 75 mg tablet Take 1 tablet (75 mg total) by mouth once daily.    digoxin (LANOXIN) 125 mcg tablet Take 1 tablet (0.125 mg total) by mouth once daily.    furosemide (LASIX) 40 MG tablet Take 1.5 tablets (60 mg total) by mouth once daily for 3 days, THEN 1 tablet (40 mg total) once daily.    gabapentin (NEURONTIN) 400 MG capsule Take 400 mg by mouth 3 (three) times daily.    isosorbide dinitrate (ISORDIL) 10 MG tablet Take 1 tablet (10 mg total) by mouth 3 (three) times daily.    LANTUS U-100 INSULIN 100 unit/mL injection Inject  50 Units into the skin once daily.    metoprolol tartrate (LOPRESSOR) 50 MG tablet Take 1 tablet (50 mg total) by mouth 2 (two) times daily.    multivitamin (THERAGRAN) per tablet Take 1 tablet by mouth once daily.    pentoxifylline (TRENTAL) 400 mg TbSR Take 1 tablet (400 mg total) by mouth 3 (three) times daily.    ranolazine (RANEXA) 500 MG Tb12 Take 1 tablet (500 mg total) by mouth 2 (two) times daily.    spironolactone (ALDACTONE) 25 MG tablet Take 1 tablet (25 mg total) by mouth once daily. Hold until patient follows up with PCP or Cardiology    ALPRAZolam (XANAX) 0.5 MG tablet Take 1 tablet (0.5 mg total) by mouth 2 (two) times daily as needed for Anxiety.    metOLazone (ZAROXOLYN) 5 MG tablet Take 5 mg by mouth every other day.    [DISCONTINUED] eszopiclone (LUNESTA) 3 mg Tab Take 3 mg by mouth every evening.    [DISCONTINUED] oxyCODONE-acetaminophen (PERCOCET)  mg per tablet Take 1 tablet by mouth every 8 (eight) hours as needed.    [DISCONTINUED] phentermine 37.5 MG capsule      Family History     Problem Relation (Age of Onset)    Heart attack Mother, Father        Tobacco Use    Smoking status: Former Smoker    Smokeless tobacco: Never Used   Substance and Sexual Activity    Alcohol use: Not Currently    Drug use: Never    Sexual activity: Not on file     Review of Systems   Constitutional: Positive for fatigue.   HENT: Negative.    Eyes: Negative.    Respiratory: Positive for cough and shortness of breath.    Cardiovascular: Negative.    Gastrointestinal: Negative.    Endocrine: Negative.    Genitourinary: Negative.    Musculoskeletal: Negative.    Skin: Negative.    Allergic/Immunologic: Negative.    Neurological: Negative.    Hematological: Negative.    All other systems reviewed and are negative.    Objective:     Vital Signs (Most Recent):  Temp: 98.3 °F (36.8 °C) (01/07/22 1043)  Pulse: 65 (01/07/22 1200)  Resp: (!) 21 (01/07/22 1200)  BP: (!) 156/68 (01/07/22 1200)  SpO2: 95 %  (01/07/22 1200) Vital Signs (24h Range):  Temp:  [98.3 °F (36.8 °C)] 98.3 °F (36.8 °C)  Pulse:  [65-71] 65  Resp:  [20-22] 21  SpO2:  [87 %-96 %] 95 %  BP: (136-156)/(51-70) 156/68     Weight: 79.4 kg (175 lb)  Body mass index is 31 kg/m².    Physical Exam  Vitals and nursing note reviewed.   Constitutional:       Appearance: She is well-developed and well-nourished.   HENT:      Head: Normocephalic and atraumatic.      Right Ear: External ear normal.      Left Ear: External ear normal.      Nose: Nose normal.      Mouth/Throat:      Mouth: Oropharynx is clear and moist.   Eyes:      Extraocular Movements: EOM normal.      Conjunctiva/sclera: Conjunctivae normal.      Pupils: Pupils are equal, round, and reactive to light.   Cardiovascular:      Rate and Rhythm: Normal rate and regular rhythm.      Pulses: Intact distal pulses.      Heart sounds: Normal heart sounds.   Pulmonary:      Effort: Pulmonary effort is normal.      Breath sounds: Normal breath sounds.      Comments: Decreased entry with coarse large airway sounds clearing with cough; no crepitations nor wheezes  Abdominal:      General: Bowel sounds are normal.      Palpations: Abdomen is soft.   Musculoskeletal:         General: Normal range of motion.      Cervical back: Normal range of motion and neck supple.   Skin:     General: Skin is warm and dry.      Capillary Refill: Capillary refill takes less than 2 seconds.   Neurological:      Mental Status: She is alert and oriented to person, place, and time.   Psychiatric:         Mood and Affect: Mood and affect normal.         Behavior: Behavior normal.         Thought Content: Thought content normal.         Judgment: Judgment normal.           CRANIAL NERVES     CN III, IV, VI   Pupils are equal, round, and reactive to light.  Extraocular motions are normal.        Significant Labs:   All pertinent labs within the past 24 hours have been reviewed.  CBC:   Recent Labs   Lab 01/07/22  1055   WBC 15.54*    HGB 12.4   HCT 36.7*        CMP:   Recent Labs   Lab 01/07/22  1055   *   K 3.1*   CL 88*   CO2 27   *   BUN 34*   CREATININE 1.4   CALCIUM 8.6*   PROT 7.1   ALBUMIN 3.4*   BILITOT 1.4*   ALKPHOS 87   AST 29   ALT 22   ANIONGAP 14   EGFRNONAA 39.2*     Cardiac Markers: No results for input(s): CKMB, MYOGLOBIN, BNP, TROPISTAT in the last 48 hours.    Significant Imaging: I have reviewed all pertinent imaging results/findings within the past 24 hours.

## 2022-01-07 NOTE — PROGRESS NOTES
Pharmacist Renal Dose Adjustment Note    Claudia Powell is a 66 y.o. female being treated with the medication meropenem    Patient Data:    Vital Signs (Most Recent):  Temp: 98.3 °F (36.8 °C) (01/07/22 1043)  Pulse: 65 (01/07/22 1200)  Resp: (!) 21 (01/07/22 1200)  BP: (!) 156/68 (01/07/22 1200)  SpO2: 95 % (01/07/22 1200)   Vital Signs (72h Range):  Temp:  [98.3 °F (36.8 °C)]   Pulse:  [65-71]   Resp:  [20-22]   BP: (136-156)/(51-70)   SpO2:  [87 %-96 %]      Recent Labs   Lab 01/07/22  1055   CREATININE 1.4     Serum creatinine: 1.4 mg/dL 01/07/22 1055  Estimated creatinine clearance: 39.4 mL/min    Meropenem 1 gm dose: every 8 hours frequency will be changed to meropenem 1 gm dose: every 12 hours frequency.    Pharmacist's Name: Belem Mcgill  Pharmacist's Extension: [phone]

## 2022-01-07 NOTE — ED NOTES
Assisted pt to bedside commode. No difficulties. Tolerated well. Bed side rails up X 2. Call light within reach. No other needs at this time.

## 2022-01-07 NOTE — H&P
[facility] - Emergency Dept  Hospital Medicine  History & Physical    Patient Name: Claudia Powell  MRN: [id number]  Patient Class: IP- Inpatient  Admission Date: 2022  Attending Physician: Ankur Berg MD  Primary Care Provider: Manjit Monae MD         Patient information was obtained from patient, relative(s) and ER records.     Subjective:     Principal Problem:Atypical pneumonia    Chief Complaint:   Chief Complaint   Patient presents with    Shortness of Breath     87% room air per EMS        HPI: 66 year old female with history of CAD, CHF, DM2, HTN, and  has been vaccinated against COVID presented to ED complaining of 2-3 days of worsening SOB, cough with yellow sputum. She reported a temperature of 105 F yesterday to ED MD. No anginal like chest discomfort.No orthopnea, PND or pedal edema. She was found to be hypoxic by EMS. They started her on 15 lpm, which has since been decreased to 3 lpm     In ED labs reviewed and noted below: leukocytosis with normal H/H; hyponatremia, hypokalemia (treated in ED) with stage 3b renal dysfunction; cardiac b iomarkers are minimally elevated; elevated lactate (patient was hypoxic by EMS) with normal procalcitonin. CXR: atypical pneumonia changes. EKG reviewed: Biventricular pacemaker detected with no acute segments. Discussed with ED MD: admit for atypical pneumonia; trend cardiac biomarkers; cultured; 130 ml/hr bolus of LR; iv antibiotics      Past Medical History:   Diagnosis Date    CHF (congestive heart failure)     Coronary artery disease     Diabetes mellitus     Hyperlipidemia     Hypertension     LBBB (left bundle branch block)     NSTEMI (non-ST elevated myocardial infarction)     PVD (peripheral vascular disease)        Past Surgical History:   Procedure Laterality Date    CARDIAC CATHETERIZATION       SECTION      CORONARY ARTERY BYPASS GRAFT  06/22/2016    x4    HYSTERECTOMY      INSERTION OF BIVENTRICULAR  IMPLANTABLE CARDIOVERTER-DEFIBRILLATOR (ICD)         Review of patient's allergies indicates:   Allergen Reactions    Cephalosporins     Penicillins      hives         No current facility-administered medications on file prior to encounter.     Current Outpatient Medications on File Prior to Encounter   Medication Sig    albuterol (PROVENTIL/VENTOLIN HFA) 90 mcg/actuation inhaler Inhale 1 puff into the lungs every 4 (four) hours as needed.    amiodarone (PACERONE) 200 MG Tab Take 1 tablet (200 mg total) by mouth once daily.    amLODIPine (NORVASC) 5 MG tablet Take 1 tablet (5 mg total) by mouth 2 (two) times daily.    apixaban (ELIQUIS) 2.5 mg Tab Take 1 tablet (2.5 mg total) by mouth 2 (two) times daily.    atorvastatin (LIPITOR) 40 MG tablet Take 1 tablet (40 mg total) by mouth every evening.    citalopram (CELEXA) 40 MG tablet Take 40 mg by mouth once daily.    clopidogreL (PLAVIX) 75 mg tablet Take 1 tablet (75 mg total) by mouth once daily.    digoxin (LANOXIN) 125 mcg tablet Take 1 tablet (0.125 mg total) by mouth once daily.    furosemide (LASIX) 40 MG tablet Take 1.5 tablets (60 mg total) by mouth once daily for 3 days, THEN 1 tablet (40 mg total) once daily.    gabapentin (NEURONTIN) 400 MG capsule Take 400 mg by mouth 3 (three) times daily.    isosorbide dinitrate (ISORDIL) 10 MG tablet Take 1 tablet (10 mg total) by mouth 3 (three) times daily.    LANTUS U-100 INSULIN 100 unit/mL injection Inject 50 Units into the skin once daily.    metoprolol tartrate (LOPRESSOR) 50 MG tablet Take 1 tablet (50 mg total) by mouth 2 (two) times daily.    multivitamin (THERAGRAN) per tablet Take 1 tablet by mouth once daily.    pentoxifylline (TRENTAL) 400 mg TbSR Take 1 tablet (400 mg total) by mouth 3 (three) times daily.    ranolazine (RANEXA) 500 MG Tb12 Take 1 tablet (500 mg total) by mouth 2 (two) times daily.    spironolactone (ALDACTONE) 25 MG tablet Take 1 tablet (25 mg total) by mouth once  daily. Hold until patient follows up with PCP or Cardiology    ALPRAZolam (XANAX) 0.5 MG tablet Take 1 tablet (0.5 mg total) by mouth 2 (two) times daily as needed for Anxiety.    metOLazone (ZAROXOLYN) 5 MG tablet Take 5 mg by mouth every other day.    [DISCONTINUED] eszopiclone (LUNESTA) 3 mg Tab Take 3 mg by mouth every evening.    [DISCONTINUED] oxyCODONE-acetaminophen (PERCOCET)  mg per tablet Take 1 tablet by mouth every 8 (eight) hours as needed.    [DISCONTINUED] phentermine 37.5 MG capsule      Family History     Problem Relation (Age of Onset)    Heart attack Mother, Father        Tobacco Use    Smoking status: Former Smoker    Smokeless tobacco: Never Used   Substance and Sexual Activity    Alcohol use: Not Currently    Drug use: Never    Sexual activity: Not on file     Review of Systems   Constitutional: Positive for fatigue.   HENT: Negative.    Eyes: Negative.    Respiratory: Positive for cough and shortness of breath.    Cardiovascular: Negative.    Gastrointestinal: Negative.    Endocrine: Negative.    Genitourinary: Negative.    Musculoskeletal: Negative.    Skin: Negative.    Allergic/Immunologic: Negative.    Neurological: Negative.    Hematological: Negative.    All other systems reviewed and are negative.    Objective:     Vital Signs (Most Recent):  Temp: 98.3 °F (36.8 °C) (01/07/22 1043)  Pulse: 65 (01/07/22 1200)  Resp: (!) 21 (01/07/22 1200)  BP: (!) 156/68 (01/07/22 1200)  SpO2: 95 % (01/07/22 1200) Vital Signs (24h Range):  Temp:  [98.3 °F (36.8 °C)] 98.3 °F (36.8 °C)  Pulse:  [65-71] 65  Resp:  [20-22] 21  SpO2:  [87 %-96 %] 95 %  BP: (136-156)/(51-70) 156/68     Weight: 79.4 kg (175 lb)  Body mass index is 31 kg/m².    Physical Exam  Vitals and nursing note reviewed.   Constitutional:       Appearance: She is well-developed and well-nourished.   HENT:      Head: Normocephalic and atraumatic.      Right Ear: External ear normal.      Left Ear: External ear normal.       Nose: Nose normal.      Mouth/Throat:      Mouth: Oropharynx is clear and moist.   Eyes:      Extraocular Movements: EOM normal.      Conjunctiva/sclera: Conjunctivae normal.      Pupils: Pupils are equal, round, and reactive to light.   Cardiovascular:      Rate and Rhythm: Normal rate and regular rhythm.      Pulses: Intact distal pulses.      Heart sounds: Normal heart sounds.   Pulmonary:      Effort: Pulmonary effort is normal.      Breath sounds: Normal breath sounds.      Comments: Decreased entry with coarse large airway sounds clearing with cough; no crepitations nor wheezes  Abdominal:      General: Bowel sounds are normal.      Palpations: Abdomen is soft.   Musculoskeletal:         General: Normal range of motion.      Cervical back: Normal range of motion and neck supple.   Skin:     General: Skin is warm and dry.      Capillary Refill: Capillary refill takes less than 2 seconds.   Neurological:      Mental Status: She is alert and oriented to person, place, and time.   Psychiatric:         Mood and Affect: Mood and affect normal.         Behavior: Behavior normal.         Thought Content: Thought content normal.         Judgment: Judgment normal.           CRANIAL NERVES     CN III, IV, VI   Pupils are equal, round, and reactive to light.  Extraocular motions are normal.        Significant Labs:   All pertinent labs within the past 24 hours have been reviewed.  CBC:   Recent Labs   Lab 01/07/22  1055   WBC 15.54*   HGB 12.4   HCT 36.7*        CMP:   Recent Labs   Lab 01/07/22  1055   *   K 3.1*   CL 88*   CO2 27   *   BUN 34*   CREATININE 1.4   CALCIUM 8.6*   PROT 7.1   ALBUMIN 3.4*   BILITOT 1.4*   ALKPHOS 87   AST 29   ALT 22   ANIONGAP 14   EGFRNONAA 39.2*     Cardiac Markers: No results for input(s): CKMB, MYOGLOBIN, BNP, TROPISTAT in the last 48 hours.    Significant Imaging: I have reviewed all pertinent imaging results/findings within the past 24 hours.    Assessment/Plan:      No notes have been filed under this hospital service.  Service: Hospital Medicine    VTE Risk Mitigation (From admission, onward)    None             Ankur Berg MD  Department of Hospital Medicine   Formerly [facility], later [facility] - Emergency Dept

## 2022-01-08 PROBLEM — I27.20 PULMONARY HYPERTENSION: Status: ACTIVE | Noted: 2021-01-01

## 2022-01-08 NOTE — NURSING
Pt admitted to unit. Pt alert and oriented *4. Pt has 2.5 L of Oxygen flowing via nasal canula. O2 sat 99%. Pt ambulated from stretcher to bed with standby/hands on assist. LR @ 100 flowing to gravity, infusing into 20G right AC vein.  upon admission to unit. Pt oriented to call bell use and unit. Verbalized understanding. Pt denies pain at this time. Will continue to monitor.

## 2022-01-08 NOTE — PROGRESS NOTES
Pharmacokinetic Initial Assessment: IV Vancomycin    Assessment/Plan:    Initiate intravenous vancomycin with loading dose of 1500 mg once followed by a maintenance dose of vancomycin 1500 mg IV every 24 hours  Desired empiric serum trough concentration is 15 to 20 mcg/mL  Draw vancomycin trough level 60 min prior to fourth dose on 01/11 at approximately 0100  Pharmacy will continue to follow and monitor vancomycin.      Please contact pharmacy at extension [phone] with any questions regarding this assessment.     Thank you for the consult,   David Mcgill       Patient brief summary:  Claudia Powell is a 66 y.o. female initiated on antimicrobial therapy with IV Vancomycin for treatment of suspected bacteremia    Drug Allergies:   Review of patient's allergies indicates:   Allergen Reactions    Cephalosporins     Penicillins      hives         Actual Body Weight:   82.5 kg    Renal Function:   Estimated Creatinine Clearance: 40.2 mL/min (based on SCr of 1.4 mg/dL).,     CBC (last 72 hours):  Recent Labs   Lab Result Units 01/07/22  1055   WBC K/uL 15.54*   Hemoglobin g/dL 12.4   Hematocrit % 36.7*   Platelets K/uL 219   Gran % % 90.9*   Lymph % % 3.1*   Mono % % 5.0   Eosinophil % % 0.1   Basophil % % 0.2   Differential Method  Automated       Metabolic Panel (last 72 hours):  Recent Labs   Lab Result Units 01/07/22  1055   Sodium mmol/L 129*   Potassium mmol/L 3.1*   Chloride mmol/L 88*   CO2 mmol/L 27   Glucose mg/dL 358*   BUN mg/dL 34*   Creatinine mg/dL 1.4   Albumin g/dL 3.4*   Total Bilirubin mg/dL 1.4*   Alkaline Phosphatase U/L 87   AST U/L 29   ALT U/L 22   Magnesium mg/dL 2.4       Drug levels (last 3 results):  No results for input(s): VANCOMYCINRA, VANCOMYCINPE, VANCOMYCINTR in the last 72 hours.    Microbiologic Results:  Microbiology Results (last 7 days)       Procedure Component Value Units Date/Time    Blood Culture #2 **CANNOT BE ORDERED STAT** [[id number]] Collected: 01/07/22 1220    Order Status:  Completed Specimen: Blood from Peripheral, Antecubital, Right Updated: 01/08/22 0020     Blood Culture, Routine Gram stain des bottle: Gram positive cocci in pairs       Results called to and read back by:JOSH ALVARADO RN 1200 01/07/2022        23:22 FGF      Gram stain aer bottle: Gram positive cocci in pairs       Positive results previously called to and read back by Josh Alvarado RN       1200 on 01/07/2022 23:22  01/08/2022  00:19 fgf    Blood Culture #1 **CANNOT BE ORDERED STAT** [[id number]] Collected: 01/07/22 1055    Order Status: Completed Specimen: Blood from Peripheral, Hand, Left Updated: 01/07/22 2321     Blood Culture, Routine Gram stain aer bottle: Gram positive cocci in pairs       Gram stain des bottle: Gram positive cocci in pairs       Results called to and read back by:JOSH ALVARADO RN 1200  01/07/2022        23:21 FGF

## 2022-01-08 NOTE — CONSULTS
Consult Note  Infectious Disease    Reason for Consult:  Bacteremia, abnormal CXR    HPI: Claudia Powell is a 66 y.o. female who has been admitted for the 4th time in the last 3 months for primarily cardiac reasons, presented to the emergency room yesterday with shortness of breath.  She reported that she had been short of breath and coughing for several days and that she had a resting O2 of 87%.  She reported a fever of 100.5°, nasal congestion, rhinorrhea, headache, body aches, nausea.  She also had polydipsia and polyuria, and urinary urgency.  She had a white blood cell count 20025, CRP of 19, troponin 0.27 100 chest x-ray was consistent with faint ground-glass opacities throughout both lungs.  COVID testing was negative, procalcitonin was in the normal range.  Blood sugar was 358, creatinine 1.4.  She was started empirically on vancomycin, meropenem and doxycycline (allergic to cephalosporins and penicillin).  She has been afebrile here, white blood cells are improved today and overnight all of her blood cultures now have presumptive Enterococcus  She was treated for urinary tract infection in November.  She has not required antibiotics since then.  She does not have any dysuria hematuria but she does acknowledge that she may not be emptying her bladder completely.  She has no sensation of pelvic floor incompetence.  She has never had a colonoscopy.  She does experience constipation but denies any blood in the stool.  She has no right upper quadrant pain or postprandial abdominal pain..  A repeat echo is being performed and compared to November 2021 at which time she had pulmonary hypertension, EF of 33%, global hypokinesis and mild pan-valvular regurgitation.        Review of patient's allergies indicates:   Allergen Reactions    Cephalosporins     Penicillins      hives       Past Medical History:   Diagnosis Date    CHF (congestive heart failure)     COPD (chronic obstructive pulmonary disease)  2021    Coronary artery disease     Diabetes mellitus     Hyperlipidemia     Hypertension     LBBB (left bundle branch block)     NSTEMI (non-ST elevated myocardial infarction)     Obesity 2021    Pulmonary hypertension 2021    PVD (peripheral vascular disease)      Past Surgical History:   Procedure Laterality Date    CARDIAC CATHETERIZATION       SECTION      CORONARY ARTERY BYPASS GRAFT  06/22/2016    x4    HYSTERECTOMY      INSERTION OF BIVENTRICULAR IMPLANTABLE CARDIOVERTER-DEFIBRILLATOR (ICD)       Social History     Socioeconomic History    Marital status:    Tobacco Use    Smoking status: Former Smoker    Smokeless tobacco: Never Used   Substance and Sexual Activity    Alcohol use: Not Currently    Drug use: Never     Family History   Problem Relation Age of Onset    Heart attack Mother     Heart attack Father          Review of Systems:     fever,    No change in vision, loss of vision or diplopia  Minimal sinus congestion,   No pain in mouth or throat. No problems with teeth, gums.  No chest pain, palpitations, syncope    cough, no sputum production, mild shortness of breath, dyspnea on exertion, without pleurisy, hemoptysis    nausea, without vomiting, diarrhea, but does have constipation, denies blood in stool, or focal abd pain,  No dysphagia, odynophagia  No dysuria, hematuria, but may have a little decreased emptying and some urinary urgency.  She did have some incontinence yesterday     No swelling of joints, redness of joints, injuries, or new focal pain  She has had more than usual headaches, without dizziness, vertigo, numbness, paresthesias, neuropathy, falls  No anxiety, depression, substance abuse, sleep disturbance  Blood cultures have been uncontrolled for several weeks, since her last discharge at which time she was on 1/4 of her usual amount of insulin.  She tolerated blood sugars in the 300s until she is our primary doctor 5 days ago  who increased her dose of insulin.  No bleeding,  anemia, malignancy, unusual bruising  No new rashes, lesions, or wounds     No recent/prior steroids     Implants:  AICD, ankle hardware  Antibiotic History:  Last received in November    EXAM & DIAGNOSTICS REVIEWED:   Vitals:     Temp:  [98.1 °F (36.7 °C)-98.8 °F (37.1 °C)]   Temp: 98.1 °F (36.7 °C) (01/08/22 1211)  Pulse: 96 (01/08/22 1211)  Resp: 18 (01/08/22 1211)  BP: 132/63 (01/08/22 1211)  SpO2: 95 % (01/08/22 1211)    Intake/Output Summary (Last 24 hours) at 1/8/2022 1254  Last data filed at 1/7/2022 1539  Gross per 24 hour   Intake 50 ml   Output --   Net 50 ml       General:  In NAD. Alert and attentive, cooperative, comfortable  Eyes:  Anicteric, PERRL, EOMI, no scleral or conjunctival petechia  ENT:  No ulcers, exudates, thrush, nares patent, dentition is good  Neck:  supple, no masses or adenopathy appreciated  Lungs: Faint bibasilar crackles  Heart:  RRR, 2/6 systolic murmur are  Abd:  Soft, NT, ND, normal BS, no masses or organomegaly appreciated.  :  Voids and may have some very mild left flank tenderness  Musc:  Joints without effusion, swelling, erythema, synovitis, muscle wasting.   Skin:  No rashes. No palmar or plantar lesions.  Left index finger has a splinter lesion which looks old.  Numerous tattoos  Neuro:             Alert, attentive, speech fluent, face symmetric, moves all extremities, no focal weakness. Ambulatory  Psych: Calm, cooperative  Lymphatic:     No cervical, supraclavicular,   nodes  Extrem: No edema, erythema, phlebitis, cellulitis, warm and well perfused  VAD:  Peripheral     Isolation:  COVID but I am going to stop this  Wound:       Lines/Tubes/Drains:    General Labs reviewed:  Recent Labs   Lab 01/07/22  1055 01/08/22  0637   WBC 15.54* 11.60   HGB 12.4 11.0*   HCT 36.7* 33.9*    201       Recent Labs   Lab 01/07/22  1055 01/08/22  0637   * 130*   K 3.1* 3.5   CL 88* 90*   CO2 27 29   BUN 34* 36*    CREATININE 1.4 1.5*   CALCIUM 8.6* 8.4*   PROT 7.1  --    BILITOT 1.4*  --    ALKPHOS 87  --    ALT 22  --    AST 29  --      Recent Labs   Lab 01/07/22  1055   CRP 19.53*         Micro:  Microbiology Results (last 7 days)     Procedure Component Value Units Date/Time    Blood Culture #2 **CANNOT BE ORDERED STAT** [[id number]]  (Abnormal) Collected: 01/07/22 1220    Order Status: Completed Specimen: Blood from Peripheral, Antecubital, Right Updated: 01/08/22 1212     Blood Culture, Routine Gram stain des bottle: Gram positive cocci in pairs       Results called to and read back by:JOSH ALVARADO RN 1200 01/07/2022        23:22 FGF      Gram stain aer bottle: Gram positive cocci in pairs       Positive results previously called to and read back by Josh Alvarado RN       1200 on 01/07/2022 23:22  01/08/2022  00:19 fgf      ENTEROCOCCUS SPECIES  For susceptibility see order #[id number]      Blood Culture #1 **CANNOT BE ORDERED STAT** [[id number]]  (Abnormal) Collected: 01/07/22 1055    Order Status: Completed Specimen: Blood from Peripheral, Hand, Left Updated: 01/08/22 1211     Blood Culture, Routine Gram stain aer bottle: Gram positive cocci in pairs       Gram stain des bottle: Gram positive cocci in pairs       Results called to and read back by:JOSH ALVARADO RN 1200  01/07/2022        23:21 FGF      ENTEROCOCCUS SPECIES  Identification and susceptibility pending            Imaging Reviewed:   CXR        Cardiology:  Most recent trans thoracic echo November 2021    IMPRESSION & PLAN   1. Sepsis, Enterococcus species    2. Hypoxemia, ?multifactorial  3. Diabetes with hyperglycemia  4. Cardiomyopathy  5. Defibrillator in situ      Recommendations:  Continue vanc and merrem. Doxy can be discontinued  Repeat blood culture now and in a.m.  Clean-catch urinalysis and culture and postvoid residual   CT scan of the abdomen and pelvis   Follow up on echo and blood cultures    Discussed with Dr. Jaun and nursing  Encouraged  the patient to contact her primary whenever her blood sugars are uncontrolled rather than waiting until an office visit as this increases her risk of  infection    Medical Decision Making during this encounter was  [_] Low Complexity  [_] Moderate Complexity  [xx  ] High Complexity

## 2022-01-08 NOTE — PROGRESS NOTES
[facility] Medicine  Progress Note    Patient Name: Claudia Powell  MRN: [id number]  Patient Class: IP- Inpatient   Admission Date: 1/7/2022  Length of Stay: 1 days  Attending Physician: Ankur Wayne MD  Primary Care Provider: Manjit Monae MD        Subjective:     Principal Problem:Atypical pneumonia        HPI:  66 year old female with history of CAD, CHF, DM2, HTN, and  has been vaccinated against COVID presented to ED complaining of 2-3 days of worsening SOB, cough with yellow sputum. She reported a temperature of 105 F yesterday to ED MD. No anginal like chest discomfort.No orthopnea, PND or pedal edema. She was found to be hypoxic by EMS. They started her on 15 lpm, which has since been decreased to 3 lpm     In ED labs reviewed and noted below: leukocytosis with normal H/H; hyponatremia, hypokalemia (treated in ED) with stage 3b renal dysfunction; cardiac b iomarkers are minimally elevated; elevated lactate (patient was hypoxic by EMS) with normal procalcitonin. CXR: atypical pneumonia changes. EKG reviewed: Biventricular pacemaker detected with no acute segments. Discussed with ED MD: admit for atypical pneumonia; trend cardiac biomarkers; cultured; 130 ml/hr bolus of LR; iv antibiotics      Overview/Hospital Course:  1/8/2022  Ms powell has had a cough for weeks? with some LAUGHLIN fever(105) and yellow sputum production      Interval History: Ms Andre Mcghee has air space disease with normal WBC, procalcitonin but elevated CRP    Review of Systems   Constitutional: Positive for diaphoresis and fatigue.   HENT: Negative.    Eyes: Negative.    Respiratory: Positive for cough.         LAUGHLIN   Cardiovascular: Negative.    Gastrointestinal: Negative.    Endocrine: Negative.    Genitourinary: Negative.    Musculoskeletal: Positive for arthralgias and myalgias.   Skin: Negative.    Allergic/Immunologic: Negative.    Neurological: Positive for weakness.   Psychiatric/Behavioral: The patient is  nervous/anxious.      Objective:     Vital Signs (Most Recent):  Temp: 98.1 °F (36.7 °C) (01/08/22 0700)  Pulse: 61 (01/08/22 0700)  Resp: 18 (01/08/22 0700)  BP: (!) 145/63 (01/08/22 0836)  SpO2: 95 % (01/08/22 0700) Vital Signs (24h Range):  Temp:  [98.1 °F (36.7 °C)-98.8 °F (37.1 °C)] 98.1 °F (36.7 °C)  Pulse:  [60-76] 61  Resp:  [18-20] 18  SpO2:  [95 %-99 %] 95 %  BP: (131-185)/(61-75) 145/63     Weight: 82.5 kg (181 lb 14.1 oz)  Body mass index is 32.22 kg/m².    Intake/Output Summary (Last 24 hours) at 1/8/2022 1204  Last data filed at 1/7/2022 1539  Gross per 24 hour   Intake 50 ml   Output --   Net 50 ml      Physical Exam  Vitals and nursing note reviewed.   Constitutional:       General: She is not in acute distress.  HENT:      Head: Normocephalic and atraumatic.      Nose: Nose normal.   Eyes:      Extraocular Movements: Extraocular movements intact.      Pupils: Pupils are equal, round, and reactive to light.   Cardiovascular:      Rate and Rhythm: Normal rate and regular rhythm.   Pulmonary:      Effort: Pulmonary effort is normal.      Breath sounds: Normal breath sounds.   Abdominal:      General: Bowel sounds are normal. There is no distension.      Tenderness: There is no abdominal tenderness. There is no guarding.   Musculoskeletal:         General: Normal range of motion.      Cervical back: Normal range of motion and neck supple.   Skin:     General: Skin is warm.   Neurological:      General: No focal deficit present.      Mental Status: She is alert.   Psychiatric:      Comments: Mildly dulled affect         Significant Labs:   All pertinent labs within the past 24 hours have been reviewed.  Recent Lab Results       01/08/22  1116   01/08/22  1109   01/08/22  0758   01/08/22  0657   01/08/22  0637        Anion Gap         11       Baso #         0.03       Basophil %         0.3       Blood Culture, Routine               BUN         36       Calcium         8.4       Chloride         90        CO2         29       Creatinine         1.5       Differential Method         Automated       Digoxin Lvl         0.8  Comment: Toxic:  Adult: >2.5 ng/mL, Pediatric: >3.0 ng/mL         eGFR if          41.6       eGFR if non          36.0  Comment: Calculation used to obtain the estimated glomerular filtration  rate (eGFR) is the CKD-EPI equation.          Eos #         0.1       Eosinophil %         0.5       Glucose         381       Gran # (ANC)         9.1       Gran %         78.2       Hematocrit         33.9       Hemoglobin         11.0       Immature Grans (Abs)         0.06  Comment: Mild elevation in immature granulocytes is non specific and   can be seen in a variety of conditions including stress response,   acute inflammation, trauma and pregnancy. Correlation with other   laboratory and clinical findings is essential.         Immature Granulocytes         0.5       Lactate, Pedro               Lymph #         1.2       Lymph %         10.4       MCH         28.4       MCHC         32.4       MCV         88       Mono #         1.2       Mono %         10.1       MPV         9.8       nRBC         0       Platelets         201       POC Glucose 558   572   391   419         Potassium         3.5       RBC         3.87       RDW         17.0       Sodium         130       Troponin I         0.331       WBC         11.60                        01/07/22 2027 01/07/22 2005 01/07/22  1634   01/07/22  1220        Anion Gap             Baso #             Basophil %             Blood Culture, Routine       Gram stain des bottle: Gram positive cocci in pairs   [P]              Results called to and read back by:JOSH ALVARADO RN 1200 01/07/2022    [P]              23:22 FGF  [P]              Gram stain aer bottle: Gram positive cocci in pairs   [P]              Positive results previously called to and read back by Josh Alvarado RN   [P]              1200 on 01/07/2022 23:22   01/08/2022  00:19 fgf  [P]       BUN             Calcium             Chloride             CO2             Creatinine             Differential Method             Digoxin Lvl             eGFR if              eGFR if non              Eos #             Eosinophil %             Glucose             Gran # (ANC)             Gran %             Hematocrit             Hemoglobin             Immature Grans (Abs)             Immature Granulocytes             Lactate, Pedro     1.4  Comment: Falsely low lactic acid results can be found in samples   containing >=13.0 mg/dL total bilirubin and/or >=3.5 mg/dL   direct bilirubin.           Lymph #             Lymph %             MCH             MCHC             MCV             Mono #             Mono %             MPV             nRBC             Platelets             POC Glucose   261           Potassium             RBC             RDW             Sodium             Troponin I 0.581  Comment: Troponin critical result(s) called and verbal readback obtained from   Analia Zee RN (1200) by 1 01/07/2022 21:41               WBC                    [P] - Preliminary Result             Significant Imaging: I have reviewed all pertinent imaging results/findings within the past 24 hours.      Assessment/Plan:    1/8/2022  A)  Enterococcal sepsis  E coli in the urine culture  Pt has a cough, sputum production and abnormal lung imaging with acute respiratory failure with hypoxemia  Combined systolic and diastolic heart failure-last EF 33 %  Hx of CAD with LBBB  DM 2 uncontrolled  HTN  P)  Continue Zosyn  ID consulted  DM 2 diet  NS at 50 cc per hour  Insulin sliding scale with increased AM detimir and addition of PM detimir  Awaiting antibiotic sensitivities    No notes have been filed under this hospital service.  Service: Hospital Medicine    VTE Risk Mitigation (From admission, onward)         Ordered     apixaban tablet 2.5 mg  2 times daily          01/07/22 1958     IP VTE HIGH RISK PATIENT  Once         01/07/22 1958     Place sequential compression device  Until discontinued         01/07/22 1958     Reason for No Pharmacological VTE Prophylaxis  Once        Question:  Reasons:  Answer:  Already adequately anticoagulated on oral Anticoagulants    01/07/22 1958                Discharge Planning   BOUCHRA:      Code Status: Full Code   Is the patient medically ready for discharge?:     Reason for patient still in hospital (select all that apply): Patient new problem and Treatment                     Ankur Wayne MD  Department of Hospital Medicine   [facility]

## 2022-01-08 NOTE — SUBJECTIVE & OBJECTIVE
Interval History: Ms Andre Mcghee has air space disease with normal WBC, procalcitonin but elevated CRP    Review of Systems   Constitutional: Positive for diaphoresis and fatigue.   HENT: Negative.    Eyes: Negative.    Respiratory: Positive for cough.         LAUGHLIN   Cardiovascular: Negative.    Gastrointestinal: Negative.    Endocrine: Negative.    Genitourinary: Negative.    Musculoskeletal: Positive for arthralgias and myalgias.   Skin: Negative.    Allergic/Immunologic: Negative.    Neurological: Positive for weakness.   Psychiatric/Behavioral: The patient is nervous/anxious.      Objective:     Vital Signs (Most Recent):  Temp: 98.1 °F (36.7 °C) (01/08/22 0700)  Pulse: 61 (01/08/22 0700)  Resp: 18 (01/08/22 0700)  BP: (!) 145/63 (01/08/22 0836)  SpO2: 95 % (01/08/22 0700) Vital Signs (24h Range):  Temp:  [98.1 °F (36.7 °C)-98.8 °F (37.1 °C)] 98.1 °F (36.7 °C)  Pulse:  [60-76] 61  Resp:  [18-20] 18  SpO2:  [95 %-99 %] 95 %  BP: (131-185)/(61-75) 145/63     Weight: 82.5 kg (181 lb 14.1 oz)  Body mass index is 32.22 kg/m².    Intake/Output Summary (Last 24 hours) at 1/8/2022 1204  Last data filed at 1/7/2022 1539  Gross per 24 hour   Intake 50 ml   Output --   Net 50 ml      Physical Exam  Vitals and nursing note reviewed.   Constitutional:       General: She is not in acute distress.  HENT:      Head: Normocephalic and atraumatic.      Nose: Nose normal.   Eyes:      Extraocular Movements: Extraocular movements intact.      Pupils: Pupils are equal, round, and reactive to light.   Cardiovascular:      Rate and Rhythm: Normal rate and regular rhythm.   Pulmonary:      Effort: Pulmonary effort is normal.      Breath sounds: Normal breath sounds.   Abdominal:      General: Bowel sounds are normal. There is no distension.      Tenderness: There is no abdominal tenderness. There is no guarding.   Musculoskeletal:         General: Normal range of motion.      Cervical back: Normal range of motion and neck supple.    Skin:     General: Skin is warm.   Neurological:      General: No focal deficit present.      Mental Status: She is alert.   Psychiatric:      Comments: Mildly dulled affect         Significant Labs:   All pertinent labs within the past 24 hours have been reviewed.  Recent Lab Results       01/08/22  1116   01/08/22  1109   01/08/22  0758   01/08/22  0657   01/08/22  0637        Anion Gap         11       Baso #         0.03       Basophil %         0.3       Blood Culture, Routine               BUN         36       Calcium         8.4       Chloride         90       CO2         29       Creatinine         1.5       Differential Method         Automated       Digoxin Lvl         0.8  Comment: Toxic:  Adult: >2.5 ng/mL, Pediatric: >3.0 ng/mL         eGFR if          41.6       eGFR if non          36.0  Comment: Calculation used to obtain the estimated glomerular filtration  rate (eGFR) is the CKD-EPI equation.          Eos #         0.1       Eosinophil %         0.5       Glucose         381       Gran # (ANC)         9.1       Gran %         78.2       Hematocrit         33.9       Hemoglobin         11.0       Immature Grans (Abs)         0.06  Comment: Mild elevation in immature granulocytes is non specific and   can be seen in a variety of conditions including stress response,   acute inflammation, trauma and pregnancy. Correlation with other   laboratory and clinical findings is essential.         Immature Granulocytes         0.5       Lactate, Pedro               Lymph #         1.2       Lymph %         10.4       MCH         28.4       MCHC         32.4       MCV         88       Mono #         1.2       Mono %         10.1       MPV         9.8       nRBC         0       Platelets         201       POC Glucose 558   572   391   419         Potassium         3.5       RBC         3.87       RDW         17.0       Sodium         130       Troponin I         0.331       WBC          11.60                        01/07/22 2027 01/07/22  2005   01/07/22  1634   01/07/22  1220        Anion Gap             Baso #             Basophil %             Blood Culture, Routine       Gram stain des bottle: Gram positive cocci in pairs   [P]              Results called to and read back by:JOSH ALVARADO RN 1200 01/07/2022    [P]              23:22 FGF  [P]              Gram stain aer bottle: Gram positive cocci in pairs   [P]              Positive results previously called to and read back by Josh Alvarado RN   [P]              1200 on 01/07/2022 23:22  01/08/2022  00:19 fgf  [P]       BUN             Calcium             Chloride             CO2             Creatinine             Differential Method             Digoxin Lvl             eGFR if              eGFR if non              Eos #             Eosinophil %             Glucose             Gran # (ANC)             Gran %             Hematocrit             Hemoglobin             Immature Grans (Abs)             Immature Granulocytes             Lactate, Pedro     1.4  Comment: Falsely low lactic acid results can be found in samples   containing >=13.0 mg/dL total bilirubin and/or >=3.5 mg/dL   direct bilirubin.           Lymph #             Lymph %             MCH             MCHC             MCV             Mono #             Mono %             MPV             nRBC             Platelets             POC Glucose   261           Potassium             RBC             RDW             Sodium             Troponin I 0.581  Comment: Troponin critical result(s) called and verbal readback obtained from   Josh Alvarado RN (1200) by SW1 01/07/2022 21:41               WBC                    [P] - Preliminary Result             Significant Imaging: I have reviewed all pertinent imaging results/findings within the past 24 hours.

## 2022-01-08 NOTE — PLAN OF CARE
[facility]  Initial Discharge Assessment       Primary Care Provider: Manjit Monae MD    Admission Diagnosis: Atypical pneumonia [J18.9]    Admission Date: 1/7/2022  Expected Discharge Date:      Discharge Barriers Identified: (P) None    Assessment completed with patient. Patient plans to discharge home with family. She reports being independent with all ADLs prior to admission. No discharge needs anticipated. CM/SW will continue to follow for any discharge needs that may arise.     Payor: [employer] MANAGED MEDICARE / Plan: [employer] MEDICARE PPO / Product Type: Medicare Advantage /     Extended Emergency Contact Information  Primary Emergency Contact: Mario Powell  Address: [address] [place] [address] [facility]  Home Phone: [phone]  Mobile Phone: [phone]  Relation: Son    Discharge Plan A: (P) Home with family  Discharge Plan B: (P) Home with family      [facility] [place] [facility] - [place], [place] - [address]  [address] [address]  Phone: [phone] Fax: [phone]    [facility]  [address]  [facility] [address]  Phone: [phone] Fax: [phone]      Initial Assessment (most recent)       Adult Discharge Assessment - 01/08/22 1720          Discharge Assessment    Assessment Type Discharge Planning Assessment (P)      Confirmed/corrected address, phone number and insurance Yes (P)      Confirmed Demographics Correct on Facesheet (P)      Source of Information patient (P)      When was your last doctors appointment? 01/04/22 (P)    With PCP    Reason For Admission Atypical pneumonia (P)      Lives With child(darius), adult (P)      Do you expect to return to your current living situation? Yes (P)      Prior to hospitilization cognitive status: Alert/Oriented;No Deficits (P)      Current cognitive status: Alert/Oriented;No Deficits     Walking or Climbing Stairs Difficulty none     Dressing/Bathing Difficulty  none     Home Accessibility wheelchair accessible (P)      Home Layout Other (see comments) (P)    Lives in mobile home. No problems getting in and out of home.    Equipment Currently Used at Home none     Readmission within 30 days? No (P)      Patient currently being followed by outpatient case management? No (P)      Do you currently have service(s) that help you manage your care at home? -- (P)    N/A    Do you take prescription medications? Yes     Do you have prescription coverage? Yes     Coverage [employer]     Do you have any problems affording any of your prescribed medications? No (P)      Is the patient taking medications as prescribed? yes (P)      Who is going to help you get home at discharge? Stuart Powell     How do you get to doctors appointments? family or friend will provide     Are you on dialysis? No     Do you take coumadin? No     Discharge Plan A Home with family (P)      Discharge Plan B Home with family (P)      DME Needed Upon Discharge  none (P)      Discharge Plan discussed with: Patient (P)      Discharge Barriers Identified None (P)         Relationship/Environment    Name(s) of Who Lives With Patient Stuart Powell (P)

## 2022-01-08 NOTE — HOSPITAL COURSE
66 year old lady with prior history of combined systolic and diastolic CHF, ICD in place, diabetes and other multiple comorbidities presented with fever, shortness of breath found to have E faecalis bacteremia and KOMAL confirmed vegetation on ICD lead.  She was treated with IV antibiotics with help of Infectious Disease.  She was transferred to tertiary care center for ICD explantation.  She was discharged to [facility] in hemodynamically stable condition.  We held Plavix and Eliquis for last 3-4 days in anticipation of procedure    Instructions provided to follow up with primary care physician as outpatient. Patient verbalized understanding and is aware to contact primary care physician or return to ED if new or worsening symptoms.    Physical exam on the day of discharge:  General: Patient resting comfortably in no acute distress.  Morbidly obese  Lungs: CTA. Good air entry.  Cor: Regular rate and rhythm. No murmurs. No pedal edema.  ICD in place  Abd: Soft. Nontender. Non-distended.  Neuro: A&O x3. Moving all 4 extremities equally    Vital signs reviewed.  Nursing notes reviewed

## 2022-01-08 NOTE — PLAN OF CARE
01/08/22 0817   Patient Assessment/Suction   Level of Consciousness (AVPU) alert   Respiratory Effort Unlabored;Normal   Expansion/Accessory Muscles/Retractions no use of accessory muscles   All Lung Fields Breath Sounds equal bilaterally;clear   Rhythm/Pattern, Respiratory pattern regular;depth regular;unlabored   Cough Frequency no cough;infrequent   PRE-TX-O2   O2 Device (Oxygen Therapy) nasal cannula   $ Is the patient on Low Flow Oxygen? Yes   Flow (L/min) 3   SpO2 97 %   Pulse Oximetry Type Intermittent   $ Pulse Oximetry - Multiple Charge Pulse Oximetry - Multiple   Pulse 62   Resp 20   Education   $ Education 15 min;Bronchodilator   Respiratory Evaluation   $ Care Plan Tech Time 15 min   $ Eval/Re-eval Charges Evaluation   Evaluation For New Orders

## 2022-01-09 NOTE — PROGRESS NOTES
[facility] Medicine  Progress Note    Patient Name: Claudia Powell  MRN: [id number]  Patient Class: IP- Inpatient   Admission Date: 1/7/2022  Length of Stay: 2 days  Attending Physician: Ankur Wayne MD  Primary Care Provider: Manjit Monae MD        Subjective:     Principal Problem:Atypical pneumonia        HPI:  66 year old female with history of CAD, CHF, DM2, HTN, and  has been vaccinated against COVID presented to ED complaining of 2-3 days of worsening SOB, cough with yellow sputum. She reported a temperature of 105 F yesterday to ED MD. No anginal like chest discomfort.No orthopnea, PND or pedal edema. She was found to be hypoxic by EMS. They started her on 15 lpm, which has since been decreased to 3 lpm     In ED labs reviewed and noted below: leukocytosis with normal H/H; hyponatremia, hypokalemia (treated in ED) with stage 3b renal dysfunction; cardiac b iomarkers are minimally elevated; elevated lactate (patient was hypoxic by EMS) with normal procalcitonin. CXR: atypical pneumonia changes. EKG reviewed: Biventricular pacemaker detected with no acute segments. Discussed with ED MD: admit for atypical pneumonia; trend cardiac biomarkers; cultured; 130 ml/hr bolus of LR; iv antibiotics      Overview/Hospital Course:  1/8/2022  Ms powell has had a cough for weeks? with some LAUGHLIN fever(105) and yellow sputum production    1/9/2022  Pt is feeling much better with decreased SOB, LAUGHLIN and weakness.      Interval History: Pt has much reduced dyspnea and weakness    Review of Systems   Constitutional: Positive for fatigue.   HENT: Negative.    Eyes: Negative.    Respiratory: Negative.    Cardiovascular: Negative.    Endocrine: Positive for heat intolerance.   Musculoskeletal: Positive for arthralgias and myalgias.   Skin: Negative.    Allergic/Immunologic: Positive for immunocompromised state.   Neurological: Positive for weakness.   Hematological: Negative.    Psychiatric/Behavioral:  Negative.      Objective:     Vital Signs (Most Recent):  Temp: 98.2 °F (36.8 °C) (01/09/22 0805)  Pulse: 60 (01/09/22 1555)  Resp: 18 (01/09/22 1555)  BP: 131/61 (94) (01/09/22 1555)  SpO2: 99 % (01/09/22 1555) Vital Signs (24h Range):  Temp:  [97.9 °F (36.6 °C)-98.3 °F (36.8 °C)] 98.2 °F (36.8 °C)  Pulse:  [60-88] 60  Resp:  [18] 18  SpO2:  [98 %-99 %] 99 %  BP: (131-147)/(53-72) 131/61     Weight: 82.5 kg (181 lb 14.1 oz)  Body mass index is 32.22 kg/m².    Intake/Output Summary (Last 24 hours) at 1/9/2022 1730  Last data filed at 1/9/2022 0507  Gross per 24 hour   Intake --   Output 400 ml   Net -400 ml      Physical Exam  Vitals and nursing note reviewed.   Constitutional:       General: She is not in acute distress.  HENT:      Head: Normocephalic and atraumatic.      Nose: Nose normal.      Mouth/Throat:      Mouth: Mucous membranes are moist.   Eyes:      Extraocular Movements: Extraocular movements intact.      Pupils: Pupils are equal, round, and reactive to light.   Cardiovascular:      Rate and Rhythm: Normal rate and regular rhythm.   Pulmonary:      Effort: Pulmonary effort is normal.      Breath sounds: Normal breath sounds.   Abdominal:      General: Bowel sounds are normal. There is no distension.      Tenderness: There is no abdominal tenderness. There is no guarding.   Musculoskeletal:         General: Normal range of motion.      Cervical back: Normal range of motion and neck supple.   Skin:     General: Skin is warm.   Neurological:      General: No focal deficit present.      Mental Status: She is alert and oriented to person, place, and time.   Psychiatric:         Mood and Affect: Mood normal.         Significant Labs:   All pertinent labs within the past 24 hours have been reviewed.  Recent Lab Results       01/09/22  1705   01/09/22  1142   01/09/22  0755   01/09/22  0710   01/08/22  2043        Appearance, UA               Bacteria, UA               Baso #       0.04         Basophil %        0.4         Bilirubin (UA)               Color, UA               Differential Method       Automated         Eos #       0.2         Eosinophil %       1.7         Glucose         441       Glucose, UA               Gran # (ANC)       7.3         Gran %       74.4         Hematocrit       31.9         Hemoglobin       10.6         Hyaline Casts, UA               Immature Grans (Abs)       0.11  Comment: Mild elevation in immature granulocytes is non specific and   can be seen in a variety of conditions including stress response,   acute inflammation, trauma and pregnancy. Correlation with other   laboratory and clinical findings is essential.           Immature Granulocytes       1.1         Ketones, UA               Leukocytes, UA               Lymph #       1.2         Lymph %       11.8         MCH       28.7         MCHC       33.2         MCV       86         Microscopic Comment               Mono #       1.0         Mono %       10.6         MPV       10.0         NITRITE UA               nRBC       0         Occult Blood UA               pH, UA               Platelets       195         POC Glucose 415   384   288           Protein, UA               RBC       3.69         RBC, UA               RDW       16.3         Specific [place], UA               Specimen UA               Squam Epithel, UA               UROBILINOGEN UA               WBC, UA               WBC       9.78         Yeast, UA                                01/08/22 2008 01/08/22  1915        Appearance, UA   Clear       Bacteria, UA   Negative       Baso #         Basophil %         Bilirubin (UA)   Negative       Color, UA   Yellow       Differential Method         Eos #         Eosinophil %         Glucose         Glucose, UA   4+       Gran # (ANC)         Gran %         Hematocrit         Hemoglobin         Hyaline Casts, UA   2       Immature Grans (Abs)         Immature Granulocytes         Ketones, UA   Negative       Leukocytes, UA    Negative       Lymph #         Lymph %         MCH         MCHC         MCV         Microscopic Comment   SEE COMMENT  Comment: Other formed elements not mentioned in the report are not   present in the microscopic examination.          Mono #         Mono %         MPV         NITRITE UA   Negative       nRBC         Occult Blood UA   Negative       pH, UA   6.0       Platelets         POC Glucose 506         Protein, UA   Trace  Comment: Recommend a 24 hour urine protein or a urine   protein/creatinine ratio if globulin induced proteinuria is  clinically suspected.         RBC         RBC, UA   4       RDW         Specific [place], UA   1.015       Specimen UA   Urine, Clean Catch       Squam Epithel, UA   5       UROBILINOGEN UA   Negative       WBC, UA   1       WBC         Yeast, UA   None             Significant Imaging: I have reviewed all pertinent imaging results/findings within the past 24 hours.      Assessment/Plan:    1/9/2022  A)  Enterococcal sepsis with no clear source and a ICD present  Bronchitis  DM 2 uncontrolled  Cardiomyopathy with EF of 42 % and wall motion abnormalities  P)  KOMAL in AM-  Begin doxycycline  Continue vancomycin  Blood culture in the AM  Add 6 units of detemir    No notes have been filed under this hospital service.  Service: Hospital Medicine    VTE Risk Mitigation (From admission, onward)         Ordered     apixaban tablet 2.5 mg  2 times daily         01/07/22 1958     IP VTE HIGH RISK PATIENT  Once         01/07/22 1958     Place sequential compression device  Until discontinued         01/07/22 1958     Reason for No Pharmacological VTE Prophylaxis  Once        Question:  Reasons:  Answer:  Already adequately anticoagulated on oral Anticoagulants    01/07/22 1958                Discharge Planning   BOUCHRA:      Code Status: Full Code   Is the patient medically ready for discharge?:     Reason for patient still in hospital (select all that apply): Patient new problem,  Treatment and Consult recommendations  Discharge Plan A: Home with family                  Ankur Wayne MD  Department of Hospital Medicine   [facility]

## 2022-01-09 NOTE — PROGRESS NOTES
Consult Note  Infectious Disease    Reason for Consult:  Bacteremia, abnormal CXR    HPI: Claudia Powell is a 66 y.o. female who has been admitted for the 4th time in the last 3 months for primarily cardiac reasons, presented to the emergency room yesterday with shortness of breath.  She reported that she had been short of breath and coughing for several days and that she had a resting O2 of 87%.  She reported a fever of 100.5°, nasal congestion, rhinorrhea, headache, body aches, nausea.  She also had polydipsia and polyuria, and urinary urgency.  She had a white blood cell count 90922, CRP of 19, troponin 0.27 100 chest x-ray was consistent with faint ground-glass opacities throughout both lungs.  COVID testing was negative, procalcitonin was in the normal range.  Blood sugar was 358, creatinine 1.4.  She was started empirically on vancomycin, meropenem and doxycycline (allergic to cephalosporins and penicillin).  She has been afebrile here, white blood cells are improved today and overnight all of her blood cultures now have presumptive Enterococcus  She was treated for urinary tract infection in November.  She has not required antibiotics since then.  She does not have any dysuria hematuria but she does acknowledge that she may not be emptying her bladder completely.  She has no sensation of pelvic floor incompetence.  She has never had a colonoscopy.  She does experience constipation but denies any blood in the stool.  She has no right upper quadrant pain or postprandial abdominal pain..  A repeat echo is being performed and compared to November 2021 at which time she had pulmonary hypertension, EF of 33%, global hypokinesis and mild pan-valvular regurgitation.    1/9:  Interim reviewed, no fever, white blood cells normal.  She remains on 3 L oxygen by nasal cannula blood cultures from admission have sensitive Enterococcus faecalis.  Blood cultures from yesterday are negative so far.  CT scan of the abdomen  did not reveal an origin for the enterococcal bacteremia but did demonstrate a probable adrenal adenoma simple right renal cyst, diverticula without diverticulitis.  UA was negative.  TTE did not describe any valvular vegetations but also did not mention the defibrillator leads. Sugars remain quite high.  She is still coughing up some slightly discolored sputum.  Discussed with her about the diagnostic findings and the plan for transesophageal echo.  Postvoid residual was not performed    EXAM & DIAGNOSTICS REVIEWED:   Vitals:     Temp:  [97.9 °F (36.6 °C)-98.3 °F (36.8 °C)]   Temp: 98.2 °F (36.8 °C) (01/09/22 0805)  Pulse: 88 (01/09/22 0805)  Resp: 18 (01/09/22 0805)  BP: (!) 141/57 (01/09/22 0805)  SpO2: 98 % (01/09/22 0805)    Intake/Output Summary (Last 24 hours) at 1/9/2022 1447  Last data filed at 1/9/2022 0507  Gross per 24 hour   Intake --   Output 400 ml   Net -400 ml       General:  In NAD. Alert and attentive, cooperative, comfortable  Eyes:  Anicteric, P EOMI, no scleral or conjunctival petechia  ENT:  No ulcers, exudates, thrush, nares patent, dentition is good  Neck:  Supple,   Lungs: Extremely faint t bibasilar crackles  Heart:  RRR, 2/6 systolic murmur    Abd:  Soft, NT, ND, normal BS, no masses or organomegaly appreciated.  :  Voids    Musc:  Joints without effusion, swelling, erythema, synovitis, muscle wasting.   Skin:  No rashes. No palmar or plantar lesions.  Left index finger has a splinter lesion which looks old.  Numerous tattoos  Neuro:             Alert, attentive, speech fluent, face symmetric, moves all extremities, no focal weakness. Ambulatory  Psych: Calm, cooperative  Lymphatic:       Extrem: No edema, erythema, phlebitis, cellulitis, warm and well perfused  VAD:  Peripheral     Isolation:  None  Wound:       Lines/Tubes/Drains:    General Labs reviewed:  Recent Labs   Lab 01/07/22  1055 01/08/22  0637 01/09/22  0710   WBC 15.54* 11.60 9.78   HGB 12.4 11.0* 10.6*   HCT 36.7* 33.9*  31.9*    201 195       Recent Labs   Lab 01/07/22  1055 01/08/22  0637   * 130*   K 3.1* 3.5   CL 88* 90*   CO2 27 29   BUN 34* 36*   CREATININE 1.4 1.5*   CALCIUM 8.6* 8.4*   PROT 7.1  --    BILITOT 1.4*  --    ALKPHOS 87  --    ALT 22  --    AST 29  --      Recent Labs   Lab 01/07/22  1055   CRP 19.53*         Micro:  Microbiology Results (last 7 days)     Procedure Component Value Units Date/Time    Blood Culture #2 **CANNOT BE ORDERED STAT** [[id number]]  (Abnormal) Collected: 01/07/22 1220    Order Status: Completed Specimen: Blood from Peripheral, Antecubital, Right Updated: 01/09/22 0754     Blood Culture, Routine Gram stain des bottle: Gram positive cocci in pairs       Results called to and read back by:JOSH ALVARADO RN 1200 01/07/2022        23:22 FGF      Gram stain aer bottle: Gram positive cocci in pairs       Positive results previously called to and read back by Josh Alvarado RN       1200 on 01/07/2022 23:22  01/08/2022  00:19 fgf      ENTEROCOCCUS FAECALIS  For susceptibility see order #[id number]      Blood Culture #1 **CANNOT BE ORDERED STAT** [[id number]]  (Abnormal)  (Susceptibility) Collected: 01/07/22 1055    Order Status: Completed Specimen: Blood from Peripheral, Hand, Left Updated: 01/09/22 0753     Blood Culture, Routine Gram stain aer bottle: Gram positive cocci in pairs       Gram stain des bottle: Gram positive cocci in pairs       Results called to and read back by:JOSH ALVARADO RN 1200  01/07/2022        23:21 FGF      ENTEROCOCCUS FAECALIS    Blood culture [[id number]] Collected: 01/09/22 0710    Order Status: Sent Specimen: Blood from Antecubital, Left Arm Updated: 01/09/22 0739    Blood culture [[id number]] Collected: 01/08/22 1629    Order Status: Completed Specimen: Blood from Antecubital, Right Arm Updated: 01/08/22 2358     Blood Culture, Routine No Growth to date          Imaging Reviewed:   CXR  CT abdomen pelvis  1. No finding of bowel obstruction, intra-abdominal  free air or abscess.  2. No evidence of obstructive uropathy, or radiopaque renal/clicking system stone.  3. Trace left greater than right pleural effusion and adjacent atelectasis.  4. Numerous additional, and incidental findings as noted above.      Cardiology:  Most recent trans thoracic echo November 2021    Transthoracic echo 1/8  · Mildly decreased systolic function.  · The estimated ejection fraction is 42%.  · Left ventricular diastolic dysfunction.  · There is moderate left ventricular global hypokinesis.  · Normal right ventricular size with normal right ventricular systolic function.  · Normal central venous pressure (3 mmHg).  Moderate aortic annular calcification no other valvular abnormalities.  The appearance of the defibrillator leads was not described      IMPRESSION & PLAN   1. Sepsis, Enterococcus species   Negative CT abd/pelvis,CEA normal, neg UA, no wounds    2. Hypoxemia, ?multifactorial  3. Diabetes with persistent hyperglycemia  4. Cardiomyopathy with interstitial edema on CXR, requiring oxygen  5. Defibrillator in situ      Recommendations:  Continue vanc and merrem can be stopped and oral doxy restarted for her bronchitis  Repeat blood culture   in a.m.       Discussed with Dr. Juan and nursing  Encouraged the patient to contact her primary whenever her blood sugars are uncontrolled rather than waiting until an office visit as this increases her risk of  infection    Medical Decision Making during this encounter was  [_] Low Complexity  [_] Moderate Complexity  [xx  ] High Complexity

## 2022-01-09 NOTE — SUBJECTIVE & OBJECTIVE
Interval History: Pt has much reduced dyspnea and weakness    Review of Systems   Constitutional: Positive for fatigue.   HENT: Negative.    Eyes: Negative.    Respiratory: Negative.    Cardiovascular: Negative.    Endocrine: Positive for heat intolerance.   Musculoskeletal: Positive for arthralgias and myalgias.   Skin: Negative.    Allergic/Immunologic: Positive for immunocompromised state.   Neurological: Positive for weakness.   Hematological: Negative.    Psychiatric/Behavioral: Negative.      Objective:     Vital Signs (Most Recent):  Temp: 98.2 °F (36.8 °C) (01/09/22 0805)  Pulse: 60 (01/09/22 1555)  Resp: 18 (01/09/22 1555)  BP: 131/61 (94) (01/09/22 1555)  SpO2: 99 % (01/09/22 1555) Vital Signs (24h Range):  Temp:  [97.9 °F (36.6 °C)-98.3 °F (36.8 °C)] 98.2 °F (36.8 °C)  Pulse:  [60-88] 60  Resp:  [18] 18  SpO2:  [98 %-99 %] 99 %  BP: (131-147)/(53-72) 131/61     Weight: 82.5 kg (181 lb 14.1 oz)  Body mass index is 32.22 kg/m².    Intake/Output Summary (Last 24 hours) at 1/9/2022 1730  Last data filed at 1/9/2022 0507  Gross per 24 hour   Intake --   Output 400 ml   Net -400 ml      Physical Exam  Vitals and nursing note reviewed.   Constitutional:       General: She is not in acute distress.  HENT:      Head: Normocephalic and atraumatic.      Nose: Nose normal.      Mouth/Throat:      Mouth: Mucous membranes are moist.   Eyes:      Extraocular Movements: Extraocular movements intact.      Pupils: Pupils are equal, round, and reactive to light.   Cardiovascular:      Rate and Rhythm: Normal rate and regular rhythm.   Pulmonary:      Effort: Pulmonary effort is normal.      Breath sounds: Normal breath sounds.   Abdominal:      General: Bowel sounds are normal. There is no distension.      Tenderness: There is no abdominal tenderness. There is no guarding.   Musculoskeletal:         General: Normal range of motion.      Cervical back: Normal range of motion and neck supple.   Skin:     General: Skin is  warm.   Neurological:      General: No focal deficit present.      Mental Status: She is alert and oriented to person, place, and time.   Psychiatric:         Mood and Affect: Mood normal.         Significant Labs:   All pertinent labs within the past 24 hours have been reviewed.  Recent Lab Results       01/09/22  1705   01/09/22  1142   01/09/22  0755   01/09/22  0710   01/08/22  2043        Appearance, UA               Bacteria, UA               Baso #       0.04         Basophil %       0.4         Bilirubin (UA)               Color, UA               Differential Method       Automated         Eos #       0.2         Eosinophil %       1.7         Glucose         441       Glucose, UA               Gran # (ANC)       7.3         Gran %       74.4         Hematocrit       31.9         Hemoglobin       10.6         Hyaline Casts, UA               Immature Grans (Abs)       0.11  Comment: Mild elevation in immature granulocytes is non specific and   can be seen in a variety of conditions including stress response,   acute inflammation, trauma and pregnancy. Correlation with other   laboratory and clinical findings is essential.           Immature Granulocytes       1.1         Ketones, UA               Leukocytes, UA               Lymph #       1.2         Lymph %       11.8         MCH       28.7         MCHC       33.2         MCV       86         Microscopic Comment               Mono #       1.0         Mono %       10.6         MPV       10.0         NITRITE UA               nRBC       0         Occult Blood UA               pH, UA               Platelets       195         POC Glucose 415   384   288           Protein, UA               RBC       3.69         RBC, UA               RDW       16.3         Specific [place], UA               Specimen UA               Squam Epithel, UA               UROBILINOGEN UA               WBC, UA               WBC       9.78         Yeast, UA                                 01/08/22 2008 01/08/22  1915        Appearance, UA   Clear       Bacteria, UA   Negative       Baso #         Basophil %         Bilirubin (UA)   Negative       Color, UA   Yellow       Differential Method         Eos #         Eosinophil %         Glucose         Glucose, UA   4+       Gran # (ANC)         Gran %         Hematocrit         Hemoglobin         Hyaline Casts, UA   2       Immature Grans (Abs)         Immature Granulocytes         Ketones, UA   Negative       Leukocytes, UA   Negative       Lymph #         Lymph %         MCH         MCHC         MCV         Microscopic Comment   SEE COMMENT  Comment: Other formed elements not mentioned in the report are not   present in the microscopic examination.          Mono #         Mono %         MPV         NITRITE UA   Negative       nRBC         Occult Blood UA   Negative       pH, UA   6.0       Platelets         POC Glucose 506         Protein, UA   Trace  Comment: Recommend a 24 hour urine protein or a urine   protein/creatinine ratio if globulin induced proteinuria is  clinically suspected.         RBC         RBC, UA   4       RDW         Specific [place], UA   1.015       Specimen UA   Urine, Clean Catch       Squam Epithel, UA   5       UROBILINOGEN UA   Negative       WBC, UA   1       WBC         Yeast, UA   None             Significant Imaging: I have reviewed all pertinent imaging results/findings within the past 24 hours.

## 2022-01-10 NOTE — PLAN OF CARE
Problem: Adult Inpatient Plan of Care  Goal: Plan of Care Review  Outcome: Ongoing, Progressing  Goal: Patient-Specific Goal (Individualized)  Outcome: Ongoing, Progressing  Goal: Absence of Hospital-Acquired Illness or Injury  Outcome: Ongoing, Progressing  Goal: Optimal Comfort and Wellbeing  Outcome: Ongoing, Progressing  Goal: Readiness for Transition of Care  Outcome: Ongoing, Progressing     Problem: Diabetes Comorbidity  Goal: Blood Glucose Level Within Targeted Range  Outcome: Ongoing, Progressing     Problem: Fluid Imbalance (Pneumonia)  Goal: Fluid Balance  Outcome: Ongoing, Progressing

## 2022-01-10 NOTE — ANESTHESIA POSTPROCEDURE EVALUATION
Anesthesia Post Evaluation    Patient: Claudia Powell    Procedure(s) Performed: Procedure(s) (LRB):  Transesophageal echo (KOMAL) intra-procedure log documentation (N/A)    Final Anesthesia Type: general      Patient location during evaluation: PACU  Patient participation: Yes- Able to Participate  Level of consciousness: awake and alert  Post-procedure vital signs: reviewed and stable  Pain management: adequate  Airway patency: patent    PONV status at discharge: No PONV  Anesthetic complications: no      Cardiovascular status: stable  Respiratory status: unassisted and spontaneous ventilation  Hydration status: euvolemic  Follow-up not needed.          Vitals Value Taken Time   /73 01/10/22 1306   Temp 36.5 °C (97.7 °F) 01/10/22 0755   Pulse 62 01/10/22 1309   Resp 18 01/10/22 1410   SpO2 96 % 01/10/22 1309   Vitals shown include unvalidated device data.      No case tracking events are documented in the log.      Pain/Walt Score: Pain Rating Prior to Med Admin: 6 (1/10/2022  2:42 PM)

## 2022-01-10 NOTE — CARE UPDATE
01/10/22 0843   PRE-TX-O2   O2 Device (Oxygen Therapy) nasal cannula   $ Is the patient on Low Flow Oxygen? Yes   Flow (L/min) 2   SpO2 97 %   Pulse Oximetry Type Intermittent   $ Pulse Oximetry - Multiple Charge Pulse Oximetry - Multiple   Pulse 60   Resp 17   Respiratory Evaluation   $ Care Plan Tech Time 15 min

## 2022-01-10 NOTE — PLAN OF CARE
Important Message from Medicare was sign, explained and given to patient/caregiver on 01/10/2022 at 2:58pm     addressed any questions or concerns.    Important Message from Medicare document will be scanned into patient's medical record

## 2022-01-10 NOTE — NURSING
Pt dinner Bs was 415, 12 units given per orders and MD notified, another 6 units ordered for pt. Pt given insulin as ordered.

## 2022-01-10 NOTE — ANESTHESIA PREPROCEDURE EVALUATION
01/10/2022  Claudia Powell is a 66 y.o., female.    Pre-op Assessment    I have reviewed the Patient Summary Reports.     I have reviewed the Nursing Notes. I have reviewed the NPO Status.   I have reviewed the Medications.     Review of Systems  Anesthesia Hx:  Denies Family Hx of Anesthesia complications.   Denies Personal Hx of Anesthesia complications.   Social:  No Alcohol Use, Former Smoker    Hematology/Oncology:     Oncology Normal   Hematology Comments: Eliquis, Plavix, and Trental (last yesterday)   EENT/Dental:EENT/Dental Normal   Cardiovascular:   Pacemaker ( biventricular pacemaker/defibrillator) Hypertension Past MI CAD  CABG/stent Dysrhythmias ( history of PAF)  CHF (Admitted with pulmonary edema, resolved) PVD hyperlipidemia 01/08/2022 transthoracic echo shows 42% EF, diastolic dysfunction, no valvular abnormalities, and no vegetations.   Pulmonary:   COPD Recent URI ( admitted with shortness of breath and yellow productive cough. Being treated with antibiotics for bronchitis.) History of pulmonary hypertension   Hepatic/GI:   Liver Disease, ( fatty liver)    Musculoskeletal:  Musculoskeletal Normal    Neurological:   Peripheral Neuropathy    Endocrine:   Diabetes, type 2, using insulin    Psych:  Psychiatric Normal           Physical Exam  General:  Obesity    Airway/Jaw/Neck:  Airway Findings: Mouth Opening: Normal Tongue: Normal  Mallampati: III  Improves to II with phonation.  TM Distance: Normal, at least 6 cm  Jaw/Neck Findings:  Neck ROM: Normal ROM      Dental:  Dental Findings: In tact   Chest/Lungs:  Chest/Lungs Findings: Clear to auscultation, Normal Respiratory Rate     Heart/Vascular:  Heart Findings: Rate: Normal  Rhythm: Regular Rhythm  Sounds: Normal  Heart Murmur  Systolic        Mental Status:  Mental Status Findings:  Cooperative, Alert and Oriented         Anesthesia  Plan  Type of Anesthesia, risks & benefits discussed:  Anesthesia Type:  MAC    Patient's Preference:   Plan Factors:          Intra-op Monitoring Plan: standard ASA monitors  Intra-op Monitoring Plan Comments:   Post Op Pain Control Plan:   Post Op Pain Control Plan Comments:     Induction:    Beta Blocker:         Informed Consent: Patient understands risks and agrees with Anesthesia plan.  Questions answered. Anesthesia consent signed with patient.  ASA Score: 4     Day of Surgery Review of History & Physical:        Anesthesia Plan Notes: POM        Ready For Surgery From Anesthesia Perspective.

## 2022-01-10 NOTE — TRANSFER OF CARE
"Anesthesia Transfer of Care Note    Patient: Claudia Powell    Procedure(s) Performed: Procedure(s) (LRB):  Transesophageal echo (KOMAL) intra-procedure log documentation (N/A)    Patient location: Other: [facility] center    Anesthesia Type: MAC    Transport from OR: Transported from OR on 2-3 L/min O2 by NC with adequate spontaneous ventilation    Post pain: adequate analgesia    Post assessment: no apparent anesthetic complications    Post vital signs: stable    Level of consciousness: awake and alert    Nausea/Vomiting: no nausea/vomiting    Complications: none    Transfer of care protocol was followed      Last vitals:   Visit Vitals  BP (!) 161/65   Pulse 60   Temp 36.5 °C (97.7 °F) (Oral)   Resp 17   Ht 5' 3" (1.6 m)   Wt 82.5 kg (181 lb 14.1 oz)   LMP  (LMP Unknown)   SpO2 97%   BMI 32.22 kg/m²     "

## 2022-01-10 NOTE — PROGRESS NOTES
Progress Note  Infectious Disease    Admit Date: 1/7/2022   LOS: 3 days     SUBJECTIVE:     Follow-up For: Enterococcus bacteremia, abnormal CXR.    HPI:     Claudia Powell is a 66 y.o. female who has been admitted for the 4th time in the last 3 months for primarily cardiac reasons, presented to the emergency room yesterday with shortness of breath.  She reported that she had been short of breath and coughing for several days and that she had a resting O2 of 87%.  She reported a fever of 100.5°, nasal congestion, rhinorrhea, headache, body aches, nausea.  She also had polydipsia and polyuria, and urinary urgency.  She had a white blood cell count 66060, CRP of 19, troponin 0.27 100 chest x-ray was consistent with faint ground-glass opacities throughout both lungs.  COVID testing was negative, procalcitonin was in the normal range.  Blood sugar was 358, creatinine 1.4.  She was started empirically on vancomycin, meropenem and doxycycline (allergic to cephalosporins and penicillin).  She has been afebrile here, white blood cells are improved today and overnight all of her blood cultures now have presumptive Enterococcus  She was treated for urinary tract infection in November.  She has not required antibiotics since then.  She does not have any dysuria hematuria but she does acknowledge that she may not be emptying her bladder completely.  She has no sensation of pelvic floor incompetence.  She has never had a colonoscopy.  She does experience constipation but denies any blood in the stool.  She has no right upper quadrant pain or postprandial abdominal pain..  A repeat echo is being performed and compared to November 2021 at which time she had pulmonary hypertension, EF of 33%, global hypokinesis and mild pan-valvular regurgitation.    INTERVAL HISTORY:  1/10/22 (Cl):  Patient seen and examined at bedside, feeling much better.  Still complaining of mid thoracic/muscular left pain.  Awaiting KOMAL today. UA from  "clean negative for infection, glucose 4+. Repeat blood cultures x2 1/8 no growth, pending final. Respiratory culture 1/10 in process.     Antibiotics (From admission, onward)            Start     Stop Route Frequency Ordered    01/09/22 2100  doxycycline capsule 100 mg         -- Oral Every 12 hours 01/09/22 1559    01/08/22 0200  vancomycin (VANCOCIN) 1,500 mg in dextrose 5 % 500 mL IVPB        Note to Pharmacy: Ht: 5' 3" (1.6 m)  Wt: 82.5 kg (181 lb 14.1 oz)  Estimated Creatinine Clearance: 40.2 mL/min (based on SCr of 1.4 mg/dL).  Body mass index is 32.22 kg/m².    -- IV Every 24 hours (non-standard times) 01/08/22 0021 01/07/22 2355  vancomycin - pharmacy to dose        "And" Linked Group Details    -- IV pharmacy to manage frequency 01/07/22 2356          Review of Systems:  HEENT: No change in vision, sore throat, ulcers, thrush, dysphagia  Heart: No chest pain, orthopnea or edema  Lungs: No shortness of breath, cough, sputum production or pleuritic pain  Abdomen: No nausea, vomiting, diarrhea, abdominal pain, appetite is good   Extremities: No edema or erythema  MSK:  Left midthoracic pain, ambulates  Neurological: No headaches, focal weakness  Psych: Calm, appropriate  Skin: No rash, itching, or erythema  VAD: No IV site issues    OBJECTIVE:     Vital Signs (Most Recent)  Temp: 97.7 °F (36.5 °C) (01/10/22 0755)  Pulse: (!) 54 (01/10/22 0755)  Resp: 18 (01/10/22 0755)  BP: (!) 161/65 (01/10/22 0755)  SpO2: 98 % (01/10/22 0755)    Temperature Range Min/Max (Last 24H):  Temp:  [97.7 °F (36.5 °C)-98 °F (36.7 °C)]     I & O (Last 24H):No intake or output data in the 24 hours ending 01/10/22 0819    Physical Exam:  General:         In NAD. Alert and attentive, cooperative, comfortable  Eyes:               Anicteric, PERRL, EOMI  ENT:                No ulcers, exudates, thrush, nares patent, dentition is good  Neck:              Supple,   Lungs:            Extremely faint bibasilar crackles  Heart:              " S1/S2+, regular rhythm, 2/6 systolic murmur, defibrillator/AICD  left side, no redness nontender to palpation  Abd:                Soft, NT, ND, normal BS, no masses or organomegaly appreciated.  :                  Voids    Musc:               left midthoracic tenderness by the T8-T10. Joints without effusion, swelling, erythema, synovitis, muscle wasting.   Skin:               No rashes. No palmar or plantar lesions.  Left index finger has a splinter lesion which looks old.  Numerous tattoos  Neuro:             Alert, attentive, speech fluent, face symmetric, moves all extremities, no focal weakness. Ambulatory  Psych:            Calm, cooperative  Lymphatic:       Extrem:           No edema, erythema, phlebitis, cellulitis, warm and well perfused  VAD:               Peripheral                                  Isolation:        None  Wound:               Laboratory:    Recent Labs   Lab 01/08/22  0637 01/09/22  0710 01/10/22  0609   WBC 11.60 9.78 8.42   HGB 11.0* 10.6* 10.9*   HCT 33.9* 31.9* 33.7*    195 236       Recent Labs   Lab 01/07/22  1055 01/08/22  0637 01/10/22  0609   * 130* 134*   K 3.1* 3.5 3.4*   CL 88* 90* 91*   CO2 27 29 31*   BUN 34* 36* 31*   CREATININE 1.4 1.5* 1.1   CALCIUM 8.6* 8.4* 8.9   PROT 7.1  --   --    BILITOT 1.4*  --   --    ALKPHOS 87  --   --    ALT 22  --   --    AST 29  --   --      Recent Labs   Lab 01/07/22  1055   CRP 19.53*     Estimated Creatinine Clearance: 51.1 mL/min (based on SCr of 1.1 mg/dL).     Microbiology Results (last 7 days)     Procedure Component Value Units Date/Time    Culture, Respiratory with Gram Stain [[id number]] Collected: 01/10/22 0814    Order Status: Sent Specimen: Respiratory from Sputum, Expectorated Updated: 01/10/22 0814    Blood culture [[id number]] Collected: 01/08/22 5869    Order Status: Completed Specimen: Blood from Antecubital, Right Arm Updated: 01/09/22 1832     Blood Culture, Routine No Growth to date      No Growth to date     Blood culture [[id number]] Collected: 01/09/22 0710    Order Status: Completed Specimen: Blood from Antecubital, Left Arm Updated: 01/09/22 1758     Blood Culture, Routine No Growth to date    Blood Culture #2 **CANNOT BE ORDERED STAT** [[id number]]  (Abnormal) Collected: 01/07/22 1220    Order Status: Completed Specimen: Blood from Peripheral, Antecubital, Right Updated: 01/09/22 0754     Blood Culture, Routine Gram stain des bottle: Gram positive cocci in pairs       Results called to and read back by:JOSH ALVARADO RN 1200 01/07/2022        23:22 FGF      Gram stain aer bottle: Gram positive cocci in pairs       Positive results previously called to and read back by Josh Alvarado RN       1200 on 01/07/2022 23:22  01/08/2022  00:19 fgf      ENTEROCOCCUS FAECALIS  For susceptibility see order #[id number]      Blood Culture #1 **CANNOT BE ORDERED STAT** [[id number]]  (Abnormal)  (Susceptibility) Collected: 01/07/22 1055    Order Status: Completed Specimen: Blood from Peripheral, Hand, Left Updated: 01/09/22 0753     Blood Culture, Routine Gram stain aer bottle: Gram positive cocci in pairs       Gram stain des bottle: Gram positive cocci in pairs       Results called to and read back by:JOSH ALVARADO RN 1200  01/07/2022        23:21 FGF      ENTEROCOCCUS FAECALIS          Diagnostic Results: Reviewed  CXR  CT scan abdomen/pelvis 1/8/22:  1. No finding of bowel obstruction, intra-abdominal free air or abscess.  2. No evidence of obstructive uropathy, or radiopaque renal/clicking system stone.  3. Trace left greater than right pleural effusion and adjacent atelectasis.  4. Numerous additional, and incidental findings as noted above.       Cardiology:  ECHO 11/2021:  · Elevated central venous pressure (15 mmHg).  · The estimated PA systolic pressure is 44 mmHg.  · The left ventricle is mildly enlarged with moderately decreased systolic function.  · The estimated ejection fraction is 33%.  · Grade III left ventricular  diastolic dysfunction.  · There is mild left ventricular global hypokinesis.  · Normal right ventricular size with normal right ventricular systolic function.  · Mild left atrial enlargement.  · Mild right atrial enlargement.  · Mild aortic regurgitation.  · Mild-to-moderate mitral regurgitation.  · Mild tricuspid regurgitation.  · Mild pulmonic regurgitation.        ASSESSMENT/PLAN:     1. Sepsis resolved, likely secondary to Enterococcus faecalis bacteremia, unknown source   Repeat blood cultures 1/8 no growth, pending final    KOMAL today, f/u report    2. Chronic left mid-thoracic pain   CRP 19.5  3. Poorly controlled Diabetes, follow up A1c%  4. Cardiomyopathy   5. Defibrillator in situ     Recommendations:   Dc Merrem  Continue Vancomycin IV, keep level 15-20  Follow KOMAL   Pt with defibrillator, will obtain WB nuclear scan to rule out acute process  Trend CRP  Type glucose control  Follow cultures  Will follow    D/w Dr Wayne    Medical Decision Making during this encounter was  [_] Low Complexity  [_] Moderate Complexity  [xx] High Complexity

## 2022-01-10 NOTE — NURSING TRANSFER
Nursing Transfer Note      1/10/2022     Reason patient is being transferred: return to room 1214    Transfer From: [facility] [place] [facility]    Transfer via wheelchair    Transfer with cardiac monitoring    Transported by DOYLE Huffman    Medicines sent: none    Any special needs or follow-up needed: none    Chart send with patient: Yes    Notified: report called to DOYLE Ricci    Patient reassessed at: 1330    Upon arrival to floor: cardiac monitor applied, patient oriented to room, call bell in reach and bed in lowest position

## 2022-01-11 NOTE — PROGRESS NOTES
Pharmacokinetic Assessment Follow Up: IV Vancomycin    Vancomycin serum concentration assessment(s):    The trough level was drawn correctly and can be used to guide therapy at this time. The measurement is below the desired definitive target range of 15 to 20 mcg/mL.    Vancomycin Regimen Plan:    Change regimen to Vancomycin 1500 mg IV every 20 hours with next serum trough concentration measured at 1300 prior to 7th dose on 01/13    Drug levels (last 3 results):  Recent Labs   Lab Result Units 01/11/22  0120   Vancomycin-Trough ug/mL 14.1       Pharmacy will continue to follow and monitor vancomycin.    Please contact pharmacy at extension [phone] for questions regarding this assessment.    Thank you for the consult,   David Mcgill       Patient brief summary:  Claudia Powell is a 66 y.o. female initiated on antimicrobial therapy with IV Vancomycin for treatment of bacteremia    Drug Allergies:   Review of patient's allergies indicates:   Allergen Reactions    Cephalosporins     Penicillins      hives         Actual Body Weight:   82.5 kg    Renal Function:   Estimated Creatinine Clearance: 51.1 mL/min (based on SCr of 1.1 mg/dL).,     CBC (last 72 hours):  Recent Labs   Lab Result Units 01/08/22  0637 01/09/22  0710 01/10/22  0609 01/11/22  0120   WBC K/uL 11.60 9.78 8.42 7.36   Hemoglobin g/dL 11.0* 10.6* 10.9* 10.5*   Hematocrit % 33.9* 31.9* 33.7* 30.7*   Platelets K/uL 201 195 236 225   Gran % % 78.2* 74.4* 57.8 57.2   Lymph % % 10.4* 11.8* 23.8 23.1   Mono % % 10.1 10.6 12.7 13.3   Eosinophil % % 0.5 1.7 3.2 2.9   Basophil % % 0.3 0.4 1.0 0.5   Differential Method  Automated Automated Automated Automated       Metabolic Panel (last 72 hours):  Recent Labs   Lab Result Units 01/08/22  0637 01/08/22  1125 01/08/22  1915 01/08/22  2043 01/10/22  0609   Sodium mmol/L 130*  --   --   --  134*   Potassium mmol/L 3.5  --   --   --  3.4*   Chloride mmol/L 90*  --   --   --  91*   CO2 mmol/L 29  --   --   --  31*    Glucose mg/dL 381* 560*  --  441* 207*   Glucose, UA   --   --  4+*  --   --    BUN mg/dL 36*  --   --   --  31*   Creatinine mg/dL 1.5*  --   --   --  1.1       Vancomycin Administrations:  vancomycin given in the last 96 hours                     vancomycin 1,500 mg in dextrose 5 % 500 mL IVPB (mg) 1,500 mg New Bag 01/11/22 0234     1,500 mg New Bag 01/10/22 0218     1,500 mg New Bag 01/09/22 0208     1,500 mg New Bag 01/08/22 0328                    Microbiologic Results:  Microbiology Results (last 7 days)       Procedure Component Value Units Date/Time    Blood culture [[id number]] Collected: 01/08/22 1629    Order Status: Completed Specimen: Blood from Antecubital, Right Arm Updated: 01/10/22 1832     Blood Culture, Routine No Growth to date      No Growth to date      No Growth to date    Blood culture [[id number]] Collected: 01/09/22 0710    Order Status: Completed Specimen: Blood from Antecubital, Left Arm Updated: 01/10/22 1232     Blood Culture, Routine No Growth to date      No Growth to date    Culture, Respiratory with Gram Stain [[id number]] Collected: 01/10/22 0814    Order Status: Sent Specimen: Respiratory from Sputum, Expectorated Updated: 01/10/22 0820    Blood Culture #2 **CANNOT BE ORDERED STAT** [[id number]]  (Abnormal) Collected: 01/07/22 1220    Order Status: Completed Specimen: Blood from Peripheral, Antecubital, Right Updated: 01/09/22 0754     Blood Culture, Routine Gram stain des bottle: Gram positive cocci in pairs       Results called to and read back by:JOSH ALVARADO RN 1200 01/07/2022        23:22 FGF      Gram stain aer bottle: Gram positive cocci in pairs       Positive results previously called to and read back by Josh Alvarado RN       1200 on 01/07/2022 23:22  01/08/2022  00:19 fgf      ENTEROCOCCUS FAECALIS  For susceptibility see order #[id number]      Blood Culture #1 **CANNOT BE ORDERED STAT** [[id number]]  (Abnormal)  (Susceptibility) Collected: 01/07/22 1055    Order Status:  Completed Specimen: Blood from Peripheral, Hand, Left Updated: 01/09/22 0753     Blood Culture, Routine Gram stain aer bottle: Gram positive cocci in pairs       Gram stain des bottle: Gram positive cocci in pairs       Results called to and read back by:JOSH ALVARADO RN 1200  01/07/2022        23:21 FGF      ENTEROCOCCUS FAECALIS

## 2022-01-11 NOTE — SUBJECTIVE & OBJECTIVE
Interval History: Ms Powell is feeling much better and relieved to know that we know the cause of her illness with a treatment plan.    Review of Systems   Constitutional: Positive for fatigue.        Reduced   HENT: Negative.    Eyes: Negative.    Respiratory:        Mild LAUGHLIN   Cardiovascular: Negative.    Gastrointestinal: Negative.    Endocrine: Positive for heat intolerance.   Genitourinary: Negative.    Musculoskeletal: Positive for arthralgias and myalgias.   Skin: Negative.    Allergic/Immunologic: Negative.    Neurological: Positive for weakness.   Hematological: Bruises/bleeds easily.   Psychiatric/Behavioral: Negative.      Objective:     Vital Signs (Most Recent):  Temp: 97.5 °F (36.4 °C) (01/11/22 1104)  Pulse: 60 (01/11/22 1104)  Resp: 18 (01/11/22 1104)  BP: (!) 149/66 (01/11/22 1201)  SpO2: 98 % (01/11/22 1104) Vital Signs (24h Range):  Temp:  [97.4 °F (36.3 °C)-98.6 °F (37 °C)] 97.5 °F (36.4 °C)  Pulse:  [60-70] 60  Resp:  [18] 18  SpO2:  [97 %-98 %] 98 %  BP: (115-151)/(56-80) 149/66     Weight: 82.5 kg (181 lb 14.1 oz)  Body mass index is 32.22 kg/m².  No intake or output data in the 24 hours ending 01/11/22 1797   Physical Exam  Vitals and nursing note reviewed.   Constitutional:       General: She is not in acute distress.     Appearance: She is not ill-appearing.   HENT:      Head: Normocephalic and atraumatic.      Nose: Nose normal.      Mouth/Throat:      Mouth: Mucous membranes are moist.   Eyes:      Extraocular Movements: Extraocular movements intact.      Pupils: Pupils are equal, round, and reactive to light.   Cardiovascular:      Rate and Rhythm: Normal rate and regular rhythm.   Pulmonary:      Effort: Pulmonary effort is normal.      Breath sounds: Normal breath sounds.   Abdominal:      General: Bowel sounds are normal. There is no distension.      Tenderness: There is no abdominal tenderness. There is no guarding.   Musculoskeletal:         General: Normal range of motion.       Cervical back: Normal range of motion and neck supple.   Skin:     General: Skin is warm.   Neurological:      Mental Status: She is alert and oriented to person, place, and time.   Psychiatric:         Mood and Affect: Mood normal.         Significant Labs:   All pertinent labs within the past 24 hours have been reviewed.  Recent Lab Results       01/11/22  1716   01/11/22  1143   01/11/22  0722   01/11/22  0120   01/10/22  1924        Albumin       2.7         Alkaline Phosphatase       81         ALT       23         Anion Gap       11         AST       24         Baso #       0.04         Basophil %       0.5         BILIRUBIN TOTAL       1.0  Comment: For infants and newborns, interpretation of results should be based  on gestational age, weight and in agreement with clinical  observations.    Premature Infant recommended reference ranges:  Up to 24 hours.............<8.0 mg/dL  Up to 48 hours............<12.0 mg/dL  3-5 days..................<15.0 mg/dL  6-29 days.................<15.0 mg/dL           BUN       32         Calcium       8.6         Chloride       92         CO2       29         Creatinine       1.2         Differential Method       Automated         eGFR if        54.4         eGFR if non        47.2  Comment: Calculation used to obtain the estimated glomerular filtration  rate (eGFR) is the CKD-EPI equation.            Eos #       0.2         Eosinophil %       2.9         Estimated Avg Glucose       263         Glucose       259         Gran # (ANC)       4.2         Gran %       57.2         Hematocrit       30.7         Hemoglobin       10.5         Hemoglobin A1C External       10.8  Comment: According to ADA guidelines, hemoglobin A1C <7.0% represents  optimal control in non-pregnant diabetic patients.  Different  metrics may apply to specific populations.   Standards of Medical Care in Diabetes - 2016.    For the purpose of screening for the presence of  diabetes:  <5.7%     Consistent with the absence of diabetes  5.7-6.4%  Consistent with increasing risk for diabetes   (prediabetes)  >or=6.5%  Consistent with diabetes    Currently no consensus exists for use of hemoglobin A1C  for diagnosis of diabetes for children.           Immature Grans (Abs)       0.22  Comment: Mild elevation in immature granulocytes is non specific and   can be seen in a variety of conditions including stress response,   acute inflammation, trauma and pregnancy. Correlation with other   laboratory and clinical findings is essential.           Immature Granulocytes       3.0         Lymph #       1.7         Lymph %       23.1         MCH       29.2         MCHC       34.2         MCV       85         Mono #       1.0         Mono %       13.3         MPV       9.5         nRBC       0         Platelets       225         POC Glucose 319   396   286     355       Potassium       3.6         PROTEIN TOTAL       6.1         RBC       3.60         RDW       16.3         Sodium       132         Vancomycin-Trough       14.1  Comment: Therapeutic Range: 10.0-20.0 ug/mL         WBC       7.36                              Significant Imaging: I have reviewed all pertinent imaging results/findings within the past 24 hours.

## 2022-01-11 NOTE — SUBJECTIVE & OBJECTIVE
Interval History: Ms Powell has enterococcal sepsis and lesions on an AICD lead. She will have to go to [facility] Re the above - Dr Oates accepting    Review of Systems   Constitutional: Positive for fatigue. Negative for chills and fever.   HENT: Negative.    Eyes: Negative.    Respiratory: Negative.    Cardiovascular: Negative.    Gastrointestinal: Negative.    Endocrine: Positive for heat intolerance.   Musculoskeletal: Positive for arthralgias and myalgias.   Skin: Negative.    Allergic/Immunologic: Positive for immunocompromised state.   Neurological: Positive for weakness.   Psychiatric/Behavioral: The patient is nervous/anxious.      Objective:     Vital Signs (Most Recent):  Temp: 98.3 °F (36.8 °C) (01/10/22 1700)  Pulse: 60 (01/10/22 1700)  Resp: 18 (01/10/22 1700)  BP: (!) 152/62 (01/10/22 1700)  SpO2: 95 % (01/10/22 1700) Vital Signs (24h Range):  Temp:  [97.6 °F (36.4 °C)-98.3 °F (36.8 °C)] 98.3 °F (36.8 °C)  Pulse:  [54-64] 60  Resp:  [11-18] 18  SpO2:  [93 %-100 %] 95 %  BP: (128-174)/(62-73) 152/62     Weight: 82.5 kg (181 lb 14.1 oz)  Body mass index is 32.22 kg/m².    Intake/Output Summary (Last 24 hours) at 1/10/2022 1833  Last data filed at 1/10/2022 1128  Gross per 24 hour   Intake 100 ml   Output 0 ml   Net 100 ml      Physical Exam  Vitals and nursing note reviewed.   Constitutional:       General: She is not in acute distress.  HENT:      Head: Normocephalic and atraumatic.      Nose: Nose normal.      Mouth/Throat:      Mouth: Mucous membranes are moist.   Eyes:      Extraocular Movements: Extraocular movements intact.      Pupils: Pupils are equal, round, and reactive to light.   Cardiovascular:      Rate and Rhythm: Normal rate and regular rhythm.   Pulmonary:      Effort: Pulmonary effort is normal.      Breath sounds: Normal breath sounds.   Abdominal:      General: Bowel sounds are normal. There is no distension.      Tenderness: There is no abdominal tenderness. There is no  guarding.   Musculoskeletal:         General: Normal range of motion.      Cervical back: Normal range of motion and neck supple.   Skin:     General: Skin is warm.   Neurological:      General: No focal deficit present.      Mental Status: She is alert and oriented to person, place, and time.   Psychiatric:      Comments: anxious         Significant Labs:   All pertinent labs within the past 24 hours have been reviewed.  Recent Lab Results       01/10/22  1704   01/10/22  1320   01/10/22  1133   01/10/22  0609   01/10/22  0539        Anion Gap       12         Baso #       0.08         Basophil %       1.0         BSA     1.91           BUN       31         Calcium       8.9         Chloride       91         CO2       31         Creatinine       1.1         Differential Method       Automated         eGFR if        >60.0         eGFR if non        52.4  Comment: Calculation used to obtain the estimated glomerular filtration  rate (eGFR) is the CKD-EPI equation.            EF     35           Eos #       0.3         Eosinophil %       3.2         Glucose       207         Gran # (ANC)       4.9         Gran %       57.8         Hematocrit       33.7         Hemoglobin       10.9         Immature Grans (Abs)       0.13  Comment: Mild elevation in immature granulocytes is non specific and   can be seen in a variety of conditions including stress response,   acute inflammation, trauma and pregnancy. Correlation with other   laboratory and clinical findings is essential.           Immature Granulocytes       1.5         Lymph #       2.0         Lymph %       23.8         MCH       28.4         MCHC       32.3         MCV       88         Mono #       1.1         Mono %       12.7         MPV       10.1         nRBC       0         Platelets       236         POC Glucose 312   172       220       Potassium       3.4         RBC       3.84         RDW       16.2         Sodium       134          WBC       8.42                          01/09/22 2012        Anion Gap       Baso #       Basophil %       BSA       BUN       Calcium       Chloride       CO2       Creatinine       Differential Method       eGFR if        eGFR if non        EF       Eos #       Eosinophil %       Glucose       Gran # (ANC)       Gran %       Hematocrit       Hemoglobin       Immature Grans (Abs)       Immature Granulocytes       Lymph #       Lymph %       MCH       MCHC       MCV       Mono #       Mono %       MPV       nRBC       Platelets       POC Glucose 368       Potassium       RBC       RDW       Sodium       WBC             Significant Imaging: I have reviewed all pertinent imaging results/findings within the past 24 hours.

## 2022-01-11 NOTE — PROGRESS NOTES
[facility] Medicine  Progress Note    Patient Name: Claudia Powell  MRN: [id number]  Patient Class: IP- Inpatient   Admission Date: 1/7/2022  Length of Stay: 4 days  Attending Physician: Ankur Wayne MD  Primary Care Provider: Manjit Monae MD        Subjective:     Principal Problem:Atypical pneumonia        HPI:  66 year old female with history of CAD, CHF, DM2, HTN, and  has been vaccinated against COVID presented to ED complaining of 2-3 days of worsening SOB, cough with yellow sputum. She reported a temperature of 105 F yesterday to ED MD. No anginal like chest discomfort.No orthopnea, PND or pedal edema. She was found to be hypoxic by EMS. They started her on 15 lpm, which has since been decreased to 3 lpm     In ED labs reviewed and noted below: leukocytosis with normal H/H; hyponatremia, hypokalemia (treated in ED) with stage 3b renal dysfunction; cardiac b iomarkers are minimally elevated; elevated lactate (patient was hypoxic by EMS) with normal procalcitonin. CXR: atypical pneumonia changes. EKG reviewed: Biventricular pacemaker detected with no acute segments. Discussed with ED MD: admit for atypical pneumonia; trend cardiac biomarkers; cultured; 130 ml/hr bolus of LR; iv antibiotics      Overview/Hospital Course:  1/8/2022  Ms powell has had a cough for weeks? with some LAUGHLIN fever(105) and yellow sputum production    1/9/2022  Pt is feeling much better with decreased SOB, LAUGHLIN and weakness.    1/10/2022  Ms Powell is feeling much stronger and more active. She is concerned Re the Lesions on her AICD lead.    1/11/2022  Ms Powell is feeling much stronger with more  energy. She denies fever or chills      Interval History: Ms Powell is feeling much better and relieved to know that we know the cause of her illness with a treatment plan.    Review of Systems   Constitutional: Positive for fatigue.        Reduced   HENT: Negative.    Eyes: Negative.    Respiratory:        Mild LAUGHLIN    Cardiovascular: Negative.    Gastrointestinal: Negative.    Endocrine: Positive for heat intolerance.   Genitourinary: Negative.    Musculoskeletal: Positive for arthralgias and myalgias.   Skin: Negative.    Allergic/Immunologic: Negative.    Neurological: Positive for weakness.   Hematological: Bruises/bleeds easily.   Psychiatric/Behavioral: Negative.      Objective:     Vital Signs (Most Recent):  Temp: 97.5 °F (36.4 °C) (01/11/22 1104)  Pulse: 60 (01/11/22 1104)  Resp: 18 (01/11/22 1104)  BP: (!) 149/66 (01/11/22 1201)  SpO2: 98 % (01/11/22 1104) Vital Signs (24h Range):  Temp:  [97.4 °F (36.3 °C)-98.6 °F (37 °C)] 97.5 °F (36.4 °C)  Pulse:  [60-70] 60  Resp:  [18] 18  SpO2:  [97 %-98 %] 98 %  BP: (115-151)/(56-80) 149/66     Weight: 82.5 kg (181 lb 14.1 oz)  Body mass index is 32.22 kg/m².  No intake or output data in the 24 hours ending 01/11/22 8007   Physical Exam  Vitals and nursing note reviewed.   Constitutional:       General: She is not in acute distress.     Appearance: She is not ill-appearing.   HENT:      Head: Normocephalic and atraumatic.      Nose: Nose normal.      Mouth/Throat:      Mouth: Mucous membranes are moist.   Eyes:      Extraocular Movements: Extraocular movements intact.      Pupils: Pupils are equal, round, and reactive to light.   Cardiovascular:      Rate and Rhythm: Normal rate and regular rhythm.   Pulmonary:      Effort: Pulmonary effort is normal.      Breath sounds: Normal breath sounds.   Abdominal:      General: Bowel sounds are normal. There is no distension.      Tenderness: There is no abdominal tenderness. There is no guarding.   Musculoskeletal:         General: Normal range of motion.      Cervical back: Normal range of motion and neck supple.   Skin:     General: Skin is warm.   Neurological:      Mental Status: She is alert and oriented to person, place, and time.   Psychiatric:         Mood and Affect: Mood normal.         Significant Labs:   All pertinent labs  within the past 24 hours have been reviewed.  Recent Lab Results       01/11/22  1716   01/11/22  1143   01/11/22  0722   01/11/22  0120   01/10/22  1924        Albumin       2.7         Alkaline Phosphatase       81         ALT       23         Anion Gap       11         AST       24         Baso #       0.04         Basophil %       0.5         BILIRUBIN TOTAL       1.0  Comment: For infants and newborns, interpretation of results should be based  on gestational age, weight and in agreement with clinical  observations.    Premature Infant recommended reference ranges:  Up to 24 hours.............<8.0 mg/dL  Up to 48 hours............<12.0 mg/dL  3-5 days..................<15.0 mg/dL  6-29 days.................<15.0 mg/dL           BUN       32         Calcium       8.6         Chloride       92         CO2       29         Creatinine       1.2         Differential Method       Automated         eGFR if        54.4         eGFR if non        47.2  Comment: Calculation used to obtain the estimated glomerular filtration  rate (eGFR) is the CKD-EPI equation.            Eos #       0.2         Eosinophil %       2.9         Estimated Avg Glucose       263         Glucose       259         Gran # (ANC)       4.2         Gran %       57.2         Hematocrit       30.7         Hemoglobin       10.5         Hemoglobin A1C External       10.8  Comment: According to ADA guidelines, hemoglobin A1C <7.0% represents  optimal control in non-pregnant diabetic patients.  Different  metrics may apply to specific populations.   Standards of Medical Care in Diabetes - 2016.    For the purpose of screening for the presence of diabetes:  <5.7%     Consistent with the absence of diabetes  5.7-6.4%  Consistent with increasing risk for diabetes   (prediabetes)  >or=6.5%  Consistent with diabetes    Currently no consensus exists for use of hemoglobin A1C  for diagnosis of diabetes for children.            Immature Grans (Abs)       0.22  Comment: Mild elevation in immature granulocytes is non specific and   can be seen in a variety of conditions including stress response,   acute inflammation, trauma and pregnancy. Correlation with other   laboratory and clinical findings is essential.           Immature Granulocytes       3.0         Lymph #       1.7         Lymph %       23.1         MCH       29.2         MCHC       34.2         MCV       85         Mono #       1.0         Mono %       13.3         MPV       9.5         nRBC       0         Platelets       225         POC Glucose 319   396   286     355       Potassium       3.6         PROTEIN TOTAL       6.1         RBC       3.60         RDW       16.3         Sodium       132         Vancomycin-Trough       14.1  Comment: Therapeutic Range: 10.0-20.0 ug/mL         WBC       7.36                              Significant Imaging: I have reviewed all pertinent imaging results/findings within the past 24 hours.      Assessment/Plan:    1/11/2022  A)  E fecalis bacteremia with lesions on an AICD lead in the right atrium  Uncontrolled DM 2 despite insulin adjustments  Thoracic pain  Ischemic cardiomyopathy with AICD in place  P)  Continue antibiotics  Adjust PM detemir  Transfer to [facility] arithmia, Dr Oates for AICD lead replacement          No notes have been filed under this hospital service.  Service: Hospital Medicine    VTE Risk Mitigation (From admission, onward)         Ordered     apixaban tablet 2.5 mg  2 times daily         01/07/22 1958     IP VTE HIGH RISK PATIENT  Once         01/07/22 1958     Place sequential compression device  Until discontinued         01/07/22 1958     Reason for No Pharmacological VTE Prophylaxis  Once        Question:  Reasons:  Answer:  Already adequately anticoagulated on oral Anticoagulants    01/07/22 1958                Discharge Planning   BOUCHRA: 1/12/2022     Code Status: Full Code   Is the patient medically  ready for discharge?:     Reason for patient still in hospital (select all that apply): Treatment and Pending disposition  Discharge Plan A: Home with family                  Ankur Wayne MD  Department of Hospital Medicine   [facility]

## 2022-01-11 NOTE — PLAN OF CARE
Problem: Adult Inpatient Plan of Care  Goal: Plan of Care Review  Outcome: Ongoing, Progressing  Goal: Patient-Specific Goal (Individualized)  Outcome: Ongoing, Progressing  Goal: Absence of Hospital-Acquired Illness or Injury  Outcome: Ongoing, Progressing  Goal: Optimal Comfort and Wellbeing  Outcome: Ongoing, Progressing  Goal: Readiness for Transition of Care  Outcome: Ongoing, Progressing     Problem: Diabetes Comorbidity  Goal: Blood Glucose Level Within Targeted Range  Outcome: Ongoing, Progressing     Problem: Fluid Imbalance (Pneumonia)  Goal: Fluid Balance  Outcome: Ongoing, Progressing     Problem: Infection (Pneumonia)  Goal: Resolution of Infection Signs and Symptoms  Outcome: Ongoing, Progressing     Problem: Respiratory Compromise (Pneumonia)  Goal: Effective Oxygenation and Ventilation  Outcome: Ongoing, Progressing     Problem: Fall Injury Risk  Goal: Absence of Fall and Fall-Related Injury  Outcome: Ongoing, Progressing     Problem: Skin Injury Risk Increased  Goal: Skin Health and Integrity  Outcome: Ongoing, Progressing

## 2022-01-11 NOTE — CONSULTS
"Former[facility]  Adult Nutrition   Consult Note (Nutrition Education)     SUMMARY     Recommendations  Recommendation/Intervention: 1) Encouraged patient to continue low sodium diet at home and gave her handouts on low sodium frozen dinners, salt free seasonings, and 7 day of meal ideas. Contact information provided for further questions. 2) RD will continue to monitor.  Goals: Po intake >75% of meals  Nutrition Goal Status: new    Dietitian Rounds Brief  Patient reports tolerating diet well, no c/o N/V and eating ~% of meals. Patient noted that she is trying to follow a low sodium diet at home and accepted more handouts.    Reason for Assessment  Reason For Assessment: consult  Diagnosis:  (Atypical pneumonia)  Relevant Medical History: HLD, CAD, CHF, NSTEMI, HTN, COPD, DM    Nutrition Risk Screen  Nutrition Risk Screen: no indicators present     MST Score: 1  Have you recently lost weight without trying?: Yes: 2-13 lbs  Weight loss score: 1  Have you been eating poorly because of a decreased appetite?: No  Appetite score: 0       Nutrition/Diet History  Food Allergies: NKFA  Factors Affecting Nutritional Intake: None identified at this time    Anthropometrics  Temp: 97.4 °F (36.3 °C)  Height: 5' 3" (160 cm)  Height (inches): 63 in  Weight Method: Bed Scale  Weight: 82.5 kg (181 lb 14.1 oz)  Weight (lb): 181.88 lb  Ideal Body Weight (IBW), Female: 115 lb  % Ideal Body Weight, Female (lb): 158.16 %  BMI (Calculated): 32.2  BMI Grade: 30 - 34.9- obesity - grade I       Weight History:  Wt Readings from Last 10 Encounters:   01/07/22 82.5 kg (181 lb 14.1 oz)   01/10/22 82.5 kg (181 lb 14.1 oz)   12/06/21 80.7 kg (178 lb)   11/26/21 85.3 kg (188 lb)   11/22/21 85.5 kg (188 lb 7.9 oz)   11/10/21 100.4 kg (221 lb 5.5 oz)   11/01/21 89.4 kg (197 lb)   10/20/21 86.6 kg (191 lb)   09/15/21 87.2 kg (192 lb 3.9 oz)   01/14/21 82.6 kg (182 lb)       Lab/Procedures/Meds: Pertinent Labs Reviewed    Clinical " Chemistry:  Recent Labs   Lab 01/07/22  1055 01/08/22  0637 01/11/22  0120   *   < > 132*   K 3.1*   < > 3.6   CL 88*   < > 92*   CO2 27   < > 29   *   < > 259*   BUN 34*   < > 32*   CREATININE 1.4   < > 1.2   CALCIUM 8.6*   < > 8.6*   PROT 7.1  --  6.1   ALBUMIN 3.4*  --  2.7*   BILITOT 1.4*  --  1.0   ALKPHOS 87  --  81   AST 29  --  24   ALT 22  --  23   ANIONGAP 14   < > 11   ESTGFRAFRICA 45.2*   < > 54.4*   EGFRNONAA 39.2*   < > 47.2*   MG 2.4  --   --     < > = values in this interval not displayed.       CBC:   Recent Labs   Lab 01/11/22  0120   WBC 7.36   RBC 3.60*   HGB 10.5*   HCT 30.7*      MCV 85   MCH 29.2   MCHC 34.2       Cardiac Profile:  Recent Labs   Lab 01/07/22  1055 01/07/22 2027 01/08/22  0637   *  --   --    *  --   --    TROPONINI 0.272* 0.581* 0.331*       Inflammatory Labs:  Recent Labs   Lab 01/07/22  1055   CRP 19.53*       Medications: Pertinent Medications reviewed    Scheduled Meds:   amiodarone  200 mg Oral Daily    amLODIPine  5 mg Oral BID    apixaban  2.5 mg Oral BID    atorvastatin  40 mg Oral QHS    citalopram  40 mg Oral Daily    clopidogreL  75 mg Oral Daily    digoxin  0.125 mg Oral Daily    doxycycline  100 mg Oral Q12H    furosemide  40 mg Oral Daily    gabapentin  300 mg Oral TID    insulin detemir U-100  30 Units Subcutaneous QHS    insulin detemir U-100  60 Units Subcutaneous Daily    isosorbide dinitrate  10 mg Oral TID    metOLazone  5 mg Oral Every other day    metoprolol tartrate  50 mg Oral BID    multivitamin  1 tablet Oral Daily    pentoxifylline  400 mg Oral TID    ranolazine  500 mg Oral BID    spironolactone  25 mg Oral Daily    vancomycin (VANCOCIN) IVPB  1,500 mg Intravenous Q20H       Continuous Infusions:   sodium chloride 0.9% 50 mL/hr at 01/09/22 0606       PRN Meds:.acetaminophen, calcium chloride IVPB, calcium chloride IVPB, calcium chloride IVPB, dextrose 50%, dextrose 50%,  HYDROcodone-acetaminophen, insulin regular, magnesium oxide, magnesium sulfate IVPB, magnesium sulfate IVPB, magnesium sulfate IVPB, magnesium sulfate IVPB, melatonin, ondansetron, potassium chloride in water, potassium chloride in water, potassium chloride in water, potassium chloride in water, potassium chloride, potassium chloride, potassium chloride, potassium chloride, vancomycin - pharmacy to dose **AND** Pharmacy to dose Vancomycin consult    Estimated/Assessed Needs  Weight Used For Calorie Calculations: 83 kg (182 lb 15.7 oz)  Energy Calorie Requirements (kcal): 5116-2711 (20-25)  Energy Need Method: Kcal/kg  Protein Requirements: 78-104gm (1.5-2gm/kg IBW)  Weight Used For Protein Calculations: 52 kg (114 lb 10.2 oz) (IBW)     Estimated Fluid Requirement Method: RDA Method  RDA Method (mL): 1660       Nutrition Prescription Ordered  Current Diet Order: 1800 mohit diabetic, cardiac    Evaluation of Received Nutrient/Fluid Intake  Energy Calories Required: meeting needs  Protein Required: meeting needs  Fluid Required: meeting needs  Tolerance: tolerating     Intake/Output Summary (Last 24 hours) at 1/11/2022 1040  Last data filed at 1/10/2022 1128  Gross per 24 hour   Intake 100 ml   Output 0 ml   Net 100 ml      % Intake of Estimated Energy Needs: 75 - 100 %  % Meal Intake: 75 - 100 %    Nutrition Risk  Level of Risk/Frequency of Follow-up: moderate     Monitor and Evaluation  Food and Nutrient Intake: energy intake  Food and Nutrient Adminstration: diet order  Knowledge/Beliefs/Attitudes: food and nutrition knowledge/skill  Physical Activity and Function: nutrition-related ADLs and IADLs  Anthropometric Measurements: weight change  Biochemical Data, Medical Tests and Procedures: electrolyte and renal panel,gastrointestinal profile,glucose/endocrine profile  Nutrition-Focused Physical Findings: overall appearance     Nutrition Follow-Up  RD Follow-up?: Yes

## 2022-01-11 NOTE — PLAN OF CARE
01/10/22 2018   Patient Assessment/Suction   Level of Consciousness (AVPU) alert   Respiratory Effort Normal;Unlabored   All Lung Fields Breath Sounds clear   PRE-TX-O2   O2 Device (Oxygen Therapy) nasal cannula   $ Is the patient on Low Flow Oxygen? Yes   Flow (L/min) 2   SpO2 98 %   Pulse Oximetry Type Intermittent   $ Pulse Oximetry - Multiple Charge Pulse Oximetry - Multiple   Pulse 60   Resp 18   Education   $ Education 15 min   Respiratory Evaluation   $ Care Plan Tech Time 15 min

## 2022-01-11 NOTE — PROGRESS NOTES
Progress Note  Infectious Disease    Admit Date: 1/7/2022   LOS: 4 days     SUBJECTIVE:     Follow-up For: Enterococcus bacteremia, abnormal CXR.    HPI:     Claudia Powell is a 66 y.o. female who has been admitted for the 4th time in the last 3 months for primarily cardiac reasons, presented to the emergency room yesterday with shortness of breath.  She reported that she had been short of breath and coughing for several days and that she had a resting O2 of 87%.  She reported a fever of 100.5°, nasal congestion, rhinorrhea, headache, body aches, nausea.  She also had polydipsia and polyuria, and urinary urgency.  She had a white blood cell count 88402, CRP of 19, troponin 0.27 100 chest x-ray was consistent with faint ground-glass opacities throughout both lungs.  COVID testing was negative, procalcitonin was in the normal range.  Blood sugar was 358, creatinine 1.4.  She was started empirically on vancomycin, meropenem and doxycycline (allergic to cephalosporins and penicillin).  She has been afebrile here, white blood cells are improved today and overnight all of her blood cultures now have presumptive Enterococcus  She was treated for urinary tract infection in November.  She has not required antibiotics since then.  She does not have any dysuria hematuria but she does acknowledge that she may not be emptying her bladder completely.  She has no sensation of pelvic floor incompetence.  She has never had a colonoscopy.  She does experience constipation but denies any blood in the stool.  She has no right upper quadrant pain or postprandial abdominal pain..  A repeat echo is being performed and compared to November 2021 at which time she had pulmonary hypertension, EF of 33%, global hypokinesis and mild pan-valvular regurgitation.    INTERVAL HISTORY:  1/10/22 (Cl):  Patient seen and examined at bedside, feeling much better.  Still complaining of mid thoracic/muscular left pain.  Awaiting KOMAL today. UA from  "clean negative for infection, glucose 4+. Repeat blood cultures x2 1/8 no growth, pending final. Respiratory culture 1/10 in process.   1/11:  Interim reviewed, discussed with Dr. Wayne.  OKMAL from 01/10 revealed a small echogenic mass present on the right ventricular lead of her defibrillator.  Hyperechoic pannus formation seen again in the right ventricular lead just below the tricuspid valve, cannot rule out vegetation it is.  It measures 2.1 x 1 cm.  Discussed with Cardiology, plan for transfer to [facility] for removal of defibrillator.  Patient is still complaining of chronic left midthoracic pain.  Nuclear scan in process.  Blood cultures from 01/08 and 1/9 no growth.  Respiratory culture with reduced normal respiratory quang.     Antibiotics (From admission, onward)                Start     Stop Route Frequency Ordered    01/11/22 2200  vancomycin 1,500 mg in dextrose 5 % 500 mL IVPB         -- IV Every 20 hours 01/11/22 0300    01/10/22 2300  doxycycline capsule 100 mg         -- Oral Every 12 hours 01/10/22 1447    01/07/22 2355  vancomycin - pharmacy to dose        "And" Linked Group Details    -- IV pharmacy to manage frequency 01/07/22 2356            Review of Systems:  HEENT: No change in vision, sore throat, ulcers, thrush, dysphagia  Heart: No chest pain, orthopnea or edema  Lungs: No shortness of breath, cough, sputum production or pleuritic pain  Abdomen: No nausea, vomiting, diarrhea, abdominal pain, appetite is good   Extremities: No edema or erythema  MSK:  Left midthoracic pain, ambulates  Neurological: No headaches, focal weakness  Psych: Calm, appropriate  Skin: No rash, itching, or erythema  VAD: No IV site issues    OBJECTIVE:     Vital Signs (Most Recent)  Temp: 97.4 °F (36.3 °C) (01/11/22 0747)  Pulse: 60 (01/11/22 0747)  Resp: 18 (01/11/22 0747)  BP: (!) 151/80 (01/11/22 0747)  SpO2: 97 % (01/11/22 0747)    Temperature Range Min/Max (Last 24H):  Temp:  [97.4 °F (36.3 °C)-98.6 " °F (37 °C)]     I & O (Last 24H):    Intake/Output Summary (Last 24 hours) at 1/11/2022 0914  Last data filed at 1/10/2022 1128  Gross per 24 hour   Intake 100 ml   Output 0 ml   Net 100 ml       Physical Exam:  General:         In NAD. Alert and attentive, cooperative, comfortable  Eyes:               Anicteric, PERRL, EOMI  ENT:                No ulcers, exudates, thrush, nares patent, dentition is good  Neck:              Supple,   Lungs:             clear to auscultation bilateral  Heart:              S1/S2+, regular rhythm, 2/6 systolic murmur, defibrillator/AICD  left side, no redness nontender to palpation  Abd:                Soft, NT, ND, normal BS, no masses or organomegaly appreciated.  :                  Voids    Musc:               left midthoracic tenderness by the T8-T10. Joints without effusion, swelling, erythema, synovitis, muscle wasting.   Skin:               No rashes. No palmar or plantar lesions.  Left index finger has a splinter lesion which looks old.  Numerous tattoos  Neuro:             Alert, attentive, speech fluent, face symmetric, moves all extremities, no focal weakness. Ambulatory  Psych:            Calm, cooperative  Lymphatic:       Extrem:           No edema, erythema, phlebitis, cellulitis, warm and well perfused  VAD:               Peripheral                                  Isolation:        None  Wound:               Laboratory:    Recent Labs   Lab 01/09/22  0710 01/10/22  0609 01/11/22  0120   WBC 9.78 8.42 7.36   HGB 10.6* 10.9* 10.5*   HCT 31.9* 33.7* 30.7*    236 225       Recent Labs   Lab 01/07/22  1055 01/08/22  0637 01/10/22  0609   * 130* 134*   K 3.1* 3.5 3.4*   CL 88* 90* 91*   CO2 27 29 31*   BUN 34* 36* 31*   CREATININE 1.4 1.5* 1.1   CALCIUM 8.6* 8.4* 8.9   PROT 7.1  --   --    BILITOT 1.4*  --   --    ALKPHOS 87  --   --    ALT 22  --   --    AST 29  --   --      Recent Labs   Lab 01/07/22  1055   CRP 19.53*     Estimated Creatinine Clearance:  51.1 mL/min (based on SCr of 1.1 mg/dL).     Microbiology Results (last 7 days)       Procedure Component Value Units Date/Time    Culture, Respiratory with Gram Stain [[id number]] Collected: 01/10/22 0814    Order Status: Completed Specimen: Respiratory from Sputum, Expectorated Updated: 01/11/22 0752     Respiratory Culture Reduced Normal Respiratory quang    Blood culture [[id number]] Collected: 01/08/22 1629    Order Status: Completed Specimen: Blood from Antecubital, Right Arm Updated: 01/10/22 1832     Blood Culture, Routine No Growth to date      No Growth to date      No Growth to date    Blood culture [[id number]] Collected: 01/09/22 0710    Order Status: Completed Specimen: Blood from Antecubital, Left Arm Updated: 01/10/22 1232     Blood Culture, Routine No Growth to date      No Growth to date    Blood Culture #2 **CANNOT BE ORDERED STAT** [[id number]]  (Abnormal) Collected: 01/07/22 1220    Order Status: Completed Specimen: Blood from Peripheral, Antecubital, Right Updated: 01/09/22 0754     Blood Culture, Routine Gram stain des bottle: Gram positive cocci in pairs       Results called to and read back by:JOSH ALVARADO RN 1200 01/07/2022        23:22 FGF      Gram stain aer bottle: Gram positive cocci in pairs       Positive results previously called to and read back by Josh Alvarado RN       1200 on 01/07/2022 23:22  01/08/2022  00:19 fgf      ENTEROCOCCUS FAECALIS  For susceptibility see order #[id number]      Blood Culture #1 **CANNOT BE ORDERED STAT** [[id number]]  (Abnormal)  (Susceptibility) Collected: 01/07/22 1055    Order Status: Completed Specimen: Blood from Peripheral, Hand, Left Updated: 01/09/22 0753     Blood Culture, Routine Gram stain aer bottle: Gram positive cocci in pairs       Gram stain des bottle: Gram positive cocci in pairs       Results called to and read back by:JOSH ALVARADO RN 1200  01/07/2022        23:21 FGF      ENTEROCOCCUS FAECALIS            Diagnostic Results:  Reviewed  CXR  CT scan abdomen/pelvis 1/8/22:  1. No finding of bowel obstruction, intra-abdominal free air or abscess.  2. No evidence of obstructive uropathy, or radiopaque renal/clicking system stone.  3. Trace left greater than right pleural effusion and adjacent atelectasis.  4. Numerous additional, and incidental findings as noted above.       Cardiology:  KOMAL 1/11/22:  · The left ventricle is mildly enlarged with concentric remodeling and  · The estimated ejection fraction is 35%.  · Left ventricular diastolic dysfunction.  · There is moderate left ventricular global hypokinesis.  · There is abnormal septal wall motion consistent with right ventricular pacemaker.  · Normal right ventricular size with normal right ventricular systolic function.  · Small echogenic mass present on the right ventricular lead.. The mass is fixed. Hyperechoic oblong pannus formation seen on the right ventricular lead just below the tricuspid valve with some peripheral irregularities associated a vegetation cannot be ruled out completely. This measures 2.1 by 1.0 cm in total.  · Moderate mitral regurgitation.  · Mild to moderate tricuspid regurgitation.  · The right atrial lead appears unremarkable. Lead visualized in the coronary sinus also appears unremarkable.       ECHO 11/2021:  · Elevated central venous pressure (15 mmHg).  · The estimated PA systolic pressure is 44 mmHg.  · The left ventricle is mildly enlarged with moderately decreased systolic function.  · The estimated ejection fraction is 33%.  · Grade III left ventricular diastolic dysfunction.  · There is mild left ventricular global hypokinesis.  · Normal right ventricular size with normal right ventricular systolic function.  · Mild left atrial enlargement.  · Mild right atrial enlargement.  · Mild aortic regurgitation.  · Mild-to-moderate mitral regurgitation.  · Mild tricuspid regurgitation.  · Mild pulmonic regurgitation.        ASSESSMENT/PLAN:     1. Sepsis  resolved, likely secondary to Enterococcus faecalis bacteremia in the setting of infected right lead/defibrillator (IE)   Repeat blood cultures 1/8 no growth    KOMAL showed suspicious pannus on right ventricular lead, cannot exclude vegetation   CRP 19.5    2. Chronic left mid-thoracic pain   Awaiting WB NM scan  3. Poorly controlled Diabetes, follow up A1c%  4. Cardiomyopathy   5. Defibrillator in situ     Recommendations:   Adequate source control   [place] [facility]   Continue Vancomycin IV, keep level 15-20 for at least 6 weeks after removing device   Monitor CBC w/diff, CMP, CRP, vanco level, weekly while on antibiotics   Follow up WB NM scan   Trend CRP  Type glucose control  Follow cultures  Will follow    D/w Dr Wayne    Medical Decision Making during this encounter was  [_] Low Complexity  [_] Moderate Complexity  [xx] High Complexity

## 2022-01-11 NOTE — PROGRESS NOTES
[facility] Medicine  Progress Note    Patient Name: Claudia Powell  MRN: [id number]  Patient Class: IP- Inpatient   Admission Date: 1/7/2022  Length of Stay: 3 days  Attending Physician: Ankur Wayne MD  Primary Care Provider: Manjit Monae MD        Subjective:     Principal Problem:Atypical pneumonia        HPI:  66 year old female with history of CAD, CHF, DM2, HTN, and  has been vaccinated against COVID presented to ED complaining of 2-3 days of worsening SOB, cough with yellow sputum. She reported a temperature of 105 F yesterday to ED MD. No anginal like chest discomfort.No orthopnea, PND or pedal edema. She was found to be hypoxic by EMS. They started her on 15 lpm, which has since been decreased to 3 lpm     In ED labs reviewed and noted below: leukocytosis with normal H/H; hyponatremia, hypokalemia (treated in ED) with stage 3b renal dysfunction; cardiac b iomarkers are minimally elevated; elevated lactate (patient was hypoxic by EMS) with normal procalcitonin. CXR: atypical pneumonia changes. EKG reviewed: Biventricular pacemaker detected with no acute segments. Discussed with ED MD: admit for atypical pneumonia; trend cardiac biomarkers; cultured; 130 ml/hr bolus of LR; iv antibiotics      Overview/Hospital Course:  1/8/2022  Ms powell has had a cough for weeks? with some LAUGHLIN fever(105) and yellow sputum production    1/9/2022  Pt is feeling much better with decreased SOB, LAUGHLIN and weakness.    1/10/2022  Ms Powell is feeling much stronger and more active. She is concerned Re the Lesions on her AICD lead.      Interval History: Ms Powell has enterococcal sepsis and lesions on an AICD lead. She will have to go to [facility] Re the above - Dr Oates accepting    Review of Systems   Constitutional: Positive for fatigue. Negative for chills and fever.   HENT: Negative.    Eyes: Negative.    Respiratory: Negative.    Cardiovascular: Negative.    Gastrointestinal: Negative.     Endocrine: Positive for heat intolerance.   Musculoskeletal: Positive for arthralgias and myalgias.   Skin: Negative.    Allergic/Immunologic: Positive for immunocompromised state.   Neurological: Positive for weakness.   Psychiatric/Behavioral: The patient is nervous/anxious.      Objective:     Vital Signs (Most Recent):  Temp: 98.3 °F (36.8 °C) (01/10/22 1700)  Pulse: 60 (01/10/22 1700)  Resp: 18 (01/10/22 1700)  BP: (!) 152/62 (01/10/22 1700)  SpO2: 95 % (01/10/22 1700) Vital Signs (24h Range):  Temp:  [97.6 °F (36.4 °C)-98.3 °F (36.8 °C)] 98.3 °F (36.8 °C)  Pulse:  [54-64] 60  Resp:  [11-18] 18  SpO2:  [93 %-100 %] 95 %  BP: (128-174)/(62-73) 152/62     Weight: 82.5 kg (181 lb 14.1 oz)  Body mass index is 32.22 kg/m².    Intake/Output Summary (Last 24 hours) at 1/10/2022 1833  Last data filed at 1/10/2022 1128  Gross per 24 hour   Intake 100 ml   Output 0 ml   Net 100 ml      Physical Exam  Vitals and nursing note reviewed.   Constitutional:       General: She is not in acute distress.  HENT:      Head: Normocephalic and atraumatic.      Nose: Nose normal.      Mouth/Throat:      Mouth: Mucous membranes are moist.   Eyes:      Extraocular Movements: Extraocular movements intact.      Pupils: Pupils are equal, round, and reactive to light.   Cardiovascular:      Rate and Rhythm: Normal rate and regular rhythm.   Pulmonary:      Effort: Pulmonary effort is normal.      Breath sounds: Normal breath sounds.   Abdominal:      General: Bowel sounds are normal. There is no distension.      Tenderness: There is no abdominal tenderness. There is no guarding.   Musculoskeletal:         General: Normal range of motion.      Cervical back: Normal range of motion and neck supple.   Skin:     General: Skin is warm.   Neurological:      General: No focal deficit present.      Mental Status: She is alert and oriented to person, place, and time.   Psychiatric:      Comments: anxious         Significant Labs:   All pertinent  labs within the past 24 hours have been reviewed.  Recent Lab Results       01/10/22  1704   01/10/22  1320   01/10/22  1133   01/10/22  0609   01/10/22  0539        Anion Gap       12         Baso #       0.08         Basophil %       1.0         BSA     1.91           BUN       31         Calcium       8.9         Chloride       91         CO2       31         Creatinine       1.1         Differential Method       Automated         eGFR if        >60.0         eGFR if non        52.4  Comment: Calculation used to obtain the estimated glomerular filtration  rate (eGFR) is the CKD-EPI equation.            EF     35           Eos #       0.3         Eosinophil %       3.2         Glucose       207         Gran # (ANC)       4.9         Gran %       57.8         Hematocrit       33.7         Hemoglobin       10.9         Immature Grans (Abs)       0.13  Comment: Mild elevation in immature granulocytes is non specific and   can be seen in a variety of conditions including stress response,   acute inflammation, trauma and pregnancy. Correlation with other   laboratory and clinical findings is essential.           Immature Granulocytes       1.5         Lymph #       2.0         Lymph %       23.8         MCH       28.4         MCHC       32.3         MCV       88         Mono #       1.1         Mono %       12.7         MPV       10.1         nRBC       0         Platelets       236         POC Glucose 312   172       220       Potassium       3.4         RBC       3.84         RDW       16.2         Sodium       134         WBC       8.42                          01/09/22  2012        Anion Gap       Baso #       Basophil %       BSA       BUN       Calcium       Chloride       CO2       Creatinine       Differential Method       eGFR if        eGFR if non        EF       Eos #       Eosinophil %       Glucose       Gran # (ANC)       Gran %        Hematocrit       Hemoglobin       Immature Grans (Abs)       Immature Granulocytes       Lymph #       Lymph %       MCH       MCHC       MCV       Mono #       Mono %       MPV       nRBC       Platelets       POC Glucose 368       Potassium       RBC       RDW       Sodium       WBC             Significant Imaging: I have reviewed all pertinent imaging results/findings within the past 24 hours.      Assessment/Plan:    1/10/2022  A)  Ms Powell has enterococcal sepsis with lesions on an AICD lead by [facility] that will require lead replacement at [facility]  Bronchitis resolving  DM 2 uncontrolled but control improving  P)  Increase PM Detemir to 30 units  Continue vancomycin  Consider transfer to [facility] for AICD lead replacement      No notes have been filed under this hospital service.  Service: Hospital Medicine    VTE Risk Mitigation (From admission, onward)         Ordered     apixaban tablet 2.5 mg  2 times daily         01/07/22 1958     IP VTE HIGH RISK PATIENT  Once         01/07/22 1958     Place sequential compression device  Until discontinued         01/07/22 1958     Reason for No Pharmacological VTE Prophylaxis  Once        Question:  Reasons:  Answer:  Already adequately anticoagulated on oral Anticoagulants    01/07/22 1958                Discharge Planning   BOUCHRA: 1/12/2022     Code Status: Full Code   Is the patient medically ready for discharge?:     Reason for patient still in hospital (select all that apply): Patient new problem, Treatment, Consult recommendations and Pending disposition  Discharge Plan A: Home with family                  Ankur Wayne MD  Department of Hospital Medicine   [facility]

## 2022-01-11 NOTE — CONSULTS
[facility]  Department of Cardiology  Consult Note      PATIENT NAME: Claudia Powell    MRN: [id number]  TODAY'S DATE: 01/10/2022  ADMIT DATE: 2022                          CONSULT REQUESTED BY: Ankur Wayne MD    SUBJECTIVE     PRINCIPAL PROBLEM: Atypical pneumonia      REASON FOR CONSULT:  Enterococcus bacteremia without ICD device      HPI:  Claudia Powell is a 66-year-old female that has been admitted to the hospital for the 4th time in the past 3 months mostly because of cardiac reasons.  She has not been feeling well for the past 2 or 3 days.  She had temperature of a 105° on the day of admission.  She noticed that she could not move the left leg and she was confused.  She called the paramedics and was brought in.  Workup revealed that she has Enterococcus bacteremia she has a Bi V ICD.  A KOMAL was performed earlier today that showed a mass in the right ventricular lead.  She has history of atrial fibrillation, heart failure with reduced ejection fraction, status post ICD placement, coronary artery disease with previous coronary artery bypass, diabetes mellitus, hypertension.  She denies any chest pains        Review of patient's allergies indicates:   Allergen Reactions    Cephalosporins     Penicillins      hives         Past Medical History:   Diagnosis Date    CHF (congestive heart failure)     COPD (chronic obstructive pulmonary disease) 2021    Coronary artery disease     Diabetes mellitus     Hyperlipidemia     Hypertension     LBBB (left bundle branch block)     NSTEMI (non-ST elevated myocardial infarction)     Obesity 2021    Pulmonary hypertension 2021    PVD (peripheral vascular disease)      Past Surgical History:   Procedure Laterality Date    CARDIAC CATHETERIZATION       SECTION      CORONARY ARTERY BYPASS GRAFT  06/22/2016    x4    HYSTERECTOMY      INSERTION OF BIVENTRICULAR IMPLANTABLE CARDIOVERTER-DEFIBRILLATOR (ICD)        Social History     Tobacco Use    Smoking status: Former Smoker    Smokeless tobacco: Never Used   Substance Use Topics    Alcohol use: Not Currently    Drug use: Never        REVIEW OF SYSTEMS  CONSTITUTIONAL:  Positive for chills and fever   NEURO:  Positive for weak dizzy and confusion  RESPIRATORY:  Positive for cough shortness of breath    CARDIOVASCULAR: Negative for chest pain. Negative for palpitations and leg swelling.   GI: Negative for abdominal pain, No melena, diarrhea, nausea and vomiting.   : Negative for dysuria and frequency, Negative for hematuria   ENDOCRINE:  Positive for polydipsia polyuria   MUSCULOSKELETAL:  Positive for weakness not able to move the legs    OBJECTIVE     VITAL SIGNS (Most Recent)  Temp: 98.3 °F (36.8 °C) (01/10/22 1700)  Pulse: 60 (01/10/22 1700)  Resp: 18 (01/10/22 1700)  BP: (!) 152/62 (01/10/22 1700)  SpO2: 95 % (01/10/22 1700)    VENTILATION STATUS  Resp: 18 (01/10/22 1700)  SpO2: 95 % (01/10/22 1700)       I & O (Last 24H):    Intake/Output Summary (Last 24 hours) at 1/10/2022 1856  Last data filed at 1/10/2022 1128  Gross per 24 hour   Intake 100 ml   Output 0 ml   Net 100 ml       WEIGHTS  Wt Readings from Last 3 Encounters:   01/07/22 2000 82.5 kg (181 lb 14.1 oz)   01/07/22 1043 79.4 kg (175 lb)   01/10/22 1111 82.5 kg (181 lb 14.1 oz)   12/06/21 1532 80.7 kg (178 lb)       PHYSICAL EXAM  GENERAL: well built, well nourished, well-developed in no apparent distress alert and oriented.   HEENT: Normocephalic. Pupils normal and conjunctivae normal.  Mucous membranes normal, no cyanosis or icterus, trachea central,no pallor or icterus is noted..   NECK: No JVD. No bruit..   CARDIAC: Regular rate and rhythm. S1 is normal.S2 is normal.  There is a grade 2/6 systolic murmur at the apex.  CHEST ANATOMY: normal.   LUNGS:  Basal crackles..   ABDOMEN: Soft no masses or organomegaly.  No abdomen pulsations or bruits.  Normal bowel sounds. No pulsations and no masses  felt, No guarding or rebound.   URINARY: No montes catheter   EXTREMITIES: No cyanosis, clubbing or edema noted at this time., no calf tenderness bilaterally.     HOME MEDICATIONS:  No current facility-administered medications on file prior to encounter.     Current Outpatient Medications on File Prior to Encounter   Medication Sig Dispense Refill    albuterol (PROVENTIL/VENTOLIN HFA) 90 mcg/actuation inhaler Inhale 1 puff into the lungs every 4 (four) hours as needed.      amiodarone (PACERONE) 200 MG Tab Take 1 tablet (200 mg total) by mouth once daily. 90 tablet 3    amLODIPine (NORVASC) 5 MG tablet Take 1 tablet (5 mg total) by mouth 2 (two) times daily. 180 tablet 3    apixaban (ELIQUIS) 2.5 mg Tab Take 1 tablet (2.5 mg total) by mouth 2 (two) times daily. 180 tablet 3    atorvastatin (LIPITOR) 40 MG tablet Take 1 tablet (40 mg total) by mouth every evening. 90 tablet 2    citalopram (CELEXA) 40 MG tablet Take 40 mg by mouth once daily.      clopidogreL (PLAVIX) 75 mg tablet Take 1 tablet (75 mg total) by mouth once daily. 90 tablet 3    digoxin (LANOXIN) 125 mcg tablet Take 1 tablet (0.125 mg total) by mouth once daily. 30 tablet 2    furosemide (LASIX) 40 MG tablet Take 1.5 tablets (60 mg total) by mouth once daily for 3 days, THEN 1 tablet (40 mg total) once daily. 60 tablet 2    gabapentin (NEURONTIN) 400 MG capsule Take 400 mg by mouth 3 (three) times daily.      isosorbide dinitrate (ISORDIL) 10 MG tablet Take 1 tablet (10 mg total) by mouth 3 (three) times daily. 270 tablet 0    LANTUS U-100 INSULIN 100 unit/mL injection Inject 50 Units into the skin once daily.      metoprolol tartrate (LOPRESSOR) 50 MG tablet Take 1 tablet (50 mg total) by mouth 2 (two) times daily. 180 tablet 2    multivitamin (THERAGRAN) per tablet Take 1 tablet by mouth once daily.      pentoxifylline (TRENTAL) 400 mg TbSR Take 1 tablet (400 mg total) by mouth 3 (three) times daily. 270 tablet 3    ranolazine (RANEXA)  500 MG Tb12 Take 1 tablet (500 mg total) by mouth 2 (two) times daily. 60 tablet 2    spironolactone (ALDACTONE) 25 MG tablet Take 1 tablet (25 mg total) by mouth once daily. Hold until patient follows up with PCP or Cardiology      ALPRAZolam (XANAX) 0.5 MG tablet Take 1 tablet (0.5 mg total) by mouth 2 (two) times daily as needed for Anxiety. 14 tablet 0    metOLazone (ZAROXOLYN) 5 MG tablet Take 5 mg by mouth every other day.         SCHEDULED MEDS:   amiodarone  200 mg Oral Daily    amLODIPine  5 mg Oral BID    apixaban  2.5 mg Oral BID    atorvastatin  40 mg Oral QHS    citalopram  40 mg Oral Daily    clopidogreL  75 mg Oral Daily    digoxin  0.125 mg Oral Daily    doxycycline  100 mg Oral Q12H    furosemide  40 mg Oral Daily    gabapentin  300 mg Oral TID    insulin detemir U-100  30 Units Subcutaneous QHS    insulin detemir U-100  60 Units Subcutaneous Daily    isosorbide dinitrate  10 mg Oral TID    metOLazone  5 mg Oral Every other day    metoprolol tartrate  50 mg Oral BID    multivitamin  1 tablet Oral Daily    pentoxifylline  400 mg Oral TID    ranolazine  500 mg Oral BID    spironolactone  25 mg Oral Daily    vancomycin (VANCOCIN) IVPB  1,500 mg Intravenous Q24H       CONTINUOUS INFUSIONS:   sodium chloride 0.9% 50 mL/hr at 01/09/22 0606       PRN MEDS:acetaminophen, calcium chloride IVPB, calcium chloride IVPB, calcium chloride IVPB, dextrose 50%, dextrose 50%, HYDROcodone-acetaminophen, insulin regular, magnesium oxide, magnesium sulfate IVPB, magnesium sulfate IVPB, magnesium sulfate IVPB, magnesium sulfate IVPB, melatonin, ondansetron, potassium chloride in water, potassium chloride in water, potassium chloride in water, potassium chloride in water, potassium chloride, potassium chloride, potassium chloride, potassium chloride, vancomycin - pharmacy to dose **AND** Pharmacy to dose Vancomycin consult    LABS AND DIAGNOSTICS     CBC LAST 3 DAYS  Recent Labs   Lab  01/08/22 0637 01/08/22  0637 01/09/22  0710 01/10/22  0609   WBC 11.60  --  9.78 8.42   RBC 3.87*  --  3.69* 3.84*   HGB 11.0*  --  10.6* 10.9*   HCT 33.9*  --  31.9* 33.7*   MCV 88  --  86 88   MCH 28.4  --  28.7 28.4   MCHC 32.4  --  33.2 32.3   RDW 17.0*  --  16.3* 16.2*     --  195 236   MPV 9.8  --  10.0 10.1   GRAN 78.2*  9.1*   < > 74.4*  7.3 57.8  4.9   LYMPH 10.4*  1.2   < > 11.8*  1.2 23.8  2.0   MONO 10.1  1.2*   < > 10.6  1.0 12.7  1.1*   BASO 0.03  --  0.04 0.08   NRBC 0  --  0 0    < > = values in this interval not displayed.       COAGULATION LAST 3 DAYS  Recent Labs   Lab 01/07/22  1055   LABPT 16.2*   INR 1.4       CHEMISTRY LAST 3 DAYS  Recent Labs   Lab 01/07/22  1055 01/07/22  1055 01/07/22  1117 01/08/22  0637 01/08/22  0637 01/08/22  1125 01/08/22  2043 01/10/22  0609   *  --   --  130*  --   --   --  134*   K 3.1*  --   --  3.5  --   --   --  3.4*   CL 88*  --   --  90*  --   --   --  91*   CO2 27  --   --  29  --   --   --  31*   ANIONGAP 14  --   --  11  --   --   --  12   BUN 34*  --   --  36*  --   --   --  31*   CREATININE 1.4  --   --  1.5*  --   --   --  1.1   *   < >  --  381*   < > 560* 441* 207*   CALCIUM 8.6*  --   --  8.4*  --   --   --  8.9   PH  --   --  7.503*  --   --   --   --   --    MG 2.4  --   --   --   --   --   --   --    ALBUMIN 3.4*  --   --   --   --   --   --   --    PROT 7.1  --   --   --   --   --   --   --    ALKPHOS 87  --   --   --   --   --   --   --    ALT 22  --   --   --   --   --   --   --    AST 29  --   --   --   --   --   --   --    BILITOT 1.4*  --   --   --   --   --   --   --     < > = values in this interval not displayed.       CARDIAC PROFILE LAST 3 DAYS  Recent Labs   Lab 01/07/22  1055 01/07/22 2027 01/08/22  0637   *  --   --    *  --   --    *  --   --    TROPONINI 0.272* 0.581* 0.331*       ENDOCRINE LAST 3 DAYS  Recent Labs   Lab 01/07/22  1055   PROCAL 0.40       LAST ARTERIAL BLOOD  GAS  ABG  Recent Labs   Lab 01/07/22  1117   PH 7.503*   PO2 40   PCO2 45.0   HCO3 35.3*   BE 12       LAST 7 DAYS MICROBIOLOGY   Microbiology Results (last 7 days)     Procedure Component Value Units Date/Time    Blood culture [[id number]] Collected: 01/08/22 1629    Order Status: Completed Specimen: Blood from Antecubital, Right Arm Updated: 01/10/22 1832     Blood Culture, Routine No Growth to date      No Growth to date      No Growth to date    Blood culture [[id number]] Collected: 01/09/22 0710    Order Status: Completed Specimen: Blood from Antecubital, Left Arm Updated: 01/10/22 1232     Blood Culture, Routine No Growth to date      No Growth to date    Culture, Respiratory with Gram Stain [[id number]] Collected: 01/10/22 0814    Order Status: Sent Specimen: Respiratory from Sputum, Expectorated Updated: 01/10/22 0820    Blood Culture #2 **CANNOT BE ORDERED STAT** [[id number]]  (Abnormal) Collected: 01/07/22 1220    Order Status: Completed Specimen: Blood from Peripheral, Antecubital, Right Updated: 01/09/22 0754     Blood Culture, Routine Gram stain des bottle: Gram positive cocci in pairs       Results called to and read back by:JOSH ALVARADO RN 1200 01/07/2022        23:22 FGF      Gram stain aer bottle: Gram positive cocci in pairs       Positive results previously called to and read back by Josh Alvarado RN       1200 on 01/07/2022 23:22  01/08/2022  00:19 fgf      ENTEROCOCCUS FAECALIS  For susceptibility see order #[id number]      Blood Culture #1 **CANNOT BE ORDERED STAT** [[id number]]  (Abnormal)  (Susceptibility) Collected: 01/07/22 1055    Order Status: Completed Specimen: Blood from Peripheral, Hand, Left Updated: 01/09/22 0753     Blood Culture, Routine Gram stain aer bottle: Gram positive cocci in pairs       Gram stain des bottle: Gram positive cocci in pairs       Results called to and read back by:JOSH ALVARADO RN 1200  01/07/2022        23:21 FGF      ENTEROCOCCUS FAECALIS          MOST RECENT  IMAGING  Transesophageal echo (KOMAL)  · The left ventricle is mildly enlarged with concentric remodeling and  · The estimated ejection fraction is 35%.  · Left ventricular diastolic dysfunction.  · There is moderate left ventricular global hypokinesis.  · There is abnormal septal wall motion consistent with right ventricular   pacemaker.  · Normal right ventricular size with normal right ventricular systolic   function.  · Small echogenic mass present on the right ventricular lead.. The mass is   fixed. Hyperechoic oblong pannus formation seen on the right ventricular   lead just below the tricuspid valve with some peripheral irregularities   associated a vegetation cannot be ruled out completely. This measures 2.1   by 1.0 cm in total.  · Moderate mitral regurgitation.  · Mild to moderate tricuspid regurgitation.  · The right atrial lead appears unremarkable. Lead visualized in the   coronary sinus also appears unremarkable.     Intra-Procedure Documentation  KOMAL performed in the Invasive Lab    - See Procedure Log link below for nursing documentation    - See KOMAL order on Card Proc Tab for physician findings       ECHOCARDIOGRAM RESULTS (last 5)  Results for orders placed during the hospital encounter of 01/07/22    Transesophageal echo (KOMAL)    Interpretation Summary  · The left ventricle is mildly enlarged with concentric remodeling and  · The estimated ejection fraction is 35%.  · Left ventricular diastolic dysfunction.  · There is moderate left ventricular global hypokinesis.  · There is abnormal septal wall motion consistent with right ventricular pacemaker.  · Normal right ventricular size with normal right ventricular systolic function.  · Small echogenic mass present on the right ventricular lead.. The mass is fixed. Hyperechoic oblong pannus formation seen on the right ventricular lead just below the tricuspid valve with some peripheral irregularities associated a vegetation cannot be ruled out completely.  This measures 2.1 by 1.0 cm in total.  · Moderate mitral regurgitation.  · Mild to moderate tricuspid regurgitation.  · The right atrial lead appears unremarkable. Lead visualized in the coronary sinus also appears unremarkable.      Echo    Interpretation Summary  · Mildly decreased systolic function.  · The estimated ejection fraction is 42%.  · Left ventricular diastolic dysfunction.  · There is moderate left ventricular global hypokinesis.  · Normal right ventricular size with normal right ventricular systolic function.  · Normal central venous pressure (3 mmHg).      Results for orders placed during the hospital encounter of 11/05/21    Echo    Interpretation Summary  · Elevated central venous pressure (15 mmHg).  · The estimated PA systolic pressure is 44 mmHg.  · The left ventricle is mildly enlarged with moderately decreased systolic function.  · The estimated ejection fraction is 33%.  · Grade III left ventricular diastolic dysfunction.  · There is mild left ventricular global hypokinesis.  · Normal right ventricular size with normal right ventricular systolic function.  · Mild left atrial enlargement.  · Mild right atrial enlargement.  · Mild aortic regurgitation.  · Mild-to-moderate mitral regurgitation.  · Mild tricuspid regurgitation.  · Mild pulmonic regurgitation.      Results for orders placed during the hospital encounter of 11/11/20    Echo Color Flow Doppler? Yes; Bubble Contrast? No    Interpretation Summary  · There is mild left ventricular eccentric hypertrophy.  · The estimated ejection fraction is 45%.  · Grade I diastolic dysfunction.  · Atrial fibrillation not observed.  · Normal right ventricular size with normal right ventricular systolic function.  · Moderate left atrial enlargement.  · Mild mitral regurgitation.  · Mild tricuspid regurgitation.  · Normal central venous pressure (3 mmHg).  · The estimated PA systolic pressure is 28 mmHg.      CURRENT/PREVIOUS VISIT EKG  Results for  orders placed or performed during the hospital encounter of 01/07/22   EKG 12-lead    Collection Time: 01/07/22 11:17 AM    Narrative    Test Reason : U07.1,    Vent. Rate : 066 BPM     Atrial Rate : 066 BPM     P-R Int : 144 ms          QRS Dur : 164 ms      QT Int : 620 ms       P-R-T Axes : 000 106 211 degrees     QTc Int : 649 ms    Atrial-sensed ventricular-paced rhythm  Biventricular pacemaker detected  Abnormal ECG  When compared with ECG of 26-NOV-2021 20:33,  Premature ventricular complexes are no longer Present  Vent. rate has decreased BY   6 BPM  Confirmed by Pasquale Woodward MD ([id number]) on 1/7/2022 6:31:17 PM    Referred By: CEDRIC   SELF           Confirmed By:Pasquale Woodward MD           ASSESSMENT/PLAN:     Active Hospital Problems    Diagnosis    *Atypical pneumonia    Sepsis due to Enterococcus with no resultant organ failure    Type 2 diabetes mellitus with hyperglycemia, without long-term current use of insulin    Pulmonary hypertension       ASSESSMENT & PLAN:   Enterococcus bacteremia  Bi V ICD with a echogenic mass in the right ventricular lead  Coronary artery disease with previous coronary artery bypass stable  Diabetes mellitus  Hypertension      RECOMMENDATIONS:  Transfer the patient to [facility] for lead extraction.  This has been discussed with her and she is in agreement to proceed.  Dr. Art Martin with the EP Service has accepted the patient.        Noam Najera MD  Department of Cardiology  Date of Service: 01/10/2022  6:56 PM

## 2022-01-11 NOTE — PLAN OF CARE
01/11/22 0743   Patient Assessment/Suction   Level of Consciousness (AVPU) alert   Respiratory Effort Unlabored   PRE-TX-O2   O2 Device (Oxygen Therapy) nasal cannula   $ Is the patient on Low Flow Oxygen? Yes   Flow (L/min) 2   Pulse Oximetry Type Intermittent   $ Pulse Oximetry - Multiple Charge Pulse Oximetry - Multiple   Pulse 70   Resp 18   Respiratory Evaluation   $ Care Plan Tech Time 15 min

## 2022-01-12 NOTE — PROGRESS NOTES
[facility] Medicine  Progress Note    Patient name: Claudia Pwoell  MRN: [id number]  Admit Date: 1/7/2022   LOS: 5 days     SUBJECTIVE:     Principal problem: Sepsis due to Enterococcus with no resultant organ failure    Interval History:  Patient was seen and examined at bedside.  Going for WBC tagged scan later in the day.  Our CT facilitated transfer was initiated to transfer her to main [facility] for ICD lead removal however no bed availability yet.  Unfortunately patient was still taking Plavix and Eliquis and received dose today AM.     Scheduled Meds:   amiodarone  200 mg Oral Daily    amLODIPine  5 mg Oral BID    atorvastatin  40 mg Oral QHS    citalopram  40 mg Oral Daily    digoxin  0.125 mg Oral Daily    doxycycline  100 mg Oral Q12H    furosemide  40 mg Oral Daily    gabapentin  300 mg Oral TID    insulin detemir U-100  30 Units Subcutaneous QHS    insulin detemir U-100  60 Units Subcutaneous Daily    isosorbide dinitrate  10 mg Oral TID    metOLazone  5 mg Oral Every other day    metoprolol tartrate  50 mg Oral BID    multivitamin  1 tablet Oral Daily    pentoxifylline  400 mg Oral TID    ranolazine  500 mg Oral BID    spironolactone  25 mg Oral Daily    vancomycin (VANCOCIN) IVPB  1,500 mg Intravenous Q20H     Continuous Infusions:   sodium chloride 0.9% 50 mL/hr at 01/09/22 0606     PRN Meds:acetaminophen, calcium chloride IVPB, calcium chloride IVPB, calcium chloride IVPB, dextrose 50%, dextrose 50%, HYDROcodone-acetaminophen, insulin regular, magnesium oxide, magnesium sulfate IVPB, magnesium sulfate IVPB, magnesium sulfate IVPB, magnesium sulfate IVPB, melatonin, ondansetron, potassium chloride in water, potassium chloride in water, potassium chloride in water, potassium chloride in water, potassium chloride, potassium chloride, potassium chloride, potassium chloride, vancomycin - pharmacy to dose **AND** Pharmacy to dose Vancomycin consult    Review of  patient's allergies indicates:   Allergen Reactions    Cephalosporins     Penicillins      hives         Review of Systems: As per interval history    OBJECTIVE:     Vital Signs (Most Recent)  Temp: 97.7 °F (36.5 °C) (01/12/22 1107)  Pulse: (!) 56 (01/12/22 1107)  Resp: 19 (01/12/22 1107)  BP: (!) 141/57 (01/12/22 1107)  SpO2: 99 % (01/12/22 1107)    Vital Signs Range (Last 24H):  Temp:  [97.7 °F (36.5 °C)-98.3 °F (36.8 °C)]   Pulse:  [56-62]   Resp:  [18-19]   BP: (131-142)/(57-66)   SpO2:  [97 %-100 %]     I & O (Last 24H):No intake or output data in the 24 hours ending 01/12/22 1620    Physical Exam:  General: Patient resting comfortably in no acute distress. Appears as stated age. Calm, morbidly obese  Eyes: No conjunctival injection. No scleral icterus.  ENT: Hearing grossly intact. No discharge from ears. No nasal discharge.   Neck: Supple, trachea midline. No JVD  CVS: RRR. No LE edema BL, left ICD in place-no erythema or fluctuance noted  Lungs: CTA BL, no wheezing or crackles. Good breath sounds. No accessory muscle use. No acute respiratory distress  Abdomen:  Soft, nontender and nondistended.  No organomegaly  Neuro: AOx3. Moves all extremities. Follows commands. Responds appropriately       Laboratory:  I have reviewed all pertinent lab results within the past 24 hours.    Diagnostic Results:  Reviewed all imaging    ASSESSMENT/PLAN:     66 year old lady with prior history of combined systolic and diastolic CHF, ICD in place, diabetes and other multiple comorbidities presented with fever, shortness of breath found to have E faecalis bacteremia and KOMAL confirmed vegetation on ICD lead.     Assessment:  Active Hospital Problems    Diagnosis  POA    *Sepsis due to Enterococcus with no resultant organ failure [A41.81]  Yes    Type 2 diabetes mellitus with hyperglycemia, without long-term current use of insulin [E11.65]  Yes    Atypical pneumonia [J18.9]  Yes    Paroxysmal atrial fibrillation [I48.0]   Yes    Pulmonary hypertension [I27.20]  Unknown    AICD (automatic cardioverter/defibrillator) present [Z95.810]  Yes     Chronic    COPD (chronic obstructive pulmonary disease) [J44.9]  Yes     Chronic    S/P CABG (coronary artery bypass graft) [Z95.1]  Not Applicable     Chronic    Fatty liver [K76.0]  Yes    Coronary artery disease involving native coronary artery of native heart without angina pectoris [I25.10]  Yes      Resolved Hospital Problems   No resolved problems to display.         Plan:   Repeat cultures from 01/08 and 1/09 no growth, darlene showed suspicious for vegetation on right ventricular lead    Going for WBC tagged scan    Discussed case with Infectious Disease and Cardiology.  I am told patient transfer has been initiated to [facility] other facilities on diversion.  Unfortunately patient received Plavix and Eliquis today AM. Hold Plavix and Eliquis in anticipation of procedure at tertiary care center    Antibiotics as per Infectious Disease    Trend CRP    Follow up on culture    Basal bolus insulin regimen    Continue current orders    Transfer to [facility] once bed available      VTE Risk Mitigation (From admission, onward)         Ordered     IP VTE HIGH RISK PATIENT  Once         01/07/22 1958     Place sequential compression device  Until discontinued         01/07/22 1958     Reason for No Pharmacological VTE Prophylaxis  Once        Question:  Reasons:  Answer:  Already adequately anticoagulated on oral Anticoagulants    01/07/22 1958                  Department Hospital Medicine  Formerly [facility], [facility]  Vivian Stapleton MD  Date of service: 01/12/2022

## 2022-01-12 NOTE — PROGRESS NOTES
Progress Note  Infectious Disease    Admit Date: 1/7/2022   LOS: 5 days     SUBJECTIVE:     Follow-up For: Enterococcus bacteremia, abnormal CXR.    HPI:     Claudia Powell is a 66 y.o. female who has been admitted for the 4th time in the last 3 months for primarily cardiac reasons, presented to the emergency room yesterday with shortness of breath.  She reported that she had been short of breath and coughing for several days and that she had a resting O2 of 87%.  She reported a fever of 100.5°, nasal congestion, rhinorrhea, headache, body aches, nausea.  She also had polydipsia and polyuria, and urinary urgency.  She had a white blood cell count 51593, CRP of 19, troponin 0.27 100 chest x-ray was consistent with faint ground-glass opacities throughout both lungs.  COVID testing was negative, procalcitonin was in the normal range.  Blood sugar was 358, creatinine 1.4.  She was started empirically on vancomycin, meropenem and doxycycline (allergic to cephalosporins and penicillin).  She has been afebrile here, white blood cells are improved today and overnight all of her blood cultures now have presumptive Enterococcus  She was treated for urinary tract infection in November.  She has not required antibiotics since then.  She does not have any dysuria hematuria but she does acknowledge that she may not be emptying her bladder completely.  She has no sensation of pelvic floor incompetence.  She has never had a colonoscopy.  She does experience constipation but denies any blood in the stool.  She has no right upper quadrant pain or postprandial abdominal pain..  A repeat echo is being performed and compared to November 2021 at which time she had pulmonary hypertension, EF of 33%, global hypokinesis and mild pan-valvular regurgitation.    INTERVAL HISTORY:  1/10/22 (Cl):  Patient seen and examined at bedside, feeling much better.  Still complaining of mid thoracic/muscular left pain.  Awaiting KOMAL today. UA from  "clean negative for infection, glucose 4+. Repeat blood cultures x2 1/8 no growth, pending final. Respiratory culture 1/10 in process.   1/11:  Interim reviewed, discussed with Dr. Wayne.  KOMAL from 01/10 revealed a small echogenic mass present on the right ventricular lead of her defibrillator.  Hyperechoic pannus formation seen again in the right ventricular lead just below the tricuspid valve, cannot rule out vegetation it is.  It measures 2.1 x 1 cm.  Discussed with Cardiology, plan for transfer to [facility] for removal of defibrillator.  Patient is still complaining of chronic left midthoracic pain.  Nuclear scan in process.  Blood cultures from 01/08 and 1/9 no growth.  Respiratory culture with reduced normal respiratory quang.   1/12: Patient not in the room, went for NM scan. Chart reviewed, hemodynamically stable, afebrile. Repeat blood cultures 1/8 & 1/9 no growth, pending final. Respiratory culture grew yeast, likely a colonizer. WBC; 9.2, no left shift, stable. Cr 1.3    Antibiotics (From admission, onward)                Start     Stop Route Frequency Ordered    01/11/22 2200  vancomycin 1,500 mg in dextrose 5 % 500 mL IVPB         -- IV Every 20 hours 01/11/22 0300    01/10/22 2300  doxycycline capsule 100 mg         -- Oral Every 12 hours 01/10/22 1447    01/07/22 2355  vancomycin - pharmacy to dose        "And" Linked Group Details    -- IV pharmacy to manage frequency 01/07/22 2356            Review of Systems:  HEENT: No change in vision, sore throat, ulcers, thrush, dysphagia  Heart: No chest pain, orthopnea or edema  Lungs: No shortness of breath, cough, sputum production or pleuritic pain  Abdomen: No nausea, vomiting, diarrhea, abdominal pain, appetite is good   Extremities: No edema or erythema  MSK:  Left midthoracic pain, ambulates  Neurological: No headaches, focal weakness  Psych: Calm, appropriate  Skin: No rash, itching, or erythema  VAD: No IV site issues    OBJECTIVE: "     Vital Signs (Most Recent)  Temp: 98.3 °F (36.8 °C) (01/12/22 0700)  Pulse: 60 (01/12/22 0832)  Resp: 18 (01/12/22 0700)  BP: 134/65 (01/12/22 0832)  SpO2: 97 % (01/12/22 0700)    Temperature Range Min/Max (Last 24H):  Temp:  [97.5 °F (36.4 °C)-98.3 °F (36.8 °C)]     I & O (Last 24H):  No intake or output data in the 24 hours ending 01/12/22 1101    Physical Exam:  General:         In NAD. Alert and attentive, cooperative, comfortable  Eyes:               Anicteric, PERRL, EOMI  ENT:                No ulcers, exudates, thrush, nares patent, dentition is good  Neck:              Supple,   Lungs:             clear to auscultation bilateral  Heart:              S1/S2+, regular rhythm, 2/6 systolic murmur, defibrillator/AICD  left side, no redness nontender to palpation  Abd:                Soft, NT, ND, normal BS, no masses or organomegaly appreciated.  :                  Voids    Musc:               left midthoracic tenderness by the T8-T10. Joints without effusion, swelling, erythema, synovitis, muscle wasting.   Skin:               No rashes. No palmar or plantar lesions.  Left index finger has a splinter lesion which looks old.  Numerous tattoos  Neuro:             Alert, attentive, speech fluent, face symmetric, moves all extremities, no focal weakness. Ambulatory  Psych:            Calm, cooperative  Lymphatic:       Extrem:           No edema, erythema, phlebitis, cellulitis, warm and well perfused  VAD:               Peripheral                                  Isolation:        None  Wound:               Laboratory:    Recent Labs   Lab 01/10/22  0609 01/11/22  0120 01/12/22  0124   WBC 8.42 7.36 9.21   HGB 10.9* 10.5* 10.4*   HCT 33.7* 30.7* 31.7*    225 252       Recent Labs   Lab 01/07/22  1055 01/08/22  0637 01/10/22  0609 01/11/22  0120 01/12/22  0124   *   < > 134* 132* 131*   K 3.1*   < > 3.4* 3.6 3.4*   CL 88*   < > 91* 92* 91*   CO2 27   < > 31* 29 30*   BUN 34*   < > 31* 32* 29*    CREATININE 1.4   < > 1.1 1.2 1.3   CALCIUM 8.6*   < > 8.9 8.6* 8.5*   PROT 7.1  --   --  6.1 6.2   BILITOT 1.4*  --   --  1.0 1.2*   ALKPHOS 87  --   --  81 97   ALT 22  --   --  23 23   AST 29  --   --  24 22    < > = values in this interval not displayed.     Recent Labs   Lab 01/07/22  1055   CRP 19.53*     Estimated Creatinine Clearance: 43.3 mL/min (based on SCr of 1.3 mg/dL).     Microbiology Results (last 7 days)       Procedure Component Value Units Date/Time    Blood culture [[id number]] Collected: 01/08/22 1629    Order Status: Completed Specimen: Blood from Antecubital, Right Arm Updated: 01/11/22 1832     Blood Culture, Routine No Growth to date      No Growth to date      No Growth to date      No Growth to date    Culture, Respiratory with Gram Stain [[id number]]  (Abnormal) Collected: 01/10/22 0814    Order Status: Completed Specimen: Respiratory from Sputum, Expectorated Updated: 01/11/22 1603     Respiratory Culture Reduced Normal Respiratory quang     Gram Stain (Respiratory) <10 epithelial cells per low power field.     Gram Stain (Respiratory) Few WBC's     Gram Stain (Respiratory) Few Gram positive rods     Gram Stain (Respiratory) Few budding yeast    Blood culture [[id number]] Collected: 01/09/22 0710    Order Status: Completed Specimen: Blood from Antecubital, Left Arm Updated: 01/11/22 1232     Blood Culture, Routine No Growth to date      No Growth to date      No Growth to date    Blood Culture #2 **CANNOT BE ORDERED STAT** [[id number]]  (Abnormal) Collected: 01/07/22 1220    Order Status: Completed Specimen: Blood from Peripheral, Antecubital, Right Updated: 01/09/22 0754     Blood Culture, Routine Gram stain des bottle: Gram positive cocci in pairs       Results called to and read back by:JOSH ALVARADO RN 1200 01/07/2022        23:22 FGF      Gram stain aer bottle: Gram positive cocci in pairs       Positive results previously called to and read back by Josh Alvarado RN       1200 on  01/07/2022 23:22  01/08/2022  00:19 fgf      ENTEROCOCCUS FAECALIS  For susceptibility see order #[id number]      Blood Culture #1 **CANNOT BE ORDERED STAT** [[id number]]  (Abnormal)  (Susceptibility) Collected: 01/07/22 1055    Order Status: Completed Specimen: Blood from Peripheral, Hand, Left Updated: 01/09/22 0753     Blood Culture, Routine Gram stain aer bottle: Gram positive cocci in pairs       Gram stain des bottle: Gram positive cocci in pairs       Results called to and read back by:JOSH ALVARADO RN 1200  01/07/2022        23:21 FGF      ENTEROCOCCUS FAECALIS            Diagnostic Results: Reviewed  CXR  CT scan abdomen/pelvis 1/8/22:  1. No finding of bowel obstruction, intra-abdominal free air or abscess.  2. No evidence of obstructive uropathy, or radiopaque renal/clicking system stone.  3. Trace left greater than right pleural effusion and adjacent atelectasis.  4. Numerous additional, and incidental findings as noted above.       Cardiology:  KOMAL 1/11/22:  · The left ventricle is mildly enlarged with concentric remodeling and  · The estimated ejection fraction is 35%.  · Left ventricular diastolic dysfunction.  · There is moderate left ventricular global hypokinesis.  · There is abnormal septal wall motion consistent with right ventricular pacemaker.  · Normal right ventricular size with normal right ventricular systolic function.  · Small echogenic mass present on the right ventricular lead.. The mass is fixed. Hyperechoic oblong pannus formation seen on the right ventricular lead just below the tricuspid valve with some peripheral irregularities associated a vegetation cannot be ruled out completely. This measures 2.1 by 1.0 cm in total.  · Moderate mitral regurgitation.  · Mild to moderate tricuspid regurgitation.  · The right atrial lead appears unremarkable. Lead visualized in the coronary sinus also appears unremarkable.       ECHO 11/2021:  · Elevated central venous pressure (15 mmHg).  · The  estimated PA systolic pressure is 44 mmHg.  · The left ventricle is mildly enlarged with moderately decreased systolic function.  · The estimated ejection fraction is 33%.  · Grade III left ventricular diastolic dysfunction.  · There is mild left ventricular global hypokinesis.  · Normal right ventricular size with normal right ventricular systolic function.  · Mild left atrial enlargement.  · Mild right atrial enlargement.  · Mild aortic regurgitation.  · Mild-to-moderate mitral regurgitation.  · Mild tricuspid regurgitation.  · Mild pulmonic regurgitation.        ASSESSMENT/PLAN:     1. Sepsis resolved, likely secondary to Enterococcus faecalis bacteremia in the setting of infected right lead/defibrillator (IE)   Repeat blood cultures 1/8 & 1/9 no growth    KOMAL showed suspicious pannus on right ventricular lead, cannot exclude vegetation   CRP 19.5    2. Chronic left mid-thoracic pain   Awaiting WB NM scan  3. Poorly controlled Diabetes, follow up A1c%  4. Cardiomyopathy   5. Defibrillator in situ     Recommendations:   Adequate source control   Plan for [place] [facility] [place]   Continue Vancomycin IV, keep level 15-20 for at least 6 weeks after removing device   Doxycycline for 5 days, end date 1/14/21  Monitor CBC w/diff, CMP, CRP, vanco level, weekly while on antibiotics   Follow up WB NM scan   Trend CRP  Type glucose control  Follow cultures  Will follow    D/w Dr Stapleton    Medical Decision Making during this encounter was  [_] Low Complexity  [_] Moderate Complexity  [xx] High Complexity

## 2022-01-12 NOTE — PROGRESS NOTES
[facility]  Department of Cardiology  Progress Note    PATIENT NAME: Claudia Powell  MRN: [id number]  TODAY'S DATE: 01/12/2022  ADMIT DATE: 1/7/2022    SUBJECTIVE     PRINCIPLE PROBLEM: Atypical pneumonia    INTERVAL HISTORY:    1/12/2022  Patient is sitting up in bed awaiting nuclear medicine scan. She is complaining of L shoulder blade aching. It is reproducible to touch and is throbbing in nature. No substernal chest pain or SOB. VSS.     Review of patient's allergies indicates:   Allergen Reactions    Cephalosporins     Penicillins      hives       REVIEW OF SYSTEMS  CARDIOVASCULAR: No recent chest pain, palpitations, arm, neck, or jaw pain  RESPIRATORY: No recent fever, cough chills, SOB or congestion  : No blood in the urine  GI: No Nausea, vomiting, constipation, diarrhea, blood, or reflux.  MUSCULOSKELETAL: + L shoulder blade pain. No myalgias  NEURO: No lightheadedness or dizziness  EYES: No Double vision, blurry, vision or headache     OBJECTIVE     VITAL SIGNS (Most Recent)  Temp: 98.3 °F (36.8 °C) (01/12/22 0700)  Pulse: 60 (01/12/22 0832)  Resp: 18 (01/12/22 0700)  BP: 134/65 (01/12/22 0832)  SpO2: 97 % (01/12/22 0700)    VENTILATION STATUS  Resp: 18 (01/12/22 0700)  SpO2: 97 % (01/12/22 0700)       I & O (Last 24H):No intake or output data in the 24 hours ending 01/12/22 0912    WEIGHTS  Wt Readings from Last 3 Encounters:   01/07/22 2000 82.5 kg (181 lb 14.1 oz)   01/07/22 1043 79.4 kg (175 lb)   01/10/22 1111 82.5 kg (181 lb 14.1 oz)   12/06/21 1532 80.7 kg (178 lb)       PHYSICAL EXAM  CONSTITUTIONAL: Well built, well nourished in no apparent distress  NECK: no carotid bruit, no JVD  LUNGS: basal crackles- improving   CHEST WALL: no tenderness  HEART: regular rate and rhythm, S1, S2 normal,grade 2/6 systolic murmur at apex   ABDOMEN: soft, non-tender; bowel sounds normal; no masses,  no organomegaly  EXTREMITIES: Extremities normal, no edema  NEURO: AAO X 3    SCHEDULED MEDS:    amiodarone  200 mg Oral Daily    amLODIPine  5 mg Oral BID    atorvastatin  40 mg Oral QHS    citalopram  40 mg Oral Daily    digoxin  0.125 mg Oral Daily    doxycycline  100 mg Oral Q12H    furosemide  40 mg Oral Daily    gabapentin  300 mg Oral TID    insulin detemir U-100  30 Units Subcutaneous QHS    insulin detemir U-100  60 Units Subcutaneous Daily    isosorbide dinitrate  10 mg Oral TID    metOLazone  5 mg Oral Every other day    metoprolol tartrate  50 mg Oral BID    multivitamin  1 tablet Oral Daily    pentoxifylline  400 mg Oral TID    ranolazine  500 mg Oral BID    spironolactone  25 mg Oral Daily    vancomycin (VANCOCIN) IVPB  1,500 mg Intravenous Q20H       CONTINUOUS INFUSIONS:   sodium chloride 0.9% 50 mL/hr at 01/09/22 0606       PRN MEDS:acetaminophen, calcium chloride IVPB, calcium chloride IVPB, calcium chloride IVPB, dextrose 50%, dextrose 50%, HYDROcodone-acetaminophen, insulin regular, magnesium oxide, magnesium sulfate IVPB, magnesium sulfate IVPB, magnesium sulfate IVPB, magnesium sulfate IVPB, melatonin, ondansetron, potassium chloride in water, potassium chloride in water, potassium chloride in water, potassium chloride in water, potassium chloride, potassium chloride, potassium chloride, potassium chloride, vancomycin - pharmacy to dose **AND** Pharmacy to dose Vancomycin consult    LABS AND DIAGNOSTICS     CBC LAST 3 DAYS  Recent Labs   Lab 01/10/22  0609 01/10/22  0609 01/11/22  0120 01/12/22  0124   WBC 8.42  --  7.36 9.21   RBC 3.84*  --  3.60* 3.61*   HGB 10.9*  --  10.5* 10.4*   HCT 33.7*  --  30.7* 31.7*   MCV 88  --  85 88   MCH 28.4  --  29.2 28.8   MCHC 32.3  --  34.2 32.8   RDW 16.2*  --  16.3* 16.4*     --  225 252   MPV 10.1  --  9.5 9.9   GRAN 57.8  4.9   < > 57.2  4.2 62.1  5.7   LYMPH 23.8  2.0   < > 23.1  1.7 19.7  1.8   MONO 12.7  1.1*   < > 13.3  1.0 10.1  0.9   BASO 0.08  --  0.04 0.11   NRBC 0  --  0 0    < > = values in this interval  not displayed.       COAGULATION LAST 3 DAYS  Recent Labs   Lab 01/07/22  1055   LABPT 16.2*   INR 1.4       CHEMISTRY LAST 3 DAYS  Recent Labs   Lab 01/07/22  1055 01/07/22  1117 01/08/22  0637 01/10/22  0609 01/11/22  0120 01/12/22  0124   *  --    < > 134* 132* 131*   K 3.1*  --    < > 3.4* 3.6 3.4*   CL 88*  --    < > 91* 92* 91*   CO2 27  --    < > 31* 29 30*   ANIONGAP 14  --    < > 12 11 10   BUN 34*  --    < > 31* 32* 29*   CREATININE 1.4  --    < > 1.1 1.2 1.3   *  --    < > 207* 259* 243*   CALCIUM 8.6*  --    < > 8.9 8.6* 8.5*   PH  --  7.503*  --   --   --   --    MG 2.4  --   --   --   --  1.8   ALBUMIN 3.4*  --   --   --  2.7* 2.8*   PROT 7.1  --   --   --  6.1 6.2   ALKPHOS 87  --   --   --  81 97   ALT 22  --   --   --  23 23   AST 29  --   --   --  24 22   BILITOT 1.4*  --   --   --  1.0 1.2*    < > = values in this interval not displayed.       CARDIAC PROFILE LAST 3 DAYS  Recent Labs   Lab 01/07/22  1055 01/07/22 2027 01/08/22  0637   *  --   --    *  --   --    *  --   --    TROPONINI 0.272* 0.581* 0.331*       ENDOCRINE LAST 3 DAYS  Recent Labs   Lab 01/07/22  1055   PROCAL 0.40       LAST ARTERIAL BLOOD GAS  ABG  Recent Labs   Lab 01/07/22  1117   PH 7.503*   PO2 40   PCO2 45.0   HCO3 35.3*   BE 12       LAST 7 DAYS MICROBIOLOGY   Microbiology Results (last 7 days)     Procedure Component Value Units Date/Time    Blood culture [[id number]] Collected: 01/08/22 5969    Order Status: Completed Specimen: Blood from Antecubital, Right Arm Updated: 01/11/22 1832     Blood Culture, Routine No Growth to date      No Growth to date      No Growth to date      No Growth to date    Culture, Respiratory with Gram Stain [[id number]]  (Abnormal) Collected: 01/10/22 0814    Order Status: Completed Specimen: Respiratory from Sputum, Expectorated Updated: 01/11/22 1603     Respiratory Culture Reduced Normal Respiratory quang     Gram Stain (Respiratory) <10 epithelial cells per  low power field.     Gram Stain (Respiratory) Few WBC's     Gram Stain (Respiratory) Few Gram positive rods     Gram Stain (Respiratory) Few budding yeast    Blood culture [[id number]] Collected: 01/09/22 0710    Order Status: Completed Specimen: Blood from Antecubital, Left Arm Updated: 01/11/22 1232     Blood Culture, Routine No Growth to date      No Growth to date      No Growth to date    Blood Culture #2 **CANNOT BE ORDERED STAT** [[id number]]  (Abnormal) Collected: 01/07/22 1220    Order Status: Completed Specimen: Blood from Peripheral, Antecubital, Right Updated: 01/09/22 0754     Blood Culture, Routine Gram stain des bottle: Gram positive cocci in pairs       Results called to and read back by:JOSH ALVARADO RN 1200 01/07/2022        23:22 FGF      Gram stain aer bottle: Gram positive cocci in pairs       Positive results previously called to and read back by Josh Alvarado RN       1200 on 01/07/2022 23:22  01/08/2022  00:19 fgf      ENTEROCOCCUS FAECALIS  For susceptibility see order #[id number]      Blood Culture #1 **CANNOT BE ORDERED STAT** [[id number]]  (Abnormal)  (Susceptibility) Collected: 01/07/22 1055    Order Status: Completed Specimen: Blood from Peripheral, Hand, Left Updated: 01/09/22 0753     Blood Culture, Routine Gram stain aer bottle: Gram positive cocci in pairs       Gram stain des bottle: Gram positive cocci in pairs       Results called to and read back by:JOSH ALVARADO RN 1200  01/07/2022        23:21 FGF      ENTEROCOCCUS FAECALIS          MOST RECENT IMAGING  Intra-Procedure Documentation  KOMAL performed in the Invasive Lab    - See Procedure Log link below for nursing documentation    - See KOMAL order on Card Proc Tab for physician findings       LASTECHO  Results for orders placed during the hospital encounter of 01/07/22    Echo    Interpretation Summary  · Mildly decreased systolic function.  · The estimated ejection fraction is 42%.  · Left ventricular diastolic dysfunction.  ·  There is moderate left ventricular global hypokinesis.  · Normal right ventricular size with normal right ventricular systolic function.  · Normal central venous pressure (3 mmHg).      CURRENT/PREVIOUS VISIT EKG  Results for orders placed or performed during the hospital encounter of 01/07/22   EKG 12-lead    Collection Time: 01/07/22 11:17 AM    Narrative    Test Reason : U07.1,    Vent. Rate : 066 BPM     Atrial Rate : 066 BPM     P-R Int : 144 ms          QRS Dur : 164 ms      QT Int : 620 ms       P-R-T Axes : 000 106 211 degrees     QTc Int : 649 ms    Atrial-sensed ventricular-paced rhythm  Biventricular pacemaker detected  Abnormal ECG  When compared with ECG of 26-NOV-2021 20:33,  Premature ventricular complexes are no longer Present  Vent. rate has decreased BY   6 BPM  Confirmed by Pasquale Woodward MD ([id number]) on 1/7/2022 6:31:17 PM    Referred By: CEDRIC   SELF           Confirmed By:Pasquale Woodward MD       ASSESSMENT/PLAN:     Active Hospital Problems    Diagnosis    *Atypical pneumonia    Sepsis due to Enterococcus with no resultant organ failure    Type 2 diabetes mellitus with hyperglycemia, without long-term current use of insulin    Pulmonary hypertension       ASSESSMENT & PLAN:   1.Enterococcus bactermia   - ID on board   - undergoing nuclear medicine scan today     2. BiV ICD with echogenic mass in RV lead   - awaiting transfer to [facility] [place] lead extraction     3. CAD s/p CABG   - chest pain free   - stable     4. Diabetes mellitus type 2  - per primary     5. Pulmonary HTN   - stable     6. Hypokalemia   - goal is above 4, replace until then     7. Essential HTN   - well controlled on present regime       RECOMMENDATIONS:  Undergoing nuclear medicine scan WBC tracer today   Hopefully after she will be transferred to [facility]   Replace K IV today   Her thoracic pain seems to be MSK- give antiinflammatory PRN     Shakira Brian PA-C  [facility]  Department of Cardiology  Date of  Service: 01/12/2022      I have personally interviewed and examined the patient.  I have reviewed all the Physician Assistant's documentation, and agree with the plan.       Noam Najera M.D.  [facility]  Department of Cardiology  Date of Service: 01/12/2022  9:12 AM

## 2022-01-13 NOTE — PLAN OF CARE
Important Message from Medicare was sign, explained and given to patient/caregiver on 01/13/2022 at 9:14am     addressed any questions or concerns.    Important Message from Medicare document will be scanned into patient's medical record

## 2022-01-13 NOTE — PLAN OF CARE
01/13/22 0924   Discharge Reassessment   Assessment Type Discharge Planning Reassessment   Did the patient's condition or plan change since previous assessment? No   Discharge Plan discussed with: Patient   Communicated BOUCHRA with patient/caregiver Date not available/Unable to determine   Discharge Plan A Home   Discharge Plan B Home   DME Needed Upon Discharge  none   Discharge Barriers Identified None   Why the patient remains in the hospital Requires continued medical care   Post-Acute Status   Discharge Delays None known at this time

## 2022-01-13 NOTE — PROGRESS NOTES
[facility] Medicine  Progress Note    Patient name: Claudia Powell  MRN: [id number]  Admit Date: 1/7/2022   LOS: 6 days     SUBJECTIVE:     Principal problem: Sepsis due to Enterococcus with no resultant organ failure    Interval History:  Patient was seen and examined at bedside.  WBC tag scan did not show any other focus of infection.  Sugar is suboptimal controlled.  Remains on vancomycin and doxycycline.  Appears euvolemic.  Accepted at [facility].  I gave report to accepting hospitalist.     Scheduled Meds:   amiodarone  200 mg Oral Daily    amLODIPine  5 mg Oral BID    atorvastatin  40 mg Oral QHS    citalopram  40 mg Oral Daily    digoxin  0.125 mg Oral Daily    doxycycline  100 mg Oral Q12H    enoxparin  40 mg Subcutaneous Q24H    furosemide  40 mg Oral Daily    gabapentin  300 mg Oral TID    insulin detemir U-100  40 Units Subcutaneous QHS    insulin detemir U-100  60 Units Subcutaneous Daily    isosorbide dinitrate  10 mg Oral TID    metOLazone  5 mg Oral Every other day    metoprolol tartrate  50 mg Oral BID    multivitamin  1 tablet Oral Daily    pentoxifylline  400 mg Oral TID    ranolazine  500 mg Oral BID    spironolactone  25 mg Oral Daily    [START ON 1/14/2022] vancomycin (VANCOCIN) IVPB  1,250 mg Intravenous Q22H     Continuous Infusions:    PRN Meds:acetaminophen, calcium chloride IVPB, calcium chloride IVPB, calcium chloride IVPB, dextrose 50%, dextrose 50%, HYDROcodone-acetaminophen, insulin regular, magnesium oxide, magnesium sulfate IVPB, magnesium sulfate IVPB, magnesium sulfate IVPB, magnesium sulfate IVPB, melatonin, naproxen, ondansetron, potassium chloride in water, potassium chloride in water, potassium chloride in water, potassium chloride in water, potassium chloride, potassium chloride, potassium chloride, potassium chloride, vancomycin - pharmacy to dose **AND** Pharmacy to dose Vancomycin consult    Review of patient's allergies  indicates:   Allergen Reactions    Cephalosporins     Penicillins      hives         Review of Systems: As per interval history    OBJECTIVE:     Vital Signs (Most Recent)  Temp: 97 °F (36.1 °C) (01/13/22 1135)  Pulse: 60 (01/13/22 1135)  Resp: 19 (01/13/22 1135)  BP: (!) 124/59 (01/13/22 1135)  SpO2: 98 % (01/13/22 1135)    Vital Signs Range (Last 24H):  Temp:  [97 °F (36.1 °C)-98.6 °F (37 °C)]   Pulse:  [59-60]   Resp:  [18-19]   BP: (124-157)/(56-70)   SpO2:  [93 %-100 %]     I & O (Last 24H):No intake or output data in the 24 hours ending 01/13/22 1620    Physical Exam:  General: Patient resting comfortably in no acute distress. Appears as stated age. Calm, morbidly obese  Eyes: No conjunctival injection. No scleral icterus.  ENT: Hearing grossly intact. No discharge from ears. No nasal discharge.   Neck: Supple, trachea midline. No JVD  CVS: RRR. No LE edema BL, left ICD in place-no erythema or fluctuance noted  Lungs: CTA BL, no wheezing or crackles. Good breath sounds. No accessory muscle use. No acute respiratory distress  Abdomen:  Soft, nontender and nondistended.  No organomegaly  Neuro: AOx3. Moves all extremities. Follows commands. Responds appropriately       Laboratory:  I have reviewed all pertinent lab results within the past 24 hours.    Diagnostic Results:  Reviewed all imaging    ASSESSMENT/PLAN:     66 year old lady with prior history of combined systolic and diastolic CHF, ICD in place, diabetes and other multiple comorbidities presented with fever, shortness of breath found to have E faecalis bacteremia and KOMAL confirmed vegetation on ICD lead.     Assessment:  Active Hospital Problems    Diagnosis  POA    *Sepsis due to Enterococcus with no resultant organ failure [A41.81]  Yes    Type 2 diabetes mellitus with hyperglycemia, without long-term current use of insulin [E11.65]  Yes    Atypical pneumonia [J18.9]  Yes    Paroxysmal atrial fibrillation [I48.0]  Yes    Pulmonary hypertension  [I27.20]  Yes    AICD (automatic cardioverter/defibrillator) present [Z95.810]  Yes     Chronic    COPD (chronic obstructive pulmonary disease) [J44.9]  Yes     Chronic    S/P CABG (coronary artery bypass graft) [Z95.1]  Not Applicable     Chronic    Fatty liver [K76.0]  Yes    Coronary artery disease involving native coronary artery of native heart without angina pectoris [I25.10]  Yes      Resolved Hospital Problems   No resolved problems to display.         Plan:   Repeat cultures from 01/08 and 01/09 no growth, KOMAL showed suspicious for vegetation on right ventricular lead    WBC tagged scan did not show any other focus of infection    Hold Plavix and Eliquis in anticipation of procedure    Antibiotics as per Infectious Disease    Trend CRP    Follow up on culture    Basal bolus insulin regimen    Continue current orders    Transfer to [facility] once bed available      VTE Risk Mitigation (From admission, onward)         Ordered     enoxaparin injection 40 mg  Every 24 hours (non-standard times)         01/12/22 1631     IP VTE HIGH RISK PATIENT  Once         01/07/22 1958     Place sequential compression device  Until discontinued         01/07/22 1958     Reason for No Pharmacological VTE Prophylaxis  Once        Question:  Reasons:  Answer:  Already adequately anticoagulated on oral Anticoagulants    01/07/22 1958                  Department Hospital Medicine  [facility]  Vivian Stapleotn MD  Date of service: 01/13/2022

## 2022-01-13 NOTE — PLAN OF CARE
01/13/22 1413   Final Note   Assessment Type Final Discharge Note   Anticipated Discharge Disposition Home   What phone number can be called within the next 1-3 days to see how you are doing after discharge? [phone]   Post-Acute Status   Discharge Delays None known at this time   Discharge orders reviewed.  No case management needs noted.

## 2022-01-13 NOTE — PLAN OF CARE
01/13/22 0800   Patient Assessment/Suction   Level of Consciousness (AVPU) alert   Respiratory Effort Normal;Unlabored   Expansion/Accessory Muscles/Retractions no use of accessory muscles;no retractions   All Lung Fields Breath Sounds clear   Rhythm/Pattern, Respiratory pattern regular   Cough Frequency infrequent   Cough Type nonproductive   PRE-TX-O2   O2 Device (Oxygen Therapy) room air  (pt has 1LPM NC on stby.)   $ Is the patient on Low Flow Oxygen? Yes   Flow (L/min) 1  (pt took off to use the bathroom)   SpO2 (!) 93 %   Pulse Oximetry Type Intermittent   $ Pulse Oximetry - Multiple Charge Pulse Oximetry - Multiple   Pulse 60   Resp 19   Education   $ Education 15 min   Respiratory Evaluation   $ Care Plan Tech Time 15 min   $ Eval/Re-eval Charges Evaluation   Evaluation For   (care plan)

## 2022-01-13 NOTE — PROGRESS NOTES
Progress Note  Infectious Disease    Admit Date: 1/7/2022   LOS: 6 days     SUBJECTIVE:     Follow-up For: Enterococcus bacteremia, abnormal CXR.    HPI:     Claudia Powell is a 66 y.o. female who has been admitted for the 4th time in the last 3 months for primarily cardiac reasons, presented to the emergency room yesterday with shortness of breath.  She reported that she had been short of breath and coughing for several days and that she had a resting O2 of 87%.  She reported a fever of 100.5°, nasal congestion, rhinorrhea, headache, body aches, nausea.  She also had polydipsia and polyuria, and urinary urgency.  She had a white blood cell count 84534, CRP of 19, troponin 0.27 100 chest x-ray was consistent with faint ground-glass opacities throughout both lungs.  COVID testing was negative, procalcitonin was in the normal range.  Blood sugar was 358, creatinine 1.4.  She was started empirically on vancomycin, meropenem and doxycycline (allergic to cephalosporins and penicillin).  She has been afebrile here, white blood cells are improved today and overnight all of her blood cultures now have presumptive Enterococcus  She was treated for urinary tract infection in November.  She has not required antibiotics since then.  She does not have any dysuria hematuria but she does acknowledge that she may not be emptying her bladder completely.  She has no sensation of pelvic floor incompetence.  She has never had a colonoscopy.  She does experience constipation but denies any blood in the stool.  She has no right upper quadrant pain or postprandial abdominal pain..  A repeat echo is being performed and compared to November 2021 at which time she had pulmonary hypertension, EF of 33%, global hypokinesis and mild pan-valvular regurgitation.    INTERVAL HISTORY:  1/10/22 (Cl):  Patient seen and examined at bedside, feeling much better.  Still complaining of mid thoracic/muscular left pain.  Awaiting KOMAL today. UA from  "clean negative for infection, glucose 4+. Repeat blood cultures x2 1/8 no growth, pending final. Respiratory culture 1/10 in process.   1/11:  Interim reviewed, discussed with Dr. Wayne.  KOMAL from 01/10 revealed a small echogenic mass present on the right ventricular lead of her defibrillator.  Hyperechoic pannus formation seen again in the right ventricular lead just below the tricuspid valve, cannot rule out vegetation it is.  It measures 2.1 x 1 cm.  Discussed with Cardiology, plan for transfer to [facility] for removal of defibrillator.  Patient is still complaining of chronic left midthoracic pain.  Nuclear scan in process.  Blood cultures from 01/08 and 1/9 no growth.  Respiratory culture with reduced normal respiratory quang.   1/12: Patient not in the room, went for NM scan. Chart reviewed, hemodynamically stable, afebrile. Repeat blood cultures 1/8 & 1/9 no growth, pending final. Respiratory culture grew yeast, likely a colonizer. WBC; 9.2, no left shift, stable. Cr 1.3  1/13:  Interim reviewed, patient seen and examined at bedside, son present.  Awaiting transfer to home C for removal of infected defibrillator.  No clear scanned negative.  Hemodynamically stable, afebrile.  WBC 10.3, stable.  Creatinine 1.4.  Repeat blood cultures 1/9 no growth.  Again respiratory culture grew Candida albicans likely a colonizer.  Patient currently breathing comfortable on room air.    Antibiotics (From admission, onward)            Start     Stop Route Frequency Ordered    01/14/22 0000  vancomycin 1.25 g in dextrose 5% 250 mL IVPB (ready to mix)         -- IV Every 22 hours (non-standard times) 01/13/22 1416    01/10/22 2300  doxycycline capsule 100 mg         -- Oral Every 12 hours 01/10/22 1447    01/07/22 2355  vancomycin - pharmacy to dose        "And" Linked Group Details    -- IV pharmacy to manage frequency 01/07/22 2356          Review of Systems:  HEENT: No change in vision, sore throat, ulcers, " thrush, dysphagia  Heart: No chest pain, orthopnea or edema  Lungs: No shortness of breath, cough, sputum production or pleuritic pain  Abdomen: No nausea, vomiting, diarrhea, abdominal pain, appetite is good   Extremities: No edema or erythema  MSK:  Left midthoracic pain, ambulates  Neurological: No headaches, focal weakness  Psych: Calm, appropriate  Skin: No rash, itching, or erythema  VAD: No IV site issues    OBJECTIVE:     Vital Signs (Most Recent)  Temp: 97 °F (36.1 °C) (01/13/22 1135)  Pulse: 60 (01/13/22 1135)  Resp: 19 (01/13/22 1135)  BP: (!) 124/59 (01/13/22 1135)  SpO2: 98 % (01/13/22 1135)    Temperature Range Min/Max (Last 24H):  Temp:  [97 °F (36.1 °C)-98.6 °F (37 °C)]     I & O (Last 24H):  No intake or output data in the 24 hours ending 01/13/22 1430    Physical Exam:  General:         In NAD. Alert and attentive, cooperative, comfortable  Eyes:               Anicteric, PERRL, EOMI  ENT:                No ulcers, exudates, thrush, nares patent, dentition is good  Neck:              Supple,   Lungs:             clear to auscultation bilateral  Heart:              S1/S2+, regular rhythm, 2/6 systolic murmur, defibrillator/AICD  left side, no redness nontender to palpation  Abd:                Soft, NT, ND, normal BS, no masses or organomegaly appreciated.  :                  Voids    Musc:               left midthoracic tenderness by the T8-T10. Joints without effusion, swelling, erythema, synovitis, muscle wasting.   Skin:               No rashes. No palmar or plantar lesions.  Left index finger has a splinter lesion which looks old.  Numerous tattoos  Neuro:             Alert, attentive, speech fluent, face symmetric, moves all extremities, no focal weakness. Ambulatory  Psych:            Calm, cooperative  Lymphatic:       Extrem:           No edema, erythema, phlebitis, cellulitis, warm and well perfused  VAD:               Peripheral                                  Isolation:         None  Wound:               Laboratory:    Recent Labs   Lab 01/11/22  0120 01/12/22  0124 01/13/22  0144   WBC 7.36 9.21 10.32   HGB 10.5* 10.4* 10.6*   HCT 30.7* 31.7* 32.6*    252 300       Recent Labs   Lab 01/11/22  0120 01/12/22  0124 01/13/22  0144   * 131* 133*   K 3.6 3.4* 3.9   CL 92* 91* 87*   CO2 29 30* 32*   BUN 32* 29* 30*   CREATININE 1.2 1.3 1.4   CALCIUM 8.6* 8.5* 9.6   PROT 6.1 6.2 6.8   BILITOT 1.0 1.2* 1.2*   ALKPHOS 81 97 95   ALT 23 23 24   AST 24 22 24     Recent Labs   Lab 01/07/22  1055   CRP 19.53*     Estimated Creatinine Clearance: 40.2 mL/min (based on SCr of 1.4 mg/dL).     Microbiology Results (last 7 days)     Procedure Component Value Units Date/Time    Blood culture [[id number]] Collected: 01/09/22 0710    Order Status: Completed Specimen: Blood from Antecubital, Left Arm Updated: 01/13/22 1232     Blood Culture, Routine No Growth to date      No Growth to date      No Growth to date      No Growth to date      No Growth to date    Blood culture [[id number]] Collected: 01/08/22 1629    Order Status: Completed Specimen: Blood from Antecubital, Right Arm Updated: 01/12/22 1832     Blood Culture, Routine No Growth to date      No Growth to date      No Growth to date      No Growth to date      No Growth to date    Culture, Respiratory with Gram Stain [[id number]]  (Abnormal) Collected: 01/10/22 0814    Order Status: Completed Specimen: Respiratory from Sputum, Expectorated Updated: 01/12/22 1401     Respiratory Culture Reduced normal respiratory quang      PRESUMPTIVE SABINO ALBICANS  Moderate       Gram Stain (Respiratory) <10 epithelial cells per low power field.     Gram Stain (Respiratory) Few WBC's     Gram Stain (Respiratory) Few Gram positive rods     Gram Stain (Respiratory) Few budding yeast    Blood Culture #2 **CANNOT BE ORDERED STAT** [[id number]]  (Abnormal) Collected: 01/07/22 1220    Order Status: Completed Specimen: Blood from Peripheral, Antecubital, Right  Updated: 01/09/22 0754     Blood Culture, Routine Gram stain des bottle: Gram positive cocci in pairs       Results called to and read back by:JOSH ALVARADO RN 1200 01/07/2022        23:22 FGF      Gram stain aer bottle: Gram positive cocci in pairs       Positive results previously called to and read back by Josh Alvarado RN       1200 on 01/07/2022 23:22  01/08/2022  00:19 fgf      ENTEROCOCCUS FAECALIS  For susceptibility see order #[id number]      Blood Culture #1 **CANNOT BE ORDERED STAT** [[id number]]  (Abnormal)  (Susceptibility) Collected: 01/07/22 1055    Order Status: Completed Specimen: Blood from Peripheral, Hand, Left Updated: 01/09/22 0753     Blood Culture, Routine Gram stain aer bottle: Gram positive cocci in pairs       Gram stain des bottle: Gram positive cocci in pairs       Results called to and read back by:JOSH ALVARADO RN 1200  01/07/2022        23:21 FGF      ENTEROCOCCUS FAECALIS          Diagnostic Results: Reviewed  CXR  CT scan abdomen/pelvis 1/8/22:  1. No finding of bowel obstruction, intra-abdominal free air or abscess.  2. No evidence of obstructive uropathy, or radiopaque renal/clicking system stone.  3. Trace left greater than right pleural effusion and adjacent atelectasis.  4. Numerous additional, and incidental findings as noted above.       Cardiology:  KOMAL 1/11/22:  · The left ventricle is mildly enlarged with concentric remodeling and  · The estimated ejection fraction is 35%.  · Left ventricular diastolic dysfunction.  · There is moderate left ventricular global hypokinesis.  · There is abnormal septal wall motion consistent with right ventricular pacemaker.  · Normal right ventricular size with normal right ventricular systolic function.  · Small echogenic mass present on the right ventricular lead.. The mass is fixed. Hyperechoic oblong pannus formation seen on the right ventricular lead just below the tricuspid valve with some peripheral irregularities associated a  vegetation cannot be ruled out completely. This measures 2.1 by 1.0 cm in total.  · Moderate mitral regurgitation.  · Mild to moderate tricuspid regurgitation.  · The right atrial lead appears unremarkable. Lead visualized in the coronary sinus also appears unremarkable.       ECHO 11/2021:  · Elevated central venous pressure (15 mmHg).  · The estimated PA systolic pressure is 44 mmHg.  · The left ventricle is mildly enlarged with moderately decreased systolic function.  · The estimated ejection fraction is 33%.  · Grade III left ventricular diastolic dysfunction.  · There is mild left ventricular global hypokinesis.  · Normal right ventricular size with normal right ventricular systolic function.  · Mild left atrial enlargement.  · Mild right atrial enlargement.  · Mild aortic regurgitation.  · Mild-to-moderate mitral regurgitation.  · Mild tricuspid regurgitation.  · Mild pulmonic regurgitation.        ASSESSMENT/PLAN:     1. Sepsis resolved, likely secondary to Enterococcus faecalis bacteremia in the setting of infected right lead/defibrillator (IE)   Repeat blood cultures 1/8 & 1/9 no growth    KOMAL showed suspicious pannus on right ventricular lead, cannot exclude vegetation   CRP 19.5    2. Chronic left mid-thoracic pain   WB NM scan negative    3. Poorly controlled Diabetes, follow up A1c%  4. Cardiomyopathy   5. Defibrillator in situ     Recommendations:   Adequate source control   [place] [facility] [place]   Continue Vancomycin IV, keep level 15-20 for at least 6 weeks after removing device   Doxycycline for 5 days, end date 1/14/21  Monitor CBC w/diff, CMP, CRP, vanco level, weekly while on antibiotics   Trend CRP  Tight glucose control  Follow cultures    D/w Dr Stapleton    Medical Decision Making during this encounter was  [_] Low Complexity  [_] Moderate Complexity  [xx] High Complexity

## 2022-01-13 NOTE — PROGRESS NOTES
Pharmacokinetic Assessment Follow Up: IV Vancomycin    Vancomycin serum concentration assessment(s):    The trough level was drawn correctly and can be used to guide therapy at this time. The measurement is above the desired definitive target range of 15 to 20 mcg/mL.    Vancomycin Regimen Plan:    Change regimen to Vancomycin 1250 mg IV every 22 hours with next serum trough concentration measured at 21:00 prior to 8th dose on 1/14    Drug levels (last 3 results):  Recent Labs   Lab Result Units 01/11/22  0120 01/13/22  1300   Vancomycin-Trough ug/mL 14.1 25.3*       Pharmacy will continue to follow and monitor vancomycin.    Please contact pharmacy at extension [phone] for questions regarding this assessment.    Thank you for the consult,   Clifford Kirkland       Patient brief summary:  Claudia Powell is a 66 y.o. female initiated on antimicrobial therapy with IV Vancomycin for treatment of bacteremia    The patient's current regimen is Vancomycin 1500 mg IV q 20 hrs    Drug Allergies:   Review of patient's allergies indicates:   Allergen Reactions    Cephalosporins     Penicillins      hives         Actual Body Weight:   82.5 kg    Renal Function:   Estimated Creatinine Clearance: 40.2 mL/min (based on SCr of 1.4 mg/dL).,     Dialysis Method (if applicable):  N/A    CBC (last 72 hours):  Recent Labs   Lab Result Units 01/11/22 0120 01/12/22 0124 01/13/22  0144   WBC K/uL 7.36 9.21 10.32   Hemoglobin g/dL 10.5* 10.4* 10.6*   Hemoglobin A1C % 10.8*  --   --    Hematocrit % 30.7* 31.7* 32.6*   Platelets K/uL 225 252 300   Gran % % 57.2 62.1 61.6   Lymph % % 23.1 19.7 20.4   Mono % % 13.3 10.1 9.9   Eosinophil % % 2.9 2.4 2.3   Basophil % % 0.5 1.2 1.6   Differential Method  Automated Automated Automated       Metabolic Panel (last 72 hours):  Recent Labs   Lab Result Units 01/11/22 0120 01/12/22 0124 01/13/22  0144   Sodium mmol/L 132* 131* 133*   Potassium mmol/L 3.6 3.4* 3.9   Chloride mmol/L 92* 91* 87*   CO2  mmol/L 29 30* 32*   Glucose mg/dL 259* 243* 234*   BUN mg/dL 32* 29* 30*   Creatinine mg/dL 1.2 1.3 1.4   Albumin g/dL 2.7* 2.8* 3.1*   Total Bilirubin mg/dL 1.0 1.2* 1.2*   Alkaline Phosphatase U/L 81 97 95   AST U/L 24 22 24   ALT U/L 23 23 24   Magnesium mg/dL  --  1.8 1.8       Vancomycin Administrations:  vancomycin given in the last 96 hours                     vancomycin 1,500 mg in dextrose 5 % 500 mL IVPB (mg) 1,500 mg New Bag 01/12/22 1746     1,500 mg New Bag 01/11/22 2212    vancomycin 1,500 mg in dextrose 5 % 500 mL IVPB (mg) 1,500 mg New Bag 01/11/22 0234     1,500 mg New Bag 01/10/22 0218                    Microbiologic Results:  Microbiology Results (last 7 days)       Procedure Component Value Units Date/Time    Blood culture [[id number]] Collected: 01/09/22 0710    Order Status: Completed Specimen: Blood from Antecubital, Left Arm Updated: 01/13/22 1232     Blood Culture, Routine No Growth to date      No Growth to date      No Growth to date      No Growth to date      No Growth to date    Blood culture [[id number]] Collected: 01/08/22 1629    Order Status: Completed Specimen: Blood from Antecubital, Right Arm Updated: 01/12/22 1832     Blood Culture, Routine No Growth to date      No Growth to date      No Growth to date      No Growth to date      No Growth to date    Culture, Respiratory with Gram Stain [[id number]]  (Abnormal) Collected: 01/10/22 0814    Order Status: Completed Specimen: Respiratory from Sputum, Expectorated Updated: 01/12/22 1401     Respiratory Culture Reduced normal respiratory quang      PRESUMPTIVE SABINO ALBICANS  Moderate       Gram Stain (Respiratory) <10 epithelial cells per low power field.     Gram Stain (Respiratory) Few WBC's     Gram Stain (Respiratory) Few Gram positive rods     Gram Stain (Respiratory) Few budding yeast    Blood Culture #2 **CANNOT BE ORDERED STAT** [[id number]]  (Abnormal) Collected: 01/07/22 1220    Order Status: Completed Specimen: Blood from  Peripheral, Antecubital, Right Updated: 01/09/22 0754     Blood Culture, Routine Gram stain des bottle: Gram positive cocci in pairs       Results called to and read back by:JOSH ALVARADO RN 1200 01/07/2022        23:22 FGF      Gram stain aer bottle: Gram positive cocci in pairs       Positive results previously called to and read back by Josh Alvarado RN       1200 on 01/07/2022 23:22  01/08/2022  00:19 fgf      ENTEROCOCCUS FAECALIS  For susceptibility see order #[id number]      Blood Culture #1 **CANNOT BE ORDERED STAT** [[id number]]  (Abnormal)  (Susceptibility) Collected: 01/07/22 1055    Order Status: Completed Specimen: Blood from Peripheral, Hand, Left Updated: 01/09/22 0753     Blood Culture, Routine Gram stain aer bottle: Gram positive cocci in pairs       Gram stain des bottle: Gram positive cocci in pairs       Results called to and read back by:JOSH ALVARADO RN 1200  01/07/2022        23:21 FGF      ENTEROCOCCUS FAECALIS

## 2022-01-13 NOTE — PROGRESS NOTES
[facility]  Department of Cardiology  Progress Note    PATIENT NAME: Claudia Powell  MRN: [id number]  TODAY'S DATE: 01/13/2022  ADMIT DATE: 1/7/2022    SUBJECTIVE     PRINCIPLE PROBLEM: Sepsis due to Enterococcus with no resultant organ failure    INTERVAL HISTORY:    1/13/2022   Patient still waiting for bed at main campus. VSS. Shoulder still bothering her. No chest pain or SOB.     1/12/2022  Patient is sitting up in bed awaiting nuclear medicine scan. She is complaining of L shoulder blade aching. It is reproducible to touch and is throbbing in nature. No substernal chest pain or SOB. VSS.     Review of patient's allergies indicates:   Allergen Reactions    Cephalosporins     Penicillins      hives       REVIEW OF SYSTEMS  CARDIOVASCULAR: No recent chest pain, palpitations, arm, neck, or jaw pain  RESPIRATORY: No recent fever, cough chills, SOB or congestion  : No blood in the urine  GI: No Nausea, vomiting, constipation, diarrhea, blood, or reflux.  MUSCULOSKELETAL: + L shoulder blade pain. No myalgias  NEURO: No lightheadedness or dizziness  EYES: No Double vision, blurry, vision or headache     OBJECTIVE     VITAL SIGNS (Most Recent)  Temp: 97.7 °F (36.5 °C) (01/13/22 0705)  Pulse: 60 (01/13/22 0924)  Resp: 19 (01/13/22 0800)  BP: 138/68 (01/13/22 0924)  SpO2: (!) 93 % (01/13/22 0800)    VENTILATION STATUS  Resp: 19 (01/13/22 0800)  SpO2: (!) 93 % (01/13/22 0800)       I & O (Last 24H):No intake or output data in the 24 hours ending 01/13/22 0957    WEIGHTS  Wt Readings from Last 3 Encounters:   01/07/22 2000 82.5 kg (181 lb 14.1 oz)   01/07/22 1043 79.4 kg (175 lb)   01/10/22 1111 82.5 kg (181 lb 14.1 oz)   12/06/21 1532 80.7 kg (178 lb)       PHYSICAL EXAM  CONSTITUTIONAL: Well built, well nourished in no apparent distress  NECK: no carotid bruit, no JVD  LUNGS: basal crackles- improving   CHEST WALL: no tenderness  HEART: regular rate and rhythm, S1, S2 normal,grade 2/6 systolic murmur at  apex   ABDOMEN: soft, non-tender; bowel sounds normal; no masses,  no organomegaly  EXTREMITIES: Extremities normal, no edema  NEURO: AAO X 3    SCHEDULED MEDS:   amiodarone  200 mg Oral Daily    amLODIPine  5 mg Oral BID    atorvastatin  40 mg Oral QHS    citalopram  40 mg Oral Daily    digoxin  0.125 mg Oral Daily    doxycycline  100 mg Oral Q12H    enoxparin  40 mg Subcutaneous Q24H    furosemide  40 mg Oral Daily    gabapentin  300 mg Oral TID    insulin detemir U-100  40 Units Subcutaneous QHS    insulin detemir U-100  60 Units Subcutaneous Daily    isosorbide dinitrate  10 mg Oral TID    metOLazone  5 mg Oral Every other day    metoprolol tartrate  50 mg Oral BID    multivitamin  1 tablet Oral Daily    pentoxifylline  400 mg Oral TID    ranolazine  500 mg Oral BID    spironolactone  25 mg Oral Daily    vancomycin (VANCOCIN) IVPB  1,500 mg Intravenous Q20H       CONTINUOUS INFUSIONS:      PRN MEDS:acetaminophen, calcium chloride IVPB, calcium chloride IVPB, calcium chloride IVPB, dextrose 50%, dextrose 50%, HYDROcodone-acetaminophen, insulin regular, magnesium oxide, magnesium sulfate IVPB, magnesium sulfate IVPB, magnesium sulfate IVPB, magnesium sulfate IVPB, melatonin, ondansetron, potassium chloride in water, potassium chloride in water, potassium chloride in water, potassium chloride in water, potassium chloride, potassium chloride, potassium chloride, potassium chloride, vancomycin - pharmacy to dose **AND** Pharmacy to dose Vancomycin consult    LABS AND DIAGNOSTICS     CBC LAST 3 DAYS  Recent Labs   Lab 01/11/22  0120 01/11/22  0120 01/12/22  0124 01/13/22  0144   WBC 7.36  --  9.21 10.32   RBC 3.60*  --  3.61* 3.69*   HGB 10.5*  --  10.4* 10.6*   HCT 30.7*  --  31.7* 32.6*   MCV 85  --  88 88   MCH 29.2  --  28.8 28.7   MCHC 34.2  --  32.8 32.5   RDW 16.3*  --  16.4* 16.6*     --  252 300   MPV 9.5  --  9.9 9.7   GRAN 57.2  4.2   < > 62.1  5.7 61.6  6.4   LYMPH 23.1  1.7    < > 19.7  1.8 20.4  2.1   MONO 13.3  1.0   < > 10.1  0.9 9.9  1.0   BASO 0.04  --  0.11 0.16   NRBC 0  --  0 0    < > = values in this interval not displayed.       COAGULATION LAST 3 DAYS  Recent Labs   Lab 01/07/22  1055   LABPT 16.2*   INR 1.4       CHEMISTRY LAST 3 DAYS  Recent Labs   Lab   0000 01/07/22  1055 01/07/22  1117 01/08/22  0637 01/11/22  0120 01/12/22  0124 01/13/22  0144   NA  --  129*  --    < > 132* 131* 133*   K  --  3.1*  --    < > 3.6 3.4* 3.9   CL  --  88*  --    < > 92* 91* 87*   CO2  --  27  --    < > 29 30* 32*   ANIONGAP  --  14  --    < > 11 10 14   BUN  --  34*  --    < > 32* 29* 30*   CREATININE  --  1.4  --    < > 1.2 1.3 1.4   GLU  --  358*  --    < > 259* 243* 234*   CALCIUM  --  8.6*  --    < > 8.6* 8.5* 9.6   PH  --   --  7.503*  --   --   --   --    MG  --  2.4  --   --   --  1.8 1.8   ALBUMIN   < > 3.4*  --   --  2.7* 2.8* 3.1*   PROT   < > 7.1  --   --  6.1 6.2 6.8   ALKPHOS   < > 87  --   --  81 97 95   ALT   < > 22  --   --  23 23 24   AST   < > 29  --   --  24 22 24   BILITOT   < > 1.4*  --   --  1.0 1.2* 1.2*    < > = values in this interval not displayed.       CARDIAC PROFILE LAST 3 DAYS  Recent Labs   Lab 01/07/22  1055 01/07/22 2027 01/08/22  0637   *  --   --    *  --   --    *  --   --    TROPONINI 0.272* 0.581* 0.331*       ENDOCRINE LAST 3 DAYS  Recent Labs   Lab 01/07/22  1055   PROCAL 0.40       LAST ARTERIAL BLOOD GAS  ABG  Recent Labs   Lab 01/07/22  1117   PH 7.503*   PO2 40   PCO2 45.0   HCO3 35.3*   BE 12       LAST 7 DAYS MICROBIOLOGY   Microbiology Results (last 7 days)     Procedure Component Value Units Date/Time    Blood culture [[id number]] Collected: 01/08/22 1629    Order Status: Completed Specimen: Blood from Antecubital, Right Arm Updated: 01/12/22 1832     Blood Culture, Routine No Growth to date      No Growth to date      No Growth to date      No Growth to date      No Growth to date    Culture, Respiratory with Gram  Stain [[id number]]  (Abnormal) Collected: 01/10/22 0814    Order Status: Completed Specimen: Respiratory from Sputum, Expectorated Updated: 01/12/22 1401     Respiratory Culture Reduced normal respiratory quang      PRESUMPTIVE SABINO ALBICANS  Moderate       Gram Stain (Respiratory) <10 epithelial cells per low power field.     Gram Stain (Respiratory) Few WBC's     Gram Stain (Respiratory) Few Gram positive rods     Gram Stain (Respiratory) Few budding yeast    Blood culture [[id number]] Collected: 01/09/22 0710    Order Status: Completed Specimen: Blood from Antecubital, Left Arm Updated: 01/12/22 1232     Blood Culture, Routine No Growth to date      No Growth to date      No Growth to date      No Growth to date    Blood Culture #2 **CANNOT BE ORDERED STAT** [[id number]]  (Abnormal) Collected: 01/07/22 1220    Order Status: Completed Specimen: Blood from Peripheral, Antecubital, Right Updated: 01/09/22 0754     Blood Culture, Routine Gram stain des bottle: Gram positive cocci in pairs       Results called to and read back by:ANALIA ALVARADO RN 1200 01/07/2022        23:22 FGF      Gram stain aer bottle: Gram positive cocci in pairs       Positive results previously called to and read back by Analia Alvarado RN       1200 on 01/07/2022 23:22  01/08/2022  00:19 fgf      ENTEROCOCCUS FAECALIS  For susceptibility see order #[id number]      Blood Culture #1 **CANNOT BE ORDERED STAT** [[id number]]  (Abnormal)  (Susceptibility) Collected: 01/07/22 1055    Order Status: Completed Specimen: Blood from Peripheral, Hand, Left Updated: 01/09/22 0753     Blood Culture, Routine Gram stain aer bottle: Gram positive cocci in pairs       Gram stain des bottle: Gram positive cocci in pairs       Results called to and read back by:ANALIA ALVARADO RN 1200  01/07/2022        23:21 FGF      ENTEROCOCCUS FAECALIS          MOST RECENT IMAGING  NM WBC Whole Body  HISTORY: Bacteremia, cardiac valve  vegetation, low back pain.    TECHNIQUE: 511  uCi of indium 111 labeled white blood cells were administered IV per protocol, with delayed whole-body images obtained in the anterior and posterior projections.    FINDINGS: Comparison to multiple prior exams, including CT of 01/08/2022. There is no abnormal focal radiotracer accumulation in the head, neck, chest, abdomen, pelvis, or the extremities to suggest site of active infection. There is normal radiotracer uptake by the liver and spleen, with normal radiotracer distribution throughout the bone marrow.    IMPRESSION: Negative exam.    Electronically signed by:  Vaughn Clemons MD  1/12/2022 12:54 PM [facility] Workstation: [id number]      [email or web address]  Results for orders placed during the hospital encounter of 01/07/22    Echo    Interpretation Summary  · Mildly decreased systolic function.  · The estimated ejection fraction is 42%.  · Left ventricular diastolic dysfunction.  · There is moderate left ventricular global hypokinesis.  · Normal right ventricular size with normal right ventricular systolic function.  · Normal central venous pressure (3 mmHg).      CURRENT/PREVIOUS VISIT EKG  Results for orders placed or performed during the hospital encounter of 01/07/22   EKG 12-lead    Collection Time: 01/07/22 11:17 AM    Narrative    Test Reason : U07.1,    Vent. Rate : 066 BPM     Atrial Rate : 066 BPM     P-R Int : 144 ms          QRS Dur : 164 ms      QT Int : 620 ms       P-R-T Axes : 000 106 211 degrees     QTc Int : 649 ms    Atrial-sensed ventricular-paced rhythm  Biventricular pacemaker detected  Abnormal ECG  When compared with ECG of 26-NOV-2021 20:33,  Premature ventricular complexes are no longer Present  Vent. rate has decreased BY   6 BPM  Confirmed by Pasquale Woodward MD ([id number]) on 1/7/2022 6:31:17 PM    Referred By: [email or web address]   SELF           Confirmed By:Pasquale Woodward MD       ASSESSMENT/PLAN:     Active Hospital Problems    Diagnosis    *Sepsis due to Enterococcus with no resultant organ failure    Type 2  diabetes mellitus with hyperglycemia, without long-term current use of insulin    Atypical pneumonia    Paroxysmal atrial fibrillation    Pulmonary hypertension    AICD (automatic cardioverter/defibrillator) present    COPD (chronic obstructive pulmonary disease)    S/P CABG (coronary artery bypass graft)    Fatty liver    Coronary artery disease involving native coronary artery of native heart without angina pectoris       ASSESSMENT & PLAN:   1.Enterococcus bactermia   - ID on board      2. BiV ICD with echogenic mass in RV lead   - awaiting transfer to [facility] for lead extraction     3. CAD s/p CABG   - chest pain free   - stable     4. Diabetes mellitus type 2  - per primary     5. Pulmonary HTN   - stable     6. Hypokalemia   - goal is above 4  - resolved at 3.9     7. Essential HTN   - well controlled on present regime       RECOMMENDATIONS:  WBC scan negative   Cardiac lead mass unlikely to be infectious source   Her thoracic pain seems to be MSK- give antiinflammatory PRN     Shakira Brian PA-C  [facility]  Department of Cardiology  Date of Service: 01/13/2022      I have personally interviewed and examined the patient.  I have reviewed all the Physician Assistant's documentation, and agree with the plan.       Noam Najera M.D.  [facility]  Department of Cardiology  Date of Service: 01/13/2022  9:12 AM

## 2022-01-13 NOTE — PROVIDER TRANSFER
Outside Transfer Acceptance Note / Regional Referral Center    Referring facility: [facility]   Referring provider: ANEESH GALINDO  Accepting facility: [facility]  Accepting provider: SUSHANT PICHARDO  Reason for transfer:  HLOC  Transfer diagnosis: Infected PM lead  Transfer specialty requested: Electrophysiology  Transfer specialty notified: yes  Transfer level: NUMBER 1-5: 2  Bed type requested: Telemetry  Isolation status: No active isolations   Admission class or status: IP- Inpatient      Narrative      66-year-old woman with PMH CAD, CHF (EF 33%), pulmonary HTN, BiV ICD, DM2, PVD and HTN admitted to [facility] on 01/07 with fever, SOB and productive cough.  WBC count 15,000, CRP 19, troponin 0.27, COVID neg, CXR pos for BL faint ground-glass opacities.  BC were obtained and patient was started on vancomycin and meropenem.  Patient allergic to cephalosporins and penicillin. Subsequently BC reported pos for Enterococcus faecalis (sensitive to ampicillin and vancomycin).  Repeat BC (1/8, 1/9) NGTD.  KOMAL (1/10) pos for small echogenic mass on the right ventricular lead of the defibrillator.  Nuclear medicine WBC whole-body scan (01/11) neg.  Transfer requested for ICD lead extraction.  Referring provider team (HM and Cardiology) have spoken to [facility] () who approved the transfer.  Transfer diagnosis infected pacemaker lead.      Instructions      Lupillo Lim-  Admit to Hospital Medicine  Upon patient arrival to floor, please send SecureChat to [facility] or call extension [phone] (if no answer, this will flip to a beeper, so enter your call back number) for Hospital Medicine admit team assignment and for additional admit orders for the patient.  Do not page the attending physician associated with the patient on arrival (this physician may not be on duty at the time of arrival).  Rather, always call [phone] to reach the triage physician for orders and team assignment.

## 2022-01-13 NOTE — PLAN OF CARE
Problem: Adult Inpatient Plan of Care  Goal: Plan of Care Review  Outcome: Met  Goal: Patient-Specific Goal (Individualized)  Outcome: Met  Goal: Absence of Hospital-Acquired Illness or Injury  Outcome: Met  Goal: Optimal Comfort and Wellbeing  Outcome: Met  Goal: Readiness for Transition of Care  Outcome: Met     Problem: Diabetes Comorbidity  Goal: Blood Glucose Level Within Targeted Range  Outcome: Met     Problem: Fluid Imbalance (Pneumonia)  Goal: Fluid Balance  Outcome: Met     Problem: Infection (Pneumonia)  Goal: Resolution of Infection Signs and Symptoms  Outcome: Met     Problem: Respiratory Compromise (Pneumonia)  Goal: Effective Oxygenation and Ventilation  Outcome: Met     Problem: Respiratory Compromise (Pneumonia)  Goal: Effective Oxygenation and Ventilation  Outcome: Met     Problem: Fall Injury Risk  Goal: Absence of Fall and Fall-Related Injury  Outcome: Met     Problem: Skin Injury Risk Increased  Goal: Skin Health and Integrity  Outcome: Met

## 2022-01-14 NOTE — PROGRESS NOTES
Progress Note  Infectious Disease    Admit Date: 1/7/2022   LOS: 7 days     SUBJECTIVE:     Follow-up For: Enterococcus bacteremia, abnormal CXR.    HPI:     Claudia Powell is a 66 y.o. female who has been admitted for the 4th time in the last 3 months for primarily cardiac reasons, presented to the emergency room yesterday with shortness of breath.  She reported that she had been short of breath and coughing for several days and that she had a resting O2 of 87%.  She reported a fever of 100.5°, nasal congestion, rhinorrhea, headache, body aches, nausea.  She also had polydipsia and polyuria, and urinary urgency.  She had a white blood cell count 65142, CRP of 19, troponin 0.27 100 chest x-ray was consistent with faint ground-glass opacities throughout both lungs.  COVID testing was negative, procalcitonin was in the normal range.  Blood sugar was 358, creatinine 1.4.  She was started empirically on vancomycin, meropenem and doxycycline (allergic to cephalosporins and penicillin).  She has been afebrile here, white blood cells are improved today and overnight all of her blood cultures now have presumptive Enterococcus  She was treated for urinary tract infection in November.  She has not required antibiotics since then.  She does not have any dysuria hematuria but she does acknowledge that she may not be emptying her bladder completely.  She has no sensation of pelvic floor incompetence.  She has never had a colonoscopy.  She does experience constipation but denies any blood in the stool.  She has no right upper quadrant pain or postprandial abdominal pain..  A repeat echo is being performed and compared to November 2021 at which time she had pulmonary hypertension, EF of 33%, global hypokinesis and mild pan-valvular regurgitation.    INTERVAL HISTORY:  1/10/22 (Cl):  Patient seen and examined at bedside, feeling much better.  Still complaining of mid thoracic/muscular left pain.  Awaiting KOMAL today. UA from  "clean negative for infection, glucose 4+. Repeat blood cultures x2 1/8 no growth, pending final. Respiratory culture 1/10 in process.   1/11:  Interim reviewed, discussed with Dr. Wayne.  KOMAL from 01/10 revealed a small echogenic mass present on the right ventricular lead of her defibrillator.  Hyperechoic pannus formation seen again in the right ventricular lead just below the tricuspid valve, cannot rule out vegetation it is.  It measures 2.1 x 1 cm.  Discussed with Cardiology, plan for transfer to [facility] for removal of defibrillator.  Patient is still complaining of chronic left midthoracic pain.  Nuclear scan in process.  Blood cultures from 01/08 and 1/9 no growth.  Respiratory culture with reduced normal respiratory quang.   1/12: Patient not in the room, went for NM scan. Chart reviewed, hemodynamically stable, afebrile. Repeat blood cultures 1/8 & 1/9 no growth, pending final. Respiratory culture grew yeast, likely a colonizer. WBC; 9.2, no left shift, stable. Cr 1.3  1/13:  Interim reviewed, patient seen and examined at bedside, son present.  Awaiting transfer to home [facility] for removal of infected defibrillator.  No clear scanned negative.  Hemodynamically stable, afebrile.  WBC 10.3, stable.  Creatinine 1.4.  Repeat blood cultures 1/9 no growth.  Again respiratory culture grew Candida albicans likely a colonizer.  Patient currently breathing comfortable on room air.  1/14:  Patient seen and examined at bedside, awaiting transfer to [facility] for removal of defibrillator.  No acute events overnight, back pain much improved.  Hemodynamically stable, afebrile.  Repeat blood cultures remain negative.    Antibiotics (From admission, onward)            Start     Stop Route Frequency Ordered    01/14/22 0000  vancomycin 1.25 g in dextrose 5% 250 mL IVPB (ready to mix)         -- IV Every 22 hours (non-standard times) 01/13/22 1416    01/07/22 2355  vancomycin - pharmacy to dose        "And" Linked Group " Details    -- IV pharmacy to manage frequency 01/07/22 9647          Review of Systems:  HEENT: No change in vision, sore throat, ulcers, thrush, dysphagia  Heart: No chest pain, orthopnea or edema  Lungs: No shortness of breath, cough, sputum production or pleuritic pain  Abdomen: No nausea, vomiting, diarrhea, abdominal pain, appetite is good   Extremities: No edema or erythema  MSK:  Left midthoracic pain, ambulates  Neurological: No headaches, focal weakness  Psych: Calm, appropriate  Skin: No rash, itching, or erythema  VAD: No IV site issues    OBJECTIVE:     Vital Signs (Most Recent)  Temp: 97.8 °F (36.6 °C) (01/14/22 1312)  Pulse: 60 (01/14/22 1312)  Resp: 18 (01/14/22 1312)  BP: (!) 147/67 (01/14/22 1312)  SpO2: (!) 93 % (01/14/22 1312)    Temperature Range Min/Max (Last 24H):  Temp:  [97.7 °F (36.5 °C)-98.4 °F (36.9 °C)]     I & O (Last 24H):    Intake/Output Summary (Last 24 hours) at 1/14/2022 1532  Last data filed at 1/13/2022 2315  Gross per 24 hour   Intake 250 ml   Output --   Net 250 ml       Physical Exam:  General:         In NAD. Alert and attentive, cooperative, comfortable  Eyes:               Anicteric, PERRL, EOMI  ENT:                No ulcers, exudates, thrush, nares patent, dentition is good  Neck:              Supple,   Lungs:             clear to auscultation bilateral  Heart:              S1/S2+, regular rhythm, 2/6 systolic murmur, defibrillator/AICD  left side, no redness nontender to palpation  Abd:                Soft, NT, ND, normal BS, no masses or organomegaly appreciated.  :                  Voids    Musc:               left midthoracic tenderness by the T8-T10. Joints without effusion, swelling, erythema, synovitis, muscle wasting.   Skin:               No rashes. No palmar or plantar lesions.  Left index finger has a splinter lesion which looks old.  Numerous tattoos  Neuro:             Alert, attentive, speech fluent, face symmetric, moves all extremities, no focal  weakness. Ambulatory  Psych:            Calm, cooperative  Lymphatic:       Extrem:           No edema, erythema, phlebitis, cellulitis, warm and well perfused  VAD:               Peripheral                                  Isolation:        None  Wound:               Laboratory:    Recent Labs   Lab 01/12/22  0124 01/13/22  0144 01/14/22  0626   WBC 9.21 10.32 9.27   HGB 10.4* 10.6* 11.2*   HCT 31.7* 32.6* 33.8*    300 315       Recent Labs   Lab 01/12/22  0124 01/13/22  0144 01/14/22  0626   * 133* 132*   K 3.4* 3.9 3.7   CL 91* 87* 92*   CO2 30* 32* 30*   BUN 29* 30* 37*   CREATININE 1.3 1.4 1.6*   CALCIUM 8.5* 9.6 9.0   PROT 6.2 6.8 6.5   BILITOT 1.2* 1.2* 1.2*   ALKPHOS 97 95 89   ALT 23 24 23   AST 22 24 21     No results for input(s): CRP in the last 168 hours.  Estimated Creatinine Clearance: 35.2 mL/min (A) (based on SCr of 1.6 mg/dL (H)).     Microbiology Results (last 7 days)     Procedure Component Value Units Date/Time    Blood culture [[id number]] Collected: 01/09/22 0710    Order Status: Completed Specimen: Blood from Antecubital, Left Arm Updated: 01/14/22 1232     Blood Culture, Routine No growth after 5 days.    Blood culture [[id number]] Collected: 01/08/22 1629    Order Status: Completed Specimen: Blood from Antecubital, Right Arm Updated: 01/13/22 1832     Blood Culture, Routine No growth after 5 days.    Culture, Respiratory with Gram Stain [[id number]]  (Abnormal) Collected: 01/10/22 0814    Order Status: Completed Specimen: Respiratory from Sputum, Expectorated Updated: 01/12/22 1401     Respiratory Culture Reduced normal respiratory quang      PRESUMPTIVE SABINO ALBICANS  Moderate       Gram Stain (Respiratory) <10 epithelial cells per low power field.     Gram Stain (Respiratory) Few WBC's     Gram Stain (Respiratory) Few Gram positive rods     Gram Stain (Respiratory) Few budding yeast    Blood Culture #2 **CANNOT BE ORDERED STAT** [[id number]]  (Abnormal) Collected: 01/07/22  1220    Order Status: Completed Specimen: Blood from Peripheral, Antecubital, Right Updated: 01/09/22 0754     Blood Culture, Routine Gram stain des bottle: Gram positive cocci in pairs       Results called to and read back by:JOSH ALVARADO RN 1200 01/07/2022        23:22 FGF      Gram stain aer bottle: Gram positive cocci in pairs       Positive results previously called to and read back by Josh Alvarado RN       1200 on 01/07/2022 23:22  01/08/2022  00:19 fgf      ENTEROCOCCUS FAECALIS  For susceptibility see order #[id number]      Blood Culture #1 **CANNOT BE ORDERED STAT** [[id number]]  (Abnormal)  (Susceptibility) Collected: 01/07/22 1055    Order Status: Completed Specimen: Blood from Peripheral, Hand, Left Updated: 01/09/22 0753     Blood Culture, Routine Gram stain aer bottle: Gram positive cocci in pairs       Gram stain des bottle: Gram positive cocci in pairs       Results called to and read back by:JOSH ALVARADO RN 1200  01/07/2022        23:21 FGF      ENTEROCOCCUS FAECALIS          Diagnostic Results: Reviewed  CXR  CT scan abdomen/pelvis 1/8/22:  1. No finding of bowel obstruction, intra-abdominal free air or abscess.  2. No evidence of obstructive uropathy, or radiopaque renal/clicking system stone.  3. Trace left greater than right pleural effusion and adjacent atelectasis.  4. Numerous additional, and incidental findings as noted above.       Cardiology:  KOMAL 1/11/22:  · The left ventricle is mildly enlarged with concentric remodeling and  · The estimated ejection fraction is 35%.  · Left ventricular diastolic dysfunction.  · There is moderate left ventricular global hypokinesis.  · There is abnormal septal wall motion consistent with right ventricular pacemaker.  · Normal right ventricular size with normal right ventricular systolic function.  · Small echogenic mass present on the right ventricular lead.. The mass is fixed. Hyperechoic oblong pannus formation seen on the right ventricular lead just  below the tricuspid valve with some peripheral irregularities associated a vegetation cannot be ruled out completely. This measures 2.1 by 1.0 cm in total.  · Moderate mitral regurgitation.  · Mild to moderate tricuspid regurgitation.  · The right atrial lead appears unremarkable. Lead visualized in the coronary sinus also appears unremarkable.       ECHO 11/2021:  · Elevated central venous pressure (15 mmHg).  · The estimated PA systolic pressure is 44 mmHg.  · The left ventricle is mildly enlarged with moderately decreased systolic function.  · The estimated ejection fraction is 33%.  · Grade III left ventricular diastolic dysfunction.  · There is mild left ventricular global hypokinesis.  · Normal right ventricular size with normal right ventricular systolic function.  · Mild left atrial enlargement.  · Mild right atrial enlargement.  · Mild aortic regurgitation.  · Mild-to-moderate mitral regurgitation.  · Mild tricuspid regurgitation.  · Mild pulmonic regurgitation.        ASSESSMENT/PLAN:     1. Sepsis resolved, likely secondary to Enterococcus faecalis bacteremia in the setting of infected right lead/defibrillator (IE)   Repeat blood cultures 1/8 & 1/9 no growth    KOMAL showed suspicious pannus on right ventricular lead, cannot exclude vegetation   CRP 19.5    2. Chronic left mid-thoracic pain   WB NM scan negative    3. Poorly controlled Diabetes, HbA1c: 10.8%  4. Cardiomyopathy   5. Defibrillator in situ     Recommendations:   Adequate source control   Plan for transfer to [facility] to remove defibrillator   Continue Vancomycin IV, keep level 15-20 for at least 6 weeks after removing device   Completed Doxycycline   Monitor CBC w/diff, CMP, CRP, vanco level, weekly while on antibiotics   Trend CRP  Tight glucose control    Dr Shoemaker will be available if needed     D/w Dr Stapleton    Medical Decision Making during this encounter was  [_] Low Complexity  [_] Moderate Complexity  [xx] High Complexity

## 2022-01-14 NOTE — PROGRESS NOTES
[facility] Medicine  Progress Note    Patient name: Claudia Powell  MRN: [id number]  Admit Date: 1/7/2022   LOS: 7 days     SUBJECTIVE:     Principal problem: Sepsis due to Enterococcus with no resultant organ failure    Interval History:  Patient was seen and examined at bedside. Remains on vancomycin and doxycycline.  Appears euvolemic.  Accepted at [facility] waiting for the bed.  Supratherapeutic level of vancomycin noted.     Scheduled Meds:   amiodarone  200 mg Oral Daily    amLODIPine  5 mg Oral BID    atorvastatin  40 mg Oral QHS    citalopram  40 mg Oral Daily    digoxin  0.125 mg Oral Daily    doxycycline  100 mg Oral Q12H    enoxparin  40 mg Subcutaneous Q24H    furosemide  40 mg Oral Daily    gabapentin  300 mg Oral TID    insulin detemir U-100  40 Units Subcutaneous QHS    insulin detemir U-100  60 Units Subcutaneous Daily    isosorbide dinitrate  10 mg Oral TID    metOLazone  5 mg Oral Every other day    metoprolol tartrate  50 mg Oral BID    multivitamin  1 tablet Oral Daily    pentoxifylline  400 mg Oral TID    ranolazine  500 mg Oral BID    spironolactone  25 mg Oral Daily    vancomycin (VANCOCIN) IVPB  1,250 mg Intravenous Q22H     Continuous Infusions:    PRN Meds:acetaminophen, calcium chloride IVPB, calcium chloride IVPB, calcium chloride IVPB, dextrose 50%, dextrose 50%, HYDROcodone-acetaminophen, insulin regular, magnesium oxide, magnesium sulfate IVPB, magnesium sulfate IVPB, magnesium sulfate IVPB, magnesium sulfate IVPB, melatonin, naproxen, ondansetron, potassium chloride in water, potassium chloride in water, potassium chloride in water, potassium chloride in water, potassium chloride, potassium chloride, potassium chloride, potassium chloride, vancomycin - pharmacy to dose **AND** Pharmacy to dose Vancomycin consult    Review of patient's allergies indicates:   Allergen Reactions    Cephalosporins     Penicillins      hives          Review of Systems: As per interval history    OBJECTIVE:     Vital Signs (Most Recent)  Temp: 97.8 °F (36.6 °C) (01/14/22 0530)  Pulse: 61 (01/14/22 0530)  Resp: 18 (01/14/22 0530)  BP: (!) 122/54 (01/14/22 0530)  SpO2: 96 % (01/14/22 0530)    Vital Signs Range (Last 24H):  Temp:  [97 °F (36.1 °C)-98.4 °F (36.9 °C)]   Pulse:  [59-61]   Resp:  [18-20]   BP: (115-138)/(53-68)   SpO2:  [96 %-98 %]     I & O (Last 24H):    Intake/Output Summary (Last 24 hours) at 1/14/2022 0834  Last data filed at 1/13/2022 2315  Gross per 24 hour   Intake 250 ml   Output --   Net 250 ml       Physical Exam:  General: Patient resting comfortably in no acute distress. Appears as stated age. Calm, morbidly obese  Eyes: No conjunctival injection. No scleral icterus.  ENT: Hearing grossly intact. No discharge from ears. No nasal discharge.   Neck: Supple, trachea midline. No JVD  CVS: RRR. No LE edema BL, left ICD in place-no erythema or fluctuance noted  Lungs: CTA BL, no wheezing or crackles. Good breath sounds. No accessory muscle use. No acute respiratory distress  Abdomen:  Soft, nontender and nondistended.  No organomegaly  Neuro: AOx3. Moves all extremities. Follows commands. Responds appropriately       Laboratory:  I have reviewed all pertinent lab results within the past 24 hours.    Diagnostic Results:  Reviewed all imaging    ASSESSMENT/PLAN:     66 year old lady with prior history of combined systolic and diastolic CHF, ICD in place, diabetes and other multiple comorbidities presented with fever, shortness of breath found to have E faecalis bacteremia and KOMAL confirmed vegetation on ICD lead.     Assessment:  Active Hospital Problems    Diagnosis  POA    *Sepsis due to Enterococcus with no resultant organ failure [A41.81]  Yes    Type 2 diabetes mellitus with hyperglycemia, without long-term current use of insulin [E11.65]  Yes    Atypical pneumonia [J18.9]  Yes    Paroxysmal atrial fibrillation [I48.0]  Yes     Pulmonary hypertension [I27.20]  Yes    AICD (automatic cardioverter/defibrillator) present [Z95.810]  Yes     Chronic    COPD (chronic obstructive pulmonary disease) [J44.9]  Yes     Chronic    S/P CABG (coronary artery bypass graft) [Z95.1]  Not Applicable     Chronic    Fatty liver [K76.0]  Yes    Coronary artery disease involving native coronary artery of native heart without angina pectoris [I25.10]  Yes      Resolved Hospital Problems   No resolved problems to display.         Plan:   Repeat cultures from 01/08 and 01/09 no growth, KOMAL showed vegetation on right ventricular lead    WBC tagged scan did not show any other focus of infection    Hold Plavix and Eliquis in anticipation of procedure    Hold diuretics today due to mild elevation of creatinine, supratherapeutic vancomycin level    Antibiotics as per Infectious Disease    Trend CRP    Follow up on culture    Basal bolus insulin regimen    Continue current orders    Transfer to [facility] once bed available      VTE Risk Mitigation (From admission, onward)         Ordered     enoxaparin injection 40 mg  Every 24 hours (non-standard times)         01/12/22 1631     IP VTE HIGH RISK PATIENT  Once         01/07/22 1958     Place sequential compression device  Until discontinued         01/07/22 1958     Reason for No Pharmacological VTE Prophylaxis  Once        Question:  Reasons:  Answer:  Already adequately anticoagulated on oral Anticoagulants    01/07/22 1958                  Department Hospital Medicine  [facility]  Vivian Stapleton MD  Date of service: 01/14/2022

## 2022-01-15 PROBLEM — T82.7XXA INFECTION INVOLVING IMPLANTABLE CARDIOVERTER-DEFIBRILLATOR (ICD): Status: ACTIVE | Noted: 2022-01-01

## 2022-01-15 NOTE — Clinical Note
The lead was removed. A new lead was attached to the right atrium.      The chronic RA lead was removed.

## 2022-01-15 NOTE — PROGRESS NOTES
Pharmacokinetic Assessment Follow Up: IV Vancomycin    Vancomycin serum concentration assessment(s):    The random level was drawn correctly and can be used to guide therapy at this time. The measurement is below the desired definitive target range of 15 to 20 mcg/mL.    Vancomycin Regimen Plan:    Vancomycin 1000 mg IV x 1 (pulse dosing)  Re-dose when the random level is less than 20 mcg/mL, next level to be drawn at 0930 on 1/16/22    Drug levels (last 3 results):  Recent Labs   Lab Result Units 01/13/22  1300 01/14/22  2053 01/15/22  0749   Vancomycin, Random ug/mL  --   --  14.5   Vancomycin-Trough ug/mL 25.3* 23.1*  --        Pharmacy will continue to follow and monitor vancomycin.    Please contact pharmacy at extension [phone] for questions regarding this assessment.    Thank you for the consult,   Izabella Chapman, PharmD       Patient brief summary:  Claudia Powell is a 66 y.o. female initiated on antimicrobial therapy with IV Vancomycin for treatment of bacteremia      Drug Allergies:   Review of patient's allergies indicates:   Allergen Reactions    Cephalosporins     Penicillins      hives         Actual Body Weight:   82.5 kg    Renal Function:   Estimated Creatinine Clearance: 33.1 mL/min (A) (based on SCr of 1.7 mg/dL (H)).,     Dialysis Method (if applicable):  N/A    CBC (last 72 hours):  Recent Labs   Lab Result Units 01/13/22  0144 01/14/22  0626 01/15/22  0749   WBC K/uL 10.32 9.27 7.81   Hemoglobin g/dL 10.6* 11.2* 10.5*   Hematocrit % 32.6* 33.8* 32.5*   Platelets K/uL 300 315 317   Gran % % 61.6 72.0 59.0   Lymph % % 20.4 11.0* 22.4   Mono % % 9.9 5.0 9.6   Eosinophil % % 2.3 2.0 2.8   Basophil % % 1.6 0.0 1.3   Differential Method  Automated Manual Automated       Metabolic Panel (last 72 hours):  Recent Labs   Lab Result Units 01/13/22  0144 01/14/22  0626 01/15/22  0748   Sodium mmol/L 133* 132* 135*   Potassium mmol/L 3.9 3.7 3.9   Chloride mmol/L 87* 92* 95   CO2 mmol/L 32* 30* 29   Glucose mg/dL  234* 144* 198*   BUN mg/dL 30* 37* 41*   Creatinine mg/dL 1.4 1.6* 1.7*   Albumin g/dL 3.1* 3.2* 3.3*   Total Bilirubin mg/dL 1.2* 1.2* 1.1*   Alkaline Phosphatase U/L 95 89 88   AST U/L 24 21 22   ALT U/L 24 23 22   Magnesium mg/dL 1.8 1.7 1.9       Vancomycin Administrations:  vancomycin given in the last 96 hours                     vancomycin in dextrose 5 % 1 gram/250 mL IVPB 1,000 mg (mg) 1,000 mg New Bag 01/15/22 1036    vancomycin 1.25 g in dextrose 5% 250 mL IVPB (ready to mix) (mg) 1,250 mg New Bag 01/13/22 2315    vancomycin 1,500 mg in dextrose 5 % 500 mL IVPB (mg) 1,500 mg New Bag 01/12/22 1746     1,500 mg New Bag 01/11/22 2212                    Microbiologic Results:  Microbiology Results (last 7 days)       Procedure Component Value Units Date/Time    Blood culture [[id number]] Collected: 01/09/22 0710    Order Status: Completed Specimen: Blood from Antecubital, Left Arm Updated: 01/14/22 1232     Blood Culture, Routine No growth after 5 days.    Blood culture [[id number]] Collected: 01/08/22 1629    Order Status: Completed Specimen: Blood from Antecubital, Right Arm Updated: 01/13/22 1832     Blood Culture, Routine No growth after 5 days.    Culture, Respiratory with Gram Stain [[id number]]  (Abnormal) Collected: 01/10/22 0814    Order Status: Completed Specimen: Respiratory from Sputum, Expectorated Updated: 01/12/22 1401     Respiratory Culture Reduced normal respiratory quang      PRESUMPTIVE SABINO ALBICANS  Moderate       Gram Stain (Respiratory) <10 epithelial cells per low power field.     Gram Stain (Respiratory) Few WBC's     Gram Stain (Respiratory) Few Gram positive rods     Gram Stain (Respiratory) Few budding yeast    Blood Culture #2 **CANNOT BE ORDERED STAT** [[id number]]  (Abnormal) Collected: 01/07/22 1220    Order Status: Completed Specimen: Blood from Peripheral, Antecubital, Right Updated: 01/09/22 0754     Blood Culture, Routine Gram stain des bottle: Gram positive cocci in pairs        Results called to and read back by:JOSH ALVARADO RN 1200 01/07/2022        23:22 FGF      Gram stain aer bottle: Gram positive cocci in pairs       Positive results previously called to and read back by Josh Alvarado RN       1200 on 01/07/2022 23:22  01/08/2022  00:19 fgf      ENTEROCOCCUS FAECALIS  For susceptibility see order #[id number]      Blood Culture #1 **CANNOT BE ORDERED STAT** [[id number]]  (Abnormal)  (Susceptibility) Collected: 01/07/22 1055    Order Status: Completed Specimen: Blood from Peripheral, Hand, Left Updated: 01/09/22 0753     Blood Culture, Routine Gram stain aer bottle: Gram positive cocci in pairs       Gram stain des bottle: Gram positive cocci in pairs       Results called to and read back by:JOSH ALVARADO RN 1200  01/07/2022        23:21 FGF      ENTEROCOCCUS FAECALIS

## 2022-01-15 NOTE — Clinical Note
The groin and chest was prepped. The site was prepped with ChloraPrep. The site was clipped. The patient was draped. The patient was positioned supine.

## 2022-01-15 NOTE — Clinical Note
The laser sheath unit was calibrated. The laser sheath was inserted over RV lead. Laser sheath usage began at 1/19/2022 2:00 PM.    The laser sheath was advanced over lead using counter traction methods.

## 2022-01-15 NOTE — PROGRESS NOTES
Pharmacokinetic Assessment Follow Up: IV Vancomycin    Patient brief summary:  Claudia Powell is a 66 y.o. female initiated on antimicrobial therapy with IV Vancomycin for treatment of Bacteremia    The patient's current regimen is 1250 mg IV q 22 hours      Actual Body Weight = 82.5 kg (181 lb 14.1 oz).    Renal Function:   Estimated Creatinine Clearance: 35.2 mL/min (A) (based on SCr of 1.6 mg/dL (H)).      Vancomycin serum concentration assessment(s):    The trough level was drawn correctly and can be used to guide therapy at this time. The measurement is above the desired definitive target range of 15 to 20 mcg/mL.    Vancomycin Regimen Plan:    Re-dose when the random level is less than 20 mcg/mL, next level to be drawn at 0600 on 1/15    Drug levels (last 3 results):  Recent Labs   Lab Result Units 01/13/22  1300 01/14/22 2053   Vancomycin-Trough ug/mL 25.3* 23.1*       Pharmacy will continue to follow and monitor vancomycin.    Please contact pharmacy at extension [phone] for questions regarding this assessment.    Thank you for the consult,   Lorenzo Cole

## 2022-01-15 NOTE — PROGRESS NOTES
[facility]  Progress Note    Patient name: Claudia Powell  MRN: [id number]  Admit Date: 1/7/2022   LOS: 8 days     SUBJECTIVE:     Principal problem: Sepsis due to Enterococcus with no resultant organ failure    Interval History:  Patient was seen and examined at bedside. Remains on vancomycin and doxycycline.  Appears euvolemic.  Accepted at [facility] waiting for the bed.  Patient is little frustrated    Scheduled Meds:   amiodarone  200 mg Oral Daily    amLODIPine  5 mg Oral BID    atorvastatin  40 mg Oral QHS    citalopram  40 mg Oral Daily    digoxin  0.125 mg Oral Daily    enoxparin  40 mg Subcutaneous Q24H    furosemide  40 mg Oral Daily    gabapentin  300 mg Oral TID    insulin detemir U-100  40 Units Subcutaneous QHS    insulin detemir U-100  60 Units Subcutaneous Daily    isosorbide dinitrate  10 mg Oral TID    metOLazone  5 mg Oral Every other day    metoprolol tartrate  50 mg Oral BID    multivitamin  1 tablet Oral Daily    pentoxifylline  400 mg Oral TID    ranolazine  500 mg Oral BID    spironolactone  25 mg Oral Daily     Continuous Infusions:   sodium chloride 0.9% 10 mL/hr at 01/14/22 2230     PRN Meds:acetaminophen, calcium chloride IVPB, calcium chloride IVPB, calcium chloride IVPB, dextrose 50%, dextrose 50%, HYDROcodone-acetaminophen, insulin regular, magnesium oxide, magnesium sulfate IVPB, magnesium sulfate IVPB, magnesium sulfate IVPB, magnesium sulfate IVPB, melatonin, naproxen, ondansetron, potassium chloride in water, potassium chloride in water, potassium chloride in water, potassium chloride in water, potassium chloride, potassium chloride, potassium chloride, potassium chloride, vancomycin - pharmacy to dose **AND** Pharmacy to dose Vancomycin consult    Review of patient's allergies indicates:   Allergen Reactions    Cephalosporins     Penicillins      hives         Review of Systems: As per interval history    OBJECTIVE:      Vital Signs (Most Recent)  Temp: 98.3 °F (36.8 °C) (01/15/22 0745)  Pulse: 78 (01/15/22 0745)  Resp: 18 (01/15/22 0745)  BP: (!) 143/68 (01/15/22 0745)  SpO2: 98 % (01/15/22 0745)    Vital Signs Range (Last 24H):  Temp:  [97.6 °F (36.4 °C)-98.3 °F (36.8 °C)]   Pulse:  [59-78]   Resp:  [18]   BP: (130-167)/(51-71)   SpO2:  [93 %-98 %]     I & O (Last 24H):  No intake or output data in the 24 hours ending 01/15/22 0921    Physical Exam:  General: Patient resting comfortably in no acute distress. Appears as stated age. Calm, morbidly obese  Eyes: No conjunctival injection. No scleral icterus.  ENT: Hearing grossly intact. No discharge from ears. No nasal discharge.   Neck: Supple, trachea midline. No JVD  CVS: RRR. No LE edema BL, left ICD in place-no erythema or fluctuance noted  Lungs: CTA BL, no wheezing or crackles. Good breath sounds. No accessory muscle use. No acute respiratory distress  Abdomen:  Soft, nontender and nondistended.  No organomegaly  Neuro: AOx3. Moves all extremities. Follows commands. Responds appropriately       Laboratory:  I have reviewed all pertinent lab results within the past 24 hours.    Diagnostic Results:  Reviewed all imaging    ASSESSMENT/PLAN:     66 year old lady with prior history of combined systolic and diastolic CHF, ICD in place, diabetes and other multiple comorbidities presented with fever, shortness of breath found to have E faecalis bacteremia and KOMAL confirmed vegetation on ICD lead.     Assessment:  Active Hospital Problems    Diagnosis  POA    *Sepsis due to Enterococcus with no resultant organ failure [A41.81]  Yes    Type 2 diabetes mellitus with hyperglycemia, without long-term current use of insulin [E11.65]  Yes    Atypical pneumonia [J18.9]  Yes    Paroxysmal atrial fibrillation [I48.0]  Yes    Pulmonary hypertension [I27.20]  Yes    AICD (automatic cardioverter/defibrillator) present [Z95.810]  Yes     Chronic    COPD (chronic obstructive pulmonary  disease) [J44.9]  Yes     Chronic    S/P CABG (coronary artery bypass graft) [Z95.1]  Not Applicable     Chronic    Fatty liver [K76.0]  Yes    Coronary artery disease involving native coronary artery of native heart without angina pectoris [I25.10]  Yes      Resolved Hospital Problems   No resolved problems to display.         Plan:   Repeat cultures from 01/08 and 01/09 no growth, KOMAL showed vegetation on right ventricular lead    WBC tagged scan did not show any other focus of infection    Hold Plavix and Eliquis in anticipation of procedure at tertiary care center    Hold diuretics today due to mild elevation of creatinine, supratherapeutic vancomycin level    Antibiotics as per Infectious Disease    Trend CRP    Follow up on culture    Basal bolus insulin regimen    Continue current orders    Transfer to [facility] once bed available      VTE Risk Mitigation (From admission, onward)         Ordered     enoxaparin injection 40 mg  Every 24 hours (non-standard times)         01/12/22 1631     IP VTE HIGH RISK PATIENT  Once         01/07/22 1958     Place sequential compression device  Until discontinued         01/07/22 1958     Reason for No Pharmacological VTE Prophylaxis  Once        Question:  Reasons:  Answer:  Already adequately anticoagulated on oral Anticoagulants    01/07/22 1958                  Department Hospital Medicine  [facility]  Vivian Stapleton MD  Date of service: 01/15/2022

## 2022-01-15 NOTE — Clinical Note
The lead was removed. A new lead was attached to the right ventricle.      The chronic RV lead was removed.

## 2022-01-15 NOTE — Clinical Note
dry, intact, no bleeding and no hematoma. Gauze and tegaderm to bilateral groins. Aquacel AG with pressure dressing to left chest.

## 2022-01-16 PROBLEM — N18.9 CKD (CHRONIC KIDNEY DISEASE): Status: ACTIVE | Noted: 2022-01-01

## 2022-01-16 PROBLEM — N18.31 STAGE 3A CHRONIC KIDNEY DISEASE: Status: ACTIVE | Noted: 2022-01-01

## 2022-01-16 PROBLEM — R94.31 PROLONGED QT INTERVAL: Status: ACTIVE | Noted: 2022-01-01

## 2022-01-16 NOTE — PROGRESS NOTES
Progress Note  Hospital Medicine      Patient Name: Claudia Powell  MRN: [id number]  Patient Class: IP- Inpatient  Admission Date: 1/15/2022  Attending Physician: Tejal Cristina MD   Primary Care Provider: Manjit Monae MD       SUBJECTIVE:     Follow-up For:  Bacteremia due to Enterococcus     Interval history/ROS: 1/16: EP assessment appreciated.     HPI: Claudia Powell is a 66 y.o. female with a PMHx of CAD, HFrEF, DM2, HTN, COPD who presents to [facility] [place] a transfer from [facility] for removal of infected implanted cardiac defibrillator. Patient initially presented to OSH on 1/7 with complaints of SOB, productive cough, fever, URI symptoms and was admitted for sepsis 2/2 bacteremia. She was initially started on empiric vancomycin, meropenem and doxycycline with resolution of fever and leukocytosis. Blood cultures from 1/7 grew Enterococcus faecalis with appropriate sensitivities. KOMAL showed suspicious pannus on right ventricular lead concerning for vegetation. She completed 5 day course of doxycycline and was continued on IV vanc with plan to continue antibiotics for at least 6 weeks after removal of ICD. Patient transferred here for CTS eval and removal of defibrillator.      Patient seen and evaluated at bedside upon arrival to [facility]. She is feeling well overall and denies any chest pain, SOB or other complaints at this time. Updated on tentative plan for removal of ICD tomorrow.     OBJECTIVE:     Body mass index is 31.44 kg/m².    Vital Signs Range (Last 24H):  Temp:  [98 °F (36.7 °C)-98.5 °F (36.9 °C)]   Pulse:  [58-61]   Resp:  [14-18]   BP: (135-152)/(58-85)   SpO2:  [92 %-97 %]     I & O (Last 24H):    Intake/Output Summary (Last 24 hours) at 1/16/2022 1545  Last data filed at 1/16/2022 0346  Gross per 24 hour   Intake 240 ml   Output 700 ml   Net -460 ml        Physical Exam:  Vitals and nursing note reviewed.   Constitutional:       General: She is not in acute distress.     Appearance:  She is well-developed. She is not ill-appearing.   HENT:      Head: Normocephalic and atraumatic.      Mouth/Throat:      Pharynx: No oropharyngeal exudate.   Eyes:      Extraocular Movements: Extraocular movements intact.      Conjunctiva/sclera: Conjunctivae normal.   Cardiovascular:      Rate and Rhythm: Normal rate and regular rhythm.      Heart sounds: Normal heart sounds.   Pulmonary:      Effort: Pulmonary effort is normal. No respiratory distress.      Breath sounds: Normal breath sounds. No wheezing.   Abdominal:      General: Bowel sounds are normal. There is no distension.      Palpations: Abdomen is soft.      Tenderness: There is no abdominal tenderness. There is no guarding.   Musculoskeletal:         General: No tenderness. Normal range of motion.      Cervical back: Normal range of motion and neck supple.      Right lower leg: No edema.      Left lower leg: No edema.   Lymphadenopathy:      Cervical: No cervical adenopathy.   Skin:     General: Skin is warm and dry.      Capillary Refill: Capillary refill takes less than 2 seconds.      Findings: No rash.   Neurological:      Mental Status: She is alert and oriented to person, place, and time.      Cranial Nerves: No cranial nerve deficit.      Sensory: No sensory deficit.      Coordination: Coordination normal.   Psychiatric:         Behavior: Behavior normal.         Thought Content: Thought content normal.         Judgment: Judgment normal.        Recent Labs   Lab 01/14/22  0626 01/15/22  0748 01/16/22  0339   * 135* 135*   K 3.7 3.9 4.2   CL 92* 95 97   CO2 30* 29 26   BUN 37* 41* 35*   CREATININE 1.6* 1.7* 1.4   * 198* 206*   CALCIUM 9.0 9.7 10.3   MG 1.7 1.9 1.9     Recent Labs   Lab 01/14/22  0626 01/15/22  0748 01/16/22  0339   ALKPHOS 89 88 113   ALT 23 22 20   AST 21 22 21   ALBUMIN 3.2* 3.3* 3.2*   PROT 6.5 6.6 7.2   BILITOT 1.2* 1.1* 0.5   INR  --   --  1.0       Recent Labs   Lab 01/13/22  0144 01/14/22  0626 01/15/22  0749  01/16/22  0339   WBC  --  9.27 7.81 10.87   HGB  --  11.2* 10.5* 11.2*   HCT  --  33.8* 32.5* 34.9*   PLT  --  315 317 366   GRAN   < > 72.0 59.0  4.6 60.6  6.6   LYMPH   < > 11.0* 22.4  1.8 23.6  2.6   MONO   < > 5.0 9.6  0.8 9.0  1.0    < > = values in this interval not displayed.       Recent Labs   Lab 01/16/22  0744 01/16/22  1152   POCTGLUCOSE 200* 201*       No results for input(s): TROPONINI in the last 168 hours.    Diagnostic Results:  Labs: Reviewed    amiodarone, 200 mg, Oral, Daily  amLODIPine, 5 mg, Oral, BID  atorvastatin, 40 mg, Oral, QHS  digoxin, 0.125 mg, Oral, Daily  gabapentin, 300 mg, Oral, TID  insulin aspart U-100, 10 Units, Subcutaneous, TIDWM  insulin detemir U-100, 25 Units, Subcutaneous, BID  isosorbide dinitrate, 10 mg, Oral, TID  metoprolol tartrate, 50 mg, Oral, BID  multivitamin, 1 tablet, Oral, Daily  pentoxifylline, 400 mg, Oral, TID        As Needed acetaminophen, albuterol-ipratropium, bisacodyL, dextrose 50%, dextrose 50%, glucagon (human recombinant), glucose, glucose, insulin aspart U-100, melatonin, polyethylene glycol, Pharmacy to dose Vancomycin consult **AND** vancomycin - pharmacy to dose    ASSESSMENT/PLAN:     Assessment: Claudia Powell is a 66 y.o. female here with:     Active Hospital Problems    Diagnosis  POA    *Bacteremia due to Enterococcus [R78.81, B95.2]  Yes    Stage 3a chronic kidney disease [N18.31]  Yes    Prolonged QT interval [R94.31]  Yes    Infection involving implantable cardioverter-defibrillator (ICD) [T82.7XXA]  Yes    Type 2 diabetes mellitus with hyperglycemia, without long-term current use of insulin [E11.65]  Yes    Paroxysmal atrial fibrillation [I48.0]  Yes    Primary hypertension [I10]  Yes    COPD (chronic obstructive pulmonary disease) [J44.9]  Yes     Chronic    S/P CABG (coronary artery bypass graft) [Z95.1]  Not Applicable     Chronic    YAQUELIN (acute kidney injury) [N17.9]  Yes    Chronic combined systolic and diastolic  heart failure [I50.42]  Yes     Chronic    Coronary artery disease involving native coronary artery of native heart without angina pectoris [I25.10]  Yes      Resolved Hospital Problems   No resolved problems to display.        Plan:       Bacteremia due to Enterococcus  Infection involving implantable cardioverter-defibrillator (ICD)  66 y.o. female with hx of HFrEF, HTN, T2DM, COPD admitted to OSH for sepsis 2/2 Enterococcus faecalis bacteremia in the setting of infected right lead/defibrillator (IE). Treated with IV antibiotics and transferred to C for removal of defibrillator.     - repeat blood cx (1/8 & 1/9) NGTD  - CTS consulted for ICD removal; appreciate assistance  - NPO at midnight  - holding Eliquis and Plavix for procedure  - continue IV vanc (pharm to dose)  - ID consulted; appreciate assistance  - trend CBC, CRP  - tight glycemic control     YAQUELIN (acute kidney injury)  Stage 3a chronic kidney disease  - Cr 1.4>>1.7  - suspect 2/2 vanc and mild dehydration   - diuretics held-- can likely resume in AM given severely depressed EF  - trend BMP     Chronic combined systolic and diastolic heart failure  S/P CABG (coronary artery bypass graft)  - appears dry vs euvolemic  - lasix, metolazone, and aldactone held for YAQUELIN-- can likely resume in AM if renal fn improved  - strict I/Os, daily weights  - cardiac diet     Type 2 diabetes mellitus with hyperglycemia, without long-term current use of insulin  - home regimen: lantus 100U daily  - inpatient regimen: detemir 25U BID + aspart 10U TIDWM  - moderate dose SSI  - ACHS accuchecks           Lab Results   Component Value Date     HGBA1C 10.8 (H) 01/11/2022         Prolonged Qtc  - Qtc 649ms on 1/7  - hold celexa and ranexa for now  - daily EKG     Paroxysmal atrial fibrillation  - continue MTP 50mg BID, amiodarone 200mg daily, and digoxin 0.125mg daily  - hold eliquis for procedure     Primary hypertension  - continue imdur, amlodipine, and MTP  - hold  aldactone d/t YAQUELIN     COPD (chronic obstructive pulmonary disease)  - no acute issues  - duonebs PRN     Coronary artery disease involving native coronary artery of native heart without angina pectoris  - continue statin and BB, hold plavix as above  - hold ranexa given prolonged QTc             Tejal Cristina MD

## 2022-01-16 NOTE — CONSULTS
History & Physical  Surgery      SUBJECTIVE:     Chief Complaint/Reason for Admission: Bacteremia     History of Present Illness: Claudia Powell is a 66 y.o. female with past medical history significant for coronary artery disease s/p CABG (performed at [facility]), CHFrEF (35%) s/p BiV-ICD placement who arrives as a transfer from an OSH after she was admitted with complaints of dyspnea, productive cough, and subjective fever. Her workup was notable Enterococcus faecalis bacteremia and KOMAL findings concerning for vegetations on ICD lead.     She currently reports feeling generally well. Denies any pain, subjective fevers, chills, nausea, vomiting, cough, abdominal pain, and skin changes.       Current Outpatient Medications on File Prior to Encounter   Medication Sig    albuterol (PROVENTIL/VENTOLIN HFA) 90 mcg/actuation inhaler Inhale 1 puff into the lungs every 4 (four) hours as needed.    ALPRAZolam (XANAX) 0.5 MG tablet Take 1 tablet (0.5 mg total) by mouth 2 (two) times daily as needed for Anxiety.    amiodarone (PACERONE) 200 MG Tab Take 1 tablet (200 mg total) by mouth once daily.    amLODIPine (NORVASC) 5 MG tablet Take 1 tablet (5 mg total) by mouth 2 (two) times daily.    apixaban (ELIQUIS) 2.5 mg Tab Take 1 tablet (2.5 mg total) by mouth 2 (two) times daily.    atorvastatin (LIPITOR) 40 MG tablet Take 1 tablet (40 mg total) by mouth every evening.    citalopram (CELEXA) 40 MG tablet Take 40 mg by mouth once daily.    clopidogreL (PLAVIX) 75 mg tablet Take 1 tablet (75 mg total) by mouth once daily.    digoxin (LANOXIN) 125 mcg tablet Take 1 tablet (0.125 mg total) by mouth once daily.    doxycycline (VIBRAMYCIN) 100 MG Cap Take 1 capsule (100 mg total) by mouth every 12 (twelve) hours.    furosemide (LASIX) 40 MG tablet Take 1 tablet (40 mg total) by mouth once daily. for 3 days    gabapentin (NEURONTIN) 400 MG capsule Take 400 mg by mouth 3 (three) times daily.    isosorbide dinitrate (ISORDIL)  10 MG tablet Take 1 tablet (10 mg total) by mouth 3 (three) times daily.    LANTUS U-100 INSULIN 100 unit/mL injection Inject 100 Units into the skin once daily.    metOLazone (ZAROXOLYN) 5 MG tablet Take 5 mg by mouth every other day.    metoprolol tartrate (LOPRESSOR) 50 MG tablet Take 1 tablet (50 mg total) by mouth 2 (two) times daily.    multivitamin (THERAGRAN) per tablet Take 1 tablet by mouth once daily.    pentoxifylline (TRENTAL) 400 mg TbSR Take 1 tablet (400 mg total) by mouth 3 (three) times daily.    ranolazine (RANEXA) 500 MG Tb12 Take 1 tablet (500 mg total) by mouth 2 (two) times daily.    spironolactone (ALDACTONE) 25 MG tablet Take 1 tablet (25 mg total) by mouth once daily. Hold until patient follows up with PCP or Cardiology       Review of patient's allergies indicates:   Allergen Reactions    Cephalosporins     Penicillins      hives         Past Medical History:   Diagnosis Date    CHF (congestive heart failure)     COPD (chronic obstructive pulmonary disease) 2021    Coronary artery disease     Diabetes mellitus     Hyperlipidemia     Hypertension     LBBB (left bundle branch block)     NSTEMI (non-ST elevated myocardial infarction)     Obesity 2021    Pulmonary hypertension 2021    PVD (peripheral vascular disease)      Past Surgical History:   Procedure Laterality Date    CARDIAC CATHETERIZATION       SECTION      CORONARY ARTERY BYPASS GRAFT  06/22/2016    x4    HYSTERECTOMY      INSERTION OF BIVENTRICULAR IMPLANTABLE CARDIOVERTER-DEFIBRILLATOR (ICD)       Family History   Problem Relation Age of Onset    Heart attack Mother     Heart attack Father      Social History     Tobacco Use    Smoking status: Former Smoker    Smokeless tobacco: Never Used   Substance Use Topics    Alcohol use: Not Currently    Drug use: Never        Review of Systems   Constitutional: Positive for fatigue and fever. Negative for activity change, appetite change, chills, diaphoresis and  unexpected weight change.   HENT: Negative.    Eyes: Negative.    Respiratory: Positive for cough and shortness of breath. Negative for apnea, choking, chest tightness, wheezing and stridor.    Cardiovascular: Negative.    Gastrointestinal: Negative.    Endocrine: Negative.    Genitourinary: Negative.    Musculoskeletal: Negative.    Skin: Negative.    Allergic/Immunologic: Negative.    Neurological: Positive for weakness.   Hematological: Negative.    Psychiatric/Behavioral: Negative.      OBJECTIVE:     Vital Signs (Most Recent)  Temp: 98.4 °F (36.9 °C) (01/16/22 0741)  Pulse: 60 (01/16/22 0741)  Resp: 14 (01/16/22 0741)  BP: (!) 142/65 (01/16/22 0741)  SpO2: 97 % (01/16/22 0741)    Physical Exam  Constitutional:       Appearance: She is not toxic-appearing or diaphoretic.   HENT:      Head: Normocephalic and atraumatic.      Right Ear: Tympanic membrane normal.      Left Ear: Tympanic membrane normal.      Nose: Nose normal.      Mouth/Throat:      Mouth: Mucous membranes are dry.      Pharynx: No oropharyngeal exudate or posterior oropharyngeal erythema.   Eyes:      General: No scleral icterus.        Right eye: No discharge.         Left eye: No discharge.      Extraocular Movements: Extraocular movements intact.      Conjunctiva/sclera: Conjunctivae normal.      Pupils: Pupils are equal, round, and reactive to light.   Cardiovascular:      Rate and Rhythm: Normal rate and regular rhythm.      Pulses: Normal pulses.      Heart sounds: Normal heart sounds.   Pulmonary:      Effort: Pulmonary effort is normal. No respiratory distress.      Breath sounds: Normal breath sounds. No stridor. No wheezing or rhonchi.   Abdominal:      General: Abdomen is flat.      Palpations: Abdomen is soft.   Musculoskeletal:         General: Normal range of motion.      Cervical back: Normal range of motion and neck supple.   Skin:     General: Skin is warm and dry.      Capillary Refill: Capillary refill takes less than 2  seconds.   Neurological:      General: No focal deficit present.      Mental Status: She is oriented to person, place, and time.   Psychiatric:         Mood and Affect: Mood normal.         Behavior: Behavior normal.       Laboratory  I have reviewed all pertinent lab results within the past 24 hours.    Diagnostic Results:  I have reviewed all diagnostic lab results within the past 24 hours.      ASSESSMENT/PLAN:     A/P:  Miss Powell is a 66 year old female wiwith past medical history significant for coronary artery disease s/p CABG (performed at [facility]), CHFrEF (35%) s/p BiV-ICD placement who arrives as a transfer from an OSH with E faecalis bacteremia.     Clinically stable on IV Vancomycin. Blood cultures obtained here NGTD. No leukocytosis.    Recommend continuing therapy for 6 weeks and AICD removal. Repeat TTE in 6 weeks to re-evaluate tricuspid valve.     No immediate plans for surgery    Discussed with Dr. Plaza     I have seen the patient and reviewed the fellow's note above. I have personally interviewed and examined the patient at bedside and agree with the findings.     We recommend removal of AICD, 6 weeks of IV antibiotics, repeat transthoracic echocardiogram in 6 weeks, and better glucose control.      Juan Plaza MD  Cardiothoracic Surgery  [facility]

## 2022-01-16 NOTE — ASSESSMENT & PLAN NOTE
Infection involving implantable cardioverter-defibrillator (ICD)  66 y.o. female with hx of HFrEF, HTN, T2DM, COPD admitted to OSH for sepsis 2/2 Enterococcus faecalis bacteremia in the setting of infected right lead/defibrillator (IE). Treated with IV antibiotics and transferred to [facility] for removal of defibrillator.    - repeat blood cx (1/8 & 1/9) NGTD  - CTS consulted for ICD removal; appreciate assistance  - NPO at midnight  - holding Eliquis and Plavix for procedure  - continue IV vanc (pharm to dose)  - ID consulted; appreciate assistance  - trend CBC, CRP  - tight glycemic control

## 2022-01-16 NOTE — ASSESSMENT & PLAN NOTE
- continue MTP 50mg BID, amiodarone 200mg daily, and digoxin 0.125mg daily  - hold eliquis for procedure

## 2022-01-16 NOTE — PLAN OF CARE
Plan of care discussed with patient. Patient is free of fall/trauma/injury. Denies CP, SOB, or pain/discomfort. Pt transferred from OSH. VSS on room air. Pt NPO since MN. Is/Os monitored. BG monitored. All questions addressed. Will continue to monitor

## 2022-01-16 NOTE — DISCHARGE SUMMARY
[facility]  Discharge Summary      Patient Name: Claudia Powell  MRN: [id number]  Patient Class: IP- Inpatient  Admission Date: 1/7/2022  Hospital Length of Stay: 8 days  Discharge Date and Time: 1/15/2022  9:30 PM  Attending Physician: No att. providers found   Discharging Provider: Vivian Stapleton MD  Primary Care Provider: Manjit Monae MD      HPI:   66 year old female with history of CAD, CHF, DM2, HTN, and  has been vaccinated against COVID presented to ED complaining of 2-3 days of worsening SOB, cough with yellow sputum. She reported a temperature of 105 F yesterday to ED MD. No anginal like chest discomfort.No orthopnea, PND or pedal edema. She was found to be hypoxic by EMS. They started her on 15 lpm, which has since been decreased to 3 lpm     In ED labs reviewed and noted below: leukocytosis with normal H/H; hyponatremia, hypokalemia (treated in ED) with stage 3b renal dysfunction; cardiac b iomarkers are minimally elevated; elevated lactate (patient was hypoxic by EMS) with normal procalcitonin. CXR: atypical pneumonia changes. EKG reviewed: Biventricular pacemaker detected with no acute segments. Discussed with ED MD: admit for atypical pneumonia; trend cardiac biomarkers; cultured; 130 ml/hr bolus of LR; iv antibiotics      Procedure(s) (LRB):  Transesophageal echo (KOMAL) intra-procedure log documentation (N/A)      Hospital Course:   66 year old lady with prior history of combined systolic and diastolic CHF, ICD in place, diabetes and other multiple comorbidities presented with fever, shortness of breath found to have E faecalis bacteremia and KOMAL confirmed vegetation on ICD lead.  She was treated with IV antibiotics with help of Infectious Disease.  She was transferred to tertiary care center for ICD explantation.  She was discharged to [facility] in hemodynamically stable condition.  We held Plavix and Eliquis for last 3-4 days in anticipation of  procedure    Instructions provided to follow up with primary care physician as outpatient. Patient verbalized understanding and is aware to contact primary care physician or return to ED if new or worsening symptoms.    Physical exam on the day of discharge:  General: Patient resting comfortably in no acute distress.  Morbidly obese  Lungs: CTA. Good air entry.  Cor: Regular rate and rhythm. No murmurs. No pedal edema.  ICD in place  Abd: Soft. Nontender. Non-distended.  Neuro: A&O x3. Moving all 4 extremities equally    Vital signs reviewed.  Nursing notes reviewed        Goals of Care Treatment Preferences:  Code Status: Full Code      Consults:   Consults (From admission, onward)        Status Ordering Provider     Inpatient consult to Registered Dietitian/Nutritionist  Once        Provider:  (Not yet assigned)    Completed ANEESH GALINDO     Inpatient consult to Infectious Diseases  Once        Provider:  Jackelin Frederick MD    Completed ANEESH GALINDO            Final Active Diagnoses:    Diagnosis Date Noted POA    PRINCIPAL PROBLEM:  Bacteremia due to Enterococcus [R78.81, B95.2]  Yes    Type 2 diabetes mellitus with hyperglycemia, without long-term current use of insulin [E11.65]  Yes    Atypical pneumonia [J18.9] 01/07/2022 Yes    Paroxysmal atrial fibrillation [I48.0] 12/06/2021 Yes    Pulmonary hypertension [I27.20] 11/22/2021 Yes    AICD (automatic cardioverter/defibrillator) present [Z95.810] 11/22/2021 Yes     Chronic    COPD (chronic obstructive pulmonary disease) [J44.9] 11/22/2021 Yes     Chronic    S/P CABG (coronary artery bypass graft) [Z95.1] 11/22/2021 Not Applicable     Chronic    Fatty liver [K76.0] 11/09/2021 Yes    Coronary artery disease involving native coronary artery of native heart without angina pectoris [I25.10] 01/28/2021 Yes      Problems Resolved During this Admission:       Discharged Condition: good    Disposition: Short Term Hospital    Follow Up:   Follow-up  Information     Manjit Monae MD In 2 weeks.    Specialty: Internal Medicine  Contact information:  [address] [place] [address]  [address]  [place] [address]  [phone]                       Patient Instructions:      Activity as tolerated       Significant Diagnostic Studies: Labs: All labs within the past 24 hours have been reviewed    Pending Diagnostic Studies:     None         Medications:  Reconciled Home Medications:      Medication List      START taking these medications    doxycycline 100 MG Cap  Commonly known as: VIBRAMYCIN  Take 1 capsule (100 mg total) by mouth every 12 (twelve) hours.        CHANGE how you take these medications    furosemide 40 MG tablet  Commonly known as: LASIX  Take 1 tablet (40 mg total) by mouth once daily. for 3 days  What changed: See the new instructions.     LANTUS U-100 INSULIN 100 unit/mL injection  Generic drug: insulin glargine  Inject 100 Units into the skin once daily.  What changed: how much to take        CONTINUE taking these medications    albuterol 90 mcg/actuation inhaler  Commonly known as: PROVENTIL/VENTOLIN HFA  Inhale 1 puff into the lungs every 4 (four) hours as needed.     ALPRAZolam 0.5 MG tablet  Commonly known as: XANAX  Take 1 tablet (0.5 mg total) by mouth 2 (two) times daily as needed for Anxiety.     amiodarone 200 MG Tab  Commonly known as: PACERONE  Take 1 tablet (200 mg total) by mouth once daily.     amLODIPine 5 MG tablet  Commonly known as: NORVASC  Take 1 tablet (5 mg total) by mouth 2 (two) times daily.     apixaban 2.5 mg Tab  Commonly known as: ELIQUIS  Take 1 tablet (2.5 mg total) by mouth 2 (two) times daily.     atorvastatin 40 MG tablet  Commonly known as: LIPITOR  Take 1 tablet (40 mg total) by mouth every evening.     citalopram 40 MG tablet  Commonly known as: CeleXA  Take 40 mg by mouth once daily.     clopidogreL 75 mg tablet  Commonly known as: PLAVIX  Take 1 tablet (75 mg total) by mouth once daily.     digoxin 125 mcg tablet  Commonly  known as: LANOXIN  Take 1 tablet (0.125 mg total) by mouth once daily.     gabapentin 400 MG capsule  Commonly known as: NEURONTIN  Take 400 mg by mouth 3 (three) times daily.     isosorbide dinitrate 10 MG tablet  Commonly known as: ISORDIL  Take 1 tablet (10 mg total) by mouth 3 (three) times daily.     metOLazone 5 MG tablet  Commonly known as: ZAROXOLYN  Take 5 mg by mouth every other day.     metoprolol tartrate 50 MG tablet  Commonly known as: LOPRESSOR  Take 1 tablet (50 mg total) by mouth 2 (two) times daily.     multivitamin per tablet  Commonly known as: THERAGRAN  Take 1 tablet by mouth once daily.     pentoxifylline 400 mg Tbsr  Commonly known as: TRENTAL  Take 1 tablet (400 mg total) by mouth 3 (three) times daily.     ranolazine 500 MG Tb12  Commonly known as: RANEXA  Take 1 tablet (500 mg total) by mouth 2 (two) times daily.     spironolactone 25 MG tablet  Commonly known as: ALDACTONE  Take 1 tablet (25 mg total) by mouth once daily. Hold until patient follows up with PCP or Cardiology        STOP taking these medications    eszopiclone 3 mg Tab  Commonly known as: LUNESTA     oxyCODONE-acetaminophen  mg per tablet  Commonly known as: PERCOCET     phentermine 37.5 MG capsule            Indwelling Lines/Drains at time of discharge:   Lines/Drains/Airways     None                 Time spent on the discharge of patient: 35 minutes         Vivian Stapleton MD  Department of Hospital Medicine  [facility]

## 2022-01-16 NOTE — ASSESSMENT & PLAN NOTE
- home regimen: lantus 100U daily  - inpatient regimen: detemir 25U BID + aspart 10U TIDWM  - moderate dose SSI  - ACHS accuchecks    Lab Results   Component Value Date    HGBA1C 10.8 (H) 01/11/2022

## 2022-01-16 NOTE — PROGRESS NOTES
Pharmacokinetic Initial Assessment: IV Vancomycin    Assessment/Plan:    Ms. Powell was on vanc at [facility] before transferring to [facility].   - She was on a scheduled regimen, but this was stopped and pulse dosing was initiated due to supratherapeutic levels. She last received vanc 1000 mg x1 today.  - Will not re-dose since she already received a dose today. Will re-dose with subsequent doses when random concentrations are less than 20 mcg/mL.  - Continue pulse dosing in the setting of erratic Scr, which is currently increasing. Ms. Powell does not appear to have underlying CKD.  - Based on last couple of levels, vanc clearance is around 17 hours. Should be able to tolerate Q24H regimen based on this info, but will wait for tomorrow's labs to determine if Scr worsening and if random level is appropriate.   - Desired empiric serum trough concentration is 15 to 20 mcg/mL.  - Draw vancomycin random level on 1/16 at 0500 with AM labs.     Pharmacy will continue to follow and monitor vancomycin.      Please contact pharmacy with any questions regarding this assessment.     Thank you for the consult,   Claudia Sykes       Patient brief summary:  Claudia Powell is a 66 y.o. female initiated on antimicrobial therapy with IV Vancomycin for treatment of suspected bacteremia    Drug Allergies:   Review of patient's allergies indicates:   Allergen Reactions    Cephalosporins     Penicillins      hives         Actual Body Weight:   82.5 kg    Renal Function:   Estimated Creatinine Clearance: 33.1 mL/min (A) (based on SCr of 1.7 mg/dL (H)).    Dialysis Method (if applicable):  N/A

## 2022-01-16 NOTE — CONSULTS
[facility] - Cardiology Stepdown  Infectious Disease  Consult Note    Patient Name: Claudia Powell  MRN: [id number]  Admission Date: 1/15/2022  Hospital Length of Stay: 1 days  Attending Physician: Tejal Cristina MD  Primary Care Provider: Manjit Monae MD     Isolation Status: No active isolations    Patient information was obtained from patient, past medical records and ER records.      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 Hyperechoic oblong pannus formation seen on RV lead just below the tricuspid valve with some peripheral irregularities associated. A vegetation cannot be ruled out completely. This measures 2.1 by 1.0 cm in total.  -- BCx 1/7 pansensitive Enterococcus faecalis  -- BCx 1/8, 1/9 NGTD  -- Resp cx 1/10 moderate Candida albicans    Recommendations:  -- Continue vancomycin for 6 weeks following device removal  -- Repeat blood cultures after device removed  -- Can re-implant ICD if indicated at least 14 days following first negative BCx after removal  -- Consider referral for screening colonoscopy if due. Etiology of Enterococcus bacteremia unclear  -- Please refer to allergy for PCN skin testing            Thank you for your consult. I will follow-up with patient. Please contact us if you have any additional questions.    Connie Brush DO  Infectious Disease  [facility] - Cardiology Stepdown    Subjective:     Principal Problem: Bacteremia due to Enterococcus    HPI: Claudia Powell is a 66 y.o. F with history of CAD, HFrEF s/p AICD, who transfers to [facility] for removal of infected AICD. She presented to [facility] 1/7 with fever, SOB, productive cough and  was admitted with sepsis 2/2 Enterococcus faecalis bacteremia. Initiated on empiric vanc, meropenem, and doxycycline with resolution of fever and leukocytosis. KOMAL revealed suspicious pannus of RV lead concerning for vegetation. She completed 5 days doxycycline and continued with vancomycin for planned 6 weeks following ICD removal. Transferred to [facility] for CTS eval /removal of ICD. ID consulted for abx recommendations for Enterococcus bacteremia. Reports some mild nausea before presentation. Denies abdominal pain, diarrhea, dysuria, frequency.      Past Medical History:   Diagnosis Date    CHF (congestive heart failure)     COPD (chronic obstructive pulmonary disease) 2021    Coronary artery disease     Diabetes mellitus     Hyperlipidemia     Hypertension     LBBB (left bundle branch block)     NSTEMI (non-ST elevated myocardial infarction)     Obesity 2021    Pulmonary hypertension 2021    PVD (peripheral vascular disease)        Past Surgical History:   Procedure Laterality Date    CARDIAC CATHETERIZATION       SECTION      CORONARY ARTERY BYPASS GRAFT  06/22/2016    x4    HYSTERECTOMY      INSERTION OF BIVENTRICULAR IMPLANTABLE CARDIOVERTER-DEFIBRILLATOR (ICD)         Review of patient's allergies indicates:   Allergen Reactions    Cephalosporins     Penicillins      hives         Medications:  Medications Prior to Admission   Medication Sig    albuterol (PROVENTIL/VENTOLIN HFA) 90 mcg/actuation inhaler Inhale 1 puff into the lungs every 4 (four) hours as needed.    ALPRAZolam (XANAX) 0.5 MG tablet Take 1 tablet (0.5 mg total) by mouth 2 (two) times daily as needed for Anxiety.    amiodarone (PACERONE) 200 MG Tab Take 1 tablet (200 mg total) by mouth once daily.    amLODIPine (NORVASC) 5 MG tablet Take 1 tablet (5 mg total) by mouth 2 (two) times daily.    apixaban (ELIQUIS) 2.5 mg Tab Take 1 tablet (2.5 mg total) by mouth 2 (two) times daily.    atorvastatin  "(LIPITOR) 40 MG tablet Take 1 tablet (40 mg total) by mouth every evening.    citalopram (CELEXA) 40 MG tablet Take 40 mg by mouth once daily.    clopidogreL (PLAVIX) 75 mg tablet Take 1 tablet (75 mg total) by mouth once daily.    digoxin (LANOXIN) 125 mcg tablet Take 1 tablet (0.125 mg total) by mouth once daily.    doxycycline (VIBRAMYCIN) 100 MG Cap Take 1 capsule (100 mg total) by mouth every 12 (twelve) hours.    furosemide (LASIX) 40 MG tablet Take 1 tablet (40 mg total) by mouth once daily. for 3 days    gabapentin (NEURONTIN) 400 MG capsule Take 400 mg by mouth 3 (three) times daily.    isosorbide dinitrate (ISORDIL) 10 MG tablet Take 1 tablet (10 mg total) by mouth 3 (three) times daily.    LANTUS U-100 INSULIN 100 unit/mL injection Inject 100 Units into the skin once daily.    metOLazone (ZAROXOLYN) 5 MG tablet Take 5 mg by mouth every other day.    metoprolol tartrate (LOPRESSOR) 50 MG tablet Take 1 tablet (50 mg total) by mouth 2 (two) times daily.    multivitamin (THERAGRAN) per tablet Take 1 tablet by mouth once daily.    pentoxifylline (TRENTAL) 400 mg TbSR Take 1 tablet (400 mg total) by mouth 3 (three) times daily.    ranolazine (RANEXA) 500 MG Tb12 Take 1 tablet (500 mg total) by mouth 2 (two) times daily.    spironolactone (ALDACTONE) 25 MG tablet Take 1 tablet (25 mg total) by mouth once daily. Hold until patient follows up with PCP or Cardiology     Antibiotics (From admission, onward)            Start     Stop Route Frequency Ordered    01/16/22 0040  vancomycin - pharmacy to dose  (vancomycin IVPB)        "And" Linked Group Details    -- IV pharmacy to manage frequency 01/15/22 2340        Antifungals (From admission, onward)            None        Antivirals (From admission, onward)    None           Immunization History   Administered Date(s) Administered    COVID-19, MRNA, LN-S, PF (Pfizer) 02/12/2021, 03/05/2021       Family History     Problem Relation (Age of Onset)    " Heart attack Mother, Father        Social History     Socioeconomic History    Marital status:    Tobacco Use    Smoking status: Former Smoker    Smokeless tobacco: Never Used   Substance and Sexual Activity    Alcohol use: Not Currently    Drug use: Never     Review of Systems   Constitutional: Positive for fever. Negative for chills.   HENT: Negative for sore throat.    Respiratory: Positive for cough and shortness of breath.    Cardiovascular: Negative for chest pain and leg swelling.   Gastrointestinal: Negative for abdominal pain, constipation, diarrhea, nausea and vomiting.   Genitourinary: Negative for dysuria.   Musculoskeletal: Negative for myalgias.   Skin: Negative for rash.   Neurological: Negative for dizziness and headaches.   Psychiatric/Behavioral: Negative for confusion.     Objective:     Vital Signs (Most Recent):  Temp: 98.4 °F (36.9 °C) (01/16/22 0741)  Pulse: 60 (01/16/22 0741)  Resp: 14 (01/16/22 0741)  BP: (!) 142/65 (01/16/22 0741)  SpO2: 97 % (01/16/22 0741) Vital Signs (24h Range):  Temp:  [98.2 °F (36.8 °C)-98.5 °F (36.9 °C)] 98.4 °F (36.9 °C)  Pulse:  [59-61] 60  Resp:  [14-18] 14  SpO2:  [93 %-97 %] 97 %  BP: (129-152)/(56-85) 142/65     Weight: 80.5 kg (177 lb 7.5 oz)  Body mass index is 31.44 kg/m².    Estimated Creatinine Clearance: 39.7 mL/min (based on SCr of 1.4 mg/dL).    Physical Exam  Constitutional:       Appearance: Normal appearance.   HENT:      Head: Normocephalic and atraumatic.      Mouth/Throat:      Mouth: Mucous membranes are moist.      Pharynx: Oropharynx is clear.   Eyes:      Extraocular Movements: Extraocular movements intact.      Pupils: Pupils are equal, round, and reactive to light.   Cardiovascular:      Rate and Rhythm: Normal rate and regular rhythm.      Pulses: Normal pulses.      Heart sounds: Normal heart sounds.   Pulmonary:      Effort: Pulmonary effort is normal. No respiratory distress.      Breath sounds: Normal breath sounds.    Abdominal:      General: Abdomen is flat. Bowel sounds are normal.      Palpations: Abdomen is soft.   Musculoskeletal:         General: No swelling.      Cervical back: Neck supple.   Skin:     General: Skin is warm and dry.      Capillary Refill: Capillary refill takes less than 2 seconds.   Neurological:      General: No focal deficit present.      Mental Status: She is alert and oriented to person, place, and time. Mental status is at baseline.   Psychiatric:         Mood and Affect: Mood normal.         Behavior: Behavior normal.         Thought Content: Thought content normal.         Judgment: Judgment normal.         Significant Labs:   Blood Culture:   Recent Labs   Lab 09/13/21  0242 01/07/22  1055 01/07/22  1220 01/08/22  1629 01/09/22  0710   LABBLOO No growth after 5 days. Gram stain aer bottle: Gram positive cocci in pairs   Gram stain des bottle: Gram positive cocci in pairs   Results called to and read back by:JOSH ALVARADO RN 1200  01/07/2022    23:21 FGF  ENTEROCOCCUS FAECALIS* Gram stain des bottle: Gram positive cocci in pairs   Results called to and read back by:JOSH ALVARADO RN 1200 01/07/2022    23:22 FGF  Gram stain aer bottle: Gram positive cocci in pairs   Positive results previously called to and read back by Josh Alvarado RN   1200 on 01/07/2022 23:22  01/08/2022  00:19 fgf  ENTEROCOCCUS FAECALIS  For susceptibility see order #[id number]  * No growth after 5 days. No growth after 5 days.     BMP:   Recent Labs   Lab 01/16/22  0339   *   *   K 4.2   CL 97   CO2 26   BUN 35*   CREATININE 1.4   CALCIUM 10.3   MG 1.9     CBC:   Recent Labs   Lab 01/15/22  0749 01/16/22 0339   WBC 7.81 10.87   HGB 10.5* 11.2*   HCT 32.5* 34.9*    366     CMP:   Recent Labs   Lab 01/15/22  0748 01/16/22 0339   * 135*   K 3.9 4.2   CL 95 97   CO2 29 26   * 206*   BUN 41* 35*   CREATININE 1.7* 1.4   CALCIUM 9.7 10.3   PROT 6.6 7.2   ALBUMIN 3.3* 3.2*   BILITOT 1.1*  0.5   ALKPHOS 88 113   AST 22 21   ALT 22 20   ANIONGAP 11 12   EGFRNONAA 31.0* 39.2*       Significant Imaging: I have reviewed all pertinent imaging results/findings within the past 24 hours.     KOMAL 1/10/2022  · The left ventricle is mildly enlarged with concentric remodeling and  · The estimated ejection fraction is 35%.  · Left ventricular diastolic dysfunction.  · There is moderate left ventricular global hypokinesis.  · There is abnormal septal wall motion consistent with right ventricular pacemaker.  · Normal right ventricular size with normal right ventricular systolic function.  · Small echogenic mass present on the right ventricular lead.. The mass is fixed. Hyperechoic oblong pannus formation seen on the right ventricular lead just below the tricuspid valve with some peripheral irregularities associated a vegetation cannot be ruled out completely. This measures 2.1 by 1.0 cm in total.  · Moderate mitral regurgitation.  · Mild to moderate tricuspid regurgitation.  · The right atrial lead appears unremarkable. Lead visualized in the coronary sinus also appears unremarkable.

## 2022-01-16 NOTE — NURSING
Pt transferred to [facility] by [facility] [address], tele removed. IV 20G to [facility] and 20G LHand remains in place and intact.

## 2022-01-16 NOTE — HPI
Claudia Powell is a 66 y.o. female with a PMHx of CAD, HFrEF, DM2, HTN, COPD who presents to [facility] as a transfer from [facility] for removal of infected implanted cardiac defibrillator. Patient initially presented to OSH on 1/7 with complaints of SOB, productive cough, fever, URI symptoms and was admitted for sepsis 2/2 bacteremia. She was initially started on empiric vancomycin, meropenem and doxycycline with resolution of fever and leukocytosis. Blood cultures from 1/7 grew Enterococcus faecalis with appropriate sensitivities. KOMAL showed suspicious pannus on right ventricular lead concerning for vegetation. She completed 5 day course of doxycycline and was continued on IV vanc with plan to continue antibiotics for at least 6 weeks after removal of ICD. Patient transferred here for CTS eval and removal of defibrillator.     Patient seen and evaluated at bedside upon arrival to C. She is feeling well overall and denies any chest pain, SOB or other complaints at this time. Updated on tentative plan for removal of ICD tomorrow.

## 2022-01-16 NOTE — ASSESSMENT & PLAN NOTE
S/P CABG (coronary artery bypass graft)  - appears dry vs euvolemic  - lasix, metolazone, and aldactone held for YAQUELIN-- can likely resume in AM if renal fn improved  - strict I/Os, daily weights  - cardiac diet

## 2022-01-16 NOTE — PROGRESS NOTES
Pharmacokinetic Assessment Follow Up: IV Vancomycin    Vancomycin serum concentration assessment(s):  · Vancomycin random drawn correctly and resulted at 15.8mcg/mL ~17h after previous dose. This is within the goal of 15-20mcg/mL.  · Creatinine improved today, however trend has been largely labile. Will continue pulse dosing. Patient may be able to tolerate q24h dosing if clearance remains steady.   · Expect patient to become subtherapeutic with improved Cr today, will redose with vancomycin 1000mg x1 today.   · Draw vancomycin random on 1/17 with AM labs.     Drug levels (last 3 results):  Recent Labs   Lab Result Units 01/13/22  1300 01/14/22 2053 01/15/22  0749 01/16/22  0339   Vancomycin, Random ug/mL  --   --  14.5 15.8   Vancomycin-Trough ug/mL 25.3* 23.1*  --   --        Pharmacy will continue to follow and monitor vancomycin.    Please contact pharmacy at extension [phone] for questions regarding this assessment.    Thank you for the consult,   Erik Cabrera PharmD       Patient brief summary:  Claudia Powell is a 66 y.o. female initiated on antimicrobial therapy with IV Vancomycin for treatment of bacteremia.    The patient's current regimen is pulse-dosing for YAQUELIN.     Drug Allergies:   Review of patient's allergies indicates:   Allergen Reactions    Cephalosporins     Penicillins      hives         Actual Body Weight:   80.5 kg    Renal Function:   Estimated Creatinine Clearance: 39.7 mL/min (based on SCr of 1.4 mg/dL).      CBC (last 72 hours):  Recent Labs   Lab Result Units 01/14/22  0626 01/15/22  0749 01/16/22  0339   WBC K/uL 9.27 7.81 10.87   Hemoglobin g/dL 11.2* 10.5* 11.2*   Hematocrit % 33.8* 32.5* 34.9*   Platelets K/uL 315 317 366   Gran % % 72.0 59.0 60.6   Lymph % % 11.0* 22.4 23.6   Mono % % 5.0 9.6 9.0   Eosinophil % % 2.0 2.8 2.3   Basophil % % 0.0 1.3 1.1   Differential Method  Manual Automated Automated       Metabolic Panel (last 72 hours):  Recent Labs   Lab Result Units  01/14/22  0626 01/15/22  0748 01/16/22  0339   Sodium mmol/L 132* 135* 135*   Potassium mmol/L 3.7 3.9 4.2   Chloride mmol/L 92* 95 97   CO2 mmol/L 30* 29 26   Glucose mg/dL 144* 198* 206*   BUN mg/dL 37* 41* 35*   Creatinine mg/dL 1.6* 1.7* 1.4   Albumin g/dL 3.2* 3.3* 3.2*   Total Bilirubin mg/dL 1.2* 1.1* 0.5   Alkaline Phosphatase U/L 89 88 113   AST U/L 21 22 21   ALT U/L 23 22 20   Magnesium mg/dL 1.7 1.9 1.9       Vancomycin Administrations:  vancomycin given in the last 96 hours                   vancomycin in dextrose 5 % 1 gram/250 mL IVPB 1,000 mg (mg) 1,000 mg New Bag 01/15/22 1036    vancomycin 1.25 g in dextrose 5% 250 mL IVPB (ready to mix) (mg) 1,250 mg New Bag 01/13/22 2315    vancomycin 1,500 mg in dextrose 5 % 500 mL IVPB (mg) 1,500 mg New Bag 01/12/22 1746                Microbiologic Results:  Microbiology Results (last 7 days)     ** No results found for the last 168 hours. **

## 2022-01-16 NOTE — PLAN OF CARE
Patient transferred to [facility]   01/16/22 0822   Final Note   Assessment Type Final Discharge Note   Anticipated Discharge Disposition Short Term

## 2022-01-16 NOTE — CONSULTS
"[facility] [address]  Cardiology  Consult Note     Patient Name: Claudia Powell  Admission Date: 1/7/22  Hospital Length of Stay: 1  Code Status: Full  Attending Provider: Dr. Cristina  Consulting Provider: Hakeem Javed MD  Reason for Consult: AICD Infection    HPI: Claudia Powell is a 66 y.o.  female with past medical history of CAD s/p CABG x4 in 2017 (Dr. Sierra), HFrEF (EF 35% on KOMAL on this admit) s/p Medtronic CRT-D placed in 2018 per patient, pAF (on Eliquis 2.5 mg BID), Type II DM, PAD who initially presented to [facility] on 1/7/22 due to shortness of breath, fever, cough, upper respiratory symptoms. Blood cultures from 1/7/22 were positive for Enterococcus faecalis bacteremia . KOMAL showed small echogenic mass on the RV lead, hyperechoic oblong pannus formation seen on RV lead just below tricuspid valve and vegetation could not be rule out. Mass was measured at 2.1x1 cm. Right atrial lead was unremarkable. At [facility], Infectious Disease was consulted and the patient was placed on IV Vancomycin and Doxycycline (Doxy finished 1/14/21). Repeat blood cultures from 1/8/22 and 1/9/22 were no growth x5 days. Cardiology was consulted and the patient was transferred to [facility] for device extraction.     Regarding her cardiac history, the patient reports that she was first diagnosed with heart failure "years ago." She reports that her CABG was performed in 2017 with Dr. Sierra and that she sees Dr. Najera in [place] as her cardiologist. She does not see an electrophysiologist. She cannot remember when she was diagnosed with atrial fibrillation. She denies any history of stroke. She reports that she was supposed to be following in device clinic but never went to the appointments.     Her Medtronic device was interrogated at bedside by myself today. Last interrogation was performed on 9/18/2018. The interrogation showed that she had been shocked 3 times for VF on 1/7/22. " Patient states that she never felt any of those shocks and she does not remember losing consciousness on that day (same day as her admission to outside hospital.)      ROS: Patient denies angina, LAUGHLIN, lower extremity edema, orthopnea, PND, palpitations, presyncope or syncope. All other ROS negative except as stated above.    PMHx:  As above    PSHx:   As above    Family Hx:  Family History   Problem Relation Age of Onset    Heart attack Mother     Heart attack Father        Social Hx:   Social History     Tobacco Use    Smoking status: Former Smoker    Smokeless tobacco: Never Used   Substance Use Topics    Alcohol use: Not Currently       Allergies:  Review of patient's allergies indicates:   Allergen Reactions    Cephalosporins     Penicillins      hives         Home Meds:  Current Outpatient Medications   Medication Instructions    albuterol (PROVENTIL/VENTOLIN HFA) 90 mcg/actuation inhaler 1 puff, Inhalation, Every 4 hours PRN    ALPRAZolam (XANAX) 0.5 mg, Oral, 2 times daily PRN    amiodarone (PACERONE) 200 mg, Oral, Daily    amLODIPine (NORVASC) 5 mg, Oral, 2 times daily    apixaban (ELIQUIS) 2.5 mg, Oral, 2 times daily    atorvastatin (LIPITOR) 40 mg, Oral, Nightly    citalopram (CELEXA) 40 mg, Oral, Daily    clopidogreL (PLAVIX) 75 mg, Oral, Daily    digoxin (LANOXIN) 0.125 mg, Oral, Daily    doxycycline (VIBRAMYCIN) 100 mg, Oral, Every 12 hours    furosemide (LASIX) 40 mg, Oral, Daily    gabapentin (NEURONTIN) 400 mg, Oral, 3 times daily    isosorbide dinitrate (ISORDIL) 10 mg, Oral, 3 times daily    LANTUS U-100 INSULIN 100 Units, Subcutaneous, Daily    metOLazone (ZAROXOLYN) 5 mg, Oral, Every other day    metoprolol tartrate (LOPRESSOR) 50 mg, Oral, 2 times daily    multivitamin (THERAGRAN) per tablet 1 tablet, Oral, Daily    pentoxifylline (TRENTAL) 400 mg, Oral, 3 times daily    ranolazine (RANEXA) 500 mg, Oral, 2 times daily    spironolactone (ALDACTONE) 25 mg, Oral, Daily,  Hold until patient follows up with PCP or Cardiology       Vitals:  Temp:  [98 °F (36.7 °C)-98.5 °F (36.9 °C)] 98.1 °F (36.7 °C)  Pulse:  [58-61] 58  Resp:  [14-18] 18  SpO2:  [92 %-97 %] 92 %  BP: (135-152)/(58-85) 135/63    Physical Exam:    Gen: NAD, resting comfortably  HEENT: Pupils equal and reactive to light  Cardio: Regular rate, no murmur  Resp: CTAB, no wheezing  Abd: Soft, non-tender, non-distended  Skin: Warm, dry, AICD in place to L chest  Neuro: Alert and oriented x3  Psych: Normal mood and affect    Labs:  Recent Labs   Lab 01/14/22  0626 01/15/22  0749 01/16/22  0339   WBC 9.27 7.81 10.87   HGB 11.2* 10.5* 11.2*   HCT 33.8* 32.5* 34.9*    317 366     Recent Labs   Lab 01/14/22  0626 01/15/22  0748 01/16/22  0339   * 135* 135*   K 3.7 3.9 4.2   CL 92* 95 97   CO2 30* 29 26   BUN 37* 41* 35*   CREATININE 1.6* 1.7* 1.4   * 198* 206*   CALCIUM 9.0 9.7 10.3   MG 1.7 1.9 1.9       Imaging:    Most recent KOMAL from 1/10/22:  · The left ventricle is mildly enlarged with concentric remodeling and  · The estimated ejection fraction is 35%.  · Left ventricular diastolic dysfunction.  · There is moderate left ventricular global hypokinesis.  · There is abnormal septal wall motion consistent with right ventricular pacemaker.  · Normal right ventricular size with normal right ventricular systolic function.  · Small echogenic mass present on the right ventricular lead.. The mass is fixed. Hyperechoic oblong pannus formation seen on the right ventricular lead just below the tricuspid valve with some peripheral irregularities associated a vegetation cannot be ruled out completely. This measures 2.1 by 1.0 cm in total.  · Moderate mitral regurgitation.  · Mild to moderate tricuspid regurgitation.  · The right atrial lead appears unremarkable. Lead visualized in the coronary sinus also appears unremarkable.     Current Meds:   amiodarone  200 mg Oral Daily    amLODIPine  5 mg Oral BID     atorvastatin  40 mg Oral QHS    digoxin  0.125 mg Oral Daily    gabapentin  300 mg Oral TID    insulin aspart U-100  10 Units Subcutaneous TIDWM    insulin detemir U-100  25 Units Subcutaneous BID    isosorbide dinitrate  10 mg Oral TID    metoprolol tartrate  50 mg Oral BID    multivitamin  1 tablet Oral Daily    pentoxifylline  400 mg Oral TID       Assessment and Plan:  66 year old female with history of CAD s/p CABG x4 in 2017 (Dr. Sierra), HFrEF (EF 35% on KOMAL on this admit) s/p Medtronic CRT-D placed in 2018 per patient, pAF (on Eliquis 2.5 mg BID), Type II DM, PAD presenting for Medtronic CRT-D device infection.    1. Medtronic CRT-D Device Infection  -Patient found to have Enterococcus faecalis bacteremia at outside hospital with unclear source  -Infectious Disease consulted: Anticipate 6 week course of antibiotics following device removal. Continue IV Vancomycin. Can re-implant ICD if indicated at least 14 days following negative blood culture  -Can resume Plavix but hold Eliquis  -Will plan for device extraction later this week    2. CAD s/p CABG in 2017  -Continue aspirin/plavix    3. HFrEF (EF 35%, ischemic)  -Management per primary    4. Paroxysmal A-fib  -Continue home meds including amiodarone 200 mg qd and digoxin 0.125 mg qd  -Hold Eliquis for now given pending extraction    Case discussed with EP fellow Dr. Orellana at time of consult. EP will continue to follow.      Kale Javed MD  [facility] PGY-4    Pager number: [phone]    Staff attestation to follow.    This note was prepared using voice recognition system and may have sound alike errors that may have been overlooked even with proof reading.

## 2022-01-16 NOTE — H&P
[facility] - Cardiology [facility] Medicine  History & Physical    Patient Name: Claudia Powell  MRN: [id number]  Patient Class: IP- Inpatient  Admission Date: 1/15/2022  Attending Physician: Tejal Cristina MD   Primary Care Provider: Manjit Monae MD         Patient information was obtained from patient, past medical records and ER records.     Subjective:     Principal Problem:Bacteremia due to Enterococcus    Chief Complaint: No chief complaint on file.       HPI: Claudia Powell is a 66 y.o. female with a PMHx of CAD, HFrEF, DM2, HTN, COPD who presents to [facility] as a transfer from [facility] for removal of infected implanted cardiac defibrillator. Patient initially presented to OSH on 1/7 with complaints of SOB, productive cough, fever, URI symptoms and was admitted for sepsis 2/2 bacteremia. She was initially started on empiric vancomycin, meropenem and doxycycline with resolution of fever and leukocytosis. Blood cultures from 1/7 grew Enterococcus faecalis with appropriate sensitivities. KOMAL showed suspicious pannus on right ventricular lead concerning for vegetation. She completed 5 day course of doxycycline and was continued on IV vanc with plan to continue antibiotics for at least 6 weeks after removal of ICD. Patient transferred here for CTS eval and removal of defibrillator.     Patient seen and evaluated at bedside upon arrival to [facility]. She is feeling well overall and denies any chest pain, SOB or other complaints at this time. Updated on tentative plan for removal of ICD tomorrow.       Past Medical History:   Diagnosis Date    CHF (congestive heart failure)     COPD (chronic obstructive pulmonary disease) 11/22/2021    Coronary artery disease     Diabetes mellitus     Hyperlipidemia     Hypertension     LBBB (left bundle branch block)     NSTEMI (non-ST elevated myocardial infarction)     Obesity 9/13/2021    Pulmonary hypertension 11/22/2021    PVD (peripheral vascular  disease)        Past Surgical History:   Procedure Laterality Date    CARDIAC CATHETERIZATION       SECTION      CORONARY ARTERY BYPASS GRAFT  06/22/2016    x4    HYSTERECTOMY      INSERTION OF BIVENTRICULAR IMPLANTABLE CARDIOVERTER-DEFIBRILLATOR (ICD)         Review of patient's allergies indicates:   Allergen Reactions    Cephalosporins     Penicillins      hives         Current Facility-Administered Medications on File Prior to Encounter   Medication    [] naproxen tablet 250 mg    [COMPLETED] vancomycin in dextrose 5 % 1 gram/250 mL IVPB 1,000 mg    [DISCONTINUED] 0.9%  NaCl infusion    [DISCONTINUED] acetaminophen tablet 650 mg    [DISCONTINUED] amiodarone tablet 200 mg    [DISCONTINUED] amLODIPine tablet 5 mg    [DISCONTINUED] atorvastatin tablet 40 mg    [DISCONTINUED] calcium chloride 1 g in dextrose 5 % 100 mL IVPB    [DISCONTINUED] calcium chloride 1 g in dextrose 5 % 100 mL IVPB    [DISCONTINUED] calcium chloride 1 g in dextrose 5 % 100 mL IVPB    [DISCONTINUED] citalopram tablet 40 mg    [DISCONTINUED] dextrose 50% injection 12.5 g    [DISCONTINUED] dextrose 50% injection 25 g    [DISCONTINUED] digoxin tablet 0.125 mg    [DISCONTINUED] enoxaparin injection 40 mg    [DISCONTINUED] furosemide tablet 40 mg    [DISCONTINUED] gabapentin capsule 300 mg    [DISCONTINUED] HYDROcodone-acetaminophen 5-325 mg per tablet 1 tablet    [DISCONTINUED] insulin detemir U-100 pen 40 Units    [DISCONTINUED] insulin detemir U-100 pen 60 Units    [DISCONTINUED] insulin regular injection 1-12 Units    [DISCONTINUED] isosorbide dinitrate tablet 10 mg    [DISCONTINUED] magnesium oxide tablet 800 mg    [DISCONTINUED] magnesium sulfate 2g in water 50mL IVPB (premix)    [DISCONTINUED] magnesium sulfate 2g in water 50mL IVPB (premix)    [DISCONTINUED] magnesium sulfate 2g in water 50mL IVPB (premix)    [DISCONTINUED] magnesium sulfate in dextrose IVPB (premix) 1 g    [DISCONTINUED]  melatonin tablet 6 mg    [DISCONTINUED] metOLazone tablet 5 mg    [DISCONTINUED] metOLazone tablet 5 mg    [DISCONTINUED] metoprolol tartrate (LOPRESSOR) tablet 50 mg    [DISCONTINUED] multivitamin tablet    [DISCONTINUED] ondansetron injection 4 mg    [DISCONTINUED] pentoxifylline CR tablet 400 mg    [DISCONTINUED] potassium chloride 10 mEq in 100 mL IVPB    [DISCONTINUED] potassium chloride 10 mEq in 100 mL IVPB    [DISCONTINUED] potassium chloride 10 mEq in 100 mL IVPB    [DISCONTINUED] potassium chloride 10 mEq in 100 mL IVPB    [DISCONTINUED] potassium chloride SA CR tablet 20 mEq    [DISCONTINUED] potassium chloride SA CR tablet 20 mEq    [DISCONTINUED] potassium chloride SA CR tablet 40 mEq    [DISCONTINUED] potassium chloride SA CR tablet 40 mEq    [DISCONTINUED] ranolazine 12 hr tablet 500 mg    [DISCONTINUED] spironolactone tablet 25 mg    [DISCONTINUED] vancomycin - pharmacy to dose    [DISCONTINUED] vancomycin 1.25 g in dextrose 5% 250 mL IVPB (ready to mix)     Current Outpatient Medications on File Prior to Encounter   Medication Sig    albuterol (PROVENTIL/VENTOLIN HFA) 90 mcg/actuation inhaler Inhale 1 puff into the lungs every 4 (four) hours as needed.    ALPRAZolam (XANAX) 0.5 MG tablet Take 1 tablet (0.5 mg total) by mouth 2 (two) times daily as needed for Anxiety.    amiodarone (PACERONE) 200 MG Tab Take 1 tablet (200 mg total) by mouth once daily.    amLODIPine (NORVASC) 5 MG tablet Take 1 tablet (5 mg total) by mouth 2 (two) times daily.    apixaban (ELIQUIS) 2.5 mg Tab Take 1 tablet (2.5 mg total) by mouth 2 (two) times daily.    atorvastatin (LIPITOR) 40 MG tablet Take 1 tablet (40 mg total) by mouth every evening.    citalopram (CELEXA) 40 MG tablet Take 40 mg by mouth once daily.    clopidogreL (PLAVIX) 75 mg tablet Take 1 tablet (75 mg total) by mouth once daily.    digoxin (LANOXIN) 125 mcg tablet Take 1 tablet (0.125 mg total) by mouth once daily.     doxycycline (VIBRAMYCIN) 100 MG Cap Take 1 capsule (100 mg total) by mouth every 12 (twelve) hours.    furosemide (LASIX) 40 MG tablet Take 1 tablet (40 mg total) by mouth once daily. for 3 days    gabapentin (NEURONTIN) 400 MG capsule Take 400 mg by mouth 3 (three) times daily.    isosorbide dinitrate (ISORDIL) 10 MG tablet Take 1 tablet (10 mg total) by mouth 3 (three) times daily.    LANTUS U-100 INSULIN 100 unit/mL injection Inject 100 Units into the skin once daily.    metOLazone (ZAROXOLYN) 5 MG tablet Take 5 mg by mouth every other day.    metoprolol tartrate (LOPRESSOR) 50 MG tablet Take 1 tablet (50 mg total) by mouth 2 (two) times daily.    multivitamin (THERAGRAN) per tablet Take 1 tablet by mouth once daily.    pentoxifylline (TRENTAL) 400 mg TbSR Take 1 tablet (400 mg total) by mouth 3 (three) times daily.    ranolazine (RANEXA) 500 MG Tb12 Take 1 tablet (500 mg total) by mouth 2 (two) times daily.    spironolactone (ALDACTONE) 25 MG tablet Take 1 tablet (25 mg total) by mouth once daily. Hold until patient follows up with PCP or Cardiology     Family History     Problem Relation (Age of Onset)    Heart attack Mother, Father        Tobacco Use    Smoking status: Former Smoker    Smokeless tobacco: Never Used   Substance and Sexual Activity    Alcohol use: Not Currently    Drug use: Never    Sexual activity: Not on file     Review of Systems   Constitutional: Negative for activity change, appetite change, chills and fever.   HENT: Negative for congestion, trouble swallowing and voice change.    Eyes: Negative for photophobia and visual disturbance.   Respiratory: Positive for cough (improving ). Negative for chest tightness, shortness of breath and wheezing.    Cardiovascular: Negative for chest pain, palpitations and leg swelling.   Gastrointestinal: Negative for abdominal distention, abdominal pain, blood in stool, constipation, nausea and vomiting.   Endocrine: Negative for polyphagia  and polyuria.   Genitourinary: Negative for dysuria, flank pain, frequency and hematuria.   Musculoskeletal: Negative for arthralgias, back pain and gait problem.   Skin: Negative for color change and wound.   Neurological: Negative for dizziness, syncope, speech difficulty, weakness, light-headedness and headaches.   Psychiatric/Behavioral: Negative for confusion, decreased concentration and dysphoric mood.     Objective:     Vital Signs (Most Recent):  Temp: 98.2 °F (36.8 °C) (01/15/22 2231)  Pulse: 60 (01/15/22 2351)  Resp: 16 (01/15/22 2211)  BP: (!) 152/62 (01/15/22 2231)  SpO2: 96 % (01/15/22 2211) Vital Signs (24h Range):  Temp:  [97.9 °F (36.6 °C)-98.4 °F (36.9 °C)] 98.2 °F (36.8 °C)  Pulse:  [60-78] 60  Resp:  [16-18] 16  SpO2:  [94 %-98 %] 96 %  BP: (129-156)/(56-71) 152/62     Weight: 82.5 kg (181 lb 14.1 oz)  Body mass index is 32.22 kg/m².    Physical Exam  Vitals and nursing note reviewed.   Constitutional:       General: She is not in acute distress.     Appearance: She is well-developed. She is not ill-appearing.   HENT:      Head: Normocephalic and atraumatic.      Mouth/Throat:      Pharynx: No oropharyngeal exudate.   Eyes:      Extraocular Movements: Extraocular movements intact.      Conjunctiva/sclera: Conjunctivae normal.   Cardiovascular:      Rate and Rhythm: Normal rate and regular rhythm.      Heart sounds: Normal heart sounds.   Pulmonary:      Effort: Pulmonary effort is normal. No respiratory distress.      Breath sounds: Normal breath sounds. No wheezing.   Abdominal:      General: Bowel sounds are normal. There is no distension.      Palpations: Abdomen is soft.      Tenderness: There is no abdominal tenderness. There is no guarding.   Musculoskeletal:         General: No tenderness. Normal range of motion.      Cervical back: Normal range of motion and neck supple.      Right lower leg: No edema.      Left lower leg: No edema.   Lymphadenopathy:      Cervical: No cervical adenopathy.    Skin:     General: Skin is warm and dry.      Capillary Refill: Capillary refill takes less than 2 seconds.      Findings: No rash.   Neurological:      Mental Status: She is alert and oriented to person, place, and time.      Cranial Nerves: No cranial nerve deficit.      Sensory: No sensory deficit.      Coordination: Coordination normal.   Psychiatric:         Behavior: Behavior normal.         Thought Content: Thought content normal.         Judgment: Judgment normal.             Significant Labs:   All pertinent labs within the past 24 hours have been reviewed.  CBC:   Recent Labs   Lab 01/14/22  0626 01/15/22  0749   WBC 9.27 7.81   HGB 11.2* 10.5*   HCT 33.8* 32.5*    317     CMP:   Recent Labs   Lab 01/14/22  0626 01/15/22  0748   * 135*   K 3.7 3.9   CL 92* 95   CO2 30* 29   * 198*   BUN 37* 41*   CREATININE 1.6* 1.7*   CALCIUM 9.0 9.7   PROT 6.5 6.6   ALBUMIN 3.2* 3.3*   BILITOT 1.2* 1.1*   ALKPHOS 89 88   AST 21 22   ALT 23 22   ANIONGAP 10 11   EGFRNONAA 33.3* 31.0*       Significant Imaging: I have reviewed all pertinent imaging results/findings within the past 24 hours.    Assessment/Plan:     * Bacteremia due to Enterococcus  Infection involving implantable cardioverter-defibrillator (ICD)  66 y.o. female with hx of HFrEF, HTN, T2DM, COPD admitted to OSH for sepsis 2/2 Enterococcus faecalis bacteremia in the setting of infected right lead/defibrillator (IE). Treated with IV antibiotics and transferred to [facility] for removal of defibrillator.    - repeat blood cx (1/8 & 1/9) NGTD  - CTS consulted for ICD removal; appreciate assistance  - NPO at midnight  - holding Eliquis and Plavix for procedure  - continue IV vanc (pharm to dose)  - ID consulted; appreciate assistance  - trend CBC, CRP  - tight glycemic control    YAQUELIN (acute kidney injury)  Stage 3a chronic kidney disease  - Cr 1.4>>1.7  - suspect 2/2 vanc and mild dehydration   - diuretics held-- can likely resume in AM given  severely depressed EF  - trend BMP    Chronic combined systolic and diastolic heart failure  S/P CABG (coronary artery bypass graft)  - appears dry vs euvolemic  - lasix, metolazone, and aldactone held for AYQUELIN-- can likely resume in AM if renal fn improved  - strict I/Os, daily weights  - cardiac diet    Type 2 diabetes mellitus with hyperglycemia, without long-term current use of insulin  - home regimen: lantus 100U daily  - inpatient regimen: detemir 25U BID + aspart 10U TIDWM  - moderate dose SSI  - ACHS accuchecks    Lab Results   Component Value Date    HGBA1C 10.8 (H) 01/11/2022       Prolonged Qtc  - Qtc 649ms on 1/7  - hold celexa and ranexa for now  - daily EKG    Paroxysmal atrial fibrillation  - continue MTP 50mg BID, amiodarone 200mg daily, and digoxin 0.125mg daily  - hold eliquis for procedure    Primary hypertension  - continue imdur, amlodipine, and MTP  - hold aldactone d/t YAQUELIN    COPD (chronic obstructive pulmonary disease)  - no acute issues  - duonebs PRN    Coronary artery disease involving native coronary artery of native heart without angina pectoris  - continue statin and BB, hold plavix as above  - hold ranexa given prolonged QTc      VTE Risk Mitigation (From admission, onward)         Ordered     IP VTE HIGH RISK PATIENT  Once         01/15/22 0783                   Mag Harkins PA-C  Department of Hospital Medicine   [facility] - Cardiology Stepdown

## 2022-01-16 NOTE — ASSESSMENT & PLAN NOTE
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 Hyperechoic oblong pannus formation seen on RV lead just below the tricuspid valve with some peripheral irregularities associated. A vegetation cannot be ruled out completely. This measures 2.1 by 1.0 cm in total.  -- BCx 1/7 pansensitive Enterococcus faecalis  -- BCx 1/8, 1/9 NGTD  -- Resp cx 1/10 moderate Candida albicans    Recommendations:  -- Continue vancomycin for 6 weeks following device removal  -- Repeat blood cultures after device removed  -- Can re-implant ICD if indicated at least 14 days following first negative BCx after removal  -- Consider referral for screening colonoscopy if due. Etiology of Enterococcus bacteremia unclear  -- Please refer to allergy for PCN skin testing

## 2022-01-16 NOTE — PLAN OF CARE
Pt free of falls/trauma/injuries. VSS. Denies c/o SOB; O2Sats remain stable on room air. Continued with vancomycin therapy. Vancomycin trough scheduled for 0500 on 1/17. Seen by CTS and ID consult. Consult to EP placed. Diet changed from NPO to cardiac, no fluid restriction in place. Patient encouraged to independently ambulate around room, be up in chair throughout day. Plan of care reviewed with patient, questions encouraged.

## 2022-01-16 NOTE — HPI
Claudia Powell is a 66 y.o. F with history of CAD, HFrEF s/p AICD, who [place] [facility] [place]. She presented to [place] 1/7 with fever, SOB, productive cough and was admitted with sepsis 2/2 Enterococcus faecalis bacteremia. Initiated on empiric vanc, meropenem, and doxycycline with resolution of fever and leukocytosis. KOMAL revealed suspicious pannus of RV lead concerning for vegetation. She completed 5 days doxycycline and continued with vancomycin for planned 6 weeks following ICD removal. Transfer[place] [facility] [place] /removal of ICD. ID consulted for abx recommendations for Enterococcus bacteremia. Reports some mild nausea before presentation. Denies abdominal pain, diarrhea, dysuria, frequency.

## 2022-01-16 NOTE — PLAN OF CARE
01/15/22 0822   Patient Assessment/Suction   Level of Consciousness (AVPU) alert   Respiratory Effort Unlabored;Normal   Expansion/Accessory Muscles/Retractions no use of accessory muscles   All Lung Fields Breath Sounds unable to assess;equal bilaterally;clear   Rhythm/Pattern, Respiratory no shortness of breath reported   Cough Frequency infrequent   Cough Type nonproductive   PRE-TX-O2   O2 Device (Oxygen Therapy) room air   SpO2 96 %   Pulse Oximetry Type Intermittent   Pulse 69   Resp 16   Education   $ Education 15 min   Respiratory Evaluation   $ Care Plan Tech Time 30 min   $ Eval/Re-eval Charges Evaluation

## 2022-01-16 NOTE — SUBJECTIVE & OBJECTIVE
Past Medical History:   Diagnosis Date    CHF (congestive heart failure)     COPD (chronic obstructive pulmonary disease) 2021    Coronary artery disease     Diabetes mellitus     Hyperlipidemia     Hypertension     LBBB (left bundle branch block)     NSTEMI (non-ST elevated myocardial infarction)     Obesity 2021    Pulmonary hypertension 2021    PVD (peripheral vascular disease)        Past Surgical History:   Procedure Laterality Date    CARDIAC CATHETERIZATION       SECTION      CORONARY ARTERY BYPASS GRAFT  06/22/2016    x4    HYSTERECTOMY      INSERTION OF BIVENTRICULAR IMPLANTABLE CARDIOVERTER-DEFIBRILLATOR (ICD)         Review of patient's allergies indicates:   Allergen Reactions    Cephalosporins     Penicillins      hives         Current Facility-Administered Medications on File Prior to Encounter   Medication    [] naproxen tablet 250 mg    [COMPLETED] vancomycin in dextrose 5 % 1 gram/250 mL IVPB 1,000 mg    [DISCONTINUED] 0.9%  NaCl infusion    [DISCONTINUED] acetaminophen tablet 650 mg    [DISCONTINUED] amiodarone tablet 200 mg    [DISCONTINUED] amLODIPine tablet 5 mg    [DISCONTINUED] atorvastatin tablet 40 mg    [DISCONTINUED] calcium chloride 1 g in dextrose 5 % 100 mL IVPB    [DISCONTINUED] calcium chloride 1 g in dextrose 5 % 100 mL IVPB    [DISCONTINUED] calcium chloride 1 g in dextrose 5 % 100 mL IVPB    [DISCONTINUED] citalopram tablet 40 mg    [DISCONTINUED] dextrose 50% injection 12.5 g    [DISCONTINUED] dextrose 50% injection 25 g    [DISCONTINUED] digoxin tablet 0.125 mg    [DISCONTINUED] enoxaparin injection 40 mg    [DISCONTINUED] furosemide tablet 40 mg    [DISCONTINUED] gabapentin capsule 300 mg    [DISCONTINUED] HYDROcodone-acetaminophen 5-325 mg per tablet 1 tablet    [DISCONTINUED] insulin detemir U-100 pen 40 Units    [DISCONTINUED] insulin detemir U-100 pen 60 Units    [DISCONTINUED] insulin regular injection  1-12 Units    [DISCONTINUED] isosorbide dinitrate tablet 10 mg    [DISCONTINUED] magnesium oxide tablet 800 mg    [DISCONTINUED] magnesium sulfate 2g in water 50mL IVPB (premix)    [DISCONTINUED] magnesium sulfate 2g in water 50mL IVPB (premix)    [DISCONTINUED] magnesium sulfate 2g in water 50mL IVPB (premix)    [DISCONTINUED] magnesium sulfate in dextrose IVPB (premix) 1 g    [DISCONTINUED] melatonin tablet 6 mg    [DISCONTINUED] metOLazone tablet 5 mg    [DISCONTINUED] metOLazone tablet 5 mg    [DISCONTINUED] metoprolol tartrate (LOPRESSOR) tablet 50 mg    [DISCONTINUED] multivitamin tablet    [DISCONTINUED] ondansetron injection 4 mg    [DISCONTINUED] pentoxifylline CR tablet 400 mg    [DISCONTINUED] potassium chloride 10 mEq in 100 mL IVPB    [DISCONTINUED] potassium chloride 10 mEq in 100 mL IVPB    [DISCONTINUED] potassium chloride 10 mEq in 100 mL IVPB    [DISCONTINUED] potassium chloride 10 mEq in 100 mL IVPB    [DISCONTINUED] potassium chloride SA CR tablet 20 mEq    [DISCONTINUED] potassium chloride SA CR tablet 20 mEq    [DISCONTINUED] potassium chloride SA CR tablet 40 mEq    [DISCONTINUED] potassium chloride SA CR tablet 40 mEq    [DISCONTINUED] ranolazine 12 hr tablet 500 mg    [DISCONTINUED] spironolactone tablet 25 mg    [DISCONTINUED] vancomycin - pharmacy to dose    [DISCONTINUED] vancomycin 1.25 g in dextrose 5% 250 mL IVPB (ready to mix)     Current Outpatient Medications on File Prior to Encounter   Medication Sig    albuterol (PROVENTIL/VENTOLIN HFA) 90 mcg/actuation inhaler Inhale 1 puff into the lungs every 4 (four) hours as needed.    ALPRAZolam (XANAX) 0.5 MG tablet Take 1 tablet (0.5 mg total) by mouth 2 (two) times daily as needed for Anxiety.    amiodarone (PACERONE) 200 MG Tab Take 1 tablet (200 mg total) by mouth once daily.    amLODIPine (NORVASC) 5 MG tablet Take 1 tablet (5 mg total) by mouth 2 (two) times daily.    apixaban (ELIQUIS) 2.5 mg Tab Take 1  tablet (2.5 mg total) by mouth 2 (two) times daily.    atorvastatin (LIPITOR) 40 MG tablet Take 1 tablet (40 mg total) by mouth every evening.    citalopram (CELEXA) 40 MG tablet Take 40 mg by mouth once daily.    clopidogreL (PLAVIX) 75 mg tablet Take 1 tablet (75 mg total) by mouth once daily.    digoxin (LANOXIN) 125 mcg tablet Take 1 tablet (0.125 mg total) by mouth once daily.    doxycycline (VIBRAMYCIN) 100 MG Cap Take 1 capsule (100 mg total) by mouth every 12 (twelve) hours.    furosemide (LASIX) 40 MG tablet Take 1 tablet (40 mg total) by mouth once daily. for 3 days    gabapentin (NEURONTIN) 400 MG capsule Take 400 mg by mouth 3 (three) times daily.    isosorbide dinitrate (ISORDIL) 10 MG tablet Take 1 tablet (10 mg total) by mouth 3 (three) times daily.    LANTUS U-100 INSULIN 100 unit/mL injection Inject 100 Units into the skin once daily.    metOLazone (ZAROXOLYN) 5 MG tablet Take 5 mg by mouth every other day.    metoprolol tartrate (LOPRESSOR) 50 MG tablet Take 1 tablet (50 mg total) by mouth 2 (two) times daily.    multivitamin (THERAGRAN) per tablet Take 1 tablet by mouth once daily.    pentoxifylline (TRENTAL) 400 mg TbSR Take 1 tablet (400 mg total) by mouth 3 (three) times daily.    ranolazine (RANEXA) 500 MG Tb12 Take 1 tablet (500 mg total) by mouth 2 (two) times daily.    spironolactone (ALDACTONE) 25 MG tablet Take 1 tablet (25 mg total) by mouth once daily. Hold until patient follows up with PCP or Cardiology     Family History     Problem Relation (Age of Onset)    Heart attack Mother, Father        Tobacco Use    Smoking status: Former Smoker    Smokeless tobacco: Never Used   Substance and Sexual Activity    Alcohol use: Not Currently    Drug use: Never    Sexual activity: Not on file     Review of Systems   Constitutional: Negative for activity change, appetite change, chills and fever.   HENT: Negative for congestion, trouble swallowing and voice change.    Eyes:  Negative for photophobia and visual disturbance.   Respiratory: Positive for cough (improving ). Negative for chest tightness, shortness of breath and wheezing.    Cardiovascular: Negative for chest pain, palpitations and leg swelling.   Gastrointestinal: Negative for abdominal distention, abdominal pain, blood in stool, constipation, nausea and vomiting.   Endocrine: Negative for polyphagia and polyuria.   Genitourinary: Negative for dysuria, flank pain, frequency and hematuria.   Musculoskeletal: Negative for arthralgias, back pain and gait problem.   Skin: Negative for color change and wound.   Neurological: Negative for dizziness, syncope, speech difficulty, weakness, light-headedness and headaches.   Psychiatric/Behavioral: Negative for confusion, decreased concentration and dysphoric mood.     Objective:     Vital Signs (Most Recent):  Temp: 98.2 °F (36.8 °C) (01/15/22 2231)  Pulse: 60 (01/15/22 2351)  Resp: 16 (01/15/22 2211)  BP: (!) 152/62 (01/15/22 2231)  SpO2: 96 % (01/15/22 2211) Vital Signs (24h Range):  Temp:  [97.9 °F (36.6 °C)-98.4 °F (36.9 °C)] 98.2 °F (36.8 °C)  Pulse:  [60-78] 60  Resp:  [16-18] 16  SpO2:  [94 %-98 %] 96 %  BP: (129-156)/(56-71) 152/62     Weight: 82.5 kg (181 lb 14.1 oz)  Body mass index is 32.22 kg/m².    Physical Exam  Vitals and nursing note reviewed.   Constitutional:       General: She is not in acute distress.     Appearance: She is well-developed. She is not ill-appearing.   HENT:      Head: Normocephalic and atraumatic.      Mouth/Throat:      Pharynx: No oropharyngeal exudate.   Eyes:      Extraocular Movements: Extraocular movements intact.      Conjunctiva/sclera: Conjunctivae normal.   Cardiovascular:      Rate and Rhythm: Normal rate and regular rhythm.      Heart sounds: Normal heart sounds.   Pulmonary:      Effort: Pulmonary effort is normal. No respiratory distress.      Breath sounds: Normal breath sounds. No wheezing.   Abdominal:      General: Bowel sounds are  normal. There is no distension.      Palpations: Abdomen is soft.      Tenderness: There is no abdominal tenderness. There is no guarding.   Musculoskeletal:         General: No tenderness. Normal range of motion.      Cervical back: Normal range of motion and neck supple.      Right lower leg: No edema.      Left lower leg: No edema.   Lymphadenopathy:      Cervical: No cervical adenopathy.   Skin:     General: Skin is warm and dry.      Capillary Refill: Capillary refill takes less than 2 seconds.      Findings: No rash.   Neurological:      Mental Status: She is alert and oriented to person, place, and time.      Cranial Nerves: No cranial nerve deficit.      Sensory: No sensory deficit.      Coordination: Coordination normal.   Psychiatric:         Behavior: Behavior normal.         Thought Content: Thought content normal.         Judgment: Judgment normal.             Significant Labs:   All pertinent labs within the past 24 hours have been reviewed.  CBC:   Recent Labs   Lab 01/14/22  0626 01/15/22  0749   WBC 9.27 7.81   HGB 11.2* 10.5*   HCT 33.8* 32.5*    317     CMP:   Recent Labs   Lab 01/14/22  0626 01/15/22  0748   * 135*   K 3.7 3.9   CL 92* 95   CO2 30* 29   * 198*   BUN 37* 41*   CREATININE 1.6* 1.7*   CALCIUM 9.0 9.7   PROT 6.5 6.6   ALBUMIN 3.2* 3.3*   BILITOT 1.2* 1.1*   ALKPHOS 89 88   AST 21 22   ALT 23 22   ANIONGAP 10 11   EGFRNONAA 33.3* 31.0*       Significant Imaging: I have reviewed all pertinent imaging results/findings within the past 24 hours.

## 2022-01-16 NOTE — ASSESSMENT & PLAN NOTE
Stage 3a chronic kidney disease  - Cr 1.4>>1.7  - suspect 2/2 vanc and mild dehydration   - diuretics held-- can likely resume in AM given severely depressed EF  - trend BMP

## 2022-01-16 NOTE — SUBJECTIVE & OBJECTIVE
Past Medical History:   Diagnosis Date    CHF (congestive heart failure)     COPD (chronic obstructive pulmonary disease) 2021    Coronary artery disease     Diabetes mellitus     Hyperlipidemia     Hypertension     LBBB (left bundle branch block)     NSTEMI (non-ST elevated myocardial infarction)     Obesity 2021    Pulmonary hypertension 2021    PVD (peripheral vascular disease)        Past Surgical History:   Procedure Laterality Date    CARDIAC CATHETERIZATION       SECTION      CORONARY ARTERY BYPASS GRAFT  06/22/2016    x4    HYSTERECTOMY      INSERTION OF BIVENTRICULAR IMPLANTABLE CARDIOVERTER-DEFIBRILLATOR (ICD)         Review of patient's allergies indicates:   Allergen Reactions    Cephalosporins     Penicillins      hives         Medications:  Medications Prior to Admission   Medication Sig    albuterol (PROVENTIL/VENTOLIN HFA) 90 mcg/actuation inhaler Inhale 1 puff into the lungs every 4 (four) hours as needed.    ALPRAZolam (XANAX) 0.5 MG tablet Take 1 tablet (0.5 mg total) by mouth 2 (two) times daily as needed for Anxiety.    amiodarone (PACERONE) 200 MG Tab Take 1 tablet (200 mg total) by mouth once daily.    amLODIPine (NORVASC) 5 MG tablet Take 1 tablet (5 mg total) by mouth 2 (two) times daily.    apixaban (ELIQUIS) 2.5 mg Tab Take 1 tablet (2.5 mg total) by mouth 2 (two) times daily.    atorvastatin (LIPITOR) 40 MG tablet Take 1 tablet (40 mg total) by mouth every evening.    citalopram (CELEXA) 40 MG tablet Take 40 mg by mouth once daily.    clopidogreL (PLAVIX) 75 mg tablet Take 1 tablet (75 mg total) by mouth once daily.    digoxin (LANOXIN) 125 mcg tablet Take 1 tablet (0.125 mg total) by mouth once daily.    doxycycline (VIBRAMYCIN) 100 MG Cap Take 1 capsule (100 mg total) by mouth every 12 (twelve) hours.    furosemide (LASIX) 40 MG tablet Take 1 tablet (40 mg total) by mouth once daily. for 3 days    gabapentin (NEURONTIN) 400 MG  "capsule Take 400 mg by mouth 3 (three) times daily.    isosorbide dinitrate (ISORDIL) 10 MG tablet Take 1 tablet (10 mg total) by mouth 3 (three) times daily.    LANTUS U-100 INSULIN 100 unit/mL injection Inject 100 Units into the skin once daily.    metOLazone (ZAROXOLYN) 5 MG tablet Take 5 mg by mouth every other day.    metoprolol tartrate (LOPRESSOR) 50 MG tablet Take 1 tablet (50 mg total) by mouth 2 (two) times daily.    multivitamin (THERAGRAN) per tablet Take 1 tablet by mouth once daily.    pentoxifylline (TRENTAL) 400 mg TbSR Take 1 tablet (400 mg total) by mouth 3 (three) times daily.    ranolazine (RANEXA) 500 MG Tb12 Take 1 tablet (500 mg total) by mouth 2 (two) times daily.    spironolactone (ALDACTONE) 25 MG tablet Take 1 tablet (25 mg total) by mouth once daily. Hold until patient follows up with PCP or Cardiology     Antibiotics (From admission, onward)            Start     Stop Route Frequency Ordered    01/16/22 0040  vancomycin - pharmacy to dose  (vancomycin IVPB)        "And" Linked Group Details    -- IV pharmacy to manage frequency 01/15/22 2340        Antifungals (From admission, onward)            None        Antivirals (From admission, onward)    None           Immunization History   Administered Date(s) Administered    COVID-19, MRNA, LN-S, PF (Pfizer) 02/12/2021, 03/05/2021       Family History     Problem Relation (Age of Onset)    Heart attack Mother, Father        Social History     Socioeconomic History    Marital status:    Tobacco Use    Smoking status: Former Smoker    Smokeless tobacco: Never Used   Substance and Sexual Activity    Alcohol use: Not Currently    Drug use: Never     Review of Systems   Constitutional: Positive for fever. Negative for chills.   HENT: Negative for sore throat.    Respiratory: Positive for cough and shortness of breath.    Cardiovascular: Negative for chest pain and leg swelling.   Gastrointestinal: Negative for abdominal pain, " constipation, diarrhea, nausea and vomiting.   Genitourinary: Negative for dysuria.   Musculoskeletal: Negative for myalgias.   Skin: Negative for rash.   Neurological: Negative for dizziness and headaches.   Psychiatric/Behavioral: Negative for confusion.     Objective:     Vital Signs (Most Recent):  Temp: 98.4 °F (36.9 °C) (01/16/22 0741)  Pulse: 60 (01/16/22 0741)  Resp: 14 (01/16/22 0741)  BP: (!) 142/65 (01/16/22 0741)  SpO2: 97 % (01/16/22 0741) Vital Signs (24h Range):  Temp:  [98.2 °F (36.8 °C)-98.5 °F (36.9 °C)] 98.4 °F (36.9 °C)  Pulse:  [59-61] 60  Resp:  [14-18] 14  SpO2:  [93 %-97 %] 97 %  BP: (129-152)/(56-85) 142/65     Weight: 80.5 kg (177 lb 7.5 oz)  Body mass index is 31.44 kg/m².    Estimated Creatinine Clearance: 39.7 mL/min (based on SCr of 1.4 mg/dL).    Physical Exam  Constitutional:       Appearance: Normal appearance.   HENT:      Head: Normocephalic and atraumatic.      Mouth/Throat:      Mouth: Mucous membranes are moist.      Pharynx: Oropharynx is clear.   Eyes:      Extraocular Movements: Extraocular movements intact.      Pupils: Pupils are equal, round, and reactive to light.   Cardiovascular:      Rate and Rhythm: Normal rate and regular rhythm.      Pulses: Normal pulses.      Heart sounds: Normal heart sounds.   Pulmonary:      Effort: Pulmonary effort is normal. No respiratory distress.      Breath sounds: Normal breath sounds.   Abdominal:      General: Abdomen is flat. Bowel sounds are normal.      Palpations: Abdomen is soft.   Musculoskeletal:         General: No swelling.      Cervical back: Neck supple.   Skin:     General: Skin is warm and dry.      Capillary Refill: Capillary refill takes less than 2 seconds.   Neurological:      General: No focal deficit present.      Mental Status: She is alert and oriented to person, place, and time. Mental status is at baseline.   Psychiatric:         Mood and Affect: Mood normal.         Behavior: Behavior normal.         Thought  Content: Thought content normal.         Judgment: Judgment normal.         Significant Labs:   Blood Culture:   Recent Labs   Lab 09/13/21  0242 01/07/22  1055 01/07/22  1220 01/08/22  1629 01/09/22  0710   LABBLOO No growth after 5 days. Gram stain aer bottle: Gram positive cocci in pairs   Gram stain des bottle: Gram positive cocci in pairs   Results called to and read back by:JOSH ALVARADO RN 1200  01/07/2022    23:21 FGF  ENTEROCOCCUS FAECALIS* Gram stain des bottle: Gram positive cocci in pairs   Results called to and read back by:JOSH ALVARADO RN 1200 01/07/2022    23:22 FGF  Gram stain aer bottle: Gram positive cocci in pairs   Positive results previously called to and read back by Josh Alvarado RN   1200 on 01/07/2022 23:22  01/08/2022  00:19 fgf  ENTEROCOCCUS FAECALIS  For susceptibility see order #[id number]  * No growth after 5 days. No growth after 5 days.     BMP:   Recent Labs   Lab 01/16/22  0339   *   *   K 4.2   CL 97   CO2 26   BUN 35*   CREATININE 1.4   CALCIUM 10.3   MG 1.9     CBC:   Recent Labs   Lab 01/15/22  0749 01/16/22  0339   WBC 7.81 10.87   HGB 10.5* 11.2*   HCT 32.5* 34.9*    366     CMP:   Recent Labs   Lab 01/15/22  0748 01/16/22  0339   * 135*   K 3.9 4.2   CL 95 97   CO2 29 26   * 206*   BUN 41* 35*   CREATININE 1.7* 1.4   CALCIUM 9.7 10.3   PROT 6.6 7.2   ALBUMIN 3.3* 3.2*   BILITOT 1.1* 0.5   ALKPHOS 88 113   AST 22 21   ALT 22 20   ANIONGAP 11 12   EGFRNONAA 31.0* 39.2*       Significant Imaging: I have reviewed all pertinent imaging results/findings within the past 24 hours.     KOMAL 1/10/2022  · The left ventricle is mildly enlarged with concentric remodeling and  · The estimated ejection fraction is 35%.  · Left ventricular diastolic dysfunction.  · There is moderate left ventricular global hypokinesis.  · There is abnormal septal wall motion consistent with right ventricular pacemaker.  · Normal right ventricular size with normal  right ventricular systolic function.  · Small echogenic mass present on the right ventricular lead.. The mass is fixed. Hyperechoic oblong pannus formation seen on the right ventricular lead just below the tricuspid valve with some peripheral irregularities associated a vegetation cannot be ruled out completely. This measures 2.1 by 1.0 cm in total.  · Moderate mitral regurgitation.  · Mild to moderate tricuspid regurgitation.  · The right atrial lead appears unremarkable. Lead visualized in the coronary sinus also appears unremarkable.

## 2022-01-17 NOTE — PROGRESS NOTES
Pharmacokinetic Assessment Follow Up: IV Vancomycin    Vancomycin serum concentration assessment(s):  · Vancomycin random drawn correctly and resulted at 12.1 mcg/mL ~20h after previous dose. This is within the goal of 15-20mcg/mL.  · Creatinine was improving but slightly up today at 1.5, peaked at 1.7  · Vancomycin 1000mg x1 today  · Draw vancomycin random on 1/18 with AM labs, dose if < 20 mcg/mL     Drug levels (last 3 results):  Recent Labs   Lab Result Units 01/14/22  2053 01/15/22  0749 01/16/22 0339 01/17/22  0534   Vancomycin, Random ug/mL  --  14.5 15.8 12.1   Vancomycin-Trough ug/mL 23.1*  --   --   --        Pharmacy will continue to follow and monitor vancomycin.    Please contact pharmacy at extension [phone] for questions regarding this assessment.    Thank you for the consult,   Ramona Ramon, PharmD       Patient brief summary:  Claudia Powell is a 66 y.o. female initiated on antimicrobial therapy with IV Vancomycin for treatment of bacteremia.    The patient's current regimen is pulse-dosing for YAQUELIN.     Drug Allergies:   Review of patient's allergies indicates:   Allergen Reactions    Cephalosporins     Penicillins      hives         Actual Body Weight:   80.5 kg    Renal Function:   Estimated Creatinine Clearance: 36.9 mL/min (A) (based on SCr of 1.5 mg/dL (H)).      CBC (last 72 hours):  Recent Labs   Lab Result Units 01/15/22  0749 01/16/22  0339 01/17/22  0534   WBC K/uL 7.81 10.87 9.53   Hemoglobin g/dL 10.5* 11.2* 10.2*   Hematocrit % 32.5* 34.9* 30.9*   Platelets K/uL 317 366 319   Gran % % 59.0 60.6 60.7   Lymph % % 22.4 23.6 23.7   Mono % % 9.6 9.0 9.1   Eosinophil % % 2.8 2.3 2.5   Basophil % % 1.3 1.1 1.3   Differential Method  Automated Automated Automated       Metabolic Panel (last 72 hours):  Recent Labs   Lab Result Units 01/15/22  0748 01/16/22  0339 01/17/22  0534   Sodium mmol/L 135* 135* 137   Potassium mmol/L 3.9 4.2 3.9   Chloride mmol/L 95 97 99   CO2 mmol/L 29 26 28    Glucose mg/dL 198* 206* 192*   BUN mg/dL 41* 35* 28*   Creatinine mg/dL 1.7* 1.4 1.5*   Albumin g/dL 3.3* 3.2* 3.0*   Total Bilirubin mg/dL 1.1* 0.5 0.5   Alkaline Phosphatase U/L 88 113 96   AST U/L 22 21 19   ALT U/L 22 20 17   Magnesium mg/dL 1.9 1.9  --        Vancomycin Administrations:  vancomycin given in the last 96 hours                   vancomycin in dextrose 5 % 1 gram/250 mL IVPB 1,000 mg (mg) 1,000 mg New Bag 01/15/22 1036    vancomycin 1.25 g in dextrose 5% 250 mL IVPB (ready to mix) (mg) 1,250 mg New Bag 01/13/22 2315    vancomycin 1,500 mg in dextrose 5 % 500 mL IVPB (mg) 1,500 mg New Bag 01/12/22 1746                Microbiologic Results:  Microbiology Results (last 7 days)     ** No results found for the last 168 hours. **

## 2022-01-17 NOTE — PROGRESS NOTES
[facility]  EP  Progress Note     Hospital Length of Stay: 2    Interval History: No acute events overnight. No new issues. Patient denies fever/chills. Telemetry reviewed and device interrogated yesterday.     Vitals:  Temp:  [96.8 °F (36 °C)-99 °F (37.2 °C)] 96.8 °F (36 °C)  Pulse:  [58-60] 60  Resp:  [16-20] 20  SpO2:  [92 %-97 %] 97 %  BP: (135-158)/(60-90) 137/63    Intake/Output    Intake/Output Summary (Last 24 hours) at 1/17/2022 1122  Last data filed at 1/17/2022 0513  Gross per 24 hour   Intake 480 ml   Output 1000 ml   Net -520 ml       Physical Exam  Gen: No apparent distress, resting comfortably  HEENT: Pupils equal and reactive to light  Cardio: Regular rate, point of maximal impulse not displaced, no murmur noted, 2+ radial pulses bilaterally, 2+ DP pulses bilaterally  Resp: CTAB, no wheezing  Abd: Soft, non-tender, non-distended  Skin: Warm, dry, no peripheral edema noted  Neuro: Alert and oriented x3  Psych: Normal mood and affect    Labs:  Recent Labs   Lab 01/15/22  0749 01/16/22  0339 01/17/22  0534   WBC 7.81 10.87 9.53   HGB 10.5* 11.2* 10.2*   HCT 32.5* 34.9* 30.9*    366 319     Recent Labs   Lab 01/14/22  0626 01/14/22  0626 01/15/22  0748 01/16/22  0339 01/17/22  0534   *   < > 135* 135* 137   K 3.7   < > 3.9 4.2 3.9   CL 92*   < > 95 97 99   CO2 30*   < > 29 26 28   BUN 37*   < > 41* 35* 28*   CREATININE 1.6*   < > 1.7* 1.4 1.5*   *   < > 198* 206* 192*   CALCIUM 9.0   < > 9.7 10.3 10.0   MG 1.7  --  1.9 1.9  --     < > = values in this interval not displayed.          Current Meds:   amiodarone  200 mg Oral Daily    amLODIPine  5 mg Oral BID    atorvastatin  40 mg Oral QHS    clopidogreL  75 mg Oral Daily    digoxin  0.125 mg Oral Daily    furosemide  40 mg Oral Daily    gabapentin  300 mg Oral TID    insulin aspart U-100  10 Units Subcutaneous TIDWM    insulin detemir U-100  25 Units Subcutaneous BID    isosorbide dinitrate  10 mg Oral  TID    metoprolol tartrate  50 mg Oral BID    multivitamin  1 tablet Oral Daily    pentoxifylline  400 mg Oral TID    sodium chloride 0.9%  10 mL Intravenous Q6H       Continuous Infusions:      PRN:  acetaminophen, albuterol-ipratropium, bisacodyL, dextrose 50%, dextrose 50%, glucagon (human recombinant), glucose, glucose, insulin aspart U-100, melatonin, polyethylene glycol, Flushing PICC Protocol **AND** sodium chloride 0.9% **AND** sodium chloride 0.9%, Pharmacy to dose Vancomycin consult **AND** vancomycin - pharmacy to dose    Assessment and Plan:    1. [employer] CRT-D Device Infection  -Patient found to have Enterococcus faecalis bacteremia at outside hospital with unclear source  -Infectious Disease consulted: Anticipate 6 week course of antibiotics following device removal. Continue IV Vancomycin. Can re-implant ICD if indicated at least 14 days following negative blood culture  -Will have Dr. Blanchard see patient tomorrow to discuss CRT-D extraction  -Patient will need LifeVest after extraction     2. CAD s/p CABG in 2017  -Continue aspirin/plavix     3. HFrEF (EF 35%, ischemic)  -Management per primary     4. Paroxysmal A-fib  -Continue home meds including amiodarone 200 mg qd and digoxin 0.125 mg qd  -Hold Eliquis for now given pending extraction       Kale Javed MD  [facility] PGY-4    Pager number: [phone]    Staff attestation to follow.    This note was prepared using voice recognition system and may have sound alike errors that may have been overlooked even with proof reading.

## 2022-01-17 NOTE — CONSULTS
Double lumen PICC to right brachial vein.  34 cm in length, 38 cm arm circumference and 0 cm exposed.   Lot # [id number].

## 2022-01-17 NOTE — PLAN OF CARE
Plan of care discussed with patient. Patient is free of fall/trauma/injury. Denies CP, SOB, or pain/discomfort. VSS on room air. Is/Os monitored. BG monitored. All questions addressed. Will continue to monitor

## 2022-01-17 NOTE — ASSESSMENT & PLAN NOTE
-- Cardiovascular implantable electronic device infection with associated Enterococcus faecalis bacteremia  -- KMOAL revealed small, fixed, echogenic mass present on RV lead. Hyperechoic oblong pannus formation seen on RV lead just below the tricuspid valve with some peripheral irregularities associated. A vegetation cannot be ruled out completely. This measures 2.1 by 1.0 cm in total.  -- BCx 1/7 pansensitive Enterococcus faecalis  -- BCx 1/8, 1/9 NGTD  -- Resp cx 1/10 moderate Candida albicans  -- CT abd/pelvis no obvious potential source for introduction of E. Faecalis into blood stream. NM scan negative.     Recommendations:  -- Continue vancomycin for 6 weeks following device removal  -- Repeat blood cultures after device removed  -- Can re-implant ICD if indicated at least 14 days following first negative BCx after removal  -- Consider referral for screening colonoscopy if due. Etiology of Enterococcus bacteremia unclear  -- Please refer to allergy for PCN skin testing

## 2022-01-17 NOTE — PROGRESS NOTES
Progress Note  Hospital Medicine      Patient Name: Claudia Powell  MRN: [id number]  Patient Class: IP- Inpatient  Admission Date: 1/15/2022  Attending Physician: Tejal Cristina MD   Primary Care Provider: Manjit Monae MD       SUBJECTIVE:     Follow-up For:  Bacteremia due to Enterococcus     Interval history/ROS:   1/17: tentative plans for lead extraction later this week.     1/16: EP assessment appreciated.     HPI: Claudia Powell is a 66 y.o. female with a PMHx of CAD, HFrEF, DM2, HTN, COPD who presents to [facility] as a transfer from [facility] for removal of infected implanted cardiac defibrillator. Patient initially presented to OSH on 1/7 with complaints of SOB, productive cough, fever, URI symptoms and was admitted for sepsis 2/2 bacteremia. She was initially started on empiric vancomycin, meropenem and doxycycline with resolution of fever and leukocytosis. Blood cultures from 1/7 grew Enterococcus faecalis with appropriate sensitivities. KOMAL showed suspicious pannus on right ventricular lead concerning for vegetation. She completed 5 day course of doxycycline and was continued on IV vanc with plan to continue antibiotics for at least 6 weeks after removal of ICD. Patient transferred here for CTS eval and removal of defibrillator.      Patient seen and evaluated at bedside upon arrival to [facility]. She is feeling well overall and denies any chest pain, SOB or other complaints at this time. Updated on tentative plan for removal of ICD tomorrow.     OBJECTIVE:     Body mass index is 31.24 kg/m².    Vital Signs Range (Last 24H):  Temp:  [96.8 °F (36 °C)-99 °F (37.2 °C)]   Pulse:  [58-60]   Resp:  [16-20]   BP: (135-158)/(60-90)   SpO2:  [92 %-97 %]     I & O (Last 24H):    Intake/Output Summary (Last 24 hours) at 1/17/2022 0963  Last data filed at 1/17/2022 0513  Gross per 24 hour   Intake 480 ml   Output 1000 ml   Net -520 ml        Physical Exam:  Vitals and nursing note reviewed.    Constitutional:       General: She is not in acute distress.     Appearance: She is well-developed. She is not ill-appearing.   HENT:      Head: Normocephalic and atraumatic.      Mouth/Throat:      Pharynx: No oropharyngeal exudate.   Eyes:      Extraocular Movements: Extraocular movements intact.      Conjunctiva/sclera: Conjunctivae normal.   Cardiovascular:      Rate and Rhythm: Normal rate and regular rhythm.      Heart sounds: Normal heart sounds.   Pulmonary:      Effort: Pulmonary effort is normal. No respiratory distress.      Breath sounds: Normal breath sounds. No wheezing.   Abdominal:      General: Bowel sounds are normal. There is no distension.      Palpations: Abdomen is soft.      Tenderness: There is no abdominal tenderness. There is no guarding.   Musculoskeletal:         General: No tenderness. Normal range of motion.      Cervical back: Normal range of motion and neck supple.      Right lower leg: No edema.      Left lower leg: No edema.   Lymphadenopathy:      Cervical: No cervical adenopathy.   Skin:     General: Skin is warm and dry.      Capillary Refill: Capillary refill takes less than 2 seconds.      Findings: No rash.   Neurological:      Mental Status: She is alert and oriented to person, place, and time.      Cranial Nerves: No cranial nerve deficit.      Sensory: No sensory deficit.      Coordination: Coordination normal.   Psychiatric:         Behavior: Behavior normal.         Thought Content: Thought content normal.         Judgment: Judgment normal.        Recent Labs   Lab 01/15/22  0748 01/16/22  0339 01/17/22  0534   * 135* 137   K 3.9 4.2 3.9   CL 95 97 99   CO2 29 26 28   BUN 41* 35* 28*   CREATININE 1.7* 1.4 1.5*   * 206* 192*   CALCIUM 9.7 10.3 10.0   MG 1.9 1.9  --      Recent Labs   Lab 01/15/22  0748 01/16/22  0339 01/17/22  0534   ALKPHOS 88 113 96   ALT 22 20 17   AST 22 21 19   ALBUMIN 3.3* 3.2* 3.0*   PROT 6.6 7.2 6.5   BILITOT 1.1* 0.5 0.5   INR  --   1.0  --        Recent Labs   Lab 01/15/22  0749 01/15/22  0749 01/16/22  0339 01/17/22  0534   WBC 7.81  --  10.87 9.53   HGB 10.5*  --  11.2* 10.2*   HCT 32.5*  --  34.9* 30.9*     --  366 319   GRAN 59.0  4.6   < > 60.6  6.6 60.7  5.8   LYMPH 22.4  1.8   < > 23.6  2.6 23.7  2.3   MONO 9.6  0.8   < > 9.0  1.0 9.1  0.9    < > = values in this interval not displayed.       Recent Labs   Lab 01/16/22  0744 01/16/22  1152 01/16/22  1554 01/16/22  1957 01/17/22  0736   POCTGLUCOSE 200* 201* 279* 298* 216*       No results for input(s): TROPONINI in the last 168 hours.    Diagnostic Results:  Labs: Reviewed    amiodarone, 200 mg, Oral, Daily  amLODIPine, 5 mg, Oral, BID  atorvastatin, 40 mg, Oral, QHS  clopidogreL, 75 mg, Oral, Daily  digoxin, 0.125 mg, Oral, Daily  gabapentin, 300 mg, Oral, TID  insulin aspart U-100, 10 Units, Subcutaneous, TIDWM  insulin detemir U-100, 25 Units, Subcutaneous, BID  isosorbide dinitrate, 10 mg, Oral, TID  metoprolol tartrate, 50 mg, Oral, BID  multivitamin, 1 tablet, Oral, Daily  pentoxifylline, 400 mg, Oral, TID        As Needed acetaminophen, albuterol-ipratropium, bisacodyL, dextrose 50%, dextrose 50%, glucagon (human recombinant), glucose, glucose, insulin aspart U-100, melatonin, polyethylene glycol, Pharmacy to dose Vancomycin consult **AND** vancomycin - pharmacy to dose    ASSESSMENT/PLAN:     Assessment: Claudia Powell is a 66 y.o. female here with:     Active Hospital Problems    Diagnosis  POA    *Bacteremia due to Enterococcus [R78.81, B95.2]  Yes    Stage 3a chronic kidney disease [N18.31]  Yes    Prolonged QT interval [R94.31]  Yes    Infection involving implantable cardioverter-defibrillator (ICD) [T82.7XXA]  Yes    Type 2 diabetes mellitus with hyperglycemia, without long-term current use of insulin [E11.65]  Yes    Paroxysmal atrial fibrillation [I48.0]  Yes    Primary hypertension [I10]  Yes    COPD (chronic obstructive pulmonary disease)  [J44.9]  Yes     Chronic    S/P CABG (coronary artery bypass graft) [Z95.1]  Not Applicable     Chronic    YAQUELIN (acute kidney injury) [N17.9]  Yes    Chronic combined systolic and diastolic heart failure [I50.42]  Yes     Chronic    Coronary artery disease involving native coronary artery of native heart without angina pectoris [I25.10]  Yes      Resolved Hospital Problems   No resolved problems to display.        Plan:       Bacteremia due to Enterococcus  Infection involving implantable cardioverter-defibrillator (ICD)  66 y.o. female with hx of HFrEF, HTN, T2DM, COPD admitted to OSH for sepsis 2/2 Enterococcus faecalis bacteremia in the setting of infected right lead/defibrillator (IE). Treated with IV antibiotics and transferred to C for removal of defibrillator.     - repeat blood cx (1/8 & 1/9) NGTD  - holding Eliquis for procedure  - continue IV vanc (pharm to dose)  - ID consulted; appreciate assistance  - trend CBC, CRP  - tight glycemic control    Plan: EP extraction, plavix ok.     ID Recommendations:  -- Continue vancomycin for 6 weeks following device removal  -- PICC line ordered  -- Repeat blood cultures after device removed  -- Can re-implant ICD if indicated at least 14 days following first negative BCx after removal  -- Consider referral for screening colonoscopy if due. Etiology of Enterococcus bacteremia unclear  -- Please refer to allergy for PCN skin testing     YAQUELIN (acute kidney injury)  Stage 3a chronic kidney disease  - Cr 1.4>>1.7  - suspect 2/2 vanc and mild dehydration   - diuretics held-- can likely resume low dose given severely depressed EF  - trend BMP     Chronic combined systolic and diastolic heart failure  S/P CABG (coronary artery bypass graft)  - appears dry vs euvolemic  - lasix, metolazone, and aldactone held for YAQUELIN-- can likely resume in AM if renal fn improved  - strict I/Os, daily weights  - cardiac diet     Type 2 diabetes mellitus with hyperglycemia, without  long-term current use of insulin  - home regimen: lantus 100U daily  - inpatient regimen: detemir 25U BID + aspart 10U TIDWM  - moderate dose SSI  - ACHS accuchecks           Lab Results   Component Value Date     HGBA1C 10.8 (H) 01/11/2022         Prolonged Qtc  - Qtc 649ms on 1/7  - hold celexa and ranexa for now  - daily EKG     Paroxysmal atrial fibrillation  - continue MTP 50mg BID, amiodarone 200mg daily, and digoxin 0.125mg daily  - hold eliquis for procedure     Primary hypertension  - continue imdur, amlodipine, and MTP  - hold aldactone d/t YAQUELIN     COPD (chronic obstructive pulmonary disease)  - no acute issues  - duonebs PRN     Coronary artery disease involving native coronary artery of native heart without angina pectoris  - continue statin and BB, hold plavix as above  - hold ranexa given prolonged QTc             Tejal Cristina MD

## 2022-01-17 NOTE — PLAN OF CARE
Pt free of falls/trauma/injuries. VSS. Denies c/o SOB; O2Sats remain stable on room air. Started on PO lasix, same as home dose per patient. Possible lead extraction tomorrow. PICC line placed for extended antibiotic treatment. Per nursing communication, ok to use PICC line. CBG monitoring continued with meals, insulin given as ordered. Plan of care reviewed with patient, questions encouraged.

## 2022-01-17 NOTE — PROGRESS NOTES
[facility] Stepdown  Infectious Disease  Progress Note    Patient Name: Claudia Powell  MRN: [id number]  Admission Date: 1/15/2022  Length of Stay: 2 days  Attending Physician: Tejal Cristina MD  Primary Care Provider: Manjit Monae MD    Isolation Status: No active isolations  Assessment/Plan:      Infection involving implantable cardioverter-defibrillator (ICD)  -- Cardiovascular implantable electronic device infection with associated Enterococcus faecalis bacteremia  -- KOMAL revealed small, fixed, echogenic mass present on RV lead. Hyperechoic oblong pannus formation seen on RV lead just below the tricuspid valve with some peripheral irregularities associated. A vegetation cannot be ruled out completely. This measures 2.1 by 1.0 cm in total.  -- BCx 1/7 pansensitive Enterococcus faecalis  -- BCx 1/8, 1/9 NGTD  -- Resp cx 1/10 moderate Candida albicans  -- CT abd/pelvis no obvious potential source for introduction of E. Faecalis into blood stream. NM scan negative.     Recommendations:  -- Continue vancomycin for 6 weeks following device removal  -- Repeat blood cultures after device removed  -- Can re-implant ICD if indicated at least 14 days following first negative BCx after removal  -- Consider referral for screening colonoscopy if due. Etiology of Enterococcus bacteremia unclear  -- Please refer to allergy for PCN skin testing            Anticipated Disposition: Pending AICD removal    Thank you for your consult. I will follow-up with patient. Please contact us if you have any additional questions.    Kayode Del Cid, DO  Infectious Disease  [facility] Step[facility]    Subjective:     Principal Problem:Bacteremia due to Enterococcus    HPI: Claudia Powell is a 66 y.o. F with history of CAD, HFrEF s/p AICD, who transfers to [facility] for removal of infected AICD. She presented to [facility] 1/7 with fever, SOB, productive cough and was admitted with sepsis 2/2 Enterococcus faecalis bacteremia. Initiated  on empiric vanc, meropenem, and doxycycline with resolution of fever and leukocytosis. KOMAL revealed suspicious pannus of RV lead concerning for vegetation. She completed 5 days doxycycline and continued with vancomycin for planned 6 weeks following ICD removal. Transferred to [facility] for CTS eval /removal of ICD. ID consulted for abx recommendations for Enterococcus bacteremia. Reports some mild nausea before presentation. Denies abdominal pain, diarrhea, dysuria, frequency.    Interval History: Patient was afebrile over night. Voiced no concerns today. Discussed plan of continued antibiotics patient understood. Discussed eval by AI in the future for her PCN allergy, and colonoscopy. She voiced understanding.     Review of Systems   Constitutional: Negative for chills and fever.   HENT: Negative for sore throat.    Respiratory: Negative for cough and shortness of breath.    Cardiovascular: Negative for chest pain and leg swelling.   Gastrointestinal: Negative for abdominal pain, constipation, diarrhea, nausea and vomiting.   Genitourinary: Negative for difficulty urinating and dysuria.   Musculoskeletal: Negative for myalgias.   Skin: Negative for rash.   Neurological: Negative for dizziness, light-headedness and headaches.   Psychiatric/Behavioral: Negative for confusion. The patient is not nervous/anxious.      Objective:     Vital Signs (Most Recent):  Temp: 97.2 °F (36.2 °C) (01/17/22 0727)  Pulse: 60 (01/17/22 0727)  Resp: 20 (01/17/22 0727)  BP: (!) 146/81 (01/17/22 0727)  SpO2: (!) 94 % (01/17/22 0727) Vital Signs (24h Range):  Temp:  [96.8 °F (36 °C)-99 °F (37.2 °C)] 97.2 °F (36.2 °C)  Pulse:  [58-60] 60  Resp:  [16-20] 20  SpO2:  [92 %-97 %] 94 %  BP: (135-158)/(60-90) 146/81     Weight: 80 kg (176 lb 5.9 oz)  Body mass index is 31.24 kg/m².    Estimated Creatinine Clearance: 36.9 mL/min (A) (based on SCr of 1.5 mg/dL (H)).    Physical Exam  Vitals and nursing note reviewed.   Constitutional:       General: She  is not in acute distress.     Appearance: Normal appearance. She is obese. She is not ill-appearing, toxic-appearing or diaphoretic.   HENT:      Head: Normocephalic and atraumatic.      Nose: Nose normal.   Eyes:      General: No scleral icterus.     Conjunctiva/sclera: Conjunctivae normal.   Musculoskeletal:      Cervical back: Normal range of motion.   Neurological:      General: No focal deficit present.      Mental Status: She is alert. Mental status is at baseline.   Psychiatric:         Mood and Affect: Mood normal.         Thought Content: Thought content normal.         Judgment: Judgment normal.         Significant Labs:   CBC:   Recent Labs   Lab 01/16/22 0339 01/17/22  0534   WBC 10.87 9.53   HGB 11.2* 10.2*   HCT 34.9* 30.9*    319     CMP:   Recent Labs   Lab 01/16/22 0339 01/17/22  0534   * 137   K 4.2 3.9   CL 97 99   CO2 26 28   * 192*   BUN 35* 28*   CREATININE 1.4 1.5*   CALCIUM 10.3 10.0   PROT 7.2 6.5   ALBUMIN 3.2* 3.0*   BILITOT 0.5 0.5   ALKPHOS 113 96   AST 21 19   ALT 20 17   ANIONGAP 12 10   EGFRNONAA 39.2* 36.0*       Significant Imaging: I have reviewed all pertinent imaging results/findings within the past 24 hours.

## 2022-01-17 NOTE — SUBJECTIVE & OBJECTIVE
Interval History: Patient was afebrile over night. Voiced no concerns today. Discussed plan of continued antibiotics patient understood. Discussed eval by AI in the future for her PCN allergy, and colonoscopy. She voiced understanding.     Review of Systems   Constitutional: Negative for chills and fever.   HENT: Negative for sore throat.    Respiratory: Negative for cough and shortness of breath.    Cardiovascular: Negative for chest pain and leg swelling.   Gastrointestinal: Negative for abdominal pain, constipation, diarrhea, nausea and vomiting.   Genitourinary: Negative for difficulty urinating and dysuria.   Musculoskeletal: Negative for myalgias.   Skin: Negative for rash.   Neurological: Negative for dizziness, light-headedness and headaches.   Psychiatric/Behavioral: Negative for confusion. The patient is not nervous/anxious.      Objective:     Vital Signs (Most Recent):  Temp: 97.2 °F (36.2 °C) (01/17/22 0727)  Pulse: 60 (01/17/22 0727)  Resp: 20 (01/17/22 0727)  BP: (!) 146/81 (01/17/22 0727)  SpO2: (!) 94 % (01/17/22 0727) Vital Signs (24h Range):  Temp:  [96.8 °F (36 °C)-99 °F (37.2 °C)] 97.2 °F (36.2 °C)  Pulse:  [58-60] 60  Resp:  [16-20] 20  SpO2:  [92 %-97 %] 94 %  BP: (135-158)/(60-90) 146/81     Weight: 80 kg (176 lb 5.9 oz)  Body mass index is 31.24 kg/m².    Estimated Creatinine Clearance: 36.9 mL/min (A) (based on SCr of 1.5 mg/dL (H)).    Physical Exam  Vitals and nursing note reviewed.   Constitutional:       General: She is not in acute distress.     Appearance: Normal appearance. She is obese. She is not ill-appearing, toxic-appearing or diaphoretic.   HENT:      Head: Normocephalic and atraumatic.      Nose: Nose normal.   Eyes:      General: No scleral icterus.     Conjunctiva/sclera: Conjunctivae normal.   Musculoskeletal:      Cervical back: Normal range of motion.   Neurological:      General: No focal deficit present.      Mental Status: She is alert. Mental status is at baseline.    Psychiatric:         Mood and Affect: Mood normal.         Thought Content: Thought content normal.         Judgment: Judgment normal.         Significant Labs:   CBC:   Recent Labs   Lab 01/16/22 0339 01/17/22  0534   WBC 10.87 9.53   HGB 11.2* 10.2*   HCT 34.9* 30.9*    319     CMP:   Recent Labs   Lab 01/16/22 0339 01/17/22  0534   * 137   K 4.2 3.9   CL 97 99   CO2 26 28   * 192*   BUN 35* 28*   CREATININE 1.4 1.5*   CALCIUM 10.3 10.0   PROT 7.2 6.5   ALBUMIN 3.2* 3.0*   BILITOT 0.5 0.5   ALKPHOS 113 96   AST 21 19   ALT 20 17   ANIONGAP 12 10   EGFRNONAA 39.2* 36.0*       Significant Imaging: I have reviewed all pertinent imaging results/findings within the past 24 hours.

## 2022-01-17 NOTE — PROCEDURES
"Claudia Powell is a 66 y.o. female patient.    Temp: 97.2 °F (36.2 °C) (01/17/22 0727)  Pulse: 60 (01/17/22 0727)  Resp: 20 (01/17/22 0727)  BP: (!) 146/81 (01/17/22 0727)  SpO2: (!) 94 % (01/17/22 0727)  Weight: 80 kg (176 lb 5.9 oz) (01/17/22 0500)  Height: 5' 3" (160 cm) (01/15/22 2231)    PICC  Date/Time: 1/17/2022 10:26 AM  Performed by: Joe Marin RN  Assisting provider: Analia James RN  Consent Done: Yes  Time out: Immediately prior to procedure a time out was called to verify the correct patient, procedure, equipment, support staff and site/side marked as required  Indications: med administration and vascular access  Local anesthetic: lidocaine 1% without epinephrine  Anesthetic Total (mL): 3  Description of findings: picc  Preparation: skin prepped with ChloraPrep  Skin prep agent dried: skin prep agent completely dried prior to procedure  Sterile barriers: all five maximum sterile barriers used - cap, mask, sterile gown, sterile gloves, and large sterile sheet  Hand hygiene: hand hygiene performed prior to central venous catheter insertion  Location details: right brachial  Catheter type: double lumen  Catheter size: 5 Fr  Catheter Length: 34cm    Ultrasound guidance: yes  Vessel Caliber: medium and patent, compressibility normal  Vascular Doppler: not done  Needle advanced into vessel with real time Ultrasound guidance.  Guidewire confirmed in vessel.  Image recorded and saved.  Sterile sheath used.  no esophageal manometryNumber of attempts: 1  Post-procedure: blood return through all ports, chlorhexidine patch and sterile dressing applied  Technical procedures used: 3cg  Specimens: No  Implants: No  Assessment: placement verified by x-ray (pending cxr)  Complications: none          Name  1/17/2022  "

## 2022-01-18 NOTE — PROGRESS NOTES
Pharmacokinetic Assessment Follow Up: IV Vancomycin    Vancomycin serum concentration assessment(s):    · Vancomycin random=5.8 mcg/mL,     Vancomycin Regimen Plan:    · Missed dose of vanc yesterday, thus vanc random lower than expected.  · SCr has improved so will start patient on scheduled vanc 1250mg (15mg/kg) IV q24h  · Goal trough 15-20 mcg/mL. Next level on 1/20 at 1015.    Drug levels (last 3 results):  Recent Labs   Lab Result Units 01/16/22 0339 01/17/22 0534 01/18/22  0442   Vancomycin, Random ug/mL 15.8 12.1 5.8       Pharmacy will continue to follow and monitor vancomycin.    Thank you for the consult,   Maritza Finnegan, PharmD, [facility]  [id number]       Patient brief summary:  Claudia Powell is a 66 y.o. female initiated on antimicrobial therapy with IV Vancomycin for treatment of bacteremia      Actual Body Weight:   80kg    Renal Function:   Estimated Creatinine Clearance: 42.6 mL/min (based on SCr of 1.3 mg/dL).,       CBC (last 72 hours):  Recent Labs   Lab Result Units 01/16/22 0339 01/17/22 0534 01/18/22  0442   WBC K/uL 10.87 9.53 10.83   Hemoglobin g/dL 11.2* 10.2* 9.9*   Hematocrit % 34.9* 30.9* 30.6*   Platelets K/uL 366 319 294   Gran % % 60.6 60.7 66.8   Lymph % % 23.6 23.7 21.1   Mono % % 9.0 9.1 7.8   Eosinophil % % 2.3 2.5 2.1   Basophil % % 1.1 1.3 0.7   Differential Method  Automated Automated Automated       Metabolic Panel (last 72 hours):  Recent Labs   Lab Result Units 01/16/22 0339 01/17/22  0534 01/18/22  0442   Sodium mmol/L 135* 137 136   Potassium mmol/L 4.2 3.9 4.0   Chloride mmol/L 97 99 100   CO2 mmol/L 26 28 27   Glucose mg/dL 206* 192* 181*   BUN mg/dL 35* 28* 24*   Creatinine mg/dL 1.4 1.5* 1.3   Albumin g/dL 3.2* 3.0* 3.0*   Total Bilirubin mg/dL 0.5 0.5 0.5   Alkaline Phosphatase U/L 113 96 101   AST U/L 21 19 17   ALT U/L 20 17 18   Magnesium mg/dL 1.9  --   --        Vancomycin Administrations:  vancomycin given in the last 96 hours                   vancomycin in  dextrose 5 % 1 gram/250 mL IVPB 1,000 mg (mg) 1,000 mg New Bag 01/16/22 0905    vancomycin in dextrose 5 % 1 gram/250 mL IVPB 1,000 mg (mg) 1,000 mg New Bag 01/15/22 1036                Microbiologic Results:  Microbiology Results (last 7 days)     ** No results found for the last 168 hours. **

## 2022-01-18 NOTE — PLAN OF CARE
Patient cooperative and pleasant with routine care and procedures.  Fall precautions reviewed and patient verbalized understanding.  Ambulates to bathroom independently with steady gait and no c/o weakness or dizziness.  Patient awaiting scheduling of ICD removal.  Resting quietly and without complaints.  Will continue to monitor.

## 2022-01-18 NOTE — PLAN OF CARE
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]    Admission Date: 1/15/2022  Expected Discharge Date: 1/20/2022    Discharge Barriers Identified: None    Payor: [employer] MANAGED MEDICARE / Plan: [employer] MEDICARE PPO / Product Type: Medicare Advantage /     Extended Emergency Contact Information  Primary Emergency Contact: Mario Powell  Address: [address] [place] [address] [facility]  Home Phone: [phone]  Mobile Phone: [phone]  Relation: Son    Discharge Plan A: Home  Discharge Plan B: Home Health      [facility] - [place], [place] - [address]  [address]  [facility] [address]  Phone: [phone] Fax: [phone]    [facility]  [address]  [facility] [address]  Phone: [phone] Fax: [phone]      Initial Assessment (most recent)     Adult Discharge Assessment - 01/18/22 1145        Discharge Assessment    Assessment Type Discharge Planning Assessment     Confirmed/corrected address, phone number and insurance Yes     Source of Information patient     Communicated BOUCHRA with patient/caregiver Yes     Lives With child(darius), adult     Do you expect to return to your current living situation? Yes     Do you have help at home or someone to help you manage your care at home? Yes     Prior to hospitilization cognitive status: Alert/Oriented     Current cognitive status: Alert/Oriented     Walking or Climbing Stairs Difficulty none     Dressing/Bathing Difficulty none     Equipment Currently Used at Home none     Readmission within 30 days? No     Do you currently have service(s) that help you manage your care at home? No     Do you take prescription medications? Yes     Do you have prescription coverage? Yes     Do you have any problems affording any of your prescribed medications? No     Is the patient  taking medications as prescribed? yes     Are you on dialysis? No     Do you take coumadin? No     Discharge Plan A Home     Discharge Plan B Home Health     DME Needed Upon Discharge  none     Discharge Plan discussed with: Patient     Discharge Barriers Identified None                  Sharmila Villalobos RN, CM   Ext: [phone]

## 2022-01-18 NOTE — PROGRESS NOTES
[facility]    Progress Note     Hospital Length of Stay: 3    Interval History: No acute events overnight. Telemetry reviewed this morning, patient paced at 60 bpm. No new complaints.     Vitals:  Temp:  [97 °F (36.1 °C)-98.4 °F (36.9 °C)] 97 °F (36.1 °C)  Pulse:  [51-82] 59  Resp:  [17-20] 20  SpO2:  [93 %-98 %] 97 %  BP: (134-154)/(61-67) 135/62    Intake/Output    Intake/Output Summary (Last 24 hours) at 1/18/2022 1319  Last data filed at 1/18/2022 0500  Gross per 24 hour   Intake 732 ml   Output 2125 ml   Net -1393 ml       Physical Exam  Gen: No apparent distress, resting comfortably  HEENT: Pupils equal and reactive to light  Cardio: Regular rate, point of maximal impulse not displaced, no murmur noted, 2+ radial pulses bilaterally, 2+ DP pulses bilaterally  Resp: CTAB, no wheezing  Abd: Soft, non-tender, non-distended  Skin: Warm, dry, no peripheral edema noted  Neuro: Alert and oriented x3  Psych: Normal mood and affect    Labs:  Recent Labs   Lab 01/16/22  0339 01/17/22  0534 01/18/22  0442   WBC 10.87 9.53 10.83   HGB 11.2* 10.2* 9.9*   HCT 34.9* 30.9* 30.6*    319 294     Recent Labs   Lab 01/14/22  0626 01/14/22  0626 01/15/22  0748 01/15/22  0748 01/16/22  0339 01/17/22  0534 01/18/22  0442   *   < > 135*   < > 135* 137 136   K 3.7   < > 3.9   < > 4.2 3.9 4.0   CL 92*   < > 95   < > 97 99 100   CO2 30*   < > 29   < > 26 28 27   BUN 37*   < > 41*   < > 35* 28* 24*   CREATININE 1.6*   < > 1.7*   < > 1.4 1.5* 1.3   *   < > 198*   < > 206* 192* 181*   CALCIUM 9.0   < > 9.7   < > 10.3 10.0 9.3   MG 1.7  --  1.9  --  1.9  --   --     < > = values in this interval not displayed.          Current Meds:   amiodarone  200 mg Oral Daily    amLODIPine  5 mg Oral BID    atorvastatin  40 mg Oral QHS    clopidogreL  75 mg Oral Daily    digoxin  0.125 mg Oral Daily    furosemide  40 mg Oral Daily    gabapentin  300 mg Oral TID    insulin aspart U-100  15 Units  Subcutaneous TIDWM    insulin detemir U-100  25 Units Subcutaneous BID    isosorbide dinitrate  10 mg Oral TID    metoprolol tartrate  50 mg Oral BID    multivitamin  1 tablet Oral Daily    mupirocin   Nasal BID    pentoxifylline  400 mg Oral TID    sodium chloride 0.9%  10 mL Intravenous Q6H    vancomycin (VANCOCIN) IVPB  15 mg/kg Intravenous Q24H       Continuous Infusions:      PRN:  acetaminophen, albuterol-ipratropium, bisacodyL, dextrose 50%, dextrose 50%, glucagon (human recombinant), glucose, glucose, insulin aspart U-100, melatonin, polyethylene glycol, Flushing PICC Protocol **AND** sodium chloride 0.9% **AND** sodium chloride 0.9%, sodium chloride 0.9%, Pharmacy to dose Vancomycin consult **AND** vancomycin - pharmacy to dose    Assessment and Plan:    1. Medtronic CRT-D Device Infection  -Patient found to have Enterococcus faecalis bacteremia at outside hospital with unclear source  -Infectious Disease consulted: Anticipate 6 week course of antibiotics following device removal. Continue IV Vancomycin. Can re-implant ICD if indicated at least 14 days following negative blood culture  -Will plan for device extraction tomorrow with Dr. Blanchard. Please keep patient NPO at midnight.   -Patient will need LifeVest after extraction. Spoke with LifeVest rep today and will place order today as well.     2. CAD s/p CABG in 2017  -Continue aspirin/plavix     3. HFrEF (EF 35%, ischemic)  -Management per primary     4. Paroxysmal A-fib  -Continue home meds including amiodarone 200 mg qd and digoxin 0.125 mg qd  -Hold Eliquis for now given pending extraction       Kale Javed MD  [facility] PGY-4    Pager number: [phone]    Staff attestation to follow.    This note was prepared using voice recognition system and may have sound alike errors that may have been overlooked even with proof reading.

## 2022-01-18 NOTE — PROGRESS NOTES
[facility] - Cardiology Stepdown  Infectious Disease  Progress Note    Patient Name: Claudia Powell  MRN: [id number]  Admission Date: 1/15/2022  Length of Stay: 3 days  Attending Physician: Tejal Cristina MD  Primary Care Provider: Manjit Monae MD    Isolation Status: No active isolations  Assessment/Plan:      Infection involving implantable cardioverter-defibrillator (ICD)  -- Cardiovascular implantable electronic device infection with associated Enterococcus faecalis bacteremia  -- KOMAL revealed small, fixed, echogenic mass present on RV lead. Hyperechoic oblong pannus formation seen on RV lead just below the tricuspid valve with some peripheral irregularities associated. A vegetation cannot be ruled out completely. This measures 2.1 by 1.0 cm in total.  -- BCx 1/7 pansensitive Enterococcus faecalis  -- BCx 1/8, 1/9 NGTD  -- Resp cx 1/10 moderate Candida albicans  -- CT abd/pelvis no obvious potential source for introduction of E. Faecalis into blood stream. NM scan negative.     Recommendations:  -- Continue vancomycin for 6 weeks following device removal (removal planned 1/19)  -- Repeat blood cultures after device removed  -- Can re-implant ICD if indicated at least 14 days following first negative BCx after removal  -- Consider referral for screening colonoscopy if due. Etiology of Enterococcus bacteremia unclear  -- Please refer to allergy for PCN skin testing            Anticipated Disposition: 6 weeks IV vanc    Thank you for your consult. I will follow-up with patient. Please contact us if you have any additional questions.    Kayode Del Cid, DO  Infectious Disease  [facility] Stepdown    Subjective:     Principal Problem:Bacteremia due to Enterococcus    HPI: Claudia Powell is a 66 y.o. F with history of CAD, HFrEF s/p AICD, who transfers to [facility] for removal of infected AICD. She presented to [facility] 1/7 with fever, SOB, productive cough and was admitted with sepsis 2/2 Enterococcus faecalis  bacteremia. Initiated on empiric vanc, meropenem, and doxycycline with resolution of fever and leukocytosis. KOMAL revealed suspicious pannus of RV lead concerning for vegetation. She completed 5 days doxycycline and continued with vancomycin for planned 6 weeks following ICD removal. Transferred to [facility] for CTS eval /removal of ICD. ID consulted for abx recommendations for Enterococcus bacteremia. Reports some mild nausea before presentation. Denies abdominal pain, diarrhea, dysuria, frequency.    Interval History: Patient was afebrile over night. Pna for device removal 1/19. Patient was in good spirits, and understood plan.     Review of Systems   Constitutional: Negative for chills and fever.   HENT: Negative for sore throat.    Respiratory: Negative for cough and shortness of breath.    Cardiovascular: Negative for chest pain and leg swelling.   Gastrointestinal: Negative for abdominal pain, constipation, diarrhea, nausea and vomiting.   Genitourinary: Negative for difficulty urinating and dysuria.   Musculoskeletal: Negative for myalgias.   Skin: Negative for rash.   Neurological: Negative for dizziness, light-headedness and headaches.   Psychiatric/Behavioral: Negative for confusion. The patient is not nervous/anxious.      Objective:     Vital Signs (Most Recent):  Temp: 97 °F (36.1 °C) (01/18/22 1122)  Pulse: (!) 59 (01/18/22 1139)  Resp: 20 (01/18/22 1122)  BP: 135/62 (01/18/22 1122)  SpO2: 97 % (01/18/22 1122) Vital Signs (24h Range):  Temp:  [97 °F (36.1 °C)-98.4 °F (36.9 °C)] 97 °F (36.1 °C)  Pulse:  [51-63] 59  Resp:  [17-20] 20  SpO2:  [93 %-98 %] 97 %  BP: (134-154)/(61-67) 135/62     Weight: 80 kg (176 lb 5.9 oz)  Body mass index is 31.24 kg/m².    Estimated Creatinine Clearance: 42.6 mL/min (based on SCr of 1.3 mg/dL).    Physical Exam  Vitals and nursing note reviewed.   Constitutional:       General: She is not in acute distress.     Appearance: Normal appearance. She is obese. She is not  ill-appearing, toxic-appearing or diaphoretic.   HENT:      Head: Normocephalic and atraumatic.      Nose: Nose normal.   Eyes:      General: No scleral icterus.     Conjunctiva/sclera: Conjunctivae normal.   Cardiovascular:      Rate and Rhythm: Normal rate and regular rhythm.      Heart sounds: Normal heart sounds. No murmur heard.  No gallop.    Pulmonary:      Effort: No respiratory distress.      Breath sounds: No wheezing.   Abdominal:      General: There is no distension.      Palpations: Abdomen is soft.      Tenderness: There is no abdominal tenderness.   Musculoskeletal:      Cervical back: Normal range of motion.   Neurological:      General: No focal deficit present.      Mental Status: She is alert. Mental status is at baseline.   Psychiatric:         Mood and Affect: Mood normal.         Thought Content: Thought content normal.         Judgment: Judgment normal.         Significant Labs:   CBC:   Recent Labs   Lab 01/17/22  0534 01/18/22  0442   WBC 9.53 10.83   HGB 10.2* 9.9*   HCT 30.9* 30.6*    294     CMP:   Recent Labs   Lab 01/17/22  0534 01/18/22  0442    136   K 3.9 4.0   CL 99 100   CO2 28 27   * 181*   BUN 28* 24*   CREATININE 1.5* 1.3   CALCIUM 10.0 9.3   PROT 6.5 6.1   ALBUMIN 3.0* 3.0*   BILITOT 0.5 0.5   ALKPHOS 96 101   AST 19 17   ALT 17 18   ANIONGAP 10 9   EGFRNONAA 36.0* 42.9*       Significant Imaging: I have reviewed all pertinent imaging results/findings within the past 24 hours.

## 2022-01-18 NOTE — PHARMACY MED REC
"Admission Medication Reconciliation - Pharmacy Consult Note    The home medication history was taken by myself.    You may go to "Admission" then "Reconcile Home Medications" tabs to review and/or act upon these items.      No issues noted with the medication reconciliation.      Maritza Finnegan, PharmD, BCPS  [id number]      PTA Medications   Medication Sig    albuterol (PROVENTIL/VENTOLIN HFA) 90 mcg/actuation inhaler Inhale 1 puff into the lungs every 4 (four) hours as needed.    amiodarone (PACERONE) 200 MG Tab Take 1 tablet (200 mg total) by mouth once daily.    amLODIPine (NORVASC) 5 MG tablet Take 1 tablet (5 mg total) by mouth 2 (two) times daily.    apixaban (ELIQUIS) 2.5 mg Tab Take 1 tablet (2.5 mg total) by mouth 2 (two) times daily.    atorvastatin (LIPITOR) 40 MG tablet Take 1 tablet (40 mg total) by mouth every evening.    citalopram (CELEXA) 40 MG tablet Take 40 mg by mouth once daily.    clopidogreL (PLAVIX) 75 mg tablet Take 1 tablet (75 mg total) by mouth once daily.    digoxin (LANOXIN) 125 mcg tablet Take 1 tablet (0.125 mg total) by mouth once daily.    furosemide (LASIX) 40 MG tablet Take 1 tablet (40 mg total) by mouth once daily. for 3 days    gabapentin (NEURONTIN) 400 MG capsule Take 400 mg by mouth 3 (three) times daily.    isosorbide dinitrate (ISORDIL) 10 MG tablet Take 1 tablet (10 mg total) by mouth 3 (three) times daily.    LANTUS U-100 INSULIN 100 unit/mL injection Inject 100 Units into the skin once daily.    metOLazone (ZAROXOLYN) 5 MG tablet Take 5 mg by mouth every other day.    metoprolol tartrate (LOPRESSOR) 50 MG tablet Take 1 tablet (50 mg total) by mouth 2 (two) times daily.    ranolazine (RANEXA) 500 MG Tb12 Take 1 tablet (500 mg total) by mouth 2 (two) times daily.    spironolactone (ALDACTONE) 25 MG tablet Take 1 tablet (25 mg total) by mouth once daily. Hold until patient follows up with PCP or Cardiology (Patient taking differently: Take 25 mg by mouth once " daily.)    multivitamin (THERAGRAN) per tablet Take 1 tablet by mouth once daily.       Maritza Finnegan, PharmD, BCPS  [id number]                  .

## 2022-01-18 NOTE — CONSULTS
Thank you for your consult to [facility]. We have reviewed the patient chart. This patient does not meet criteria for [facility] service at this time due to [facility] service capacity limitations. Will hand back to In-house service.     Blessing Varela MD  [facility]  [facility]

## 2022-01-18 NOTE — PLAN OF CARE
Plan of care discussed with patient.  Patient remains free of falls and trauma. Patient ambulating independently, fall precautions in place. Patient Aox4 and VS are stable. Patient receiving IV Vancomycin daily for bacteremia due to infected ICD lead. Patient to be NPO at midnight for operation to replace ICD lead and put on LifeVest tomorrow. Patient has no complaints of pain. Discussed medications and care. Patient has no questions at this time. Will continue to monitor.

## 2022-01-18 NOTE — ANESTHESIA PREPROCEDURE EVALUATION
[facility]  Anesthesia Pre-Operative Evaluation         Patient Name: Claudia Powell  YOB: 1955  MRN: [id number]    SUBJECTIVE:     Pre-operative Evaluation for Procedure(s) (LRB):  EXTRACTION, ELECTRODE LEAD (N/A)  ECHOCARDIOGRAM,TRANSESOPHAGEAL (N/A)     01/18/2022    Claudia Powell is a 66 y.o. female with a PMHx significant for HTN, CAD (s/p CABG), HFrEF (LVEF 35-40% ), paroxysmal AFib (on Eliquis), uncontrolled T2DM (HgbA1c = 10.8%), and COPD currently admitted to Hospital Medicine for management of an infected AICD. She was initially admitted with sepsis due to Enterococcus faecalis and started on IV antibiotics. A KOMAL was completed which demonstrated a fixed mass on the RV lead of her Med[employer]onic CRT-D device.    She now presents for the above procedure(s) with Cardiology - Dr. Blanchard.    Previous Airway: None documented.    LDA:   PICC Double Lumen 01/17/22 1026 right brachial (Active)     Drips: None documented.      Patient Active Problem List   Diagnosis    Coronary artery disease involving native coronary artery of native heart without angina pectoris    Diabetes mellitus without complication    Peripheral vascular disease    SOB (shortness of breath)    Chronic combined systolic and diastolic heart failure    Obesity    YAQUELIN (acute kidney injury)    Fatty liver    UTI (urinary tract infection)    COPD (chronic obstructive pulmonary disease)    Acute on chronic combined systolic and diastolic heart failure    AICD (automatic cardioverter/defibrillator) present    S/P CABG (coronary artery bypass graft)    Acute on chronic congestive heart failure    Paroxysmal atrial fibrillation    Primary hypertension    Atypical pneumonia    Pulmonary hypertension    Bacteremia due to Enterococcus    Type 2 diabetes mellitus with hyperglycemia, without long-term current use of insulin    Infection involving implantable cardioverter-defibrillator (ICD)    Stage 3a  chronic kidney disease    Prolonged QT interval       Review of patient's allergies indicates:   Allergen Reactions    Cephalosporins      Tongue swelling and hives    Penicillins      hives         Current Inpatient Medications:   amiodarone  200 mg Oral Daily    amLODIPine  5 mg Oral BID    atorvastatin  40 mg Oral QHS    clopidogreL  75 mg Oral Daily    digoxin  0.125 mg Oral Daily    furosemide  40 mg Oral Daily    gabapentin  300 mg Oral TID    insulin aspart U-100  10 Units Subcutaneous TIDWM    insulin detemir U-100  25 Units Subcutaneous BID    isosorbide dinitrate  10 mg Oral TID    metoprolol tartrate  50 mg Oral BID    multivitamin  1 tablet Oral Daily    mupirocin   Nasal BID    pentoxifylline  400 mg Oral TID    sodium chloride 0.9%  10 mL Intravenous Q6H    vancomycin (VANCOCIN) IVPB  15 mg/kg Intravenous Q24H       Current Outpatient Medications   Medication Instructions    albuterol (PROVENTIL/VENTOLIN HFA) 90 mcg/actuation inhaler 1 puff, Inhalation, Every 4 hours PRN    ALPRAZolam (XANAX) 0.5 mg, Oral, 2 times daily PRN    amiodarone (PACERONE) 200 mg, Oral, Daily    amLODIPine (NORVASC) 5 mg, Oral, 2 times daily    apixaban (ELIQUIS) 2.5 mg, Oral, 2 times daily    atorvastatin (LIPITOR) 40 mg, Oral, Nightly    citalopram (CELEXA) 40 mg, Oral, Daily    clopidogreL (PLAVIX) 75 mg, Oral, Daily    digoxin (LANOXIN) 0.125 mg, Oral, Daily    doxycycline (VIBRAMYCIN) 100 mg, Oral, Every 12 hours    furosemide (LASIX) 40 mg, Oral, Daily    gabapentin (NEURONTIN) 400 mg, Oral, 3 times daily    isosorbide dinitrate (ISORDIL) 10 mg, Oral, 3 times daily    LANTUS U-100 INSULIN 100 Units, Subcutaneous, Daily    metOLazone (ZAROXOLYN) 5 mg, Oral, Every other day    metoprolol tartrate (LOPRESSOR) 50 mg, Oral, 2 times daily    multivitamin (THERAGRAN) per tablet 1 tablet, Oral, Daily    pentoxifylline (TRENTAL) 400 mg, Oral, 3 times daily    ranolazine (RANEXA) 500 mg, Oral,  2 times daily    spironolactone (ALDACTONE) 25 mg, Oral, Daily, Hold until patient follows up with PCP or Cardiology       Past Surgical History:   Procedure Laterality Date    CARDIAC CATHETERIZATION       SECTION      CORONARY ARTERY BYPASS GRAFT  06/22/2016    x4    HYSTERECTOMY      INSERTION OF BIVENTRICULAR IMPLANTABLE CARDIOVERTER-DEFIBRILLATOR (ICD)         Social History     Substance and Sexual Activity   Drug Use Never     Tobacco Use: Medium Risk    Smoking Tobacco Use: Former Smoker    Smokeless Tobacco Use: Never Used     Alcohol Use: Not At Risk    Frequency of Alcohol Consumption: Never    Average Number of Drinks: Patient refused    Frequency of Binge Drinking: Never         OBJECTIVE:     Vital Signs Range (Last 24H):  Temp:  [36 °C (96.8 °F)-36.9 °C (98.4 °F)]   Pulse:  [58-63]   Resp:  [17-20]   BP: (134-154)/(61-67)   SpO2:  [93 %-98 %]       Significant Labs    Heme Profile  Lab Results   Component Value Date    WBC 10.83 2022    HGB 9.9 (L) 2022    HCT 30.6 (L) 2022     2022       Coagulation Studies  Lab Results   Component Value Date    LABPROT 10.5 2022    INR 1.0 2022    APTT 24.2 2021       BMP  Lab Results   Component Value Date     2022    K 4.0 2022     2022    CO2 27 2022    BUN 24 (H) 2022    CREATININE 1.3 2022    MG 1.9 2022    PHOS 3.5 2021    CAION 0.85 (L) 2021       Liver Function Tests  Lab Results   Component Value Date    AST 17 2022    ALT 18 2022    ALKPHOS 101 2022    BILITOT 0.5 2022    PROT 6.1 2022    ALBUMIN 3.0 (L) 2022       Lipid Profile  Lab Results   Component Value Date    CHOL 142 2020    HDL 37 (L) 2020    TRIG 147 2020       Endocrine Profile  Lab Results   Component Value Date    HGBA1C 10.8 (H) 2022    TSH 1.300 2021       Diagnostic Studies    XR Chest  (01/09/2022):  Portable chest radiograph with comparison chest x-ray January 7, 2022 show normal cardiomediastinal silhouette. Left AICD again noted as well as median sternotomy wires.  Lungs are clear. Pulmonary vasculature is normal. No acute osseous abnormality.    NM WBC Whole Body (01/12/2022):  FINDINGS: Comparison to multiple prior exams, including CT of 01/08/2022. There is no abnormal focal radiotracer accumulation in the head, neck, chest, abdomen, pelvis, or the extremities to suggest site of active infection. There is normal radiotracer uptake by the liver and spleen, with normal radiotracer distribution throughout the bone marrow.      Cardiac Studies    EKG:   Results for orders placed or performed during the hospital encounter of 01/15/22   EKG 12-lead    Collection Time: 01/18/22  5:34 AM    Narrative    Test Reason : R94.31,    Vent. Rate : 060 BPM     Atrial Rate : 060 BPM     P-R Int : 120 ms          QRS Dur : 170 ms      QT Int : 486 ms       P-R-T Axes : 000 192 049 degrees     QTc Int : 486 ms    Atrial-sensed ventricular-paced rhythm  Biventricular pacemaker detected  Abnormal ECG  When compared with ECG of 17-JAN-2022 05:53,  No significant change was found  Confirmed by JH CLAROS MD ([id number]) on 1/18/2022 9:33:48 AM    Referred By: ANEESH GALINDO           Confirmed By:JH CLAROS MD       TTE (01/08/2022):  Interpretation Summary  · Mildly decreased systolic function.  · The estimated ejection fraction is 42%.  · Left ventricular diastolic dysfunction.  · There is moderate left ventricular global hypokinesis.  · Normal right ventricular size with normal right ventricular systolic function.  · Normal central venous pressure (3 mmHg).      KOMAL (01/10/2022):  Interpretation Summary  · The left ventricle is mildly enlarged with concentric remodeling and  · The estimated ejection fraction is 35%.  · Left ventricular diastolic dysfunction.  · There is moderate left ventricular global  hypokinesis.  · There is abnormal septal wall motion consistent with right ventricular pacemaker.  · Normal right ventricular size with normal right ventricular systolic function.  · Small echogenic mass present on the right ventricular lead.. The mass is fixed. Hyperechoic oblong pannus formation seen on the right ventricular lead just below the tricuspid valve with some peripheral irregularities associated a vegetation cannot be ruled out completely. This measures 2.1 by 1.0 cm in total.  · Moderate mitral regurgitation.  · Mild to moderate tricuspid regurgitation.  · The right atrial lead appears unremarkable. Lead visualized in the coronary sinus also appears unremarkable.      ASSESSMENT/PLAN:     Claudia Powell is a 66 y.o. female who presents for AICD lead extraction and KOMAL.    Anesthesia Evaluation    I have reviewed the Patient Summary Reports.    I have reviewed the Nursing Notes. I have reviewed the NPO Status.   I have reviewed the Medications.     Review of Systems  Anesthesia Hx:  No problems with previous Anesthesia Denies Hx of Anesthetic complications  History of prior surgery of interest to airway management or planning: heart surgery. Previous anesthesia: General Denies Family Hx of Anesthesia complications.   Denies Personal Hx of Anesthesia complications.   Social:  No Alcohol Use, Former Smoker    Hematology/Oncology:     Oncology Normal   Hematology Comments: Eliquis, Plavix, and Trental (last yesterday)   EENT/Dental:EENT/Dental Normal   Cardiovascular:   Pacemaker ( biventricular pacemaker/defibrillator) Hypertension Past MI CAD  CABG/stent Dysrhythmias ( history of PAF)  CHF (Admitted with pulmonary edema, resolved) PVD hyperlipidemia 01/08/2022 transthoracic echo shows 42% EF, diastolic dysfunction, no valvular abnormalities, and no vegetations. Coronary Artery Disease: S/P Aorto-Coronary Bypass Graft Surgery (CABG): CABG was 06/2016 at [facility]  Valvular Heart Disease: Mitral  Regurgitation (MR), moderate   Congestive Heart Failure (CHF) , Chronic Congestive Heart Failure , LV Systolic HF Hypertension  Disorder of Cardiac Rhythm, Atrial Fibrillation, Paroxysmal Atrial Fibrillation    Pulmonary:   COPD Recent URI ( admitted with shortness of breath and yellow productive cough. Being treated with antibiotics for bronchitis.) History of pulmonary hypertension   Hepatic/GI:   Liver Disease, ( fatty liver)    Musculoskeletal:  Musculoskeletal Normal    Neurological:   Peripheral Neuropathy    Endocrine:   Diabetes, type 2, using insulin  Diabetes, Type 2 Diabetes , controlled by insulin. , most recent HgA1c value was 10.8% on 01/11/2022.  Denies Thyroid Disease    Psych:  Psychiatric Normal           Physical Exam  General:  Obesity    Airway/Jaw/Neck:  Airway Findings: Mouth Opening: Normal Tongue: Normal  Mallampati: III  TM Distance: Normal, at least 6 cm  Jaw/Neck Findings:  Neck ROM: Normal ROM      Dental:  Dental Findings: In tact   Chest/Lungs:  Chest/Lungs Findings: Clear to auscultation, Normal Respiratory Rate     Heart/Vascular:  Heart Findings: Rate: Normal  Rhythm: Regular Rhythm  Sounds: Normal  Heart Murmur  Systolic        Mental Status:  Mental Status Findings:  Cooperative, Alert and Oriented         Anesthesia Plan  Type of Anesthesia, risks & benefits discussed:  Anesthesia Type:  general    Patient's Preference:   Plan Factors:          Intra-op Monitoring Plan: standard ASA monitors and arterial line  Intra-op Monitoring Plan Comments:   Post Op Pain Control Plan: multimodal analgesia, IV/PO Opioids PRN and per primary service following discharge from PACU  Post Op Pain Control Plan Comments:     Induction:   IV  Beta Blocker:  Patient is on a Beta-Blocker and has received one dose within the past 24 hours (No further documentation required).       Informed Consent: Patient understands risks and agrees with Anesthesia plan.  Questions answered. Anesthesia consent signed  with patient.  ASA Score: 4     Day of Surgery Review of History & Physical:    H&P update referred to the surgeon.         Ready For Surgery From Anesthesia Perspective.

## 2022-01-18 NOTE — ASSESSMENT & PLAN NOTE
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 Hyperechoic oblong pannus formation seen on RV lead just below the tricuspid valve with some peripheral irregularities associated. A vegetation cannot be ruled out completely. This measures 2.1 by 1.0 cm in total.  -- BCx 1/7 pansensitive Enterococcus faecalis  -- BCx 1/8, 1/9 NGTD  -- Resp cx 1/10 moderate Candida albicans  -- CT abd/pelvis no obvious potential source for introduction of E. Faecalis into blood stream. NM scan negative.     Recommendations:  -- Continue vancomycin for 6 weeks following device removal (removal planned 1/19)  -- Repeat blood cultures after device removed  -- Can re-implant ICD if indicated at least 14 days following first negative BCx after removal  -- Consider referral for screening colonoscopy if due. Etiology of Enterococcus bacteremia unclear  -- Please refer to allergy for PCN skin testing

## 2022-01-18 NOTE — PROGRESS NOTES
Progress Note  Hospital Medicine      Patient Name: Claudia Powell  MRN: [id number]  Patient Class: IP- Inpatient  Admission Date: 1/15/2022  Attending Physician: Tejal Cristina MD   Primary Care Provider: Manjit Monae MD       SUBJECTIVE:     Follow-up For:  Bacteremia due to Enterococcus     Interval history/ROS:   1/18: Plan for AICD removal tomorrow AM. No patient issues. Holding eliquis for procedure. IV vancomycin after for 6 weeks. PICC line in place. Informed CM about Home infusion needs.     1/17: tentative plans for lead extraction later this week.     1/16: EP assessment appreciated.     HPI: Claudia Powell is a 66 y.o. female with a PMHx of CAD, HFrEF, DM2, HTN, COPD who presents to [facility] for removal of infected implanted cardiac defibrillator. Patient initially presented to OSH on 1/7 with complaints of SOB, productive cough, fever, URI symptoms and was admitted for sepsis 2/2 bacteremia. She was initially started on empiric vancomycin, meropenem and doxycycline with resolution of fever and leukocytosis. Blood cultures from 1/7 grew Enterococcus faecalis with appropriate sensitivities. KOMAL showed suspicious pannus on right ventricular lead concerning for vegetation. She completed 5 day course of doxycycline and was continued on IV vanc with plan to continue antibiotics for at least 6 weeks after removal of ICD. Patient transferred here for CTS eval and removal of defibrillator.      Patient seen and evaluated at bedside upon arrival to C. She is feeling well overall and denies any chest pain, SOB or other complaints at this time. Updated on tentative plan for removal of ICD tomorrow.     OBJECTIVE:     Body mass index is 31.24 kg/m².    Vital Signs Range (Last 24H):  Temp:  [97 °F (36.1 °C)-98.4 °F (36.9 °C)]   Pulse:  [51-82]   Resp:  [17-20]   BP: (134-154)/(61-67)   SpO2:  [93 %-98 %]     I & O (Last 24H):    Intake/Output Summary (Last 24 hours) at  1/18/2022 1317  Last data filed at 1/18/2022 0500  Gross per 24 hour   Intake 732 ml   Output 2125 ml   Net -1393 ml        Physical Exam:  Vitals and nursing note reviewed.   Constitutional:       General: She is not in acute distress.     Appearance: She is well-developed. She is not ill-appearing.   HENT:      Head: Normocephalic and atraumatic.      Mouth/Throat:      Pharynx: No oropharyngeal exudate.   Eyes:      Extraocular Movements: Extraocular movements intact.      Conjunctiva/sclera: Conjunctivae normal.   Cardiovascular:      Rate and Rhythm: Normal rate and regular rhythm.      Heart sounds: Normal heart sounds.   Pulmonary:      Effort: Pulmonary effort is normal. No respiratory distress.      Breath sounds: Normal breath sounds. No wheezing.   Abdominal:      General: Bowel sounds are normal. There is no distension.      Palpations: Abdomen is soft.      Tenderness: There is no abdominal tenderness. There is no guarding.   Musculoskeletal:         General: No tenderness. Normal range of motion.      Cervical back: Normal range of motion and neck supple.      Right lower leg: No edema.      Left lower leg: No edema.   Lymphadenopathy:      Cervical: No cervical adenopathy.   Skin:     General: Skin is warm and dry.      Capillary Refill: Capillary refill takes less than 2 seconds.      Findings: No rash.   Neurological:      Mental Status: She is alert and oriented to person, place, and time.      Cranial Nerves: No cranial nerve deficit.      Sensory: No sensory deficit.      Coordination: Coordination normal.   Psychiatric:         Behavior: Behavior normal.         Thought Content: Thought content normal.         Judgment: Judgment normal.        Recent Labs   Lab 01/16/22  0339 01/17/22  0534 01/18/22  0442   * 137 136   K 4.2 3.9 4.0   CL 97 99 100   CO2 26 28 27   BUN 35* 28* 24*   CREATININE 1.4 1.5* 1.3   * 192* 181*   CALCIUM 10.3 10.0 9.3   MG 1.9  --   --      Recent Labs   Lab  01/16/22  0339 01/17/22  0534 01/18/22  0442   ALKPHOS 113 96 101   ALT 20 17 18   AST 21 19 17   ALBUMIN 3.2* 3.0* 3.0*   PROT 7.2 6.5 6.1   BILITOT 0.5 0.5 0.5   INR 1.0  --   --        Recent Labs   Lab 01/16/22  0339 01/16/22  0339 01/17/22  0534 01/18/22  0442   WBC 10.87  --  9.53 10.83   HGB 11.2*  --  10.2* 9.9*   HCT 34.9*  --  30.9* 30.6*     --  319 294   GRAN 60.6  6.6   < > 60.7  5.8 66.8  7.2   LYMPH 23.6  2.6   < > 23.7  2.3 21.1  2.3   MONO 9.0  1.0   < > 9.1  0.9 7.8  0.9    < > = values in this interval not displayed.       Recent Labs   Lab 01/17/22  0736 01/17/22  1104 01/17/22  1528 01/17/22  2049 01/18/22  0736 01/18/22  1115   POCTGLUCOSE 216* 274* 326* 279* 225* 295*       No results for input(s): TROPONINI in the last 168 hours.    Diagnostic Results:  Labs: Reviewed    amiodarone, 200 mg, Oral, Daily  amLODIPine, 5 mg, Oral, BID  atorvastatin, 40 mg, Oral, QHS  clopidogreL, 75 mg, Oral, Daily  digoxin, 0.125 mg, Oral, Daily  furosemide, 40 mg, Oral, Daily  gabapentin, 300 mg, Oral, TID  insulin aspart U-100, 15 Units, Subcutaneous, TIDWM  insulin detemir U-100, 25 Units, Subcutaneous, BID  isosorbide dinitrate, 10 mg, Oral, TID  metoprolol tartrate, 50 mg, Oral, BID  multivitamin, 1 tablet, Oral, Daily  mupirocin, , Nasal, BID  pentoxifylline, 400 mg, Oral, TID  sodium chloride 0.9%, 10 mL, Intravenous, Q6H  vancomycin (VANCOCIN) IVPB, 15 mg/kg, Intravenous, Q24H        As Needed acetaminophen, albuterol-ipratropium, bisacodyL, dextrose 50%, dextrose 50%, glucagon (human recombinant), glucose, glucose, insulin aspart U-100, melatonin, polyethylene glycol, Flushing PICC Protocol **AND** sodium chloride 0.9% **AND** sodium chloride 0.9%, sodium chloride 0.9%, Pharmacy to dose Vancomycin consult **AND** vancomycin - pharmacy to dose    ASSESSMENT/PLAN:     Assessment: Claudia Powell is a 66 y.o. female here with:     Active Hospital Problems    Diagnosis  POA    *Bacteremia  due to Enterococcus [R78.81, B95.2]  Yes    Stage 3a chronic kidney disease [N18.31]  Yes    Prolonged QT interval [R94.31]  Yes    Infection involving implantable cardioverter-defibrillator (ICD) [T82.7XXA]  Yes    Type 2 diabetes mellitus with hyperglycemia, without long-term current use of insulin [E11.65]  Yes    Paroxysmal atrial fibrillation [I48.0]  Yes    Primary hypertension [I10]  Yes    COPD (chronic obstructive pulmonary disease) [J44.9]  Yes     Chronic    S/P CABG (coronary artery bypass graft) [Z95.1]  Not Applicable     Chronic    YAQUELIN (acute kidney injury) [N17.9]  Yes    Chronic combined systolic and diastolic heart failure [I50.42]  Yes     Chronic    Coronary artery disease involving native coronary artery of native heart without angina pectoris [I25.10]  Yes      Resolved Hospital Problems   No resolved problems to display.        Plan:       Bacteremia due to Enterococcus  Infection involving implantable cardioverter-defibrillator (ICD)  66 y.o. female with hx of HFrEF, HTN, T2DM, COPD admitted to OSH for sepsis 2/2 Enterococcus faecalis bacteremia in the setting of infected right lead/defibrillator (IE). Treated with IV antibiotics and transferred to C for removal of defibrillator.     - repeat blood cx (1/8 & 1/9) NGTD  - holding Eliquis for procedure  - continue IV vanc (pharm to dose) - 6 week plan  - ID consulted; appreciate assistance final recs placed.   - trend CBC, CRP  - tight glycemic control    Plan: EP extraction, plavix ok.     ID Recommendations:  -- Continue vancomycin for 6 weeks following device removal  -- PICC line in  -- Repeat blood cultures after device removed  -- Can re-implant ICD if indicated at least 14 days following first negative BCx after removal  -- Consider referral for screening colonoscopy if due. Etiology of Enterococcus bacteremia unclear  -- Please refer to allergy for PCN skin testing     YAQUELIN (acute kidney injury)  Stage 3a chronic kidney  disease  - Cr 1.4>>1.7  - fluctuates around 1/4 -> 1.9  - currently stable  - ok for lasix.      Chronic combined systolic and diastolic heart failure  S/P CABG (coronary artery bypass graft)  - appears dry vs euvolemic  - restarted lasix today   - strict I/Os, daily weights  - cardiac diet     Type 2 diabetes mellitus with hyperglycemia, without long-term current use of insulin  - home regimen: lantus 100U daily  - A1c 10  - inpatient regimen: detemir 25U BID + aspart 15U TIDWM  - moderate dose SSI  - ACHS accuchecks           Lab Results   Component Value Date     HGBA1C 10.8 (H) 01/11/2022         Prolonged Qtc  - Qtc 649ms on 1/7  - hold celexa and ranexa for now  - daily EKG stable     Paroxysmal atrial fibrillation  - continue MTP 50mg BID, amiodarone 200mg daily, and digoxin 0.125mg daily  - hold eliquis for procedure     Primary hypertension  - continue imdur, amlodipine, and MTP  - hold aldactone d/t YAQUELIN     COPD (chronic obstructive pulmonary disease)  - no acute issues  - duonebs PRN     Coronary artery disease involving native coronary artery of native heart without angina pectoris  - continue statin and BB, hold plavix as above  - hold ranexa given prolonged QTc     Dispo - Home infusion services, Thursday.        Tejal Cristina MD

## 2022-01-19 NOTE — ASSESSMENT & PLAN NOTE
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 Hyperechoic oblong pannus formation seen on RV lead just below the tricuspid valve with some peripheral irregularities associated. A vegetation cannot be ruled out completely. This measures 2.1 by 1.0 cm in total.  -- BCx 1/7 pansensitive Enterococcus faecalis  -- BCx 1/8, 1/9 NGTD  -- Resp cx 1/10 moderate Candida albicans  -- CT abd/pelvis no obvious potential source for introduction of E. Faecalis into blood stream. NM scan negative.     Recommendations:  -- Continue vancomycin for 6 weeks following device removal (removal 1/19)  -- Repeat blood cultures after device removed  -- Can re-implant ICD if indicated at least 14 days following first negative BCx after removal  -- Consider referral for screening colonoscopy if due. Etiology of Enterococcus bacteremia unclear  -- Please refer to allergy for PCN skin testing

## 2022-01-19 NOTE — PLAN OF CARE
Patient cooperative and pleasant with routine care and procedures.  PICC line removed this evening per MD orders and 2 peripheral IV's placed.  Patient given pre op instructions for ICD removal in am.  NPO after midnight maintained and patient verbalized understanding.  Fall precautions reviewed and patient verbalized understanding.  Resting quietly and without complaints.  Will continue to monitor.

## 2022-01-19 NOTE — NURSING TRANSFER
Nursing Transfer Note      1/19/2022     Reason patient is being transferred: inpt    Transfer To: [id number]    Transfer via stretcher    Transfer with cardiac monitoring    Transported by transporter    Medicines sent: none    Any special needs or follow-up needed: bedrest until 1900    Chart send with patient: Yes    Notified: son    Patient reassessed at: 1/19/22

## 2022-01-19 NOTE — BRIEF OP NOTE
: Dr. Angelique Blanchard MD  Date of procedure: 1/19/2022  Post-operative Diagnosis: Bacteremia    Procedure Performed: total system extraction of cardiac implantable device    Description of Procedure: The patient was brought to the EP lab in the fasting state. Prepped and draped in sterile fashion. Safety timeout was performed. Sedation administered by anesthesia staff. Ultrasound guided access of the right femoral vein x2 and left femoral vein x2. Bridge balloon through 12 Fr sheath and wire in right femoral vein. 9 Fr sheath for central access for anesthesia and wire in left femoral vein. Lidocaine for local anesthetic at device site. Incision made. Blunt and electrosurgical dissection to the level of the existing generator. Patient was noted to have fluid collection at level of pocket. Pocket swabbed and sent for culture. Generator and leads dissected free and removed from pocket. Pocket washed with antibiotic solution. Leads prepped for extraction. Mechanical and laser lead extraction performed. All leads removed in tact. Tips sent for culture. Hemostasis achieved. Pocket closed.     EBL: <30 mL    Specimens Removed: None  Complications: no immediate    1. Antibiotics per home/ID regimen  2. NO HEPARIN PRODUCTS/Eliquis for at least 3 days  3. No lifting over 5 pounds  4. Dressing will be removed in AM by EP  5. Follow up in device clinic in 1 week for device and wound checks.     Dr Blanchard, the attending electrophysiologist, was present for the entirety of this procedure.    Mario Gorman MD PGY 7

## 2022-01-19 NOTE — PROGRESS NOTES
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 Hyperechoic oblong pannus formation seen on RV lead just below the tricuspid valve with some peripheral irregularities associated. A vegetation cannot be ruled out completely. This measures 2.1 by 1.0 cm in total.  -- BCx 1/7 pansensitive Enterococcus faecalis  -- BCx 1/8, 1/9 NGTD  -- Resp cx 1/10 moderate Candida albicans  -- CT abd/pelvis no obvious potential source for introduction of E. Faecalis into blood stream. NM scan negative.     Recommendations:  -- Continue vancomycin for 6 weeks following device removal (removal 1/19)  -- Repeat blood cultures after device removed  -- Can re-implant ICD if indicated at least 14 days following first negative BCx after removal  -- Consider referral for screening colonoscopy if due. Etiology of Enterococcus bacteremia unclear  -- Please refer to allergy for PCN skin testing            Anticipated Disposition: 6 weeks IV vanc after device removal    Thank you for your consult. I will follow-up with patient. Please contact us if you have any additional questions.    Kayode Del Cid, DO  Infectious Disease  [facility] - [facility]iology    Subjective:     Principal Problem:Bacteremia due to Enterococcus    HPI: Claudia Powell is a 66 y.o. F with history of CAD, HFrEF s/p AICD, who transfers to [facility] for removal of infected AICD. She presented to [facility] 1/7 with fever, SOB, productive cough and was admitted with sepsis 2/2 Enterococcus faecalis bacteremia.  Initiated on empiric vanc, meropenem, and doxycycline with resolution of fever and leukocytosis. KOMAL revealed suspicious pannus of RV lead concerning for vegetation. She completed 5 days doxycycline and continued with vancomycin for planned 6 weeks following ICD removal. Transferred to [facility] for CTS eval /removal of ICD. ID consulted for abx recommendations for Enterococcus bacteremia. Reports some mild nausea before presentation. Denies abdominal pain, diarrhea, dysuria, frequency.    Interval History: Patient was afebrile over night, continues to have no leukocytosis. Patient undergoing device removal.     Review of Systems   Constitutional: Negative for chills and fever.   HENT: Negative for sore throat.    Respiratory: Negative for cough and shortness of breath.    Cardiovascular: Negative for chest pain and leg swelling.   Gastrointestinal: Negative for abdominal pain, constipation, diarrhea, nausea and vomiting.   Genitourinary: Negative for difficulty urinating and dysuria.   Musculoskeletal: Negative for myalgias.   Skin: Negative for rash.   Neurological: Negative for dizziness, light-headedness and headaches.   Psychiatric/Behavioral: Negative for confusion. The patient is not nervous/anxious.      Objective:     Vital Signs (Most Recent):  Temp: 98.3 °F (36.8 °C) (01/19/22 0735)  Pulse: 60 (01/19/22 0735)  Resp: 16 (01/19/22 0537)  BP: 135/64 (01/19/22 1240)  SpO2: 95 % (01/19/22 0910) Vital Signs (24h Range):  Temp:  [96.9 °F (36.1 °C)-98.3 °F (36.8 °C)] 98.3 °F (36.8 °C)  Pulse:  [58-60] 60  Resp:  [16-20] 16  SpO2:  [94 %-98 %] 95 %  BP: (135-162)/(64-81) 135/64     Weight: 81.2 kg (179 lb)  Body mass index is 31.71 kg/m².    Estimated Creatinine Clearance: 42.9 mL/min (based on SCr of 1.3 mg/dL).    Physical Exam  Vitals and nursing note reviewed.   Constitutional:       General: She is not in acute distress.     Appearance: Normal appearance. She is obese. She is not ill-appearing, toxic-appearing or  diaphoretic.   HENT:      Head: Normocephalic and atraumatic.      Nose: Nose normal.   Eyes:      General: No scleral icterus.     Conjunctiva/sclera: Conjunctivae normal.   Abdominal:      General: There is no distension.      Palpations: Abdomen is soft.      Tenderness: There is no abdominal tenderness.   Musculoskeletal:      Cervical back: Normal range of motion.   Neurological:      General: No focal deficit present.      Mental Status: She is alert. Mental status is at baseline.   Psychiatric:         Mood and Affect: Mood normal.         Thought Content: Thought content normal.         Judgment: Judgment normal.         Significant Labs:   CBC:   Recent Labs   Lab 01/18/22  0442   WBC 10.83   HGB 9.9*   HCT 30.6*        CMP:   Recent Labs   Lab 01/18/22  0442      K 4.0      CO2 27   *   BUN 24*   CREATININE 1.3   CALCIUM 9.3   PROT 6.1   ALBUMIN 3.0*   BILITOT 0.5   ALKPHOS 101   AST 17   ALT 18   ANIONGAP 9   EGFRNONAA 42.9*       Significant Imaging: I have reviewed all pertinent imaging results/findings within the past 24 hours.

## 2022-01-19 NOTE — ASSESSMENT & PLAN NOTE
Infection involving implantable cardioverter-defibrillator (ICD)  66 y.o. female with hx of HFrEF, HTN, T2DM, COPD admitted to OSH for sepsis 2/2 Enterococcus faecalis bacteremia in the setting of infected right lead/defibrillator (IE). Treated with IV antibiotics and transferred to C for removal of defibrillator.  - repeat blood cx (1/8 & 1/9) NGTD  - EP consulted for ICD removal; appreciate assistance  - NPO at midnight  - holding Eliquis and Plavix for procedure; resume once cleared by EP  - continue IV vanc (pharm to dose) for 6 weeks following ICD removal  - repeat blood cultures ordered  - pocket cultures and lead cultures submitted; continue to monitor  - replace PICC (removed for ease of ICD removal)  - ID consulted; appreciate assistance  - trend CBC, CRP  - tight glycemic control  ID recommends:   -- Can re-implant ICD if indicated at least 14 days following first negative BCx after removal  -- Consider referral for screening colonoscopy if due. Etiology of Enterococcus bacteremia unclear  -- Please refer to allergy for PCN skin testing

## 2022-01-19 NOTE — SUBJECTIVE & OBJECTIVE
Interval History: Patient was afebrile over night, continues to have no leukocytosis. Patient undergoing device removal.     Review of Systems   Constitutional: Negative for chills and fever.   HENT: Negative for sore throat.    Respiratory: Negative for cough and shortness of breath.    Cardiovascular: Negative for chest pain and leg swelling.   Gastrointestinal: Negative for abdominal pain, constipation, diarrhea, nausea and vomiting.   Genitourinary: Negative for difficulty urinating and dysuria.   Musculoskeletal: Negative for myalgias.   Skin: Negative for rash.   Neurological: Negative for dizziness, light-headedness and headaches.   Psychiatric/Behavioral: Negative for confusion. The patient is not nervous/anxious.      Objective:     Vital Signs (Most Recent):  Temp: 98.3 °F (36.8 °C) (01/19/22 0735)  Pulse: 60 (01/19/22 0735)  Resp: 16 (01/19/22 0537)  BP: 135/64 (01/19/22 1240)  SpO2: 95 % (01/19/22 0910) Vital Signs (24h Range):  Temp:  [96.9 °F (36.1 °C)-98.3 °F (36.8 °C)] 98.3 °F (36.8 °C)  Pulse:  [58-60] 60  Resp:  [16-20] 16  SpO2:  [94 %-98 %] 95 %  BP: (135-162)/(64-81) 135/64     Weight: 81.2 kg (179 lb)  Body mass index is 31.71 kg/m².    Estimated Creatinine Clearance: 42.9 mL/min (based on SCr of 1.3 mg/dL).    Physical Exam  Vitals and nursing note reviewed.   Constitutional:       General: She is not in acute distress.     Appearance: Normal appearance. She is obese. She is not ill-appearing, toxic-appearing or diaphoretic.   HENT:      Head: Normocephalic and atraumatic.      Nose: Nose normal.   Eyes:      General: No scleral icterus.     Conjunctiva/sclera: Conjunctivae normal.   Abdominal:      General: There is no distension.      Palpations: Abdomen is soft.      Tenderness: There is no abdominal tenderness.   Musculoskeletal:      Cervical back: Normal range of motion.   Neurological:      General: No focal deficit present.      Mental Status: She is alert. Mental status is at  baseline.   Psychiatric:         Mood and Affect: Mood normal.         Thought Content: Thought content normal.         Judgment: Judgment normal.         Significant Labs:   CBC:   Recent Labs   Lab 01/18/22 0442   WBC 10.83   HGB 9.9*   HCT 30.6*        CMP:   Recent Labs   Lab 01/18/22 0442      K 4.0      CO2 27   *   BUN 24*   CREATININE 1.3   CALCIUM 9.3   PROT 6.1   ALBUMIN 3.0*   BILITOT 0.5   ALKPHOS 101   AST 17   ALT 18   ANIONGAP 9   EGFRNONAA 42.9*       Significant Imaging: I have reviewed all pertinent imaging results/findings within the past 24 hours.

## 2022-01-19 NOTE — ANESTHESIA PROCEDURE NOTES
Arterial    Diagnosis: Systolic heart failure  Doctor requesting consult: Lo    Patient location during procedure: done in OR    Staffing  Authorizing Provider: Kayode Mckenzie MD  Performing Provider: Kayode Mckenzie MD    Anesthesiologist was present at the time of the procedure.    Preanesthetic Checklist  Completed: patient identified, IV checked, site marked, risks and benefits discussed, surgical consent, monitors and equipment checked, pre-op evaluation, timeout performed and anesthesia consent givenArterial  Skin Prep: chlorhexidine gluconate  Local Infiltration: none  Orientation: right  Location: radial    Catheter Size: 20 G  Catheter placement by Anatomical landmarks. Heme positive aspiration all ports.Insertion Attempts: 2  Assessment  Dressing: secured with tape and tegaderm  Patient: Tolerated well

## 2022-01-19 NOTE — SUBJECTIVE & OBJECTIVE
This encounter was provided through telemedicine.  Patient was transferred to the telemedicine service on:  01/19/2022   The patient location is: [facility] EP POOL ROOM/[facility] E* admitted 1/15/2022 11:13 PM.  Present with the patient at the time of the telemed/virtual assessment: Bedside nurse    Interval History/Overnight Events:   Clinical record since admit reviewed.    Patient eliasut return from ICD removal.  She is complaining of back pain and nausea; Tylenol has not been effective at alleviating her pain; she has taken oxycodone in the past without ill effect.  Encourage patient to drink some liquids prior to taking oral pain medicine.  Clear liquid diabetic diet ordered with advanced as tolerated.    Life vest is being placed at bedside prior to visit.    She is interested in getting home as soon as possible and does not anticipate any problems doing the IV antibiotics at home; she will have her son to assist.  She has been ambulatory in her room prior to her procedure without difficulty.    Review of Systems   Constitutional: Negative for activity change and fever.   Respiratory: Negative for shortness of breath.    Cardiovascular: Negative for chest pain.   Gastrointestinal: Positive for nausea.   Musculoskeletal: Positive for back pain.        Inpatient Medications:  Scheduled Meds:   amiodarone  200 mg Oral Daily    amLODIPine  5 mg Oral BID    atorvastatin  40 mg Oral QHS    digoxin  0.125 mg Oral Daily    furosemide  40 mg Oral Daily    gabapentin  300 mg Oral TID    insulin aspart U-100  15 Units Subcutaneous TIDWM    insulin detemir U-100  25 Units Subcutaneous BID    isosorbide dinitrate  10 mg Oral TID    multivitamin  1 tablet Oral Daily    mupirocin   Nasal BID    pentoxifylline  400 mg Oral TID    sodium chloride 0.9%  10 mL Intravenous Q6H    vancomycin (VANCOCIN) IVPB  15 mg/kg Intravenous Q24H     Continuous Infusions:  PRN Meds:.sodium chloride, acetaminophen, albuterol-ipratropium,  bisacodyL, BUPivacaine (PF) 0.25% (2.5 mg/ml), dextrose 50%, dextrose 50%, fentaNYL, glucagon (human recombinant), glucose, glucose, influenza, insulin aspart U-100, LIDOcaine HCL 20 mg/ml (2%), melatonin, ondansetron, polyethylene glycol, Flushing PICC Protocol **AND** sodium chloride 0.9% **AND** sodium chloride 0.9%, sodium chloride 0.9%, sodium chloride 0.9%, sodium chloride 0.9%, Pharmacy to dose Vancomycin consult **AND** vancomycin - pharmacy to dose, vancomycin      Objective:     Temp:  [97.3 °F (36.3 °C)-98.3 °F (36.8 °C)] 97.9 °F (36.6 °C)  Pulse:  [44-60] 44  Resp:  [15-20] 18  SpO2:  [88 %-98 %] 88 %  BP: (135-165)/(64-81) 156/70      Intake/Output Summary (Last 24 hours) at 1/19/2022 1654  Last data filed at 1/19/2022 1530  Gross per 24 hour   Intake 1461.66 ml   Output 1300 ml   Net 161.66 ml        Body mass index is 31.71 kg/m².    Physical Exam  Vitals and nursing note reviewed.   Constitutional:       General: She is not in acute distress.     Appearance: Normal appearance.   HENT:      Head: Normocephalic and atraumatic.   Eyes:      General: No scleral icterus.        Right eye: No discharge.         Left eye: No discharge.      Extraocular Movements: Extraocular movements intact.   Cardiovascular:      Rate and Rhythm: Normal rate.   Pulmonary:      Effort: Pulmonary effort is normal. No tachypnea or respiratory distress.   Musculoskeletal:      Comments: Sling in place   Neurological:      General: No focal deficit present.      Mental Status: She is alert and oriented to person, place, and time.      Cranial Nerves: No cranial nerve deficit.      Motor: No weakness.   Psychiatric:         Mood and Affect: Mood and affect normal.         Speech: Speech normal.         Behavior: Behavior is cooperative.         Thought Content: Thought content normal.          Labs:  Recent Results (from the past 24 hour(s))   POCT glucose    Collection Time: 01/18/22  9:22 PM   Result Value Ref Range    POCT  Glucose 251 (H) 70 - 110 mg/dL   POCT glucose    Collection Time: 01/19/22  7:56 AM   Result Value Ref Range    POCT Glucose 283 (H) 70 - 110 mg/dL   Prepare RBC 4 Units; Biventricular ICD extraction    Collection Time: 01/19/22  9:39 AM   Result Value Ref Range    UNIT NUMBER [id number]     Product Code G3617P17     DISPENSE STATUS CROSSMATCHED     CODING SYSTEM [id number]     Unit Blood Type Code 9500     Unit Blood Type O NEG     Unit Expiration [id number]     UNIT NUMBER [id number]     Product Code Z3605Z32     DISPENSE STATUS CROSSMATCHED     CODING SYSTEM [id number]     Unit Blood Type Code 9500     Unit Blood Type O NEG     Unit Expiration [id number]     UNIT NUMBER [id number]     Product Code I3217R79     DISPENSE STATUS CROSSMATCHED     CODING SYSTEM YPAY020     Unit Blood Type Code 9500     Unit Blood Type O NEG     Unit Expiration [id number]     UNIT NUMBER [id number]     Product Code J0423S27     DISPENSE STATUS CROSSMATCHED     CODING SYSTEM [id number]     Unit Blood Type Code 9500     Unit Blood Type O NEG     Unit Expiration [id number]    Type & Screen    Collection Time: 01/19/22  9:39 AM   Result Value Ref Range    Group & Rh O NEG     Indirect Len NEG    Transesophageal echo (KOMAL)    Collection Time: 01/19/22  2:51 PM   Result Value Ref Range    BSA 1.9 m2    EF 35 %    Sinus 2.70 cm    STJ 2.40 cm   POCT glucose    Collection Time: 01/19/22  4:42 PM   Result Value Ref Range    POCT Glucose 256 (H) 70 - 110 mg/dL        Lab Results   Component Value Date    JLB24SXTSLRP Negative 01/18/2022       Recent Labs   Lab 01/16/22  0339 01/16/22  0339 01/17/22  0534 01/18/22  0442   WBC 10.87  --  9.53 10.83   LYMPH 23.6  2.6   < > 23.7  2.3 21.1  2.3   HGB 11.2*  --  10.2* 9.9*   HCT 34.9*  --  30.9* 30.6*     --  319 294    < > = values in this interval not displayed.     Recent Labs   Lab 01/14/22  0626 01/14/22  0626 01/15/22  0748 01/15/22  0748 01/16/22  0339 01/17/22  0534  01/18/22  0442   *   < > 135*   < > 135* 137 136   K 3.7   < > 3.9   < > 4.2 3.9 4.0   CL 92*   < > 95   < > 97 99 100   CO2 30*   < > 29   < > 26 28 27   BUN 37*   < > 41*   < > 35* 28* 24*   CREATININE 1.6*   < > 1.7*   < > 1.4 1.5* 1.3   *   < > 198*   < > 206* 192* 181*   CALCIUM 9.0   < > 9.7   < > 10.3 10.0 9.3   MG 1.7  --  1.9  --  1.9  --   --     < > = values in this interval not displayed.     Recent Labs   Lab 01/16/22  0339 01/17/22  0534 01/18/22 0442   ALKPHOS 113 96 101   ALT 20 17 18   AST 21 19 17   ALBUMIN 3.2* 3.0* 3.0*   PROT 7.2 6.5 6.1   BILITOT 0.5 0.5 0.5   INR 1.0  --   --         No results for input(s): DDIMER, FERRITIN, CRP, LDH, BNP, TROPONINI, CPK in the last 72 hours.    Invalid input(s): PROCALCITONIN    All labs within the last 24 hours were reviewed.     Microbiology:  Microbiology Results (last 7 days)     Procedure Component Value Units Date/Time    Culture, Anaerobe [[id number]] Collected: 01/19/22 1416    Order Status: Sent Specimen: Incision site from Heart Updated: 01/19/22 1508    Aerobic culture [[id number]] Collected: 01/19/22 1416    Order Status: Sent Specimen: Incision site from Heart Updated: 01/19/22 1506    Culture, Anaerobe [[id number]] Collected: 01/19/22 1418    Order Status: Sent Specimen: Incision site from Heart Updated: 01/19/22 1504    Aerobic culture [[id number]] Collected: 01/19/22 1418    Order Status: Sent Specimen: Incision site from Heart Updated: 01/19/22 1503    Culture, Anaerobe [[id number]] Collected: 01/19/22 1422    Order Status: Sent Specimen: Incision site from Heart Updated: 01/19/22 1500    Aerobic culture [[id number]] Collected: 01/19/22 1422    Order Status: Sent Specimen: Incision site from Heart Updated: 01/19/22 1500    Aerobic culture [[id number]] Collected: 01/19/22 1243    Order Status: Sent Specimen: Incision site from Heart Updated: 01/19/22 1320    Culture, Anaerobe [[id number]] Collected: 01/19/22 1243    Order Status: Sent  Specimen: Incision site from Heart Updated: 01/19/22 1319            Imaging      Results for orders placed during the hospital encounter of 01/07/22    Echo    Interpretation Summary  · Mildly decreased systolic function.  · The estimated ejection fraction is 42%.  · Left ventricular diastolic dysfunction.  · There is moderate left ventricular global hypokinesis.  · Normal right ventricular size with normal right ventricular systolic function.  · Normal central venous pressure (3 mmHg).      Intra-Procedure Documentation  KOMAL performed in the Invasive Lab    - See Procedure Log link below for nursing documentation    - See KOMAL order on Card Proc Tab for physician findings   X-Ray Chest AP Portable  Narrative: EXAMINATION:  XR CHEST AP PORTABLE    CLINICAL HISTORY:  post device removal;    TECHNIQUE:  Single frontal view of the chest was performed.    COMPARISON:  January 17, 2022    FINDINGS:  Interval removal previously noted left-sided tri lead pacing device.  Interval removal of previously noted right PICC line.  Stable findings of sternotomy, upper normal to mildly enlarged heart size.  Interval development of bilateral scattered interstitial infiltrates that could be on the basis of edema and or atelectasis and or infection.  No lobar or consolidative pneumonia.  No pleural fluid.  No pneumothorax.  Remote healed left clavicular fracture.  No acute bony findings.  Impression: As above.    Electronically signed by: Maynor Case  Date:    01/19/2022  Time:    15:36  Transesophageal echo (KOMAL)  · KOMAL guidance used for ICD extraction. No pericardial effusion post   procedure.  · The left ventricle is normal in size with moderately decreased systolic   function. The estimated ejection fraction is 40%.  · Normal right ventricular size with normal right ventricular systolic   function.  · Mild mitral regurgitation.  · Mild tricuspid regurgitation.  · The estimated ejection fraction is 35%.         All imaging within  the last 24 hours was reviewed.       Discharge Planning   BOUCHRA: 1/20/2022     Code Status: Full Code   Is the patient medically ready for discharge?: No    Reason for patient still in hospital (select all that apply): Patient trending condition, Treatment, Consult recommendations and Pending disposition  Discharge Plan A: Home

## 2022-01-19 NOTE — PLAN OF CARE
Pt vital signs wnl.  Pt verbalizes some back pain from the stretcher.  Left chest pressure dressing cdi.  Left and right groin dressing intact.

## 2022-01-19 NOTE — ASSESSMENT & PLAN NOTE
S/P CABG (coronary artery bypass graft)  - appears dry vs euvolemic  - lasix, metolazone, and aldactone initially held for YAQUELIN; furosemide resumed  - strict I/Os, daily weights  - cardiac diet

## 2022-01-19 NOTE — ASSESSMENT & PLAN NOTE
- home regimen: lantus 100U daily  - inpatient regimen: detemir 25U BID + aspart 10U TIDWM  - moderate dose SSI  - ACHS accuchecks  -continue to monitor and adjust regimen as indicated for goal sugars 14- - 180    Lab Results   Component Value Date    HGBA1C 10.8 (H) 01/11/2022

## 2022-01-19 NOTE — ASSESSMENT & PLAN NOTE
- continue metoprolol 50mg BID, amiodarone 200mg daily, and digoxin 0.125mg daily  - hold eliquis for ICD removal

## 2022-01-19 NOTE — NURSING
Pt arrived back to . Patient AOx4 and VS stable upon arrival. Left chest pressure dressing CDI. Bilateral groin sites WNL, dressing is CDI. Reoriented to room and call light use, will continue to monitor.

## 2022-01-19 NOTE — ASSESSMENT & PLAN NOTE
S/p removal of ICD on 1/19/2022  See:  Bacteremia due to Enterococcus  Cardiology recommendations:   Ok to discharge after PICC line placed  -Continue home doses of amiodarone and digoxin  -Hold Eliquis for next 3 days then resume  -Follow up in device clinic in one week

## 2022-01-19 NOTE — SUBJECTIVE & OBJECTIVE
Past Medical History:   Diagnosis Date    CHF (congestive heart failure)     COPD (chronic obstructive pulmonary disease) 2021    Coronary artery disease     Diabetes mellitus     Hyperlipidemia     Hypertension     LBBB (left bundle branch block)     NSTEMI (non-ST elevated myocardial infarction)     Obesity 2021    Pulmonary hypertension 2021    PVD (peripheral vascular disease)        Past Surgical History:   Procedure Laterality Date    CARDIAC CATHETERIZATION       SECTION      CORONARY ARTERY BYPASS GRAFT  06/22/2016    x4    HYSTERECTOMY      INSERTION OF BIVENTRICULAR IMPLANTABLE CARDIOVERTER-DEFIBRILLATOR (ICD)         Review of patient's allergies indicates:   Allergen Reactions    Cephalosporins      Tongue swelling and hives    Penicillins      hives         No current facility-administered medications on file prior to encounter.     Current Outpatient Medications on File Prior to Encounter   Medication Sig    albuterol (PROVENTIL/VENTOLIN HFA) 90 mcg/actuation inhaler Inhale 1 puff into the lungs every 4 (four) hours as needed.    amiodarone (PACERONE) 200 MG Tab Take 1 tablet (200 mg total) by mouth once daily.    amLODIPine (NORVASC) 5 MG tablet Take 1 tablet (5 mg total) by mouth 2 (two) times daily.    apixaban (ELIQUIS) 2.5 mg Tab Take 1 tablet (2.5 mg total) by mouth 2 (two) times daily.    atorvastatin (LIPITOR) 40 MG tablet Take 1 tablet (40 mg total) by mouth every evening.    citalopram (CELEXA) 40 MG tablet Take 40 mg by mouth once daily.    clopidogreL (PLAVIX) 75 mg tablet Take 1 tablet (75 mg total) by mouth once daily.    digoxin (LANOXIN) 125 mcg tablet Take 1 tablet (0.125 mg total) by mouth once daily.    furosemide (LASIX) 40 MG tablet Take 1 tablet (40 mg total) by mouth once daily. for 3 days    gabapentin (NEURONTIN) 400 MG capsule Take 400 mg by mouth 3 (three) times daily.    isosorbide dinitrate (ISORDIL) 10 MG tablet Take 1  tablet (10 mg total) by mouth 3 (three) times daily.    LANTUS U-100 INSULIN 100 unit/mL injection Inject 100 Units into the skin once daily.    metOLazone (ZAROXOLYN) 5 MG tablet Take 5 mg by mouth every other day.    metoprolol tartrate (LOPRESSOR) 50 MG tablet Take 1 tablet (50 mg total) by mouth 2 (two) times daily.    ranolazine (RANEXA) 500 MG Tb12 Take 1 tablet (500 mg total) by mouth 2 (two) times daily.    spironolactone (ALDACTONE) 25 MG tablet Take 1 tablet (25 mg total) by mouth once daily. Hold until patient follows up with PCP or Cardiology (Patient taking differently: Take 25 mg by mouth once daily.)    multivitamin (THERAGRAN) per tablet Take 1 tablet by mouth once daily.     Family History     Problem Relation (Age of Onset)    Heart attack Mother, Father        Tobacco Use    Smoking status: Former Smoker    Smokeless tobacco: Never Used   Substance and Sexual Activity    Alcohol use: Not Currently    Drug use: Never    Sexual activity: Not on file     Review of Systems   Constitutional: Negative for chills, decreased appetite and fever.   HENT: Negative for congestion and sore throat.    Eyes: Negative for blurred vision and discharge.   Cardiovascular: Negative for chest pain, claudication, cyanosis, dyspnea on exertion and leg swelling.   Respiratory: Negative for cough, hemoptysis and shortness of breath.    Endocrine: Negative for cold intolerance and heat intolerance.   Skin: Negative for color change.   Musculoskeletal: Negative for muscle weakness and myalgias.   Gastrointestinal: Negative for bloating and abdominal pain.   Neurological: Negative for dizziness, focal weakness and weakness.   Psychiatric/Behavioral: Negative for altered mental status and depression.     Objective:     Vital Signs (Most Recent):  Temp: 98.3 °F (36.8 °C) (01/19/22 0735)  Pulse: 60 (01/19/22 0735)  Resp: 16 (01/19/22 0537)  BP: 135/64 (01/19/22 0735)  SpO2: 95 % (01/19/22 0537) Vital Signs (24h  Range):  Temp:  [96.9 °F (36.1 °C)-98.3 °F (36.8 °C)] 98.3 °F (36.8 °C)  Pulse:  [51-82] 60  Resp:  [16-20] 16  SpO2:  [94 %-98 %] 95 %  BP: (135-162)/(62-81) 135/64     Weight: 81.3 kg (179 lb 3.7 oz)  Body mass index is 31.75 kg/m².    SpO2: 95 %  O2 Device (Oxygen Therapy): room air      Intake/Output Summary (Last 24 hours) at 1/19/2022 0843  Last data filed at 1/19/2022 0600  Gross per 24 hour   Intake 361.66 ml   Output 1300 ml   Net -938.34 ml       Lines/Drains/Airways     Peripheral Intravenous Line                 Peripheral IV - Single Lumen 01/18/22 1842 20 G Left Antecubital <1 day         Peripheral IV - Single Lumen 01/18/22 2000 22 G Anterior;Left Forearm <1 day                Physical Exam  Constitutional:       Appearance: She is well-developed and well-nourished.   HENT:      Head: Normocephalic and atraumatic.      Right Ear: External ear normal.      Left Ear: External ear normal.   Eyes:      Extraocular Movements: EOM normal.      Conjunctiva/sclera: Conjunctivae normal.   Cardiovascular:      Rate and Rhythm: Normal rate and regular rhythm.      Pulses: Intact distal pulses.           Radial pulses are 2+ on the right side and 2+ on the left side.      Heart sounds: Normal heart sounds.   Pulmonary:      Effort: Pulmonary effort is normal. No respiratory distress.      Breath sounds: Normal breath sounds. No wheezing.   Abdominal:      General: Bowel sounds are normal. There is no distension.      Palpations: Abdomen is soft.      Tenderness: There is no abdominal tenderness.   Musculoskeletal:         General: No edema. Normal range of motion.      Cervical back: Normal range of motion and neck supple.   Skin:     General: Skin is warm and dry.   Neurological:      Mental Status: She is alert and oriented to person, place, and time.         Significant Labs:   CMP   Recent Labs   Lab 01/18/22  0442      K 4.0      CO2 27   *   BUN 24*   CREATININE 1.3   CALCIUM 9.3   PROT  6.1   ALBUMIN 3.0*   BILITOT 0.5   ALKPHOS 101   AST 17   ALT 18   ANIONGAP 9   ESTGFRAFRICA 49.4*   EGFRNONAA 42.9*    and CBC   Recent Labs   Lab 01/18/22  0442   WBC 10.83   HGB 9.9*   HCT 30.6*          Significant Imaging: Echocardiogram:   2D echo with color flow doppler: No results found for this or any previous visit. and Transthoracic echo (TTE) complete (Cupid Only):   Results for orders placed or performed during the hospital encounter of 01/07/22   Echo   Result Value Ref Range    BSA 1.91 m2    LVIDd 4.57 3.5 - 6.0 cm    LVIDs 3.81 2.1 - 4.0 cm    FS 17 28 - 44 %    LV Systolic Volume 62.40 mL    LV Systolic Volume Index 33.5 mL/m2    LV Diastolic Volume 111.22 mL    LV Diastolic Volume Index 59.80 mL/m2    Right Atrial Pressure (from IVC) 3 mmHg    EF 42 %    Narrative    · Mildly decreased systolic function.  · The estimated ejection fraction is 42%.  · Left ventricular diastolic dysfunction.  · There is moderate left ventricular global hypokinesis.  · Normal right ventricular size with normal right ventricular systolic   function.  · Normal central venous pressure (3 mmHg).

## 2022-01-19 NOTE — PROGRESS NOTES
[facility] - Cardiology  [facility] Medicine  Telemedicine Progress Note    Patient Name: Claudia Powell  MRN: [id number]  Patient Class: IP- Inpatient   Admission Date: 1/15/2022  Length of Stay: 4 days  Attending Physician: Blessing Varela MD  Primary Care Provider: Manjit Monae MD          Subjective:     Principal Problem:Bacteremia due to Enterococcus        HPI:  Claudia Powell is a 66 y.o. female with a PMHx of CAD, HFrEF, DM2, HTN, COPD who presents to [facility] as a transfer from [facility] for removal of infected implanted cardiac defibrillator. Patient initially presented to OSH on 1/7 with complaints of SOB, productive cough, fever, URI symptoms and was admitted for sepsis 2/2 bacteremia. She was initially started on empiric vancomycin, meropenem and doxycycline with resolution of fever and leukocytosis. Blood cultures from 1/7 grew Enterococcus faecalis with appropriate sensitivities. KOMAL showed suspicious pannus on right ventricular lead concerning for vegetation. She completed 5 day course of doxycycline and was continued on IV vanc with plan to continue antibiotics for at least 6 weeks after removal of ICD. Patient transferred here for CTS eval and removal of defibrillator.     Patient seen and evaluated at bedside upon arrival to [facility]. She is feeling well overall and denies any chest pain, SOB or other complaints at this time. Updated on tentative plan for removal of ICD tomorrow.       Overview/Hospital Course:  No notes on file      This encounter was provided through telemedicine.  Patient was transferred to the telemedicine service on:  01/19/2022   The patient location is: [facility] EP POOL ROOM/[facility] E* admitted 1/15/2022 11:13 PM.  Present with the patient at the time of the telemed/virtual assessment: Bedside nurse    Interval History/Overnight Events:   Clinical record since admit reviewed.    Patient jsut return from ICD removal.  She is complaining of back pain and nausea; Tylenol  has not been effective at alleviating her pain; she has taken oxycodone in the past without ill effect.  Encourage patient to drink some liquids prior to taking oral pain medicine.  Clear liquid diabetic diet ordered with advanced as tolerated.    Life vest is being placed at bedside prior to visit.    She is interested in getting home as soon as possible and does not anticipate any problems doing the IV antibiotics at home; she will have her son to assist.  She has been ambulatory in her room prior to her procedure without difficulty.    Review of Systems   Constitutional: Negative for activity change and fever.   Respiratory: Negative for shortness of breath.    Cardiovascular: Negative for chest pain.   Gastrointestinal: Positive for nausea.   Musculoskeletal: Positive for back pain.        Inpatient Medications:  Scheduled Meds:   amiodarone  200 mg Oral Daily    amLODIPine  5 mg Oral BID    atorvastatin  40 mg Oral QHS    digoxin  0.125 mg Oral Daily    furosemide  40 mg Oral Daily    gabapentin  300 mg Oral TID    insulin aspart U-100  15 Units Subcutaneous TIDWM    insulin detemir U-100  25 Units Subcutaneous BID    isosorbide dinitrate  10 mg Oral TID    multivitamin  1 tablet Oral Daily    mupirocin   Nasal BID    pentoxifylline  400 mg Oral TID    sodium chloride 0.9%  10 mL Intravenous Q6H    vancomycin (VANCOCIN) IVPB  15 mg/kg Intravenous Q24H     Continuous Infusions:  PRN Meds:.sodium chloride, acetaminophen, albuterol-ipratropium, bisacodyL, BUPivacaine (PF) 0.25% (2.5 mg/ml), dextrose 50%, dextrose 50%, fentaNYL, glucagon (human recombinant), glucose, glucose, influenza, insulin aspart U-100, LIDOcaine HCL 20 mg/ml (2%), melatonin, ondansetron, polyethylene glycol, Flushing PICC Protocol **AND** sodium chloride 0.9% **AND** sodium chloride 0.9%, sodium chloride 0.9%, sodium chloride 0.9%, sodium chloride 0.9%, Pharmacy to dose Vancomycin consult **AND** vancomycin - pharmacy to dose,  vancomycin      Objective:     Temp:  [97.3 °F (36.3 °C)-98.3 °F (36.8 °C)] 97.9 °F (36.6 °C)  Pulse:  [44-60] 44  Resp:  [15-20] 18  SpO2:  [88 %-98 %] 88 %  BP: (135-165)/(64-81) 156/70      Intake/Output Summary (Last 24 hours) at 1/19/2022 1654  Last data filed at 1/19/2022 1530  Gross per 24 hour   Intake 1461.66 ml   Output 1300 ml   Net 161.66 ml        Body mass index is 31.71 kg/m².    Physical Exam  Vitals and nursing note reviewed.   Constitutional:       General: She is not in acute distress.     Appearance: Normal appearance.   HENT:      Head: Normocephalic and atraumatic.   Eyes:      General: No scleral icterus.        Right eye: No discharge.         Left eye: No discharge.      Extraocular Movements: Extraocular movements intact.   Cardiovascular:      Rate and Rhythm: Normal rate.   Pulmonary:      Effort: Pulmonary effort is normal. No tachypnea or respiratory distress.   Musculoskeletal:      Comments: Sling in place   Neurological:      General: No focal deficit present.      Mental Status: She is alert and oriented to person, place, and time.      Cranial Nerves: No cranial nerve deficit.      Motor: No weakness.   Psychiatric:         Mood and Affect: Mood and affect normal.         Speech: Speech normal.         Behavior: Behavior is cooperative.         Thought Content: Thought content normal.          Labs:  Recent Results (from the past 24 hour(s))   POCT glucose    Collection Time: 01/18/22  9:22 PM   Result Value Ref Range    POCT Glucose 251 (H) 70 - 110 mg/dL   POCT glucose    Collection Time: 01/19/22  7:56 AM   Result Value Ref Range    POCT Glucose 283 (H) 70 - 110 mg/dL   Prepare RBC 4 Units; Biventricular ICD extraction    Collection Time: 01/19/22  9:39 AM   Result Value Ref Range    UNIT NUMBER [id number]     Product Code K1433F13     DISPENSE STATUS CROSSMATCHED     CODING SYSTEM VGGS581     Unit Blood Type Code 9500     Unit Blood Type O NEG     Unit Expiration  [id number]     UNIT NUMBER [id number]     Product Code S5361Z74     DISPENSE STATUS CROSSMATCHED     CODING SYSTEM QFDE787     Unit Blood Type Code 9500     Unit Blood Type O NEG     Unit Expiration [id number]     UNIT NUMBER [id number]     Product Code T1592K08     DISPENSE STATUS CROSSMATCHED     CODING SYSTEM QEJM051     Unit Blood Type Code 9500     Unit Blood Type O NEG     Unit Expiration 418353464454     UNIT NUMBER [id number]     Product Code K5649N73     DISPENSE STATUS CROSSMATCHED     CODING SYSTEM MGKE852     Unit Blood Type Code 9500     Unit Blood Type O NEG     Unit Expiration [id number]    Type & Screen    Collection Time: 01/19/22  9:39 AM   Result Value Ref Range    Group & Rh O NEG     Indirect Len NEG    Transesophageal echo (KOMAL)    Collection Time: 01/19/22  2:51 PM   Result Value Ref Range    BSA 1.9 m2    EF 35 %    Sinus 2.70 cm    STJ 2.40 cm   POCT glucose    Collection Time: 01/19/22  4:42 PM   Result Value Ref Range    POCT Glucose 256 (H) 70 - 110 mg/dL        Lab Results   Component Value Date    DVG29GSQABRA Negative 01/18/2022       Recent Labs   Lab 01/16/22  0339 01/16/22  0339 01/17/22  0534 01/18/22  0442   WBC 10.87  --  9.53 10.83   LYMPH 23.6  2.6   < > 23.7  2.3 21.1  2.3   HGB 11.2*  --  10.2* 9.9*   HCT 34.9*  --  30.9* 30.6*     --  319 294    < > = values in this interval not displayed.     Recent Labs   Lab 01/14/22  0626 01/14/22  0626 01/15/22  0748 01/15/22  0748 01/16/22  0339 01/17/22  0534 01/18/22 0442   *   < > 135*   < > 135* 137 136   K 3.7   < > 3.9   < > 4.2 3.9 4.0   CL 92*   < > 95   < > 97 99 100   CO2 30*   < > 29   < > 26 28 27   BUN 37*   < > 41*   < > 35* 28* 24*   CREATININE 1.6*   < > 1.7*   < > 1.4 1.5* 1.3   *   < > 198*   < > 206* 192* 181*   CALCIUM 9.0   < > 9.7   < > 10.3 10.0 9.3   MG 1.7  --  1.9  --  1.9  --   --     < > = values in this interval not displayed.     Recent Labs   Lab 01/16/22  0337  01/17/22  0534 01/18/22  0442   ALKPHOS 113 96 101   ALT 20 17 18   AST 21 19 17   ALBUMIN 3.2* 3.0* 3.0*   PROT 7.2 6.5 6.1   BILITOT 0.5 0.5 0.5   INR 1.0  --   --         No results for input(s): DDIMER, FERRITIN, CRP, LDH, BNP, TROPONINI, CPK in the last 72 hours.    Invalid input(s): PROCALCITONIN    All labs within the last 24 hours were reviewed.     Microbiology:  Microbiology Results (last 7 days)     Procedure Component Value Units Date/Time    Culture, Anaerobe [[id number]] Collected: 01/19/22 1416    Order Status: Sent Specimen: Incision site from Heart Updated: 01/19/22 1508    Aerobic culture [[id number]] Collected: 01/19/22 1416    Order Status: Sent Specimen: Incision site from Heart Updated: 01/19/22 1506    Culture, Anaerobe [[id number]] Collected: 01/19/22 1418    Order Status: Sent Specimen: Incision site from Heart Updated: 01/19/22 1504    Aerobic culture [[id number]] Collected: 01/19/22 1418    Order Status: Sent Specimen: Incision site from Heart Updated: 01/19/22 1503    Culture, Anaerobe [[id number]] Collected: 01/19/22 1422    Order Status: Sent Specimen: Incision site from Heart Updated: 01/19/22 1500    Aerobic culture [[id number]] Collected: 01/19/22 1422    Order Status: Sent Specimen: Incision site from Heart Updated: 01/19/22 1500    Aerobic culture [[id number]] Collected: 01/19/22 1243    Order Status: Sent Specimen: Incision site from Heart Updated: 01/19/22 1320    Culture, Anaerobe [[id number]] Collected: 01/19/22 1243    Order Status: Sent Specimen: Incision site from Heart Updated: 01/19/22 1319            Imaging      Results for orders placed during the hospital encounter of 01/07/22    Echo    Interpretation Summary  · Mildly decreased systolic function.  · The estimated ejection fraction is 42%.  · Left ventricular diastolic dysfunction.  · There is moderate left ventricular global hypokinesis.  · Normal right ventricular size with normal right ventricular systolic function.  ·  Normal central venous pressure (3 mmHg).      Intra-Procedure Documentation  KOMAL performed in the Invasive Lab    - See Procedure Log link below for nursing documentation    - See KOMAL order on Card Proc Tab for physician findings   X-Ray Chest AP Portable  Narrative: EXAMINATION:  XR CHEST AP PORTABLE    CLINICAL HISTORY:  post device removal;    TECHNIQUE:  Single frontal view of the chest was performed.    COMPARISON:  January 17, 2022    FINDINGS:  Interval removal previously noted left-sided tri lead pacing device.  Interval removal of previously noted right PICC line.  Stable findings of sternotomy, upper normal to mildly enlarged heart size.  Interval development of bilateral scattered interstitial infiltrates that could be on the basis of edema and or atelectasis and or infection.  No lobar or consolidative pneumonia.  No pleural fluid.  No pneumothorax.  Remote healed left clavicular fracture.  No acute bony findings.  Impression: As above.    Electronically signed by: Maynor Case  Date:    01/19/2022  Time:    15:36  Transesophageal echo (KOMAL)  · KOMAL guidance used for ICD extraction. No pericardial effusion post   procedure.  · The left ventricle is normal in size with moderately decreased systolic   function. The estimated ejection fraction is 40%.  · Normal right ventricular size with normal right ventricular systolic   function.  · Mild mitral regurgitation.  · Mild tricuspid regurgitation.  · The estimated ejection fraction is 35%.         All imaging within the last 24 hours was reviewed.       Discharge Planning   BOUCHRA: 1/20/2022     Code Status: Full Code   Is the patient medically ready for discharge?: No    Reason for patient still in hospital (select all that apply): Patient trending condition, Treatment, Consult recommendations and Pending disposition  Discharge Plan A: Home            Assessment/Plan:      * Bacteremia due to Enterococcus  Infection involving implantable  cardioverter-defibrillator (ICD)  66 y.o. female with hx of HFrEF, HTN, T2DM, COPD admitted to OSH for sepsis 2/2 Enterococcus faecalis bacteremia in the setting of infected right lead/defibrillator (IE). Treated with IV antibiotics and transferred to C for removal of defibrillator.  - repeat blood cx (1/8 & 1/9) NGTD  - EP consulted for ICD removal; appreciate assistance  - NPO at midnight  - holding Eliquis and Plavix for procedure; resume once cleared by EP  - continue IV vanc (pharm to dose) for 6 weeks following ICD removal  - repeat blood cultures ordered  - pocket cultures and lead cultures submitted; continue to monitor  - replace PICC (removed for ease of ICD removal)  - ID consulted; appreciate assistance  - trend CBC, CRP  - tight glycemic control  ID recommends:   -- Can re-implant ICD if indicated at least 14 days following first negative BCx after removal  -- Consider referral for screening colonoscopy if due. Etiology of Enterococcus bacteremia unclear  -- Please refer to allergy for PCN skin testing    Prolonged QT interval  - Qtc 649ms on 1/7l 442 on 1/19  - hold celexa and ranexa for now  - daily EKG    Stage 3a chronic kidney disease  Estimated Creatinine Clearance: 42.9 mL/min (based on SCr of 1.3 mg/dL).  See: YAQUELIN      Infection involving implantable cardioverter-defibrillator (ICD)  S/p removal of ICD on 1/19/2022  See:  Bacteremia due to Enterococcus    Type 2 diabetes mellitus with hyperglycemia, without long-term current use of insulin  - home regimen: lantus 100U daily  - inpatient regimen: detemir 25U BID + aspart 10U TIDWM  - moderate dose SSI  - ACHS accuchecks  -continue to monitor and adjust regimen as indicated for goal sugars 14- - 180    Lab Results   Component Value Date    HGBA1C 10.8 (H) 01/11/2022           Primary hypertension  - continue imdur, amlodipine, and metoprolol  - holding aldactone/metolazone due YAQUELIN    Paroxysmal atrial fibrillation  - continue metoprolol 50mg  BID, amiodarone 200mg daily, and digoxin 0.125mg daily  - hold eliquis for ICD removal    COPD (chronic obstructive pulmonary disease)  - no acute issues  - duonebs PRN    YAQUELIN (acute kidney injury)  Stage 3a chronic kidney disease  Estimated Creatinine Clearance: 42.9 mL/min (based on SCr of 1.3 mg/dL).  - improved  - suspect 2/2 vanc and mild dehydration   - diuretics initially held but Lasix has been resumed; resume metolazone and spironolactone as tolerated  - continue to monitor renal function    Chronic combined systolic and diastolic heart failure  S/P CABG (coronary artery bypass graft)  - appears dry vs euvolemic  - lasix, metolazone, and aldactone initially held for YAQUELIN; furosemide resumed  - strict I/Os, daily weights  - cardiac diet    Coronary artery disease involving native coronary artery of native heart without angina pectoris  - continue statin and BB, hold plavix for Icd removal  - hold ranexa given prolonged QTc    VTE Risk Mitigation (From admission, onward)         Ordered     IP VTE HIGH RISK PATIENT  Once         01/15/22 3674            High Risk Conditions:  Patient is currently on drug therapy requiring intensive monitoring for toxicity: Vancomycin      I have assessed these findings virtually using a telemed platform and with assistance of the bedside nurse.        The attending portion of this evaluation, treatment, and documentation was performed per Blessing Varela MD via Telemedicine AudioVisual using the secure V[employer] software platform with 2 way audio/video. The provider was located off-site and the patient is located in the hospital. The aforementioned video software was utilized to document the relevant history and physical exam    Blessing Varela MD  Department of Hospital Medicine   [facility] - Cardiology

## 2022-01-19 NOTE — ASSESSMENT & PLAN NOTE
1. KOMAL to assist with CRTD device extraction  -No absolute contraindications of esophageal stricture, tumor, perforation, laceration,or diverticulum and/or active GI bleed  -The risks, benefits & alternatives of the procedure were explained to the patient.   -The risks of transesophageal echo include but are not limited to:  Dental trauma, esophageal trauma/perforation, bleeding, laryngospasm/brochospasm, aspiration, sore throat/hoarseness, & dislodgement of the endotracheal tube/nasogastric tube (where applicable).    -The risks of moderate sedation include hypotension, respiratory depression, arrhythmias, bronchospasm, & death.    -Informed consent was obtained. The patient is agreeable to proceed with the procedure and all questions and concerns addressed.    Case discussed with an attending in echocardiography lab.     Further recommendations per attending addendum

## 2022-01-19 NOTE — CONSULTS
Thank you for your consult to [facility]. We have reviewed the patient chart. This patient does meet criteria for [facility] service at this time. Will assume care on 01/19/22 at 7AM.    Blessing Varela MD  [facility] Medicine  [facility]

## 2022-01-19 NOTE — ASSESSMENT & PLAN NOTE
Stage 3a chronic kidney disease  Estimated Creatinine Clearance: 42.9 mL/min (based on SCr of 1.3 mg/dL).  - improved  - suspect 2/2 vanc and mild dehydration   - diuretics initially held but Lasix has been resumed; resume metolazone and spironolactone as tolerated  - continue to monitor renal function

## 2022-01-19 NOTE — CONSULTS
"[facility] - [facility] Step[facility]  Cardiology  Consult Note    Patient Name: Claudia Powell  MRN: [id number]  Admission Date: 1/15/2022  Hospital Length of Stay: 4 days  Code Status: Full Code   Attending Provider: Blessing Varela MD   Consulting Provider: Kami Santana MD  Primary Care Physician: Manjit Monae MD  Principal Problem:Bacteremia due to Enterococcus    Patient information was obtained from patient and ER records.     Consults  Subjective:     Chief Complaint:  KOMAL request for CRT d Extraction     HPI:   Claudia Powell is a 66 y.o.  female with past medical history of CAD s/p CABG x4 in 2017 (Dr. Sierra), HFrEF (EF 35% on KOMAL on this admit) s/p Medtronic CRT-D placed in 2018 per patient, pAF (on Eliquis 2.5 mg BID), Type II DM, PAD who initially presented to [facility] on 1/7/22 due to shortness of breath, fever, cough, upper respiratory symptoms. Blood cultures from 1/7/22 were positive for Enterococcus faecalis bacteremia . KOMAL (at OSH) showed small echogenic mass on the RV lead, hyperechoic oblong pannus formation seen on RV lead just below tricuspid valve and vegetation could not be rule out. Mass was measured at 2.1x1 cm. Right atrial lead was unremarkable. At [facility], Infectious Disease was consulted and the patient was placed on IV Vancomycin and Doxycycline (Doxy finished 1/14/21). Repeat blood cultures from 1/8/22 and 1/9/22 were no growth x5 days. Cardiology was consulted and the patient was transferred to [facility] for device extraction.      Regarding her cardiac history, the patient reports that she was first diagnosed with heart failure "years ago." She reports that her CABG was performed in 2017 with Dr. Sierra and that she sees Dr. Najera in [place] as her cardiologist. She does not see an electrophysiologist. She cannot remember when she was diagnosed with atrial fibrillation. She denies any history of stroke. She reports that she was supposed to be following in " device clinic but never went to the appointments.      Her Medtronic device was interrogated at bedside by myself today. Last interrogation was performed on 2018. The interrogation showed that she had been shocked 3 times for VF on 22. Patient states that she never felt any of those shocks and she does not remember losing consciousness on that day (same day as her admission to outside hospital.)    EP consulted and plans to do complete device extraction today.    KOMAL request placed to be done with device extraction.    Dysphagia or odynophagia:  No  Liver Disease, esophageal disease, or known varices:  No  Upper GI Bleeding: No  Snoring:  No  Sleep Apnea:  No  Prior neck surgery or radiation:  No  History of anesthetic difficulties:  No  Family history of anesthetic difficulties:  No  Last oral intake:  12 hours ago  Able to move neck in all directions:  Yes        Past Medical History:   Diagnosis Date    CHF (congestive heart failure)     COPD (chronic obstructive pulmonary disease) 2021    Coronary artery disease     Diabetes mellitus     Hyperlipidemia     Hypertension     LBBB (left bundle branch block)     NSTEMI (non-ST elevated myocardial infarction)     Obesity 2021    Pulmonary hypertension 2021    PVD (peripheral vascular disease)        Past Surgical History:   Procedure Laterality Date    CARDIAC CATHETERIZATION       SECTION      CORONARY ARTERY BYPASS GRAFT  06/22/2016    x4    HYSTERECTOMY      INSERTION OF BIVENTRICULAR IMPLANTABLE CARDIOVERTER-DEFIBRILLATOR (ICD)         Review of patient's allergies indicates:   Allergen Reactions    Cephalosporins      Tongue swelling and hives    Penicillins      hives         No current facility-administered medications on file prior to encounter.     Current Outpatient Medications on File Prior to Encounter   Medication Sig    albuterol (PROVENTIL/VENTOLIN HFA) 90 mcg/actuation inhaler Inhale 1 puff into the  lungs every 4 (four) hours as needed.    amiodarone (PACERONE) 200 MG Tab Take 1 tablet (200 mg total) by mouth once daily.    amLODIPine (NORVASC) 5 MG tablet Take 1 tablet (5 mg total) by mouth 2 (two) times daily.    apixaban (ELIQUIS) 2.5 mg Tab Take 1 tablet (2.5 mg total) by mouth 2 (two) times daily.    atorvastatin (LIPITOR) 40 MG tablet Take 1 tablet (40 mg total) by mouth every evening.    citalopram (CELEXA) 40 MG tablet Take 40 mg by mouth once daily.    clopidogreL (PLAVIX) 75 mg tablet Take 1 tablet (75 mg total) by mouth once daily.    digoxin (LANOXIN) 125 mcg tablet Take 1 tablet (0.125 mg total) by mouth once daily.    furosemide (LASIX) 40 MG tablet Take 1 tablet (40 mg total) by mouth once daily. for 3 days    gabapentin (NEURONTIN) 400 MG capsule Take 400 mg by mouth 3 (three) times daily.    isosorbide dinitrate (ISORDIL) 10 MG tablet Take 1 tablet (10 mg total) by mouth 3 (three) times daily.    LANTUS U-100 INSULIN 100 unit/mL injection Inject 100 Units into the skin once daily.    metOLazone (ZAROXOLYN) 5 MG tablet Take 5 mg by mouth every other day.    metoprolol tartrate (LOPRESSOR) 50 MG tablet Take 1 tablet (50 mg total) by mouth 2 (two) times daily.    ranolazine (RANEXA) 500 MG Tb12 Take 1 tablet (500 mg total) by mouth 2 (two) times daily.    spironolactone (ALDACTONE) 25 MG tablet Take 1 tablet (25 mg total) by mouth once daily. Hold until patient follows up with PCP or Cardiology (Patient taking differently: Take 25 mg by mouth once daily.)    multivitamin (THERAGRAN) per tablet Take 1 tablet by mouth once daily.     Family History     Problem Relation (Age of Onset)    Heart attack Mother, Father        Tobacco Use    Smoking status: Former Smoker    Smokeless tobacco: Never Used   Substance and Sexual Activity    Alcohol use: Not Currently    Drug use: Never    Sexual activity: Not on file     Review of Systems   Constitutional: Negative for chills, decreased  appetite and fever.   HENT: Negative for congestion and sore throat.    Eyes: Negative for blurred vision and discharge.   Cardiovascular: Negative for chest pain, claudication, cyanosis, dyspnea on exertion and leg swelling.   Respiratory: Negative for cough, hemoptysis and shortness of breath.    Endocrine: Negative for cold intolerance and heat intolerance.   Skin: Negative for color change.   Musculoskeletal: Negative for muscle weakness and myalgias.   Gastrointestinal: Negative for bloating and abdominal pain.   Neurological: Negative for dizziness, focal weakness and weakness.   Psychiatric/Behavioral: Negative for altered mental status and depression.     Objective:     Vital Signs (Most Recent):  Temp: 98.3 °F (36.8 °C) (01/19/22 0735)  Pulse: 60 (01/19/22 0735)  Resp: 16 (01/19/22 0537)  BP: 135/64 (01/19/22 0735)  SpO2: 95 % (01/19/22 0537) Vital Signs (24h Range):  Temp:  [96.9 °F (36.1 °C)-98.3 °F (36.8 °C)] 98.3 °F (36.8 °C)  Pulse:  [51-82] 60  Resp:  [16-20] 16  SpO2:  [94 %-98 %] 95 %  BP: (135-162)/(62-81) 135/64     Weight: 81.3 kg (179 lb 3.7 oz)  Body mass index is 31.75 kg/m².    SpO2: 95 %  O2 Device (Oxygen Therapy): room air      Intake/Output Summary (Last 24 hours) at 1/19/2022 0843  Last data filed at 1/19/2022 0600  Gross per 24 hour   Intake 361.66 ml   Output 1300 ml   Net -938.34 ml       Lines/Drains/Airways     Peripheral Intravenous Line                 Peripheral IV - Single Lumen 01/18/22 1842 20 G Left Antecubital <1 day         Peripheral IV - Single Lumen 01/18/22 2000 22 G Anterior;Left Forearm <1 day                Physical Exam  Constitutional:       Appearance: She is well-developed and well-nourished.   HENT:      Head: Normocephalic and atraumatic.      Right Ear: External ear normal.      Left Ear: External ear normal.   Eyes:      Extraocular Movements: EOM normal.      Conjunctiva/sclera: Conjunctivae normal.   Cardiovascular:      Rate and Rhythm: Normal rate and  regular rhythm.      Pulses: Intact distal pulses.           Radial pulses are 2+ on the right side and 2+ on the left side.      Heart sounds: Normal heart sounds.   Pulmonary:      Effort: Pulmonary effort is normal. No respiratory distress.      Breath sounds: Normal breath sounds. No wheezing.   Abdominal:      General: Bowel sounds are normal. There is no distension.      Palpations: Abdomen is soft.      Tenderness: There is no abdominal tenderness.   Musculoskeletal:         General: No edema. Normal range of motion.      Cervical back: Normal range of motion and neck supple.   Skin:     General: Skin is warm and dry.   Neurological:      Mental Status: She is alert and oriented to person, place, and time.         Significant Labs:   CMP   Recent Labs   Lab 01/18/22  0442      K 4.0      CO2 27   *   BUN 24*   CREATININE 1.3   CALCIUM 9.3   PROT 6.1   ALBUMIN 3.0*   BILITOT 0.5   ALKPHOS 101   AST 17   ALT 18   ANIONGAP 9   ESTGFRAFRICA 49.4*   EGFRNONAA 42.9*    and CBC   Recent Labs   Lab 01/18/22  0442   WBC 10.83   HGB 9.9*   HCT 30.6*          Significant Imaging: Echocardiogram:   2D echo with color flow doppler: No results found for this or any previous visit. and Transthoracic echo (TTE) complete ([facility] Only):   Results for orders placed or performed during the hospital encounter of 01/07/22   Echo   Result Value Ref Range    BSA 1.91 m2    LVIDd 4.57 3.5 - 6.0 cm    LVIDs 3.81 2.1 - 4.0 cm    FS 17 28 - 44 %    LV Systolic Volume 62.40 mL    LV Systolic Volume Index 33.5 mL/m2    LV Diastolic Volume 111.22 mL    LV Diastolic Volume Index 59.80 mL/m2    Right Atrial Pressure (from IVC) 3 mmHg    EF 42 %    Narrative    · Mildly decreased systolic function.  · The estimated ejection fraction is 42%.  · Left ventricular diastolic dysfunction.  · There is moderate left ventricular global hypokinesis.  · Normal right ventricular size with normal right ventricular systolic    function.  · Normal central venous pressure (3 mmHg).        Assessment and Plan:     * Bacteremia due to Enterococcus  1. KOMAL to assist with CRTD device extraction  -No absolute contraindications of esophageal stricture, tumor, perforation, laceration,or diverticulum and/or active GI bleed  -The risks, benefits & alternatives of the procedure were explained to the patient.   -The risks of transesophageal echo include but are not limited to:  Dental trauma, esophageal trauma/perforation, bleeding, laryngospasm/brochospasm, aspiration, sore throat/hoarseness, & dislodgement of the endotracheal tube/nasogastric tube (where applicable).    -The risks of moderate sedation include hypotension, respiratory depression, arrhythmias, bronchospasm, & death.    -Informed consent was obtained. The patient is agreeable to proceed with the procedure and all questions and concerns addressed.    Case discussed with an attending in echocardiography lab.     Further recommendations per attending addendum          VTE Risk Mitigation (From admission, onward)         Ordered     IP VTE HIGH RISK PATIENT  Once         01/15/22 1427                Thank you for your consult. I will follow-up with patient. Please contact us if you have any additional questions.    Kami Santana MD  Cardiology   [facility] - [facility] Stepdown

## 2022-01-19 NOTE — NURSING
Pt transported via stretcher to EP lab for procedure. Patient AOx4 and VS stable when leaving CSU.

## 2022-01-19 NOTE — TRANSFER OF CARE
"Anesthesia Transfer of Care Note    Patient: Claudia Powell    Procedure(s) Performed: Procedure(s) (LRB):  EXTRACTION, ELECTRODE LEAD (N/A)  ECHOCARDIOGRAM,TRANSESOPHAGEAL (N/A)    Patient location: Cath Lab    Anesthesia Type: general    Transport from OR: Transported from OR on 6-10 L/min O2 by face mask with adequate spontaneous ventilation    Post pain: adequate analgesia    Post assessment: no apparent anesthetic complications and tolerated procedure well    Post vital signs: stable    Level of consciousness: awake and alert    Nausea/Vomiting: no nausea/vomiting    Complications: none    Transfer of care protocol was followed      Last vitals:   Visit Vitals  /64   Pulse 60   Temp 36.8 °C (98.3 °F) (Oral)   Resp 16   Ht 5' 3" (1.6 m)   Wt 81.2 kg (179 lb)   LMP  (LMP Unknown)   SpO2 95%   BMI 31.71 kg/m²     "

## 2022-01-19 NOTE — PLAN OF CARE
Plan of care discussed with patient.  Patient remains free of falls and trauma. Patient to remain on bedrest until 1900 due to bilateral groin sites used in procedure to extract ICD. Groin sites are WNL and dressing is CDI. Patient has left chest pressure dressing that is clean dry and intact. Patient had LifeVest put on and demonstrated use. Patient has complaints of mild pain that is treated with PRN pain medication. Discussed medications and care. Patient has no questions at this time. Will continue to monitor.

## 2022-01-19 NOTE — ANESTHESIA PROCEDURE NOTES
Intubation    Date/Time: 1/19/2022 10:35 AM  Performed by: Mario Serna CRNA  Authorized by: Kayode Mckenzie MD     Intubation:     Induction:  Intravenous    Intubated:  Postinduction    Mask Ventilation:  Easy mask    Attempts:  1    Attempted By:  CRNA    Method of Intubation:  Video laryngoscopy    Blade:  Ramirez 3    Laryngeal View Grade: Grade I - full view of cords      Difficult Airway Encountered?: No      Complications:  None    Airway Device:  Oral endotracheal tube    Airway Device Size:  7.0    Style/Cuff Inflation:  Cuffed    Inflation Amount (mL):  6    Tube secured:  21    Secured at:  The lips    Placement Verified By:  Capnometry    Complicating Factors:  None    Findings Post-Intubation:  BS equal bilateral

## 2022-01-20 NOTE — PLAN OF CARE
Patient free from falls and injuries throughout shift.  AAO and VSS.  Patient denies chest pain and SOB. On RA with sats 95%.   Bilateral groin sites WNL. Life vest in place. Catheter taken out. 400cc output. No acute events overnight. Patient resting well at this time.  Plan of care discussed with patient.  Patient verbalizes understanding.  Will continue to monitor.

## 2022-01-20 NOTE — PROGRESS NOTES
Pharmacokinetic Assessment Follow Up: IV Vancomycin    Vancomycin serum concentration assessment(s):    The trough level was drawn correctly and can be used to guide therapy at this time. The measurement is below the desired definitive target range of 15 to 20 mcg/mL.    Vancomycin Regimen Plan:    Plan to change the patient's vancomycin regimen from 1.25 g IV q24h to 1.75 g IV q24h  Since the 1.25 g IV vancomycin dose was already started today, will plan on giving a one time 500 mg IV vancomycin dose after the 1.25 g IV dose to administer a total of 1.75 g IV today  Plan for a trough prior to the third dose on 1/22 at 1100 for close monitoring as 1.75 g is ~ 20 mg/kg     Drug levels (last 3 results):  Recent Labs   Lab Result Units 01/18/22 0442 01/20/22  1134   Vancomycin, Random ug/mL 5.8  --    Vancomycin-Trough ug/mL  --  9.2*       Pharmacy will continue to follow and monitor vancomycin.    Please contact pharmacy at extension [phone] for questions regarding this assessment.    Thank you for the consult,   Favian Pratt, PharmD, [facility]      Patient brief summary:  Claudia Powell is a 66 y.o. female initiated on antimicrobial therapy with IV Vancomycin for treatment of bacteremia    Drug Allergies:   Review of patient's allergies indicates:   Allergen Reactions    Cephalosporins      Tongue swelling and hives    Penicillins      hives         Actual Body Weight:   81.2 kg    Renal Function:   Estimated Creatinine Clearance: 39.9 mL/min (based on SCr of 1.4 mg/dL).,     Dialysis Method (if applicable):  N/A    CBC (last 72 hours):  Recent Labs   Lab Result Units 01/18/22  0442   WBC K/uL 10.83   Hemoglobin g/dL 9.9*   Hematocrit % 30.6*   Platelets K/uL 294   Gran % % 66.8   Lymph % % 21.1   Mono % % 7.8   Eosinophil % % 2.1   Basophil % % 0.7   Differential Method  Automated       Metabolic Panel (last 72 hours):  Recent Labs   Lab Result Units 01/18/22  0442 01/20/22  0916   Sodium mmol/L 136 135*    Potassium mmol/L 4.0 4.1   Chloride mmol/L 100 102   CO2 mmol/L 27 20*   Glucose mg/dL 181* 202*   BUN mg/dL 24* 29*   Creatinine mg/dL 1.3 1.4   Albumin g/dL 3.0*  --    Total Bilirubin mg/dL 0.5  --    Alkaline Phosphatase U/L 101  --    AST U/L 17  --    ALT U/L 18  --        Vancomycin Administrations:  vancomycin given in the last 96 hours                   vancomycin 1.25 g in dextrose 5% 250 mL IVPB (ready to mix) (mg) 1,250 mg New Bag 01/20/22 1150     1,250 mg Given 01/19/22 1137     1,250 mg New Bag 01/18/22 1125    vancomycin injection (mg) 1,000 mg Given 01/19/22 1146                Microbiologic Results:  Microbiology Results (last 7 days)     Procedure Component Value Units Date/Time    Culture, Anaerobe [[id number]] Collected: 01/19/22 1416    Order Status: Completed Specimen: Incision site from Heart Updated: 01/20/22 0735     Anaerobic Culture Culture in progress    Narrative:      RV LEAD    Culture, Anaerobe [[id number]] Collected: 01/19/22 1418    Order Status: Completed Specimen: Incision site from Heart Updated: 01/20/22 0735     Anaerobic Culture Culture in progress    Narrative:      RA LEAD    Culture, Anaerobe [[id number]] Collected: 01/19/22 1243    Order Status: Completed Specimen: Incision site from Heart Updated: 01/20/22 0735     Anaerobic Culture Culture in progress    Narrative:      POCKET #1    Culture, Anaerobe [[id number]] Collected: 01/19/22 1422    Order Status: Completed Specimen: Incision site from Heart Updated: 01/20/22 0735     Anaerobic Culture Culture in progress    Narrative:      CS LEAD    Aerobic culture [[id number]] Collected: 01/19/22 1422    Order Status: Completed Specimen: Incision site from Heart Updated: 01/20/22 0710     Aerobic Bacterial Culture No growth    Narrative:      CS LEAD    Aerobic culture [[id number]] Collected: 01/19/22 1243    Order Status: Completed Specimen: Incision site from Heart Updated: 01/20/22 0710     Aerobic Bacterial Culture No growth     Narrative:      POCKET #1    Aerobic culture [[id number]] Collected: 01/19/22 1416    Order Status: Completed Specimen: Incision site from Heart Updated: 01/20/22 0710     Aerobic Bacterial Culture No growth    Narrative:      RV LEAD    Aerobic culture [[id number]] Collected: 01/19/22 1418    Order Status: Completed Specimen: Incision site from Heart Updated: 01/20/22 0710     Aerobic Bacterial Culture No growth    Narrative:      RA LEAD    Blood culture [[id number]] Collected: 01/19/22 1803    Order Status: Completed Specimen: Blood from Peripheral, Right Hand Updated: 01/20/22 0115     Blood Culture, Routine No Growth to date    Blood culture [[id number]] Collected: 01/19/22 1803    Order Status: Completed Specimen: Blood from Peripheral, Left Arm Updated: 01/20/22 0115     Blood Culture, Routine No Growth to date

## 2022-01-20 NOTE — SUBJECTIVE & OBJECTIVE
Telemedicine  This service was provided by Virtual Visit.    Patient was transferred to [facility] on:  1/19/2022    Chief Complaint   Patient presents with    Shortness of Breath       87% room air per EMS     The patient location is: 303/303 [address]   Admitted 1/15/2022 11:13 PM  Present with the patient at the time of the telemed/virtual assessment: Telepresenter    Interval History / Events Overnight:   The patient is able to provide adequate history. Additional history was obtained from past medical records. No significant events reported by Nursing.  Patient complains of odd odor of urine. Symptoms have worsened since yesterday. Associated symptoms include: fatigue. Symptoms are stable.     Lab test(s) reviewed: sCr stable  Discussed case with ID (another health care provider)    Review of Systems   Constitutional: Negative for fever.   Respiratory: Negative for shortness of breath.    Genitourinary: Negative for dysuria.     Objective:     Vital Signs (Most Recent):  Temp: 97.3 °F (36.3 °C) (01/20/22 1018)  Pulse: (!) 57 (01/20/22 1104)  Resp: 20 (01/20/22 1141)  BP: 136/60 (01/20/22 1018)  SpO2: (!) 91 % (01/20/22 1018) Vital Signs (24h Range):  Temp:  [97.3 °F (36.3 °C)-98.2 °F (36.8 °C)] 97.3 °F (36.3 °C)  Pulse:  [40-58] 57  Resp:  [15-20] 20  SpO2:  [88 %-97 %] 91 %  BP: (135-170)/(60-76) 136/60     Weight: 81.2 kg (179 lb)  Body mass index is 31.71 kg/m².    Intake/Output Summary (Last 24 hours) at 1/20/2022 1215  Last data filed at 1/20/2022 0530  Gross per 24 hour   Intake 1810 ml   Output 600 ml   Net 1210 ml      Physical Exam  Constitutional:       General: She is not in acute distress.     Appearance: Normal appearance. She is obese. She is not diaphoretic.   Eyes:      General: Lids are normal. No scleral icterus.        Right eye: No discharge.         Left eye: No discharge.      Conjunctiva/sclera: Conjunctivae normal.   Cardiovascular:      Rate and Rhythm: Bradycardia present.    Pulmonary:      Effort: Pulmonary effort is normal. No tachypnea, accessory muscle usage or respiratory distress.   Abdominal:      General: There is no distension.   Skin:     Coloration: Skin is not cyanotic.   Neurological:      Mental Status: She is alert and oriented to person, place, and time. She is not disoriented.   Psychiatric:         Attention and Perception: Attention normal.         Mood and Affect: Affect normal.         Behavior: Behavior is cooperative.         Significant Labs:     Recent Labs   Lab 09/13/21  0753 01/11/22  0120   HGBA1C 6.4* 10.8*     Recent Labs   Lab 01/20/22  0827 01/20/22  1217 01/20/22  1529   POCTGLUCOSE 187* 197* 266*     Recent Labs   Lab 01/16/22  0339 01/17/22  0534 01/18/22  0442   WBC 10.87 9.53 10.83   HGB 11.2* 10.2* 9.9*   HCT 34.9* 30.9* 30.6*    319 294     Recent Labs   Lab 01/14/22  0626 01/15/22  0749 01/16/22  0339 01/16/22  0339 01/17/22  0534 01/18/22  0442   GRAN 72.0   < > 60.6  6.6   < > 60.7  5.8 66.8  7.2   BAND 4.0  --   --   --   --   --    LYMPH 11.0*   < > 23.6  2.6   < > 23.7  2.3 21.1  2.3   MONO 5.0   < > 9.0  1.0   < > 9.1  0.9 7.8  0.9   EOS  --    < > 0.3  --  0.2 0.2    < > = values in this interval not displayed.     Recent Labs   Lab 01/14/22  0626 01/14/22  0626 01/15/22  0748 01/15/22  0748 01/16/22  0339 01/16/22  0339 01/17/22  0534 01/18/22  0442 01/20/22  0916   *   < > 135*   < > 135*   < > 137 136 135*   K 3.7   < > 3.9   < > 4.2   < > 3.9 4.0 4.1   CL 92*   < > 95   < > 97   < > 99 100 102   CO2 30*   < > 29   < > 26   < > 28 27 20*   BUN 37*   < > 41*   < > 35*   < > 28* 24* 29*   CREATININE 1.6*   < > 1.7*   < > 1.4   < > 1.5* 1.3 1.4   *   < > 198*   < > 206*   < > 192* 181* 202*   CALCIUM 9.0   < > 9.7   < > 10.3   < > 10.0 9.3 9.3   ALBUMIN 3.2*   < > 3.3*   < > 3.2*  --  3.0* 3.0*  --    MG 1.7  --  1.9  --  1.9  --   --   --   --     < > = values in this interval not displayed.     Recent Labs    Lab 01/15/22  0748 01/15/22  0749 01/16/22  0339 01/17/22  0534 01/18/22  0442   ALKPHOS   < >  --  113 96 101   ALT   < >  --  20 17 18   AST   < >  --  21 19 17   PROT   < >  --  7.2 6.5 6.1   BILITOT   < >  --  0.5 0.5 0.5   INR  --   --  1.0  --   --    CRP  --  0.84* 8.8*  --   --     < > = values in this interval not displayed.     Recent Labs   Lab 09/13/21  0230 09/14/21  0303 11/07/21  0132 11/22/21  0851 01/07/22  1055 01/07/22  1634   PROCAL   < >  --  0.42 <0.05 0.40  --    LACTATE  --  3.2*  --   --  2.3* 1.4   DDIMER  --   --   --   --  0.38  --    FERRITIN  --   --   --   --  76  --     < > = values in this interval not displayed.     SARS-CoV-2 RNA, Amplification, Qual (no units)   Date Value   01/18/2022 Negative   01/07/2022 Negative   11/26/2021 Negative   11/22/2021 Negative   11/06/2021 Negative   09/13/2021 Negative         Results for orders placed during the hospital encounter of 01/07/22    Echo    Interpretation Summary  · Mildly decreased systolic function.  · The estimated ejection fraction is 42%.  · Left ventricular diastolic dysfunction.  · There is moderate left ventricular global hypokinesis.  · Normal right ventricular size with normal right ventricular systolic function.  · Normal central venous pressure (3 mmHg).      Electrophysiology Procedure  · Successful laser lead extraction with complete system removal of a   previously implanted Medtronic CRT-D in the setting of bacteremia with   device infection.  · Complex device extraction techniques with laser sheath required.  · Extensive pocket debridement with culture swabs sent for microbial   analysis.     I certify that I was present for the critical steps of the procedure   including the diagnostic, surgical and/or interventional portions.     Procedure Log documented by Documenter: Leny Mcgowan RN and verified   by PENG Blanchard MD.    Date: 1/20/2022  Time: 5:32 PM  Intra-Procedure Documentation  KOMAL performed in  the Invasive Lab    - See Procedure Log link below for nursing documentation    - See KOMAL order on Card Proc Tab for physician findings       Labs and Imaging within the last 24 hours listed above were reviewed.       Diet: Diet [facility]  GI Prophylaxis: Not indicated  Significant LDAs:   IV Access Type: Peripheral  Urinary Catheter Indication if present: Patient Does Not Have Urinary Catheter  Other Lines/Tubes/Drains:    HIGH RISK CONDITION(S):   Patient is currently on drug therapy requiring intensive monitoring for toxicity: Vancomycin and Digoxin     Goals of Care:    Previous admission:  1/7/22  Likely prognosis:  Fair  Code Status: Full Code  Comfort Only: No  Hospice: No  Goals at discharge: remain at home, with physician follow-up    Discharge Planning   BOUCHRA: 1/21/2022     Code Status: Full Code   Is the patient medically ready for discharge?: No    Reason for patient still in hospital (select all that apply): Laboratory test, Pending disposition and Other (specify) PICC placement  Discharge Plan A: Home

## 2022-01-20 NOTE — PROGRESS NOTES
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 Hyperechoic oblong pannus formation seen on RV lead just below the tricuspid valve with some peripheral irregularities associated. A vegetation cannot be ruled out completely. This measures 2.1 by 1.0 cm in total.  -- BCx 1/7 pansensitive Enterococcus faecalis  -- BCx 1/8, 1/9 NGTD  -- Resp cx 1/10 moderate Candida albicans  -- CT abd/pelvis no obvious potential source for introduction of E. Faecalis into blood stream. NM scan negative.   -- now s/p device removal 1/19    Recommendations:  -- Continue vancomycin for 6 weeks following from the day of device removal 1/19  -- Repeat blood cultures after device removal NGTD  -- Culture of fluid collection and leads pending  -- OK for PICC placement from ID standpoint  -- Can re-implant ICD if indicated at least 14 days following first negative BCx after removal  -- Consider referral for screening colonoscopy if due. Etiology of Enterococcus bacteremia unclear  -- Please refer to allergy for PCN skin testing            Anticipated Disposition: 6 weeks IV antibiotics    Thank you for your consult. I will follow-up with patient. Please contact us if you have any additional questions.    Kayode Del Cid DO  Infectious Disease  [facility] - Cardiology Stepdown    Subjective:     Principal Problem:Bacteremia due to Enterococcus    HPI: Claudia Powell is a 66 y.o. F with history of CAD, HFrEF s/p AICD, who transfers to [facility] [place] of  infected AICD. She presented to [facility] 1/7 with fever, SOB, productive cough and was admitted with sepsis 2/2 Enterococcus faecalis bacteremia. Initiated on empiric vanc, meropenem, and doxycycline with resolution of fever and leukocytosis. KOMAL revealed suspicious pannus of RV lead concerning for vegetation. She completed 5 days doxycycline and continued with vancomycin for planned 6 weeks following ICD removal. Transferred to [facility] for CTS eval /removal of ICD. ID consulted for abx recommendations for Enterococcus bacteremia. Reports some mild nausea before presentation. Denies abdominal pain, diarrhea, dysuria, frequency.    Interval History: Patient s/p device removal. States that she has pain over her chest, but denies feeling feverish. She was afebrile over night. She had questions about when she will be possibly discharged.     Review of Systems   Constitutional: Negative for chills and fever.   Respiratory: Negative for cough and shortness of breath.    Cardiovascular: Negative for chest pain, palpitations and leg swelling.   Gastrointestinal: Negative for abdominal pain, constipation, diarrhea, nausea and vomiting.   Genitourinary: Negative for difficulty urinating and dysuria.   Musculoskeletal: Positive for myalgias (Muscular pain across chest).   Skin: Negative for rash.   Neurological: Negative for dizziness, light-headedness and headaches.   Psychiatric/Behavioral: Negative for confusion.     Objective:     Vital Signs (Most Recent):  Temp: 97.3 °F (36.3 °C) (01/20/22 1018)  Pulse: (!) 57 (01/20/22 1104)  Resp: 16 (01/20/22 1018)  BP: 136/60 (01/20/22 1018)  SpO2: (!) 91 % (01/20/22 1018) Vital Signs (24h Range):  Temp:  [97.3 °F (36.3 °C)-98.2 °F (36.8 °C)] 97.3 °F (36.3 °C)  Pulse:  [40-58] 57  Resp:  [15-20] 16  SpO2:  [88 %-97 %] 91 %  BP: (135-170)/(60-76) 136/60     Weight: 81.2 kg (179 lb)  Body mass index is 31.71 kg/m².    Estimated Creatinine Clearance: 39.9 mL/min (based on SCr of 1.4  mg/dL).    Physical Exam  Vitals and nursing note reviewed.   Constitutional:       General: She is not in acute distress.     Appearance: Normal appearance. She is obese. She is not ill-appearing, toxic-appearing or diaphoretic.   HENT:      Head: Normocephalic and atraumatic.      Nose: Nose normal.   Eyes:      General: No scleral icterus.     Conjunctiva/sclera: Conjunctivae normal.   Cardiovascular:      Rate and Rhythm: Regular rhythm. Bradycardia present.      Heart sounds: No murmur heard.  No gallop.       Comments: Life vest in place  Abdominal:      General: There is no distension.      Palpations: Abdomen is soft.      Tenderness: There is no abdominal tenderness.   Musculoskeletal:      Cervical back: Normal range of motion.      Right lower leg: No edema.      Left lower leg: No edema.      Comments: Bandages of inguinal area bilaterally, tender with palpation, no surrounding erythema or oozing  Bandage on chest   Neurological:      General: No focal deficit present.      Mental Status: She is alert. Mental status is at baseline.   Psychiatric:         Mood and Affect: Mood normal.         Thought Content: Thought content normal.         Judgment: Judgment normal.         Significant Labs:     CMP:   Recent Labs   Lab 01/20/22  0916   *   K 4.1      CO2 20*   *   BUN 29*   CREATININE 1.4   CALCIUM 9.3   ANIONGAP 13   EGFRNONAA 39.2*       Significant Imaging: I have reviewed all pertinent imaging results/findings within the past 24 hours.

## 2022-01-20 NOTE — PROGRESS NOTES
[facility]  EP  Progress Note      Interval History:  -No acute events overnight. Patient without new complaints.  -Telemetry reviewed for past 24 hrs: Appears sinus (p waves low amplitude) bradycardia with underlying LBBB. Patient asymptomatic overnight      Last Echo from 1/8/22:  · Mildly decreased systolic function.  · The estimated ejection fraction is 42%.  · Left ventricular diastolic dysfunction.  · There is moderate left ventricular global hypokinesis.  · Normal right ventricular size with normal right ventricular systolic function.  · Normal central venous pressure (3 mmHg).      Vitals:  Temp:  [97.3 °F (36.3 °C)-98.2 °F (36.8 °C)] 98.2 °F (36.8 °C)  Pulse:  [40-52] 52  Resp:  [15-20] 16  SpO2:  [88 %-97 %] 91 %  BP: (135-170)/(64-76) 170/65    Intake/Output    Intake/Output Summary (Last 24 hours) at 1/20/2022 0833  Last data filed at 1/20/2022 0530  Gross per 24 hour   Intake 2060 ml   Output 600 ml   Net 1460 ml       Physical Exam  Gen: No apparent distress, resting comfortably  HEENT: Pupils equal and reactive to light  Cardio: Regular rate, point of maximal impulse not displaced, no murmur noted, 2+ radial pulses bilaterally, 2+ DP pulses bilaterally  Resp: CTAB, no wheezing  Abd: Soft, non-tender, non-distended  Skin: Warm, dry, no peripheral edema noted, LifeVest on  Neuro: Alert and oriented x3  Psych: Normal mood and affect    Labs:  Recent Labs   Lab 01/16/22  0339 01/17/22  0534 01/18/22  0442   WBC 10.87 9.53 10.83   HGB 11.2* 10.2* 9.9*   HCT 34.9* 30.9* 30.6*    319 294     Recent Labs   Lab 01/14/22  0626 01/14/22  0626 01/15/22  0748 01/15/22  0748 01/16/22  0339 01/17/22  0534 01/18/22  0442   *   < > 135*   < > 135* 137 136   K 3.7   < > 3.9   < > 4.2 3.9 4.0   CL 92*   < > 95   < > 97 99 100   CO2 30*   < > 29   < > 26 28 27   BUN 37*   < > 41*   < > 35* 28* 24*   CREATININE 1.6*   < > 1.7*   < > 1.4 1.5* 1.3   *   < > 198*   < > 206* 192* 181*    CALCIUM 9.0   < > 9.7   < > 10.3 10.0 9.3   MG 1.7  --  1.9  --  1.9  --   --     < > = values in this interval not displayed.          Current Meds:   amiodarone  200 mg Oral Daily    amLODIPine  5 mg Oral BID    atorvastatin  40 mg Oral QHS    digoxin  0.125 mg Oral Daily    furosemide  40 mg Oral Daily    gabapentin  300 mg Oral TID    insulin aspart U-100  15 Units Subcutaneous TIDWM    insulin detemir U-100  25 Units Subcutaneous BID    isosorbide dinitrate  10 mg Oral TID    multivitamin  1 tablet Oral Daily    mupirocin   Nasal BID    pentoxifylline  400 mg Oral TID    sodium chloride 0.9%  10 mL Intravenous Q6H    vancomycin (VANCOCIN) IVPB  15 mg/kg Intravenous Q24H       Continuous Infusions:      PRN:  sodium chloride, acetaminophen, albuterol-ipratropium, bisacodyL, BUPivacaine (PF) 0.25% (2.5 mg/ml), dextrose 50%, dextrose 50%, glucagon (human recombinant), glucose, glucose, influenza, insulin aspart U-100, LIDOcaine HCL 20 mg/ml (2%), melatonin, ondansetron, oxyCODONE, polyethylene glycol, Flushing PICC Protocol **AND** sodium chloride 0.9% **AND** sodium chloride 0.9%, sodium chloride 0.9%, sodium chloride 0.9%, sodium chloride 0.9%, Pharmacy to dose Vancomycin consult **AND** vancomycin - pharmacy to dose, vancomycin    Assessment and Plan:    1. Medtronic CRT-D Device Infection  -Patient found to have Enterococcus faecalis bacteremia at outside hospital with unclear source  -Infectious Disease consulted: Anticipate 6 week course of antibiotics following device removal. Continue IV Vancomycin. Can re-implant ICD if indicated at least 14 days following negative blood culture  -Device extracted on 1/19/22 with Dr. Blanchard  -Now with sinus bradycardia and LBBB HR in 40s but asymptomatic    Discharge Instructions/Recommendations:  -Repeat Echo in one month as outpatient to help determine need for CRT-P vs CRT-D and also follow up with Dr. Blanchard in clinic  -Ok to discharge today after  PICC line placed  -Continue home doses of amiodarone and digoxin  -Hold Eliquis for next 3 days then resume  -Follow up in device clinic in one week  -EP will sign off     2. CAD s/p CABG in 2017  -Continue aspirin/plavix     3. HFrEF (EF 35%, ischemic)  -Management per primary     4. Paroxysmal A-fib  -Continue home meds including amiodarone 200 mg qd and digoxin 0.125 mg qd  -Can re-start Eliquis in 3 days        Kale Jaevd MD  [facility] PGY-4    Pager number: [phone]    Staff attestation to follow.    This note was prepared using voice recognition system and may have sound alike errors that may have been overlooked even with proof reading.

## 2022-01-20 NOTE — PLAN OF CARE
01/20/22 1623   Post-Acute Status   Post-Acute Authorization Home Health;IV Infusion   Home Health Status Referrals Sent   IV Infusion Status Post acute provider reviewing for acceptance     Under review with [facility].  HH referral sent to [facility].    Alka Booker LMSW  [facility]  [phone]

## 2022-01-20 NOTE — ANESTHESIA POSTPROCEDURE EVALUATION
Anesthesia Post Evaluation    Patient: Claudia Powell    Procedure(s) Performed: Procedure(s) (LRB):  EXTRACTION, ELECTRODE LEAD (N/A)  ECHOCARDIOGRAM,TRANSESOPHAGEAL (N/A)    Final Anesthesia Type: general      Patient location during evaluation: PACU  Patient participation: Yes- Able to Participate  Level of consciousness: awake and alert  Post-procedure vital signs: reviewed and stable  Pain management: adequate  Airway patency: patent    PONV status at discharge: No PONV  Anesthetic complications: no      Cardiovascular status: hemodynamically stable  Respiratory status: unassisted  Hydration status: euvolemic  Follow-up not needed.          Vitals Value Taken Time   /65 01/20/22 0705   Temp 36.8 °C (98.2 °F) 01/20/22 0705   Pulse 52 01/20/22 0705   Resp 16 01/20/22 0705   SpO2 91 % 01/20/22 0705         No case tracking events are documented in the log.      Pain/Walt Score: Pain Rating Prior to Med Admin: 7 (1/20/2022  5:25 AM)  Walt Score: 10 (1/19/2022  3:30 PM)

## 2022-01-20 NOTE — SUBJECTIVE & OBJECTIVE
Interval History: Patient s/p device removal. States that she has pain over her chest, but denies feeling feverish. She was afebrile over night. She had questions about when she will be possibly discharged.     Review of Systems   Constitutional: Negative for chills and fever.   Respiratory: Negative for cough and shortness of breath.    Cardiovascular: Negative for chest pain, palpitations and leg swelling.   Gastrointestinal: Negative for abdominal pain, constipation, diarrhea, nausea and vomiting.   Genitourinary: Negative for difficulty urinating and dysuria.   Musculoskeletal: Positive for myalgias (Muscular pain across chest).   Skin: Negative for rash.   Neurological: Negative for dizziness, light-headedness and headaches.   Psychiatric/Behavioral: Negative for confusion.     Objective:     Vital Signs (Most Recent):  Temp: 97.3 °F (36.3 °C) (01/20/22 1018)  Pulse: (!) 57 (01/20/22 1104)  Resp: 16 (01/20/22 1018)  BP: 136/60 (01/20/22 1018)  SpO2: (!) 91 % (01/20/22 1018) Vital Signs (24h Range):  Temp:  [97.3 °F (36.3 °C)-98.2 °F (36.8 °C)] 97.3 °F (36.3 °C)  Pulse:  [40-58] 57  Resp:  [15-20] 16  SpO2:  [88 %-97 %] 91 %  BP: (135-170)/(60-76) 136/60     Weight: 81.2 kg (179 lb)  Body mass index is 31.71 kg/m².    Estimated Creatinine Clearance: 39.9 mL/min (based on SCr of 1.4 mg/dL).    Physical Exam  Vitals and nursing note reviewed.   Constitutional:       General: She is not in acute distress.     Appearance: Normal appearance. She is obese. She is not ill-appearing, toxic-appearing or diaphoretic.   HENT:      Head: Normocephalic and atraumatic.      Nose: Nose normal.   Eyes:      General: No scleral icterus.     Conjunctiva/sclera: Conjunctivae normal.   Cardiovascular:      Rate and Rhythm: Regular rhythm. Bradycardia present.      Heart sounds: No murmur heard.  No gallop.       Comments: Life vest in place  Abdominal:      General: There is no distension.      Palpations: Abdomen is soft.       Tenderness: There is no abdominal tenderness.   Musculoskeletal:      Cervical back: Normal range of motion.      Right lower leg: No edema.      Left lower leg: No edema.      Comments: Bandages of inguinal area bilaterally, tender with palpation, no surrounding erythema or oozing  Bandage on chest   Neurological:      General: No focal deficit present.      Mental Status: She is alert. Mental status is at baseline.   Psychiatric:         Mood and Affect: Mood normal.         Thought Content: Thought content normal.         Judgment: Judgment normal.         Significant Labs:     CMP:   Recent Labs   Lab 01/20/22  0916   *   K 4.1      CO2 20*   *   BUN 29*   CREATININE 1.4   CALCIUM 9.3   ANIONGAP 13   EGFRNONAA 39.2*       Significant Imaging: I have reviewed all pertinent imaging results/findings within the past 24 hours.

## 2022-01-20 NOTE — PLAN OF CARE
Pt maintained free from falls/trauma/injuries and skin breakdown. Pt c/o back and bilateral lower legs pain and PRN Oxy given. BG monitored and coverage given as ordered. IV abx given as scheduled. Plan of care reviewed. Pt verbalized understanding. All questions and concerns addressed. Will continue to monitor.

## 2022-01-20 NOTE — PLAN OF CARE
01/20/22 1302   Post-Acute Status   Post-Acute Authorization IV Infusion   [facility] Infusion Status Referral(s) sent     Per Dr Mcneal plan for pt to discharge tomorrow on IV vancomycin.  Referral sent to [facility].    Alka Booker LMSW  [facility]  [phone]     Okay to observe.     If patient is anxious and desires check this week that is okay as well. Would schedule in any opening.

## 2022-01-20 NOTE — ASSESSMENT & PLAN NOTE
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 Hyperechoic oblong pannus formation seen on RV lead just below the tricuspid valve with some peripheral irregularities associated. A vegetation cannot be ruled out completely. This measures 2.1 by 1.0 cm in total.  -- BCx 1/7 pansensitive Enterococcus faecalis  -- BCx 1/8, 1/9 NGTD  -- Resp cx 1/10 moderate Candida albicans  -- CT abd/pelvis no obvious potential source for introduction of E. Faecalis into blood stream. NM scan negative.   -- now s/p device removal 1/19    Recommendations:  -- Continue vancomycin for 6 weeks following from the day of device removal 1/19  -- Repeat blood cultures after device removal NGTD  -- Culture of fluid collection and leads pending  -- OK for PICC placement from ID standpoint  -- Can re-implant ICD if indicated at least 14 days following first negative BCx after removal  -- Consider referral for screening colonoscopy if due. Etiology of Enterococcus bacteremia unclear  -- Please refer to allergy for PCN skin testing

## 2022-01-21 NOTE — PLAN OF CARE
Referral received for home IVABX at d/c, pt accepted for services by [facility].  OHI educator will meet with pt for education/training following PICC line placement.  Will await final HH and d/c orders.  Pt will need to receive vancomycin dose per floor nursing prior to d/c.  Anticipate delivery of IV medication and supplies to home following d/c.    BOUCHRA:  1/21/22    Home IV Therapy: Vancomycin 1.75Gm IV Q24hrs END DATE- 6 weeks from 1/19 (awaiting final culture results)    Home IV Access:  PICC line placement pending    Home Health Agency: [facility] referral pending    Home Health Orders:  Pending d/c    [facility]  Gabriella Anguiano RN Clinical Liaison  Cell: [phone]  Office: [phone]   Fax:  [phone]

## 2022-01-21 NOTE — PLAN OF CARE
[facility] - Cardiology Stepdown      HOME HEALTH ORDERS  FACE TO FACE ENCOUNTER    Patient Name: Claudia Powell  YOB: 1955    PCP: Manjit Monae MD   PCP Address: [address] [place] [address] [address] / [facility] [address]  PCP Phone Number: [phone]  PCP Fax: [phone]    Encounter Date: 1/13/22    Admit to Home Health    Diagnoses:  Active Hospital Problems    Diagnosis  POA    *Bacteremia due to Enterococcus [R78.81, B95.2]  Yes    Stage 3a chronic kidney disease [N18.31]  Yes    Prolonged QT interval [R94.31]  Yes    Infection involving implantable cardioverter-defibrillator (ICD) [T82.7XXA]  Yes    Type 2 diabetes mellitus with hyperglycemia, without long-term current use of insulin [E11.65]  Yes    Paroxysmal atrial fibrillation [I48.0]  Yes    Primary hypertension [I10]  Yes    COPD (chronic obstructive pulmonary disease) [J44.9]  Yes     Chronic    S/P CABG (coronary artery bypass graft) [Z95.1]  Not Applicable     Chronic    AYQUELIN (acute kidney injury) [N17.9]  Yes    Chronic combined systolic and diastolic heart failure [I50.42]  Yes     Chronic    Coronary artery disease involving native coronary artery of native heart without angina pectoris [I25.10]  Yes    Peripheral vascular disease [I73.9]  Yes     Chronic      Resolved Hospital Problems   No resolved problems to display.       Follow Up Appointments:  Future Appointments   Date Time Provider Department Center   1/24/2022  1:20 PM Shakira Brian PA-C [facility]IO [facility] at [facility]   1/31/2022 11:20 AM CARLIE [facility] MED [place]   3/10/2022  9:00 AM Fadi Dutta MD [facility] ALLERGY [facility]      Follow-up Information     A Angelique Blanchard MD. Schedule an appointment as soon as possible for a visit in 2 weeks.    Specialty: Electrophysiology  Why: For discharge from hospital follow up, As previously planned. Follow up in [facility] in 1 week  Contact information:  [address]  [facility]  [address]  [phone]             Shakira Brian PA-C On 1/24/2022.    Specialty: Cardiology  Why: Cardiology follow up at 1:20PM   Contact information:  [address]  [address]  [facility] [address]  [phone]             CARLIE Coats On 1/31/2022.    Specialty: Internal Medicine  Why: hospital follow up at 11:20AM   Contact information:  [address]  [place] [address] [phone]             [facility] ENDOCRINOLOGY. Schedule an appointment as soon as possible for a visit in 1 week.    Specialty: Endocrinology  Why: For discharge from hospital follow up, To establish care  Contact information:  [address] [place] [address] Drive  [facility] [address] [phone]                     Allergies:  Review of patient's allergies indicates:   Allergen Reactions    Cephalosporins      Tongue swelling and hives    Penicillins      hives         Medications: Review discharge medications with patient and family and provide education.    Current Facility-Administered Medications   Medication Dose Route Frequency Provider Last Rate Last Admin    0.9%  NaCl infusion (for blood administration)   Intravenous Q24H PRN Favian Law MD        acetaminophen tablet 650 mg  650 mg Oral Q4H PRN Mag Harkins PA-C   650 mg at 01/21/22 0836    albuterol-ipratropium 2.5 mg-0.5 mg/3 mL nebulizer solution 3 mL  3 mL Nebulization Q4H PRN Mag Harkins PA-C        amiodarone tablet 200 mg  200 mg Oral Daily Mag Harkins PA-C   200 mg at 01/21/22 0829    amLODIPine tablet 5 mg  5 mg Oral BID Mag Harkins PA-C   5 mg at 01/21/22 0830    atorvastatin tablet 40 mg  40 mg Oral QHS Mag Harkins PA-C   40 mg at 01/20/22 2045    bisacodyL suppository 10 mg  10 mg Rectal Daily PRN Mag Harkins PA-C        BUPivacaine (PF) 0.25% (2.5 mg/ml) injection    PRN KAREY Blanchard MD   6 mL at 01/19/22 1237    dextrose 50% injection 12.5 g  12.5 g Intravenous PRN Mag EDEN  MICHAEL Harkins        dextrose 50% injection 25 g  25 g Intravenous PRN Mag Harkins PA-C        digoxin tablet 0.125 mg  0.125 mg Oral Daily Mag Harkins PA-C   0.125 mg at 01/21/22 0830    furosemide tablet 40 mg  40 mg Oral Daily Tejal Cristina MD   40 mg at 01/21/22 0829    gabapentin capsule 300 mg  300 mg Oral TID Mag Harkins PA-C   300 mg at 01/21/22 0829    glucagon (human recombinant) injection 1 mg  1 mg Intramuscular PRN Mag Harkins PA-C        glucose chewable tablet 16 g  16 g Oral PRN Mag Harkins PA-C        glucose chewable tablet 24 g  24 g Oral PRN Mag Harkins PA-C        influenza (QUADRIVALENT ADJUVANTED PF) vaccine 0.5 mL  0.5 mL Intramuscular vaccine x 1 dose Tejal Cristina MD        insulin aspart U-100 pen 0-5 Units  0-5 Units Subcutaneous QID (AC + HS) PRN Mag Harkins PA-C   3 Units at 01/20/22 1828    insulin aspart U-100 pen 15 Units  15 Units Subcutaneous TIDWM Tejal Cristina MD   15 Units at 01/21/22 0825    insulin detemir U-100 pen 25 Units  25 Units Subcutaneous BID Mag Harkins PA-C   25 Units at 01/21/22 0827    isosorbide dinitrate tablet 10 mg  10 mg Oral TID Mag Harkins PA-C   10 mg at 01/21/22 0830    LIDOcaine HCL 20 mg/ml (2%) injection    PRN KAREY Blanchard MD   6 mL at 01/19/22 1239    melatonin tablet 6 mg  6 mg Oral Nightly PRN Mag Harkins PA-C        multivitamin tablet  1 tablet Oral Daily Mag Harkins PA-C   1 tablet at 01/21/22 0829    mupirocin 2 % ointment   Nasal BID Tejal Cristina MD   Given at 01/21/22 0828    oxyCODONE immediate release tablet Tab 10 mg  10 mg Oral Q6H PRN Jia Mcneal MD   10 mg at 01/21/22 0415    pentoxifylline CR tablet 400 mg  400 mg Oral TID Mag Harkins PA-C   400 mg at 01/21/22 0830    polyethylene glycol packet 17 g  17 g Oral Daily PRN Mag Harkins PA-C        sodium chloride 0.9% flush  10 mL  10 mL Intravenous Q6H Tejal Cristina MD   10 mL at 01/21/22 0600    And    sodium chloride 0.9% flush 10 mL  10 mL Intravenous PRN Tejal Cristina MD        sodium chloride 0.9% flush 10 mL  10 mL Intravenous PRN Hakeem Javed MD        sodium chloride 0.9% flush 10 mL  10 mL Intravenous Q6H Jia Mcneal MD        And    sodium chloride 0.9% flush 10 mL  10 mL Intravenous PRN Jia Mcneal MD        sodium chloride 0.9% irrigation    PRN KAREY Blanchard MD   1,000 mL at 01/19/22 1146    vancomycin - pharmacy to dose   Intravenous pharmacy to manage frequency Mag Harkins PA-C        vancomycin 1.75 g in 5 % dextrose 500 mL IVPB  1,750 mg Intravenous Q24H Jia Mcneal MD        vancomycin injection    PRN KAREY Blanchard MD   1,000 mg at 01/19/22 1146     Current Discharge Medication List      START taking these medications    Details   oxyCODONE-acetaminophen (PERCOCET)  mg per tablet Take 1 tablet by mouth every 6 (six) hours as needed for Pain.  Qty: 20 tablet, Refills: 0      vancomycin HCl in 5 % dextrose (VANCOMYCIN IN 5 % DEXTROSE) 1.75 gram/500 mL Soln Inject 500 mLs (1,750 mg total) into the vein once daily.  Qty: 95198 mL, Refills: 0         CONTINUE these medications which have CHANGED    Details   apixaban (ELIQUIS) 2.5 mg Tab Take 1 tablet (2.5 mg total) by mouth 2 (two) times daily.  Qty: 180 tablet, Refills: 3    Comments: Hold until 1/23/2022      clopidogreL (PLAVIX) 75 mg tablet Take 1 tablet (75 mg total) by mouth once daily.  Qty: 90 tablet, Refills: 3    Comments: Hold until 1/23/2022      pentoxifylline (TRENTAL) 400 mg TbSR Take 1 tablet (400 mg total) by mouth 3 (three) times daily.  Qty: 90 tablet, Refills: 1         CONTINUE these medications which have NOT CHANGED    Details   albuterol (PROVENTIL/VENTOLIN HFA) 90 mcg/actuation inhaler Inhale 1 puff into the lungs every 4 (four) hours as needed.      amiodarone (PACERONE) 200  MG Tab Take 1 tablet (200 mg total) by mouth once daily.  Qty: 90 tablet, Refills: 3      amLODIPine (NORVASC) 5 MG tablet Take 1 tablet (5 mg total) by mouth 2 (two) times daily.  Qty: 180 tablet, Refills: 3    Comments: .      atorvastatin (LIPITOR) 40 MG tablet Take 1 tablet (40 mg total) by mouth every evening.  Qty: 90 tablet, Refills: 2      citalopram (CELEXA) 40 MG tablet Take 40 mg by mouth once daily.      digoxin (LANOXIN) 125 mcg tablet Take 1 tablet (0.125 mg total) by mouth once daily.  Qty: 30 tablet, Refills: 2      furosemide (LASIX) 40 MG tablet Take 1 tablet (40 mg total) by mouth once daily. for 3 days  Qty: 60 tablet, Refills: 2      gabapentin (NEURONTIN) 400 MG capsule Take 400 mg by mouth 3 (three) times daily.      isosorbide dinitrate (ISORDIL) 10 MG tablet Take 1 tablet (10 mg total) by mouth 3 (three) times daily.  Qty: 270 tablet, Refills: 0      LANTUS U-100 INSULIN 100 unit/mL injection Inject 100 Units into the skin once daily.      metOLazone (ZAROXOLYN) 5 MG tablet Take 5 mg by mouth every other day.      spironolactone (ALDACTONE) 25 MG tablet Take 1 tablet (25 mg total) by mouth once daily. Hold until patient follows up with PCP or Cardiology    Comments: .      multivitamin (THERAGRAN) per tablet Take 1 tablet by mouth once daily.         STOP taking these medications       metoprolol tartrate (LOPRESSOR) 50 MG tablet Comments:   Reason for Stopping:         ranolazine (RANEXA) 500 MG Tb12 Comments:   Reason for Stopping:                 I have seen and examined this patient within the last 30 days. My clinical findings that support the need for the home health skilled services and home bound status are the following:no   Requiring assistive device to leave home due to unsteady gait caused by  Surgery.  Medical restrictions requiring assistance of another human to leave home due to  IV infusion Needs.     Diet:   cardiac diet and diabetic diet 2000 calorie    Labs:  SN to perform  labs:  CBC: Weekly; 6 week(s), CMP: Weekly; 6 week(s) and CRP: Weekly; 6 week(s) and vancomycin trough twice weekly for 6 weeks.  Report Lab results to [facility] Clinic and PCP.    Referrals/ Consults  Physical Therapy to evaluate and treat. Evaluate for home safety and equipment needs; Establish/upgrade home exercise program. Perform / instruct on therapeutic exercises, gait training, transfer training, and Range of Motion.  Occupational Therapy to evaluate and treat. Evaluate home environment for safety and equipment needs. Perform/Instruct on transfers, ADL training, ROM, and therapeutic exercises.  Aide to provide assistance with personal care, ADLs, and vital signs.    Activities:   activity as tolerated and no lifting for 6 weeks. Keep LifeVest on patient.    Nursing:   Agency to admit patient within 24 hours of hospital discharge unless specified on physician order or at patient request    SN to complete comprehensive assessment including routine vital signs. Instruct on disease process and s/s of complications to report to MD. Review/verify medication list sent home with the patient at time of discharge  and instruct patient/caregiver as needed. Frequency may be adjusted depending on start of care date.     Skilled nurse to perform up to 3 visits PRN for symptoms related to diagnosis    Notify MD if SBP > 160 or < 90; DBP > 90 or < 50; HR > 120 or < 50; Temp > 101; O2 < 88%    Ok to schedule additional visits based on staff availability and patient request on consecutive days within the home health episode.    When multiple disciplines ordered:    Start of Care occurs on Sunday - Wednesday schedule remaining discipline evaluations as ordered on separate consecutive days following the start of care.    Thursday SOC -schedule subsequent evaluations Friday and Monday the following week.     Friday - Saturday SOC - schedule subsequent discipline evaluations on consecutive days starting Monday of the following  week.    For all post-discharge communication and subsequent orders please contact patient's primary care physician. If unable to reach primary care physician or do not receive response within 30 minutes, please contact Cardiology for clinical staff order clarification    Miscellaneous   Home Infusion Therapy:   SN to perform Infusion Therapy/Central Line Care.  Review Central Line Care & Central Line Flush with patient.    Administer (drug and dose):   Vancomycin 1750 mg every 24 hours to keep vancomycin trough 15 - 20  Last dose given: 1/21/2022                      Home dose due: 01/22/22    Scrub the Hub: Prior to accessing the line, always perform a 30 second alcohol scrub  Each lumen of the central line is to be flushed at least daily with 10 mL Normal Saline and 3 mL Heparin flush (10 units/mL)  Skilled Nurse (SN) may draw blood from IV access  Blood Draw Procedure:   - Aspirate at least 5 mL of blood   - Discard   - Obtain specimen   - Change injection cap   - Flush with 20 mL Normal Saline followed by a                 3-5 mL Heparin flush (10 units/mL)  Central :   - Sterile dressing changes are done weekly and as needed.   - Use chlor-hexadine scrub to cleanse site, apply Biopatch to insertion site,       apply securement device dressing   - Injection caps are changed weekly and after EVERY lab draw.   - If sterile gauze is under dressing to control oozing,                 dressing change must be performed every 24 hours until gauze is not needed.    Home Health Aide:  Nursing Twice weekly, Physical Therapy Three times weekly, Occupational Therapy Three times weekly and Home Health Aide Three times weekly    Wound Care Orders  Surgical Wound:  Location: L chest, L groin, R groin  Monitor    I certify that this patient is confined to her home and needs intermittent skilled nursing care, physical therapy and occupational therapy.      Jia Mcneal MD

## 2022-01-21 NOTE — CONSULTS
Double lumen PICC to right cephalic vein.  36 cm in length, 36 cm arm circumference and 0 cm exposed.   Lot # [id number] .

## 2022-01-21 NOTE — DISCHARGE SUMMARY
[facility] - Cardiology [facility] Medicine  Telemedicine Discharge Summary      Patient Name: Claudia Powell  MRN: [id number]  Patient Class: IP- Inpatient  Admission Date: 1/15/2022  Hospital Length of Stay: 6 days  Discharge Date and Time:  01/21/2022 9:42 AM  Attending Physician: Jia Mcneal MD   Discharging Provider: Jia Mcneal MD  Primary Care Provider: Manjit Monae MD      HPI:   Claudia Powell is a 66 y.o. female with a PMHx of CAD, HFrEF, DM2, HTN, COPD who presents to [facility] as a transfer from [facility] for removal of infected implanted cardiac defibrillator. Patient initially presented to OSH on 1/7 with complaints of SOB, productive cough, fever, URI symptoms and was admitted for sepsis 2/2 bacteremia. She was initially started on empiric vancomycin, meropenem and doxycycline with resolution of fever and leukocytosis. Blood cultures from 1/7 grew Enterococcus faecalis with appropriate sensitivities. KOMAL showed suspicious pannus on right ventricular lead concerning for vegetation. She completed 5 day course of doxycycline and was continued on IV vanc with plan to continue antibiotics for at least 6 weeks after removal of ICD. Patient transferred here for CTS eval and removal of defibrillator.     Patient seen and evaluated at bedside upon arrival to C. She is feeling well overall and denies any chest pain, SOB or other complaints at this time. Updated on tentative plan for removal of ICD tomorrow.       Procedure(s) (LRB):  EXTRACTION, ELECTRODE LEAD (N/A)  ECHOCARDIOGRAM,TRANSESOPHAGEAL (N/A)      Hospital Course:     * Bacteremia due to Enterococcus  Infection involving implantable cardioverter-defibrillator (ICD)  66 y.o. female with hx of HFrEF, HTN, T2DM, COPD admitted to OSH for sepsis 2/2 Enterococcus faecalis bacteremia in the setting of infected right lead/defibrillator (IE). Treated with IV antibiotics and transferred to [facility] for removal of defibrillator.  -  repeat blood cx (1/8 & 1/9) NGTD  - EP consulted for ICD removal; appreciate assistance  - holding Eliquis and Plavix for procedure; resume once cleared by EP  - continue IV vanc (pharm to dose) for 6 weeks following ICD removal on 1/19  - repeat blood cultures pending and NGTD  - pocket cultures and lead cultures submitted; continue to monitor  - replace PICC (removed for ease of ICD removal) on 1/21  - ID consulted; appreciate assistance  - trend CBC, CRP  - tight glycemic control  ID recommends:   -- Can re-implant ICD if indicated at least 14 days following first negative BCx after removal  -- Consider referral for screening colonoscopy if due. Etiology of Enterococcus bacteremia unclear  -- Please refer to allergy for PCN skin testing     Prolonged QT interval  - Qtc 649ms on 1/7l 442 on 1/19  - hold Celexa and Ranexa for now  - daily EKG  - resume Celexa at discharge; hold Ranexa until follow up with Cardiology     Stage 3a chronic kidney disease  Estimated Creatinine Clearance: 39.9 mL/min (based on SCr of 1.4 mg/dL).  See: YAQUELIN     Infection involving implantable cardioverter-defibrillator (ICD)  S/p removal of ICD on 1/19/2022  See:  Bacteremia due to Enterococcus  Cardiology recommendations:   Ok to discharge after PICC line placed  -Continue home doses of amiodarone and digoxin  -Hold Eliquis for next 3 days then resume  -Follow up in device clinic in one week     Type 2 diabetes mellitus with hyperglycemia, without long-term current use of insulin  - home regimen: Lantus 100 Units daily  - inpatient regimen: detemir 25 Units BID + aspart 15 Units TIDWM  - moderate dose SSI  -continue to monitor and adjust regimen as indicated for goal sugars 140 - 180     Primary hypertension  - continue imdur, amlodipine  - holding aldactone/metolazone due YAQUELIN     Paroxysmal atrial fibrillation  - holding metoprolol 50mg BID for bradycardia, continue amiodarone 200mg daily, and digoxin 0.125mg daily  - held Eliquis for  ICD removal; resume 1/23     COPD (chronic obstructive pulmonary disease)  - no acute issues  - duonebs PRN     YAQUELIN (acute kidney injury)  Stage 3a chronic kidney disease  Estimated Creatinine Clearance: 39.9 mL/min (based on SCr of 1.4 mg/dL).  - improved  - diuretics initially held but Lasix has been resumed; resume metolazone and spironolactone as tolerated  - continue to monitor renal function     Chronic combined systolic and diastolic heart failure  S/P CABG (coronary artery bypass graft)  - appears dry vs euvolemic  - Lasix, metolazone, and aldactone initially held for YAQUELIN; furosemide resumed  - strict I/Os, daily weights  - cardiac diet  - Continue home medications at discharge.     Peripheral vascular disease  Continuing current management with pentoxifylline     Coronary artery disease involving native coronary artery of native heart without angina pectoris  - continue statin; holding BB for bradycardia, hEld Plavix for ICD removal, resume 1/23  - held Ranexa given prolonged QTc; follow up with Cardiology to resume.         Goals of Care Treatment Preferences:  Code Status: Full Code      Consults:   Consults (From admission, onward)        Status Ordering Provider     [facility]  Once        Provider:  (Not yet assigned)    Acknowledged PRASANTH MAYFIELD     Inpatient consult to Social Work/Case Management  Once        Provider:  (Not yet assigned)    Acknowledged MYKEL ISRAEL     Inpatient virtual consult to Hospital Medicine  Once        Provider:  (Not yet assigned)    Completed CHEY SHAH     Inpatient consult [facility] PICC team ([facility])  Once        Provider:  (Not yet assigned)    Completed CHEY SHAH     Inpatient virtual consult to Hospital Medicine  Once        Provider:  (Not yet assigned)    Completed CHEY SHAH     Inpatient consult to Electrophysiology  Once        Provider:  (Not yet assigned)    Completed CHEY SHAH     Inpatient  "consult to Infectious Diseases  Once        Provider:  (Not yet assigned)    Completed RUTH DAVILA     Inpatient consult to Cardiothoracic Surgery  Once        Provider:  (Not yet assigned)    Completed RUTH DAVILA     Pharmacy to dose Vancomycin consult  Once        Provider:  (Not yet assigned)   "And" Linked Group Details    Acknowledged RUTH DAVILA          Final Active Diagnoses:    Diagnosis Date Noted POA    PRINCIPAL PROBLEM:  Bacteremia due to Enterococcus [R78.81, B95.2]  Yes    Stage 3a chronic kidney disease [N18.31] 01/16/2022 Yes    Prolonged QT interval [R94.31] 01/16/2022 Yes    Infection involving implantable cardioverter-defibrillator (ICD) [T82.7XXA] 01/15/2022 Yes    Type 2 diabetes mellitus with hyperglycemia, without long-term current use of insulin [E11.65]  Yes    Paroxysmal atrial fibrillation [I48.0] 12/06/2021 Yes    Primary hypertension [I10] 12/06/2021 Yes    COPD (chronic obstructive pulmonary disease) [J44.9] 11/22/2021 Yes     Chronic    S/P CABG (coronary artery bypass graft) [Z95.1] 11/22/2021 Not Applicable     Chronic    YAQUELIN (acute kidney injury) [N17.9] 11/06/2021 Yes    Chronic combined systolic and diastolic heart failure [I50.42] 09/13/2021 Yes     Chronic    Coronary artery disease involving native coronary artery of native heart without angina pectoris [I25.10] 01/28/2021 Yes    Peripheral vascular disease [I73.9] 01/28/2021 Yes     Chronic      Problems Resolved During this Admission:       Discharged Condition: stable    Disposition: IV Therapy Provider    Follow Up:   Follow-up Information     A Angelique Blanchard MD. Schedule an appointment as soon as possible for a visit in 2 weeks.    Specialty: Electrophysiology  Why: For discharge from hospital follow up, As previously planned. Follow up in [facility] in 1 week  Contact information:  Justin AGUAYO [facility] [id number] [phone]             Shakira Brian PA-C On " 1/24/2022.    Specialty: Cardiology  Why: Cardiology follow up at 1:20PM   Contact information:  [address]  [address]  [facility] [address]  [phone]             CARLIE Coats On 1/31/2022.    Specialty: Internal Medicine  Why: hospital follow up at 11:20AM   Contact information:  [address] [place] [facility] [address]  [phone]             PROV NS ENDOCRINOLOGY. Schedule an appointment as soon as possible for a visit in 1 week.    Specialty: Endocrinology  Why: For discharge from hospital follow up, To establish care  Contact information:  [address] [place] [address] [id number] [phone]           Schedule an appointment as soon as possible for a visit with Jackelin Frederick MD.    Specialty: Infectious Diseases  Why: For discharge from hospital follow up  Contact information:  [address]  [address]  [facility] [address]  [phone]                       Future Appointments   Date Time Provider Department Center   1/24/2022  1:20 PM [facility]   1/31/2022 11:20 AM CARLIE [facility]   3/10/2022  9:00 AM Fadi Dutta MD [facility] ALLERGY [facility]       Patient Instructions:      Ambulatory referral/consult to Allergy   Standing Status: Future   Referral Priority: Routine Referral Type: Allergy Testing   Referral Reason: Specialty Services Required   Requested Specialty: Allergy     Ambulatory referral/consult to Gastroenterology   Standing Status: Future   Referral Priority: Routine Referral Type: Consultation   Referral Reason: Specialty Services Required   Requested Specialty: Gastroenterology     Ambulatory referral/consult to Outpatient Case Management   Referral Priority: Routine Referral Type: Consultation   Referral Reason: Specialty Services Required   Number of Visits Requested: 1     Ambulatory referral/consult to Endocrinology   Standing Status: Future   Referral Priority: Routine Referral Type:  Consultation   Requested Specialty: Endocrinology   Number of Visits Requested: 1     Ambulatory referral/consult to Diabetes Education   Standing Status: Future   Referral Priority: Routine Referral Type: Consultation   Referral Reason: Specialty Services Required   Requested Specialty: Diabetes   Number of Visits Requested: 1 Expiration Date: 01/21/23     Ambulatory referral/consult to Infectious Disease   Standing Status: Future   Referral Priority: Routine Referral Type: Consultation   Referral Reason: Specialty Services Required   Requested Specialty: Infectious Diseases   Number of Visits Requested: 1     Diet Cardiac     Keep surgical extremity elevated   Order Comments: L UE IN SLING     Notify your health care provider if you experience any of the following:  temperature >100.4     Notify your health care provider if you experience any of the following:  persistent nausea and vomiting or diarrhea     Notify your health care provider if you experience any of the following:  severe persistent headache     Notify your health care provider if you experience any of the following:  difficulty breathing or increased cough     Notify your health care provider if you experience any of the following:  persistent dizziness, light-headedness, or visual disturbances     Notify your health care provider if you experience any of the following:  increased confusion or weakness     Notify your health care provider if you experience any of the following:  redness, tenderness, or signs of infection (pain, swelling, redness, odor or green/yellow discharge around incision site)     Activity as tolerated       Significant Diagnostic Studies:      Recent Labs   Lab 09/13/21  0753 01/11/22  0120   HGBA1C 6.4* 10.8*     Recent Labs   Lab 01/16/22  0339 01/17/22  0534 01/18/22  0442   WBC 10.87 9.53 10.83   HGB 11.2* 10.2* 9.9*   HCT 34.9* 30.9* 30.6*    319 294     Recent Labs   Lab 01/16/22  0339 01/16/22 0339  01/17/22 0534 01/18/22 0442   GRAN 60.6  6.6   < > 60.7  5.8 66.8  7.2   LYMPH 23.6  2.6   < > 23.7  2.3 21.1  2.3   MONO 9.0  1.0   < > 9.1  0.9 7.8  0.9   EOS 0.3  --  0.2 0.2    < > = values in this interval not displayed.     Recent Labs   Lab 01/15/22  0748 01/15/22  0748 01/16/22 0339 01/16/22 0339 01/17/22 0534 01/17/22 0534 01/18/22 0442 01/20/22  0916 01/21/22  0431   *   < > 135*   < > 137   < > 136 135* 134*   K 3.9   < > 4.2   < > 3.9   < > 4.0 4.1 4.2   CL 95   < > 97   < > 99   < > 100 102 101   CO2 29   < > 26   < > 28   < > 27 20* 23   BUN 41*   < > 35*   < > 28*   < > 24* 29* 24*   CREATININE 1.7*   < > 1.4   < > 1.5*   < > 1.3 1.4 1.5*   *   < > 206*   < > 192*   < > 181* 202* 165*   CALCIUM 9.7   < > 10.3   < > 10.0   < > 9.3 9.3 9.5   ALBUMIN 3.3*   < > 3.2*   < > 3.0*  --  3.0*  --  3.0*   MG 1.9  --  1.9  --   --   --   --   --   --    PHOS  --   --   --   --   --   --   --   --  3.1    < > = values in this interval not displayed.     Recent Labs   Lab 01/15/22  0748 01/15/22  0749 01/16/22 0339 01/17/22 0534 01/18/22 0442   ALKPHOS   < >  --  113 96 101   ALT   < >  --  20 17 18   AST   < >  --  21 19 17   PROT   < >  --  7.2 6.5 6.1   BILITOT   < >  --  0.5 0.5 0.5   INR  --   --  1.0  --   --    CRP  --  0.84* 8.8*  --   --     < > = values in this interval not displayed.     Recent Labs   Lab 09/13/21  0230 09/14/21  0303 11/07/21  0132 11/22/21  0851 01/07/22  1055 01/07/22  1634   PROCAL   < >  --  0.42 <0.05 0.40  --    LACTATE  --  3.2*  --   --  2.3* 1.4   DDIMER  --   --   --   --  0.38  --    FERRITIN  --   --   --   --  76  --     < > = values in this interval not displayed.     SARS-CoV-2 RNA, Amplification, Qual (no units)   Date Value   01/18/2022 Negative   01/07/2022 Negative   11/26/2021 Negative   11/22/2021 Negative   11/06/2021 Negative   09/13/2021 Negative       Results for orders placed during the hospital encounter of  01/07/22    Echo    Interpretation Summary  · Mildly decreased systolic function.  · The estimated ejection fraction is 42%.  · Left ventricular diastolic dysfunction.  · There is moderate left ventricular global hypokinesis.  · Normal right ventricular size with normal right ventricular systolic function.  · Normal central venous pressure (3 mmHg).      Electrophysiology Procedure  · Successful laser lead extraction with complete system removal of a   previously implanted Medtronic CRT-D in the setting of bacteremia with   device infection.  · Complex device extraction techniques with laser sheath required.  · Extensive pocket debridement with culture swabs sent for microbial   analysis.     I certify that I was present for the critical steps of the procedure   including the diagnostic, surgical and/or interventional portions.     Procedure Log documented by Documenter: Leny Mcgowan RN and verified   by A Angelique Blanchard MD.    Date: 1/20/2022  Time: 5:32 PM  Intra-Procedure Documentation  KOMAL performed in the Invasive Lab    - See Procedure Log link below for nursing documentation    - See KOMAL order on Card Proc Tab for physician findings       Pending Diagnostic Studies:     Procedure Component Value Units Date/Time    EKG 12-lead [[id number]] Collected: 01/21/22 0647    Order Status: Sent Lab Status: In process Updated: 01/21/22 0822    Narrative:      Test Reason : R94.31,    Vent. Rate : 063 BPM     Atrial Rate : 063 BPM     P-R Int : 174 ms          QRS Dur : 150 ms      QT Int : 442 ms       P-R-T Axes : 014 068 256 degrees     QTc Int : 452 ms    Normal sinus rhythm  Nonspecific intraventricular block  Abnormal ECG  When compared with ECG of 20-JAN-2022 05:38,  Previous ECG has undetermined rhythm, needs review  T wave inversion less evident in Anterior-lateral leads    Referred By: ANEESH GALINDO           Confirmed By:          Medications:  Reconciled Home Medications:      Medication List      START  taking these medications    oxyCODONE-acetaminophen  mg per tablet  Commonly known as: PERCOCET  Take 1 tablet by mouth every 6 (six) hours as needed for Pain.     vancomycin in 5 % dextrose 1.75 gram/500 mL Soln  Inject 500 mLs (1,750 mg total) into the vein once daily.        CHANGE how you take these medications    apixaban 2.5 mg Tab  Commonly known as: ELIQUIS  Take 1 tablet (2.5 mg total) by mouth 2 (two) times daily.  Start taking on: January 23, 2022  What changed: These instructions start on January 23, 2022. If you are unsure what to do until then, ask your doctor or other care provider.     clopidogreL 75 mg tablet  Commonly known as: PLAVIX  Take 1 tablet (75 mg total) by mouth once daily.  Start taking on: January 23, 2022  What changed: These instructions start on January 23, 2022. If you are unsure what to do until then, ask your doctor or other care provider.     spironolactone 25 MG tablet  Commonly known as: ALDACTONE  Take 1 tablet (25 mg total) by mouth once daily. Hold until patient follows up with PCP or Cardiology  What changed: additional instructions        CONTINUE taking these medications    albuterol 90 mcg/actuation inhaler  Commonly known as: PROVENTIL/VENTOLIN HFA  Inhale 1 puff into the lungs every 4 (four) hours as needed.     amiodarone 200 MG Tab  Commonly known as: PACERONE  Take 1 tablet (200 mg total) by mouth once daily.     amLODIPine 5 MG tablet  Commonly known as: NORVASC  Take 1 tablet (5 mg total) by mouth 2 (two) times daily.     atorvastatin 40 MG tablet  Commonly known as: LIPITOR  Take 1 tablet (40 mg total) by mouth every evening.     citalopram 40 MG tablet  Commonly known as: CeleXA  Take 40 mg by mouth once daily.     digoxin 125 mcg tablet  Commonly known as: LANOXIN  Take 1 tablet (0.125 mg total) by mouth once daily.     furosemide 40 MG tablet  Commonly known as: LASIX  Take 1 tablet (40 mg total) by mouth once daily. for 3 days     gabapentin 400 MG  capsule  Commonly known as: NEURONTIN  Take 400 mg by mouth 3 (three) times daily.     isosorbide dinitrate 10 MG tablet  Commonly known as: ISORDIL  Take 1 tablet (10 mg total) by mouth 3 (three) times daily.     LANTUS U-100 INSULIN 100 unit/mL injection  Generic drug: insulin glargine  Inject 100 Units into the skin once daily.     metOLazone 5 MG tablet  Commonly known as: ZAROXOLYN  Take 5 mg by mouth every other day.     multivitamin per tablet  Commonly known as: THERAGRAN  Take 1 tablet by mouth once daily.     pentoxifylline 400 mg Tbsr  Commonly known as: TRENTAL  Take 1 tablet (400 mg total) by mouth 3 (three) times daily.        STOP taking these medications    metoprolol tartrate 50 MG tablet  Commonly known as: LOPRESSOR     ranolazine 500 MG Tb12  Commonly known as: RANEXA            Indwelling Lines/Drains at time of discharge:   Lines/Drains/Airways     PICC               Time spent on the discharge of patient: 50 minutes    This service was provided by Virtual Visit.   Patient was seen and examined on the date of discharge.  Additional time was spent speaking with consultants and case management, reviewing records, and/or discussing the plan of care with patient/family.  The patient location is: [address]  Admitted 1/15/2022 11:13 PM  Present with the patient at the time of the telemed/virtual assessment: Telepresenter    Patient was transferred to [facility] Medicine on:  1/19/2022  This document was prepared by chart review and may not directly reflect my personal knowledge of the patient's case, clinical course, or significant events during the hospital stay.    The attending portion of this evaluation, treatment, and documentation was performed per Jia Mcneal MD via Telemedicine AudioVisual using the secure [employer] software platform with 2 way audio/video. The provider was located off-site and the patient is located in the hospital. The aforementioned video software was utilized to  document the relevant history and physical exam       Jia Mcneal MD  Department of Hospital Medicine  [facility] - Cardiology Stepdown

## 2022-01-21 NOTE — PROCEDURES
"Claudia Powell is a 66 y.o. female patient.    Temp: 99 °F (37.2 °C) (01/21/22 1120)  Pulse: 65 (01/21/22 1120)  Resp: 18 (01/21/22 1120)  BP: 136/76 (01/21/22 1120)  SpO2: (!) 94 % (01/21/22 1120)  Weight: 83 kg (182 lb 15.7 oz) (01/21/22 0500)  Height: 5' 3" (160 cm) (01/19/22 1240)    PICC  Date/Time: 1/21/2022 11:30 AM  Performed by: Carmina Osorio RN  Assisting provider: Analia James RN  Consent Done: Yes  Time out: Immediately prior to procedure a time out was called to verify the correct patient, procedure, equipment, support staff and site/side marked as required  Indications: med administration and vascular access  Anesthesia: local infiltration  Local anesthetic: lidocaine 1% without epinephrine  Anesthetic Total (mL): 3  Description of findings: picc  Preparation: skin prepped with ChloraPrep  Skin prep agent dried: skin prep agent completely dried prior to procedure  Sterile barriers: all five maximum sterile barriers used - cap, mask, sterile gown, sterile gloves, and large sterile sheet  Hand hygiene: hand hygiene performed prior to central venous catheter insertion  Location details: right cephalic  Catheter type: double lumen  Catheter size: 5 Fr  Catheter Length: 36cm    Ultrasound guidance: yes  Vessel Caliber: medium and patent, compressibility normal  Vascular Doppler: not done  Needle advanced into vessel with real time Ultrasound guidance.  Guidewire confirmed in vessel.  Image recorded and saved.  Sterile sheath used.  no esophageal manometryNumber of attempts: 1  Post-procedure: blood return through all ports, chlorhexidine patch and sterile dressing applied  Technical procedures used: 3cg  Specimens: No  Implants: No  Assessment: placement verified by x-ray  Complications: none          Name   1/21/2022  "

## 2022-01-21 NOTE — NURSING
Patient is ready for discharge. Patient stable alert and oriented. IVs and tele  removed. No complaints of pain. Discharge papers given @ bedside. Discussed discharge plan. Reviewed medications and side effects, appointments, and answered questions with patient and family. RX given to patient @ bedside. Pt left via wheelchair with son.

## 2022-01-21 NOTE — PLAN OF CARE
[facility] - Cardiology Stepdown  Discharge Final Note    Primary Care Provider: Manjit Monae MD    Expected Discharge Date: 1/21/2022    Patient will be discharged with [facility] [place]     Final Discharge Note (most recent)     Final Note - 01/21/22 1125        Final Note    Assessment Type Final Discharge Note     Anticipated Discharge Disposition Home-Health Care [facility]     Hospital Resources/Appts/Education Provided Appointments scheduled and added to AVS               Contact Info     A Angelique Blanchard MD   Specialty: Electrophysiology    [address] [facility] [address]   Phone: [phone]       Next Steps: Schedule an appointment as soon as possible for a visit in 2 week(s)    Instructions: For discharge from hospital follow up, As previously planned. Follow up in [facility] in 1 week    Shakira Brian PA-C   Specialty: Cardiology    [address]  [address]  [place] [address]   Phone: [phone]       Next Steps: Follow up on 1/24/2022    Instructions: Cardiology follow up at 1:20PM     CARLIE Coats   Specialty: Internal Medicine    [address]  [place] [address]   Phone: [phone]       Next Steps: Follow up on 1/31/2022    Instructions: hospital follow up at 11:20AM         Sharmila Villalobos RN, CM   Ext: [phone]

## 2022-01-21 NOTE — ASSESSMENT & PLAN NOTE
- holding metoprolol 50mg BID for bradycardia, continue amiodarone 200mg daily, and digoxin 0.125mg daily  - held Eliquis for ICD removal; resume 1/23

## 2022-01-21 NOTE — NURSING
Life vest on patient, resp even and unlabored, pt c/o pain, pt medicated and resting,  Call light in  Reachy, bed low to floor, will cont to monitor

## 2022-01-21 NOTE — PLAN OF CARE
01/21/22 1450   Post-Acute Status   Post-Acute Authorization Home Health;IV Infusion   Home Health Status Set-up Complete/Auth obtained   IV Infusion Status Set-up Complete/Auth obtained     Pt accepted by [facility].  Per Arianna with O Infusion meds were delivered to the bedside.    Alka Booker LMSW  [facility]  [id number]

## 2022-01-21 NOTE — ASSESSMENT & PLAN NOTE
S/P CABG (coronary artery bypass graft)  - appears dry vs euvolemic  - Lasix, metolazone, and aldactone initially held for YAQUELIN; furosemide resumed  - strict I/Os, daily weights  - cardiac diet  - Continue home medications at discharge.

## 2022-01-21 NOTE — PROGRESS NOTES
[facility] - Cardiology Stepdown  Infectious Disease  Progress Note    Patient Name: Claudia Powell  MRN: [id number]  Admission Date: 1/15/2022  Length of Stay: 6 days  Attending Physician: Jia Mcneal MD  Primary Care Provider: Manjit Monae MD    Isolation Status: No active isolations  Assessment/Plan:      Infection involving implantable cardioverter-defibrillator (ICD)  Enterococcus faecalis bacteremia, with 1/10/22 KOMAL showing small, fixed, echogenic mass on RV lead and hyperechoic oblong pannus formation on RV lead just below tricuspid valve 2.1x1.0cm, vegetation cannot be ruled out completely.  Blood cultures cleared 1/8/22.  CRT-D device and leads removed 1/19/22, with cultures from blood, leads, and pocket fluid without growth to date.    Recommendations:  -Vancomycin for 6 weeks following device removal, OPAT note below.  -Can re-implant ICD if indicated at least 14 days following first negative BCx after removal  -Etiology of Enterococcus bacteremia unclear.  Needs outpatient colonoscopy.  -Reported allergies to penicillins and cephalosporins.  Needs outpatient allergy referral for testing.  -Patient wishes to have ID follow-up in [place].            Outpatient Antibiotic Therapy Plan:    Please send referral to [facility].    1) Infection: Enterococcus faecalis bacteremia, lead infection, endocarditis    2) Discharge Antibiotics:    Intravenous antibiotics:   Vancomycin 1.75g q24h     3) Therapy Duration:  6 weeks    Estimated end date of IV antibiotics: 03/03/2022    4) Outpatient Weekly Labs:    Order the following labs to be drawn on Mondays:    CBC   CMP    Vancomycin trough. Target 15-20    If discharged on vancomycin IV, order the following additional labs to be drawn on Thursdays:   CMP    Vancomycin trough. Target 15-20    If vancomycin trough is not at target (15-20) prior to discharge, schedule vancomycin trough to be drawn before their fourth  outpatient dose.    5) Fax Lab Results to Infectious Diseases Provider: Dr. Marv Patricia    [facility] Fax Number: [phone]    6) Outpatient Infectious Diseases Follow-up     Will forward note to Dr. Jackelin Frederick, patient prefers to have ID follow-up there.              Thank you for your consult. I will sign off. Please contact us if you have any additional questions.    Marv Patricia MD  Infectious Disease  [facility] - Cardiology Stepdown    Subjective:     Principal Problem:Bacteremia due to Enterococcus    HPI: Claudia Powell is a 66 y.o. F with history of CAD, HFrEF s/p AICD, who transfers to [facility] for removal of infected AICD. She presented to [place] 1/7 with fever, SOB, productive cough and was admitted with sepsis 2/2 Enterococcus faecalis bacteremia. Initiated on empiric vanc, meropenem, and doxycycline with resolution of fever and leukocytosis. KOMAL revealed suspicious pannus of RV lead concerning for vegetation. She completed 5 days doxycycline and continued with vancomycin for planned 6 weeks following ICD removal. Transferred to [facility] for CTS eval /removal of ICD. ID consulted for abx recommendations for Enterococcus bacteremia. Reports some mild nausea before presentation. Denies abdominal pain, diarrhea, dysuria, frequency.    Interval History: No acute events overnight.  Given life vest.    Review of Systems   Constitutional: Negative for chills and fever.   Respiratory: Negative for cough and shortness of breath.    Cardiovascular: Negative for chest pain, palpitations and leg swelling.   Gastrointestinal: Negative for abdominal pain, diarrhea, nausea and vomiting.   Genitourinary: Negative for dysuria.   Musculoskeletal: Myalgias: Muscular pain across chest.   Skin: Negative for rash.   Neurological: Negative for dizziness, light-headedness and headaches.     Objective:     Vital Signs (Most Recent):  Temp: 98.5 °F (36.9 °C) (01/21/22 1539)  Pulse: 83 (01/21/22 1539)  Resp: 18 (01/21/22  1539)  BP: (!) 111/53 (01/21/22 1539)  SpO2: 96 % (01/21/22 1539) Vital Signs (24h Range):  Temp:  [98.1 °F (36.7 °C)-99 °F (37.2 °C)] 98.5 °F (36.9 °C)  Pulse:  [62-83] 83  Resp:  [16-20] 18  SpO2:  [91 %-96 %] 96 %  BP: (111-152)/(51-76) 111/53     Weight: 83 kg (182 lb 15.7 oz)  Body mass index is 32.41 kg/m².    Estimated Creatinine Clearance: 37.6 mL/min (A) (based on SCr of 1.5 mg/dL (H)).    Physical Exam  Vitals reviewed.   Constitutional:       General: She is not in acute distress.     Appearance: Normal appearance. She is obese. She is not toxic-appearing or diaphoretic.   HENT:      Head: Normocephalic and atraumatic.      Right Ear: External ear normal.      Left Ear: External ear normal.      Nose: Nose normal.   Eyes:      General: No scleral icterus.     Conjunctiva/sclera: Conjunctivae normal.   Cardiovascular:      Rate and Rhythm: Normal rate and regular rhythm.      Heart sounds: No murmur heard.  No gallop.       Comments: Life vest in place  Pulmonary:      Effort: Pulmonary effort is normal.   Abdominal:      General: There is no distension.      Palpations: Abdomen is soft.   Musculoskeletal:      Cervical back: Normal range of motion.      Right lower leg: No edema.      Left lower leg: No edema.      Comments: Bandages of inguinal area bilaterally, tender with palpation, no surrounding erythema or oozing  Bandage on chest   Neurological:      Mental Status: She is alert. Mental status is at baseline.   Psychiatric:         Mood and Affect: Mood normal.         Behavior: Behavior normal.         Thought Content: Thought content normal.         Judgment: Judgment normal.         Significant Labs: All pertinent labs within the past 24 hours have been reviewed.    Significant Imaging: I have reviewed all pertinent imaging results/findings within the past 24 hours.

## 2022-01-21 NOTE — ASSESSMENT & PLAN NOTE
Infection involving implantable cardioverter-defibrillator (ICD)  66 y.o. female with hx of HFrEF, HTN, T2DM, COPD admitted to OSH for sepsis 2/2 Enterococcus faecalis bacteremia in the setting of infected right lead/defibrillator (IE). Treated with IV antibiotics and transferred to [facility] for removal of defibrillator.  - repeat blood cx (1/8 & 1/9) NGTD  - EP consulted for ICD removal; appreciate assistance  - holding Eliquis and Plavix for procedure; resume once cleared by EP  - continue IV vanc (pharm to dose) for 6 weeks following ICD removal on 1/19  - repeat blood cultures pending and NGTD  - pocket cultures and lead cultures submitted; continue to monitor  - replace PICC (removed for ease of ICD removal) on 1/21  - ID consulted; appreciate assistance  - trend CBC, CRP  - tight glycemic control  ID recommends:   -- Can re-implant ICD if indicated at least 14 days following first negative BCx after removal  -- Consider referral for screening colonoscopy if due. Etiology of Enterococcus bacteremia unclear  -- Please refer to allergy for PCN skin testing

## 2022-01-21 NOTE — ASSESSMENT & PLAN NOTE
Enterococcus faecalis bacteremia, with 1/10/22 KOMAL showing small, fixed, echogenic mas son RV lead and hyperechoic oblong pannus formation on RV lead just below tricuspid valve 2.1x1.0cm, vegetation cannot be ruled out completely.  Blood cultures cleared 1/8/22.  pacemaker and leads removed 1/19/22, with cultures from blood, leads, and pocket fluid without growth to date.    Recommendations:  -Vancomycin for 6 weeks following device removal, OPAT note to follow.  -Can re-implant ICD if indicated at least 14 days following first negative BCx after removal  -Etiology of Enterococcus bacteremia unclear.  Needs outpatient colonoscopy.  -Reported allergies to penicillins and cephalosporins.  Needs outpatient allergy referral for testing.  -Patient wishes to have ID follow-up in [place].

## 2022-01-21 NOTE — ASSESSMENT & PLAN NOTE
Stage 3a chronic kidney disease  Estimated Creatinine Clearance: 39.9 mL/min (based on SCr of 1.4 mg/dL).  - improved  - diuretics initially held but Lasix has been resumed; resume metolazone and spironolactone as tolerated  - continue to monitor renal function

## 2022-01-21 NOTE — ASSESSMENT & PLAN NOTE
- continue statin; holding BB for bradycardia, hEld Plavix for ICD removal, resume 1/23  - held Ranexa given prolonged QTc; follow up with Cardiology to resume.

## 2022-01-21 NOTE — ASSESSMENT & PLAN NOTE
- home regimen: Lantus 100 Units daily  - inpatient regimen: detemir 25 Units BID + aspart 15 Units TIDWM  - moderate dose SSI  -continue to monitor and adjust regimen as indicated for goal sugars 140 - 180

## 2022-01-21 NOTE — ASSESSMENT & PLAN NOTE
- Qtc 649ms on 1/7l 442 on 1/19  - hold Celexa and Ranexa for now  - daily EKG  - resume Celexa at discharge; hold Ranexa until follow up with Cardiology

## 2022-01-21 NOTE — NURSING
[facility] educator met with Patient discussed discharge plan for home IVABX. Claudia Powell will dc home with family support. Patient will infuse medication via Elastomeric Pump. Patient on S.A.S.H procedure. S.A.S.H mat provided.  Patient education checklist reviewed and acknowledged by above person(s) above and are agreeable to discharge with home infusion plan of care. IV administration process using aspetic technique was reviewed with successful return demonstration. Patient feels comfortable with infusion. Patient will dc home with Vancomycin 1.75 gms IV [id number] for estimated end of therapy date . Dosing schedule times are 12 noon. Extension placed to double lumen picc. [facility] will follow patient for weekly dressing changes and lab draws. Time allotted for questions. Patients nurse and case management team notified teaching has been completed.     Medication delivery made to bedside

## 2022-01-21 NOTE — SUBJECTIVE & OBJECTIVE
Interval History: No acute events overnight.  Given life vest.    Review of Systems   Constitutional: Negative for chills and fever.   Respiratory: Negative for cough and shortness of breath.    Cardiovascular: Negative for chest pain, palpitations and leg swelling.   Gastrointestinal: Negative for abdominal pain, diarrhea, nausea and vomiting.   Genitourinary: Negative for dysuria.   Musculoskeletal: Myalgias: Muscular pain across chest.   Skin: Negative for rash.   Neurological: Negative for dizziness, light-headedness and headaches.     Objective:     Vital Signs (Most Recent):  Temp: 98.5 °F (36.9 °C) (01/21/22 1539)  Pulse: 83 (01/21/22 1539)  Resp: 18 (01/21/22 1539)  BP: (!) 111/53 (01/21/22 1539)  SpO2: 96 % (01/21/22 1539) Vital Signs (24h Range):  Temp:  [98.1 °F (36.7 °C)-99 °F (37.2 °C)] 98.5 °F (36.9 °C)  Pulse:  [62-83] 83  Resp:  [16-20] 18  SpO2:  [91 %-96 %] 96 %  BP: (111-152)/(51-76) 111/53     Weight: 83 kg (182 lb 15.7 oz)  Body mass index is 32.41 kg/m².    Estimated Creatinine Clearance: 37.6 mL/min (A) (based on SCr of 1.5 mg/dL (H)).    Physical Exam  Vitals reviewed.   Constitutional:       General: She is not in acute distress.     Appearance: Normal appearance. She is obese. She is not toxic-appearing or diaphoretic.   HENT:      Head: Normocephalic and atraumatic.      Right Ear: External ear normal.      Left Ear: External ear normal.      Nose: Nose normal.   Eyes:      General: No scleral icterus.     Conjunctiva/sclera: Conjunctivae normal.   Cardiovascular:      Rate and Rhythm: Normal rate and regular rhythm.      Heart sounds: No murmur heard.  No gallop.       Comments: Life vest in place  Pulmonary:      Effort: Pulmonary effort is normal.   Abdominal:      General: There is no distension.      Palpations: Abdomen is soft.   Musculoskeletal:      Cervical back: Normal range of motion.      Right lower leg: No edema.      Left lower leg: No edema.      Comments: Bandages of  inguinal area bilaterally, tender with palpation, no surrounding erythema or oozing  Bandage on chest   Neurological:      Mental Status: She is alert. Mental status is at baseline.   Psychiatric:         Mood and Affect: Mood normal.         Behavior: Behavior normal.         Thought Content: Thought content normal.         Judgment: Judgment normal.         Significant Labs: All pertinent labs within the past 24 hours have been reviewed.    Significant Imaging: I have reviewed all pertinent imaging results/findings within the past 24 hours.

## 2022-01-21 NOTE — PROGRESS NOTES
[facility] Lim - Cardiology [facility] Medicine  Telemedicine Progress Note    Patient Name: Claudia Powell  MRN: [id number]  Patient Class: IP- Inpatient   Admission Date: 1/15/2022  Length of Stay: 5 days  Attending Physician: Jia Mcneal MD  Primary Care Provider: Manjit Monae MD      Subjective:     Principal Problem:Bacteremia due to Enterococcus    HPI:  Claudia Powell is a 66 y.o. female with a PMHx of CAD, HFrEF, DM2, HTN, COPD who presents to [facility] as a transfer from [facility] for removal of infected implanted cardiac defibrillator. Patient initially presented to OSH on 1/7 with complaints of SOB, productive cough, fever, URI symptoms and was admitted for sepsis 2/2 bacteremia. She was initially started on empiric vancomycin, meropenem and doxycycline with resolution of fever and leukocytosis. Blood cultures from 1/7 grew Enterococcus faecalis with appropriate sensitivities. KOMAL showed suspicious pannus on right ventricular lead concerning for vegetation. She completed 5 day course of doxycycline and was continued on IV vanc with plan to continue antibiotics for at least 6 weeks after removal of ICD. Patient transferred here for CTS eval and removal of defibrillator.     Patient seen and evaluated at bedside upon arrival to [facility]. She is feeling well overall and denies any chest pain, SOB or other complaints at this time. Updated on tentative plan for removal of ICD tomorrow.       Overview/Hospital Course:  No notes on file    Telemedicine  This service was provided by Virtual Visit.    Patient was transferred to [facility] on:  1/19/2022    Chief Complaint   Patient presents with    Shortness of Breath       87% room air per EMS     The patient location is: 303[address]   Admitted 1/15/2022 11:13 PM  Present with the patient at the time of the telemed/virtual assessment: Telepresenter    Interval History / Events Overnight:   The patient is able to provide adequate  history. Additional history was obtained from past medical records. No significant events reported by Nursing.  Patient complains of odd odor of urine. Symptoms have worsened since yesterday. Associated symptoms include: fatigue. Symptoms are stable.     Lab test(s) reviewed: sCr stable  Discussed case with ID (another health care provider)    Review of Systems   Constitutional: Negative for fever.   Respiratory: Negative for shortness of breath.    Genitourinary: Negative for dysuria.     Objective:     Vital Signs (Most Recent):  Temp: 97.3 °F (36.3 °C) (01/20/22 1018)  Pulse: (!) 57 (01/20/22 1104)  Resp: 20 (01/20/22 1141)  BP: 136/60 (01/20/22 1018)  SpO2: (!) 91 % (01/20/22 1018) Vital Signs (24h Range):  Temp:  [97.3 °F (36.3 °C)-98.2 °F (36.8 °C)] 97.3 °F (36.3 °C)  Pulse:  [40-58] 57  Resp:  [15-20] 20  SpO2:  [88 %-97 %] 91 %  BP: (135-170)/(60-76) 136/60     Weight: 81.2 kg (179 lb)  Body mass index is 31.71 kg/m².    Intake/Output Summary (Last 24 hours) at 1/20/2022 1215  Last data filed at 1/20/2022 0530  Gross per 24 hour   Intake 1810 ml   Output 600 ml   Net 1210 ml      Physical Exam  Constitutional:       General: She is not in acute distress.     Appearance: Normal appearance. She is obese. She is not diaphoretic.   Eyes:      General: Lids are normal. No scleral icterus.        Right eye: No discharge.         Left eye: No discharge.      Conjunctiva/sclera: Conjunctivae normal.   Cardiovascular:      Rate and Rhythm: Bradycardia present.   Pulmonary:      Effort: Pulmonary effort is normal. No tachypnea, accessory muscle usage or respiratory distress.   Abdominal:      General: There is no distension.   Skin:     Coloration: Skin is not cyanotic.   Neurological:      Mental Status: She is alert and oriented to person, place, and time. She is not disoriented.   Psychiatric:         Attention and Perception: Attention normal.         Mood and Affect: Affect normal.         Behavior: Behavior is  [facility].         Significant Labs:     Recent Labs   Lab 09/13/21  0753 01/11/22  0120   HGBA1C 6.4* 10.8*     Recent Labs   Lab 01/20/22  0827 01/20/22  1217 01/20/22  1529   POCTGLUCOSE 187* 197* 266*     Recent Labs   Lab 01/16/22  0339 01/17/22  0534 01/18/22  0442   WBC 10.87 9.53 10.83   HGB 11.2* 10.2* 9.9*   HCT 34.9* 30.9* 30.6*    319 294     Recent Labs   Lab 01/14/22  0626 01/15/22  0749 01/16/22  0339 01/16/22  0339 01/17/22  0534 01/18/22  0442   GRAN 72.0   < > 60.6  6.6   < > 60.7  5.8 66.8  7.2   BAND 4.0  --   --   --   --   --    LYMPH 11.0*   < > 23.6  2.6   < > 23.7  2.3 21.1  2.3   MONO 5.0   < > 9.0  1.0   < > 9.1  0.9 7.8  0.9   EOS  --    < > 0.3  --  0.2 0.2    < > = values in this interval not displayed.     Recent Labs   Lab 01/14/22  0626 01/14/22  0626 01/15/22  0748 01/15/22  0748 01/16/22  0339 01/16/22  0339 01/17/22  0534 01/18/22  0442 01/20/22  0916   *   < > 135*   < > 135*   < > 137 136 135*   K 3.7   < > 3.9   < > 4.2   < > 3.9 4.0 4.1   CL 92*   < > 95   < > 97   < > 99 100 102   CO2 30*   < > 29   < > 26   < > 28 27 20*   BUN 37*   < > 41*   < > 35*   < > 28* 24* 29*   CREATININE 1.6*   < > 1.7*   < > 1.4   < > 1.5* 1.3 1.4   *   < > 198*   < > 206*   < > 192* 181* 202*   CALCIUM 9.0   < > 9.7   < > 10.3   < > 10.0 9.3 9.3   ALBUMIN 3.2*   < > 3.3*   < > 3.2*  --  3.0* 3.0*  --    MG 1.7  --  1.9  --  1.9  --   --   --   --     < > = values in this interval not displayed.     Recent Labs   Lab 01/15/22  0748 01/15/22  0749 01/16/22  0339 01/17/22  0534 01/18/22  0442   ALKPHOS   < >  --  113 96 101   ALT   < >  --  20 17 18   AST   < >  --  21 19 17   PROT   < >  --  7.2 6.5 6.1   BILITOT   < >  --  0.5 0.5 0.5   INR  --   --  1.0  --   --    CRP  --  0.84* 8.8*  --   --     < > = values in this interval not displayed.     Recent Labs   Lab 09/13/21  0230 09/14/21  0303 11/07/21  0132 11/22/21  0851 01/07/22  1055 01/07/22  1634   PROCAL   < >   --  0.42 <0.05 0.40  --    LACTATE  --  3.2*  --   --  2.3* 1.4   DDIMER  --   --   --   --  0.38  --    FERRITIN  --   --   --   --  76  --     < > = values in this interval not displayed.     SARS-CoV-2 RNA, Amplification, Qual (no units)   Date Value   01/18/2022 Negative   01/07/2022 Negative   11/26/2021 Negative   11/22/2021 Negative   11/06/2021 Negative   09/13/2021 Negative         Results for orders placed during the hospital encounter of 01/07/22    Echo    Interpretation Summary  · Mildly decreased systolic function.  · The estimated ejection fraction is 42%.  · Left ventricular diastolic dysfunction.  · There is moderate left ventricular global hypokinesis.  · Normal right ventricular size with normal right ventricular systolic function.  · Normal central venous pressure (3 mmHg).      Electrophysiology Procedure  · Successful laser lead extraction with complete system removal of a   previously implanted Medtronic CRT-D in the setting of bacteremia with   device infection.  · Complex device extraction techniques with laser sheath required.  · Extensive pocket debridement with culture swabs sent for microbial   analysis.     I certify that I was present for the critical steps of the procedure   including the diagnostic, surgical and/or interventional portions.     Procedure Log documented by Documenter: Leny Mcgowan RN and verified   by PENG Blanchard MD.    Date: 1/20/2022  Time: 5:32 PM  Intra-Procedure Documentation  KOMAL performed in the Invasive Lab    - See Procedure Log link below for nursing documentation    - See KOMAL order on Card Proc Tab for physician findings       Labs and Imaging within the last 24 hours listed above were reviewed.       Diet: Diet Cardiac [facility]  GI Prophylaxis: Not indicated  Significant LDAs:   IV Access Type: Peripheral  Urinary Catheter Indication if present: Patient Does Not Have Urinary Catheter  Other Lines/Tubes/Drains:    HIGH RISK CONDITION(S):    Patient is currently on drug therapy requiring intensive monitoring for toxicity: Vancomycin and Digoxin     Goals of Care:    Previous admission:  1/7/22  Likely prognosis:  Fair  Code Status: Full Code  Comfort Only: No  Hospice: No  Goals at discharge: remain at home, with physician follow-up    Discharge Planning   BOUCHRA: 1/21/2022     Code Status: Full Code   Is the patient medically ready for discharge?: No    Reason for patient still in hospital (select all that apply): Laboratory test, Pending disposition and Other (specify) PICC placement  Discharge Plan A: Home          Assessment/Plan:      * Bacteremia due to Enterococcus  Infection involving implantable cardioverter-defibrillator (ICD)  66 y.o. female with hx of HFrEF, HTN, T2DM, COPD admitted to OSH for sepsis 2/2 Enterococcus faecalis bacteremia in the setting of infected right lead/defibrillator (IE). Treated with IV antibiotics and transferred to C for removal of defibrillator.  - repeat blood cx (1/8 & 1/9) NGTD  - EP consulted for ICD removal; appreciate assistance  - holding Eliquis and Plavix for procedure; resume once cleared by EP  - continue IV vanc (pharm to dose) for 6 weeks following ICD removal on 1/19  - repeat blood cultures pending and NGTD  - pocket cultures and lead cultures submitted; continue to monitor  - replace PICC (removed for ease of ICD removal) on 1/21  - ID consulted; appreciate assistance  - trend CBC, CRP  - tight glycemic control  ID recommends:   -- Can re-implant ICD if indicated at least 14 days following first negative BCx after removal  -- Consider referral for screening colonoscopy if due. Etiology of Enterococcus bacteremia unclear  -- Please refer to allergy for PCN skin testing    Prolonged QT interval  - Qtc 649ms on 1/7l 442 on 1/19  - hold Celexa and Ranexa for now  - daily EKG  - resume Celexa at discharge; hold Ranexa until follow up with Cardiology    Stage 3a chronic kidney disease  Estimated  Creatinine Clearance: 39.9 mL/min (based on SCr of 1.4 mg/dL).  See: YAQUELIN    Infection involving implantable cardioverter-defibrillator (ICD)  S/p removal of ICD on 1/19/2022  See:  Bacteremia due to Enterococcus  Cardiology recommendations:   Ok to discharge after PICC line placed  -Continue home doses of amiodarone and digoxin  -Hold Eliquis for next 3 days then resume  -Follow up in device clinic in one week    Type 2 diabetes mellitus with hyperglycemia, without long-term current use of insulin  - home regimen: Lantus 100 Units daily  - inpatient regimen: detemir 25 Units BID + aspart 15 Units TIDWM  - moderate dose SSI  -continue to monitor and adjust regimen as indicated for goal sugars 140 - 180    Primary hypertension  - continue imdur, amlodipine  - holding aldactone/metolazone due YAQUELIN    Paroxysmal atrial fibrillation  - holding metoprolol 50mg BID for bradycardia, continue amiodarone 200mg daily, and digoxin 0.125mg daily  - held Eliquis for ICD removal; resume 1/23    COPD (chronic obstructive pulmonary disease)  - no acute issues  - duonebs PRN    YAQUELIN (acute kidney injury)  Stage 3a chronic kidney disease  Estimated Creatinine Clearance: 39.9 mL/min (based on SCr of 1.4 mg/dL).  - improved  - diuretics initially held but Lasix has been resumed; resume metolazone and spironolactone as tolerated  - continue to monitor renal function    Chronic combined systolic and diastolic heart failure  S/P CABG (coronary artery bypass graft)  - appears dry vs euvolemic  - Lasix, metolazone, and aldactone initially held for YAQUELIN; furosemide resumed  - strict I/Os, daily weights  - cardiac diet  - Continue home medications at discharge.    Peripheral vascular disease  Continuing current management with pentoxifylline    Coronary artery disease involving native coronary artery of native heart without angina pectoris  - continue statin; holding BB for bradycardia, hEld Plavix for ICD removal, resume 1/23  - held Ranexa  given prolonged QTc; follow up with Cardiology to resume.        Active Hospital Problems    Diagnosis  POA    *Bacteremia due to Enterococcus [R78.81, B95.2]  Yes    Stage 3a chronic kidney disease [N18.31]  Yes    Prolonged QT interval [R94.31]  Yes    Infection involving implantable cardioverter-defibrillator (ICD) [T82.7XXA]  Yes    Type 2 diabetes mellitus with hyperglycemia, without long-term current use of insulin [E11.65]  Yes    Paroxysmal atrial fibrillation [I48.0]  Yes    Primary hypertension [I10]  Yes    COPD (chronic obstructive pulmonary disease) [J44.9]  Yes     Chronic    S/P CABG (coronary artery bypass graft) [Z95.1]  Not Applicable     Chronic    YAQUELIN (acute kidney injury) [N17.9]  Yes    Chronic combined systolic and diastolic heart failure [I50.42]  Yes     Chronic    Coronary artery disease involving native coronary artery of native heart without angina pectoris [I25.10]  Yes    Peripheral vascular disease [I73.9]  Yes     Chronic      Resolved Hospital Problems   No resolved problems to display.       Inpatient Medications Prescribed for Management of Current Problems:     Scheduled Meds:    amiodarone  200 mg Oral Daily    amLODIPine  5 mg Oral BID    atorvastatin  40 mg Oral QHS    digoxin  0.125 mg Oral Daily    furosemide  40 mg Oral Daily    gabapentin  300 mg Oral TID    insulin aspart U-100  15 Units Subcutaneous TIDWM    insulin detemir U-100  25 Units Subcutaneous BID    isosorbide dinitrate  10 mg Oral TID    multivitamin  1 tablet Oral Daily    mupirocin   Nasal BID    pentoxifylline  400 mg Oral TID    sodium chloride 0.9%  10 mL Intravenous Q6H    [START ON 1/21/2022] vancomycin (VANCOCIN) IVPB  1,750 mg Intravenous Q24H     Continuous Infusions:   As Needed: sodium chloride, acetaminophen, albuterol-ipratropium, bisacodyL, BUPivacaine (PF) 0.25% (2.5 mg/ml), dextrose 50%, dextrose 50%, glucagon (human recombinant), glucose, glucose, influenza, insulin  aspart U-100, LIDOcaine HCL 20 mg/ml (2%), melatonin, oxyCODONE, polyethylene glycol, Flushing PICC Protocol **AND** sodium chloride 0.9% **AND** sodium chloride 0.9%, sodium chloride 0.9%, sodium chloride 0.9%, Pharmacy to dose Vancomycin consult **AND** vancomycin - pharmacy to dose, vancomycin    VTE Risk Mitigation (From admission, onward)         Ordered     IP VTE HIGH RISK PATIENT  Once         01/15/22 3539              I have assessed these finding virtually using telemed platform and with assistance of bedside nurse     The attending portion of this evaluation, treatment, and documentation was performed per Jia Mcneal MD via Telemedicine AudioVisual using the secure [employer] software platform with 2 way audio/video. The provider was located off-site and the patient is located in the hospital. The aforementioned video software was utilized to document the relevant history and physical exam    Jia Mcneal MD  Department of Hospital Medicine   [facility] - Cardiology Stepdown

## 2022-01-23 NOTE — ED PROVIDER NOTES
Encounter Date: 1/23/2022       History     Chief Complaint   Patient presents with    Cardiac Arrest     Per EMS: arrived to find patient on sofa SOB. Patient became apneic as they were putting patient into ambulance, performed CPR, gave EPI, resulted in ROSC for @ 8 minutes, then pulse was lost.  Resumed CPR, gave EPI again before arrival.      Patient here witnessed cardiac arrest EMS was called to patient's home with reported shortness of breath she is found seated on the sofa during extrication she became apneic initial EKG showed PEA she had a life vest in place and there was no reported shock the life vest was not in that patient was unresponsive and requesting bystanders to began CPR the patient apparently recently been hospitalized she has a bandage on her left upper chest wall which I am assuming is from previous pacemaker placement or removal EMS reports that patient received an amp of epinephrine IV she had a midline her right upper arm they had return of spontaneous circulation the patient did not become arousable she was intubated in route B she return to asystole and another mg of epinephrine was begun CPR was re-initiated at arrival emergency department patient is on Danny device is CPR in progress she is  asystolic at arrival last epinephrine administered two minutes PTA no further history available at this time        Review of patient's allergies indicates:   Allergen Reactions    Cephalosporins      Tongue swelling and hives    Penicillins      hives       Past Medical History:   Diagnosis Date    CHF (congestive heart failure)     COPD (chronic obstructive pulmonary disease) 11/22/2021    Coronary artery disease     Diabetes mellitus     Hyperlipidemia     Hypertension     LBBB (left bundle branch block)     NSTEMI (non-ST elevated myocardial infarction)     Obesity 9/13/2021    Pulmonary hypertension 11/22/2021    PVD (peripheral vascular disease)      Past Surgical History:    Procedure Laterality Date    CARDIAC CATHETERIZATION       SECTION      CORONARY ARTERY BYPASS GRAFT  06/22/2016    x4    HYSTERECTOMY      INSERTION OF BIVENTRICULAR IMPLANTABLE CARDIOVERTER-DEFIBRILLATOR (ICD)       Family History   Problem Relation Age of Onset    Heart attack Mother     Heart attack Father      Social History     Tobacco Use    Smoking status: Former Smoker    Smokeless tobacco: Never Used   Substance Use Topics    Alcohol use: Not Currently    Drug use: Never     Review of Systems   Unable to perform ROS: Patient unresponsive       Physical Exam     Initial Vitals   BP Pulse Resp Temp SpO2   -- -- -- -- --      MAP       --         Physical Exam    Constitutional: She appears well-developed and well-nourished. She appears distressed. She is intubated.   HENT:   Head: Normocephalic.   Eyes:   Pupils fixed   Cardiovascular:   Asystolic palpable femoral pulse with CPR via Danny device   Pulmonary/Chest: She is intubated.   Breath sounds equal bilaterally with bag-valve-mask   Abdominal: Abdomen is soft.   Musculoskeletal:         General: No edema.     Neurological: She is unresponsive.   Unresponsive   Skin: Capillary refill takes more than 3 seconds. There is pallor.   Cool to the touch at extremities         ED Course   Procedures  Labs Reviewed - No data to display       Imaging Results    None          Medications   EPINEPHrine 0.1 mg/mL injection (0.1 mg Intravenous Given 22 0738)   sodium bicarbonate 8.4 % (1 mEq/mL) injection (50 mEq Intravenous Given 22 0733)     Medical Decision Making:   ED Management:  I placed an OG tube at patient arrival CPR was continued throughout resuscitation via the Danny device she received multiple rounds of epinephrine and 1 amp of bicarb during our resuscitative efforts there was no further return of spontaneous circulation patient remained in asystole and I pronounced patient at 0740 patient's son was home at EMS arrival and  is in route to the hospital I will notify patient's son of his mother's death upon his arrival                      Clinical Impression:   Final diagnoses:  [I46.9] Cardiopulmonary arrest (Primary)          ED Disposition Condition                   Kaleb Hernandez MD  [phone]

## 2022-01-24 LAB
BACTERIA BLD CULT: NORMAL
BACTERIA BLD CULT: NORMAL

## 2022-01-26 LAB
BACTERIA SPEC ANAEROBE CULT: NORMAL

## 2023-02-20 NOTE — HPI
"Claudia Powell is a 66 y.o.  female with past medical history of CAD s/p CABG x4 in 2017 (Dr. Sierra), HFrEF (EF 35% on KOMAL on this admit) s/p Medtronic CRT-D placed in 2018 per patient, pAF (on Eliquis 2.5 mg BID), Type II DM, PAD who initially presented to [facility] on 1/7/22 due to shortness of breath, fever, cough, upper respiratory symptoms. Blood cultures from 1/7/22 were positive for Enterococcus faecalis bacteremia . KOMAL (at OSH) showed small echogenic mass on the RV lead, hyperechoic oblong pannus formation seen on RV lead just below tricuspid valve and vegetation could not be rule out. Mass was measured at 2.1x1 cm. Right atrial lead was unremarkable. At [facility], Infectious Disease was consulted and the patient was placed on IV Vancomycin and Doxycycline (Doxy finished 1/14/21). Repeat blood cultures from 1/8/22 and 1/9/22 were no growth x5 days. Cardiology was consulted and the patient was transferred to [facility] for device extraction.      Regarding her cardiac history, the patient reports that she was first diagnosed with heart failure "years ago." She reports that her CABG was performed in 2017 with Dr. Sierra and that she sees Dr. Najera in [place] as her cardiologist. She does not see an electrophysiologist. She cannot remember when she was diagnosed with atrial fibrillation. She denies any history of stroke. She reports that she was supposed to be following in device clinic but never went to the appointments.      Her Medtronic device was interrogated at bedside by myself today. Last interrogation was performed on 9/18/2018. The interrogation showed that she had been shocked 3 times for VF on 1/7/22. Patient states that she never felt any of those shocks and she does not remember losing consciousness on that day (same day as her admission to outside hospital.)    EP consulted and plans to do complete device extraction today.    KOMAL request placed to be done with device " extraction.    Dysphagia or odynophagia:  No  Liver Disease, esophageal disease, or known varices:  No  Upper GI Bleeding: No  Snoring:  No  Sleep Apnea:  No  Prior neck surgery or radiation:  No  History of anesthetic difficulties:  No  Family history of anesthetic difficulties:  No  Last oral intake:  12 hours ago  Able to move neck in all directions:  Yes     Orbicularis Oris Muscle Flap Text: The defect edges were debeveled with a #15 scalpel blade.  Given that the defect affected the competency of the oral sphincter an orbicularis oris muscle flap was deemed most appropriate to restore this competency and normal muscle function.  Using a sterile surgical marker, an appropriate flap was drawn incorporating the defect. The area thus outlined was incised with a #15 scalpel blade.

## (undated) DEVICE — CATH BRIDGE OCCL BLLN 80CM

## (undated) DEVICE — Device

## (undated) DEVICE — DILATOR VISISHEATH 14FR 43CM

## (undated) DEVICE — BOWL FLUID - BACK STOP

## (undated) DEVICE — DRAPE INCISE IOBAN 2 23X17IN

## (undated) DEVICE — NDL PERCUTANEOUS ENTRYBSDN 18

## (undated) DEVICE — COVER EQUIP BAND STRL 40X60IN

## (undated) DEVICE — KIT WRENCH

## (undated) DEVICE — BLADE PLASMA WIDE SPATULA TIP

## (undated) DEVICE — KIT LEAD LOCKING DEVICE ACC

## (undated) DEVICE — ADHESIVE DERMABOND ADVANCED

## (undated) DEVICE — KIT LEAD ACCESSORY

## (undated) DEVICE — CUTTER LEAD

## (undated) DEVICE — KIT PROBE COVER WITH GEL

## (undated) DEVICE — KIT SHEATH GLIDELIGHT LSR 14FR

## (undated) DEVICE — ELECTRODE REM PLYHSV RETURN 9

## (undated) DEVICE — TRAY FOLEY CATH 16FR LATEX FRE

## (undated) DEVICE — SEE MEDLINE ITEM 107746

## (undated) DEVICE — KIT STYLET 52CM

## (undated) DEVICE — PACK PACER PERMANENT

## (undated) DEVICE — DEVICE LEAD EXTRACTION 2 65CM

## (undated) DEVICE — SHEATH INTRODUCER 9FR 11CM

## (undated) DEVICE — PAD DEFIB CADENCE ADULT R2

## (undated) DEVICE — COVER INSTR ELASTIC BAND 40X20

## (undated) DEVICE — KIT BRIDGE ACCESSORY

## (undated) DEVICE — WRENCH PM PINCH 6.5MM LEAD